# Patient Record
Sex: FEMALE | Race: WHITE | NOT HISPANIC OR LATINO | Employment: UNEMPLOYED | URBAN - METROPOLITAN AREA
[De-identification: names, ages, dates, MRNs, and addresses within clinical notes are randomized per-mention and may not be internally consistent; named-entity substitution may affect disease eponyms.]

---

## 2017-10-24 ENCOUNTER — HOSPITAL ENCOUNTER (OUTPATIENT)
Dept: PREADMISSION TESTING | Facility: HOSPITAL | Age: 22
Discharge: HOME | End: 2017-10-24
Attending: SURGERY
Payer: COMMERCIAL

## 2017-10-24 VITALS — HEIGHT: 61 IN | WEIGHT: 123 LBS | BODY MASS INDEX: 23.22 KG/M2

## 2017-10-24 DIAGNOSIS — K80.20 CALCULUS OF GALLBLADDER WITHOUT CHOLECYSTITIS WITHOUT OBSTRUCTION: Primary | ICD-10-CM

## 2017-10-24 LAB
BILIRUB UR QL STRIP: NEGATIVE
CLARITY UR: CLEAR
COLOR UR: YELLOW
GLUCOSE UR QL STRIP: NEGATIVE
HGB UR QL STRIP: NEGATIVE
KETONES UR QL STRIP: NEGATIVE
LEUKOCYTE ESTERASE UR QL STRIP: NEGATIVE
NITRITE UR QL STRIP: NEGATIVE
PH UR STRIP: 6 [PH] (ref 5–8)
PROT UR QL STRIP: NEGATIVE
SP GR UR STRIP: <=1.005 (ref 1–1.03)
URN SPEC COLLECT METH UR: ABNORMAL
UROBILINOGEN UR STRIP-ACNC: NEGATIVE EU/DL

## 2017-10-24 PROCEDURE — 81003 URINALYSIS AUTO W/O SCOPE: CPT

## 2017-10-24 PROCEDURE — 99900103 DSU ONLY-NO CHARGE-INITIAL HR (STAT)

## 2017-10-26 ENCOUNTER — ANESTHESIA EVENT (OUTPATIENT)
Dept: SURGERY | Facility: HOSPITAL | Age: 22
End: 2017-10-26
Payer: COMMERCIAL

## 2017-10-27 ENCOUNTER — HOSPITAL ENCOUNTER (OUTPATIENT)
Facility: HOSPITAL | Age: 22
Discharge: HOME | End: 2017-10-27
Attending: SURGERY | Admitting: SURGERY
Payer: COMMERCIAL

## 2017-10-27 ENCOUNTER — ANESTHESIA (OUTPATIENT)
Dept: SURGERY | Facility: HOSPITAL | Age: 22
End: 2017-10-27
Payer: COMMERCIAL

## 2017-10-27 VITALS
OXYGEN SATURATION: 98 % | TEMPERATURE: 98 F | DIASTOLIC BLOOD PRESSURE: 66 MMHG | WEIGHT: 123 LBS | BODY MASS INDEX: 23.22 KG/M2 | HEIGHT: 61 IN | RESPIRATION RATE: 16 BRPM | SYSTOLIC BLOOD PRESSURE: 118 MMHG | HEART RATE: 70 BPM

## 2017-10-27 DIAGNOSIS — K80.20 GALLSTONES: ICD-10-CM

## 2017-10-27 LAB
B-HCG UR QL: NEGATIVE
CTP QC/QA: YES

## 2017-10-27 PROCEDURE — 71000015 HC POSTOP RECOV 1ST HR: Performed by: SURGERY

## 2017-10-27 PROCEDURE — 81025 URINE PREGNANCY TEST: CPT | Performed by: SURGERY

## 2017-10-27 PROCEDURE — 63600175 PHARM REV CODE 636 W HCPCS: Performed by: ANESTHESIOLOGY

## 2017-10-27 PROCEDURE — 27201423 OPTIME MED/SURG SUP & DEVICES STERILE SUPPLY: Performed by: SURGERY

## 2017-10-27 PROCEDURE — 25000003 PHARM REV CODE 250: Performed by: ANESTHESIOLOGY

## 2017-10-27 PROCEDURE — 71000033 HC RECOVERY, INTIAL HOUR: Performed by: SURGERY

## 2017-10-27 PROCEDURE — 63600175 PHARM REV CODE 636 W HCPCS: Performed by: SURGERY

## 2017-10-27 PROCEDURE — 36000709 HC OR TIME LEV III EA ADD 15 MIN: Performed by: SURGERY

## 2017-10-27 PROCEDURE — D9220A PRA ANESTHESIA: Mod: CRNA,,, | Performed by: NURSE ANESTHETIST, CERTIFIED REGISTERED

## 2017-10-27 PROCEDURE — 63600175 PHARM REV CODE 636 W HCPCS: Performed by: NURSE ANESTHETIST, CERTIFIED REGISTERED

## 2017-10-27 PROCEDURE — 99900104 DSU ONLY-NO CHARGE-EA ADD'L HR (STAT): Performed by: SURGERY

## 2017-10-27 PROCEDURE — D9220A PRA ANESTHESIA: Mod: ANES,,, | Performed by: ANESTHESIOLOGY

## 2017-10-27 PROCEDURE — 37000009 HC ANESTHESIA EA ADD 15 MINS: Performed by: SURGERY

## 2017-10-27 PROCEDURE — 36000708 HC OR TIME LEV III 1ST 15 MIN: Performed by: SURGERY

## 2017-10-27 PROCEDURE — 71000016 HC POSTOP RECOV ADDL HR: Performed by: SURGERY

## 2017-10-27 PROCEDURE — 25000003 PHARM REV CODE 250: Performed by: NURSE ANESTHETIST, CERTIFIED REGISTERED

## 2017-10-27 PROCEDURE — 37000008 HC ANESTHESIA 1ST 15 MINUTES: Performed by: SURGERY

## 2017-10-27 PROCEDURE — 99900103 DSU ONLY-NO CHARGE-INITIAL HR (STAT): Performed by: SURGERY

## 2017-10-27 PROCEDURE — 71000039 HC RECOVERY, EACH ADD'L HOUR: Performed by: SURGERY

## 2017-10-27 PROCEDURE — 88304 TISSUE EXAM BY PATHOLOGIST: CPT | Performed by: PATHOLOGY

## 2017-10-27 RX ORDER — FENTANYL CITRATE 50 UG/ML
25 INJECTION, SOLUTION INTRAMUSCULAR; INTRAVENOUS EVERY 5 MIN PRN
Status: DISCONTINUED | OUTPATIENT
Start: 2017-10-27 | End: 2017-10-27 | Stop reason: HOSPADM

## 2017-10-27 RX ORDER — ONDANSETRON 2 MG/ML
INJECTION INTRAMUSCULAR; INTRAVENOUS
Status: DISCONTINUED | OUTPATIENT
Start: 2017-10-27 | End: 2017-10-27

## 2017-10-27 RX ORDER — SODIUM CHLORIDE, SODIUM LACTATE, POTASSIUM CHLORIDE, CALCIUM CHLORIDE 600; 310; 30; 20 MG/100ML; MG/100ML; MG/100ML; MG/100ML
INJECTION, SOLUTION INTRAVENOUS CONTINUOUS
Status: DISCONTINUED | OUTPATIENT
Start: 2017-10-27 | End: 2017-10-27 | Stop reason: HOSPADM

## 2017-10-27 RX ORDER — DEXAMETHASONE SODIUM PHOSPHATE 4 MG/ML
INJECTION, SOLUTION INTRA-ARTICULAR; INTRALESIONAL; INTRAMUSCULAR; INTRAVENOUS; SOFT TISSUE
Status: DISCONTINUED | OUTPATIENT
Start: 2017-10-27 | End: 2017-10-27

## 2017-10-27 RX ORDER — FENTANYL CITRATE 50 UG/ML
25 INJECTION, SOLUTION INTRAMUSCULAR; INTRAVENOUS EVERY 5 MIN PRN
Status: COMPLETED | OUTPATIENT
Start: 2017-10-27 | End: 2017-10-27

## 2017-10-27 RX ORDER — LIDOCAINE HCL/PF 100 MG/5ML
SYRINGE (ML) INTRAVENOUS
Status: DISCONTINUED | OUTPATIENT
Start: 2017-10-27 | End: 2017-10-27

## 2017-10-27 RX ORDER — FENTANYL CITRATE 50 UG/ML
INJECTION, SOLUTION INTRAMUSCULAR; INTRAVENOUS
Status: DISCONTINUED | OUTPATIENT
Start: 2017-10-27 | End: 2017-10-27

## 2017-10-27 RX ORDER — MIDAZOLAM HYDROCHLORIDE 1 MG/ML
INJECTION, SOLUTION INTRAMUSCULAR; INTRAVENOUS
Status: DISCONTINUED | OUTPATIENT
Start: 2017-10-27 | End: 2017-10-27

## 2017-10-27 RX ORDER — NEOSTIGMINE METHYLSULFATE 1 MG/ML
INJECTION, SOLUTION INTRAVENOUS
Status: DISCONTINUED | OUTPATIENT
Start: 2017-10-27 | End: 2017-10-27

## 2017-10-27 RX ORDER — ONDANSETRON 2 MG/ML
4 INJECTION INTRAMUSCULAR; INTRAVENOUS ONCE AS NEEDED
Status: DISCONTINUED | OUTPATIENT
Start: 2017-10-27 | End: 2017-10-27 | Stop reason: HOSPADM

## 2017-10-27 RX ORDER — PROPOFOL 10 MG/ML
VIAL (ML) INTRAVENOUS
Status: DISCONTINUED | OUTPATIENT
Start: 2017-10-27 | End: 2017-10-27

## 2017-10-27 RX ORDER — VECURONIUM BROMIDE FOR INJECTION 1 MG/ML
INJECTION, POWDER, LYOPHILIZED, FOR SOLUTION INTRAVENOUS
Status: DISCONTINUED | OUTPATIENT
Start: 2017-10-27 | End: 2017-10-27

## 2017-10-27 RX ORDER — KETOROLAC TROMETHAMINE 30 MG/ML
INJECTION, SOLUTION INTRAMUSCULAR; INTRAVENOUS
Status: DISCONTINUED | OUTPATIENT
Start: 2017-10-27 | End: 2017-10-27

## 2017-10-27 RX ORDER — LIDOCAINE HYDROCHLORIDE 10 MG/ML
1 INJECTION, SOLUTION EPIDURAL; INFILTRATION; INTRACAUDAL; PERINEURAL ONCE
Status: COMPLETED | OUTPATIENT
Start: 2017-10-27 | End: 2017-10-27

## 2017-10-27 RX ORDER — GLYCOPYRROLATE 0.2 MG/ML
INJECTION INTRAMUSCULAR; INTRAVENOUS
Status: DISCONTINUED | OUTPATIENT
Start: 2017-10-27 | End: 2017-10-27

## 2017-10-27 RX ORDER — OXYCODONE HYDROCHLORIDE 5 MG/1
5 TABLET ORAL ONCE
Status: COMPLETED | OUTPATIENT
Start: 2017-10-27 | End: 2017-10-27

## 2017-10-27 RX ORDER — PHENYLEPHRINE HYDROCHLORIDE 10 MG/ML
INJECTION INTRAVENOUS
Status: DISCONTINUED | OUTPATIENT
Start: 2017-10-27 | End: 2017-10-27

## 2017-10-27 RX ORDER — OXYCODONE HYDROCHLORIDE 5 MG/1
5 TABLET ORAL ONCE AS NEEDED
Status: COMPLETED | OUTPATIENT
Start: 2017-10-27 | End: 2017-10-27

## 2017-10-27 RX ADMIN — PIPERACILLIN SODIUM AND TAZOBACTAM SODIUM 3.38 G: 3; .375 INJECTION, POWDER, LYOPHILIZED, FOR SOLUTION INTRAVENOUS at 07:10

## 2017-10-27 RX ADMIN — GLYCOPYRROLATE 0.2 MG: 0.2 INJECTION, SOLUTION INTRAMUSCULAR; INTRAVENOUS at 07:10

## 2017-10-27 RX ADMIN — PHENYLEPHRINE HYDROCHLORIDE 100 MCG: 10 INJECTION INTRAVENOUS at 07:10

## 2017-10-27 RX ADMIN — NEOSTIGMINE METHYLSULFATE 5 MG: 1 INJECTION INTRAVENOUS at 08:10

## 2017-10-27 RX ADMIN — FENTANYL CITRATE 25 MCG: 50 INJECTION, SOLUTION INTRAMUSCULAR; INTRAVENOUS at 09:10

## 2017-10-27 RX ADMIN — FENTANYL CITRATE 50 MCG: 50 INJECTION INTRAMUSCULAR; INTRAVENOUS at 07:10

## 2017-10-27 RX ADMIN — MIDAZOLAM 2 MG: 1 INJECTION INTRAMUSCULAR; INTRAVENOUS at 07:10

## 2017-10-27 RX ADMIN — DEXAMETHASONE SODIUM PHOSPHATE 8 MG: 4 INJECTION, SOLUTION INTRAMUSCULAR; INTRAVENOUS at 07:10

## 2017-10-27 RX ADMIN — LIDOCAINE HYDROCHLORIDE 75 MG: 20 INJECTION, SOLUTION INTRAVENOUS at 07:10

## 2017-10-27 RX ADMIN — SODIUM CHLORIDE, SODIUM LACTATE, POTASSIUM CHLORIDE, AND CALCIUM CHLORIDE: .6; .31; .03; .02 INJECTION, SOLUTION INTRAVENOUS at 08:10

## 2017-10-27 RX ADMIN — VECURONIUM BROMIDE FOR INJECTION 5 MG: 1 INJECTION, POWDER, LYOPHILIZED, FOR SOLUTION INTRAVENOUS at 07:10

## 2017-10-27 RX ADMIN — PROPOFOL 160 MG: 10 INJECTION, EMULSION INTRAVENOUS at 07:10

## 2017-10-27 RX ADMIN — KETOROLAC TROMETHAMINE 30 MG: 30 INJECTION, SOLUTION INTRAMUSCULAR; INTRAVENOUS at 07:10

## 2017-10-27 RX ADMIN — OXYCODONE HYDROCHLORIDE 5 MG: 5 TABLET ORAL at 09:10

## 2017-10-27 RX ADMIN — LIDOCAINE HYDROCHLORIDE 10 MG: 10 INJECTION, SOLUTION EPIDURAL; INFILTRATION; INTRACAUDAL; PERINEURAL at 06:10

## 2017-10-27 RX ADMIN — FENTANYL CITRATE 25 MCG: 50 INJECTION INTRAMUSCULAR; INTRAVENOUS at 08:10

## 2017-10-27 RX ADMIN — ONDANSETRON 4 MG: 2 INJECTION, SOLUTION INTRAMUSCULAR; INTRAVENOUS at 07:10

## 2017-10-27 RX ADMIN — SODIUM CHLORIDE, SODIUM LACTATE, POTASSIUM CHLORIDE, AND CALCIUM CHLORIDE: .6; .31; .03; .02 INJECTION, SOLUTION INTRAVENOUS at 06:10

## 2017-10-27 RX ADMIN — GLYCOPYRROLATE 0.4 MG: 0.2 INJECTION, SOLUTION INTRAMUSCULAR; INTRAVENOUS at 08:10

## 2017-10-27 NOTE — ANESTHESIA PREPROCEDURE EVALUATION
10/27/2017  Irene Garyson is a 21 y.o., female.    Anesthesia Evaluation    I have reviewed the Patient Summary Reports.    I have reviewed the Nursing Notes.      Review of Systems  Anesthesia Hx:  No problems with previous Anesthesia        Physical Exam  General:  Well nourished                 Anesthesia Plan  Type of Anesthesia, risks & benefits discussed:  Anesthesia Type:  general  Patient's Preference:   Intra-op Monitoring Plan:   Intra-op Monitoring Plan Comments:   Post Op Pain Control Plan:   Post Op Pain Control Plan Comments:   Induction:   IV  Beta Blocker:  Patient is not currently on a Beta-Blocker (No further documentation required).       Informed Consent: Patient understands risks and agrees with Anesthesia plan.  Questions answered. Anesthesia consent signed with patient.  ASA Score: 1     Day of Surgery Review of History & Physical:    H&P update referred to the surgeon.         Ready For Surgery From Anesthesia Perspective.

## 2017-10-27 NOTE — TRANSFER OF CARE
"Anesthesia Transfer of Care Note    Patient: Irene Grayson    Procedure(s) Performed: Procedure(s) (LRB):  CHOLECYSTECTOMY-LAPAROSCOPIC (N/A)    Patient location: PACU    Anesthesia Type: general    Transport from OR: Transported from OR on room air with adequate spontaneous ventilation    Post pain: adequate analgesia    Post assessment: no apparent anesthetic complications and tolerated procedure well    Post vital signs: stable    Level of consciousness: sedated    Nausea/Vomiting: no nausea/vomiting    Complications: none    Transfer of care protocol was followed      Last vitals:   Visit Vitals  /64 (BP Location: Left arm, Patient Position: Lying)   Pulse 80   Temp 36.6 °C (97.9 °F) (Temporal)   Resp 18   Ht 5' 1" (1.549 m)   Wt 55.8 kg (123 lb)   LMP 10/14/2017 (Exact Date)   SpO2 99%   Breastfeeding? No   BMI 23.24 kg/m²     "

## 2017-10-27 NOTE — BRIEF OP NOTE
[facility]  Brief Operative Note     SUMMARY     Surgery Date: 10/27/2017     Surgeon(s) and Role:     * Mariela Martin MD - Primary    Assisting Surgeon: Dr Case Riojas    Pre-op Diagnosis:  Cholecystitis [K81.9]    Post-op Diagnosis:  Post-Op Diagnosis Codes:     * Biliary colic [K80.50]    Procedure(s) (LRB):  CHOLECYSTECTOMY-LAPAROSCOPIC (N/A)    Anesthesia: General    Description of the findings of the procedure: Small GB, but intrahepatic    Findings/Key Components: One mod. Size stone in GB--pt wanted a pic of stones    Estimated Blood Loss: 5 mL         Specimens:   Specimen (12h ago through future)    Start     Ordered    10/27/17 0755  Specimen to Pathology - Surgery  Once     Comments:  Pre-op Diagnosis: Cholecystitis [K81.9]Post-op Diagnosis: same Procedure(s):CHOLECYSTECTOMY-LAPAROSCOPIC Number of specimens: 1Name of specimens: gallbladder      10/27/17 0755          Discharge Note    SUMMARY     Admit Date: 10/27/2017    Discharge Date and Time:  10/27/2017 9:01 AM    Hospital Course (synopsis of major diagnoses, care, treatment, and services provided during the course of the hospital stay): **Uneventful*     Final Diagnosis: Post-Op Diagnosis Codes:     * Biliary colic [K80.50]    Disposition: Home or Self Care    Follow Up/Patient Instructions:     Medications:  Reconciled Home Medications:   Current Discharge Medication List      CONTINUE these medications which have NOT CHANGED    Details   ibuprofen (ADVIL,MOTRIN) 200 MG tablet Take 200 mg by mouth every 6 (six) hours as needed for Pain.      ondansetron (ZOFRAN-ODT) 4 MG TbDL Take 1 tablet (4 mg total) by mouth every 8 (eight) hours as needed (nausea).  Qty: 9 tablet, Refills: 0             Discharge Procedure Orders  Diet general     Activity as tolerated     Lifting restrictions   Scheduling Instructions: No heavy lifting X 3 weeks     Remove dressing in 48 hours   Scheduling Instructions: 1). Clean w peroxide after removing  dressings  2) Abd binder- for pt comfort  3). May shower once dressings removed       Follow-up Information     Mariela Martin MD On 11/6/2017.    Specialty:  General Surgery  Why:  For wound re-check at 2:30pm  Contact information:  [address]  [address]  [place] [address]  [phone]             Mariela Martin MD.    Specialty:  General Surgery  Why:  Pt already has an apt on Nov. 6th at 2:30pm  Contact information:  [address]  [address]  [facility] [address]  [phone]

## 2017-10-27 NOTE — OR NURSING
Pt. Is AAOx4, calm, cooperative.  Pt. Tolerates clear liquids without nausea or vomiting and maintains airway on room air.  Has abdominal discomfort/gas pain and abdominal discomfort from lap cali, but able to tolerate pain at this time.  Ambulated with 1 person assist to bathroom and able to void spontaneously. Pt. Hesitant to drink much at this time - RN instructed pt. And her mother on importance of  Adequate oral fluid intake, and to avoid spicy or fried food, advancing diet slowly, and they verbalized understanding.  RN reviewed plan of care for post op care, discharge instructions and medications with pt. And mother and they voiced understanding.  RN gave written instructions to mother.  Discharged by wheelchair to lobby and home with parents in private vehicle.

## 2017-10-27 NOTE — OR NURSING
Pt arrived to preop and placed in bed. SCDs and warm blankets provided. Periop process explained to both patient and family with verbalized understanding. Pts belongings tagged/bagged and kept in preop.

## 2017-10-27 NOTE — DISCHARGE SUMMARY
DATE OF ADMISSION:  10/27/2017.    DATE OF DISCHARGE:  10/27/2017.    DISCHARGE DIAGNOSES:  1.  Cholelithiasis.  2.  Biliary colic.  3.  Intrahepatic gallbladder.    PROCEDURE:  Laparoscopic cholecystectomy.    SURGEON:  Mariela Martin M.D.    FIRST ASSISTANT:  Dr. Rodriguez    SUMMARY:  The patient is a 21-year-old white female who was having biliary   colic, which was getting more frequent and she was found to have gallstones in   her gallbladder.  She was admitted for a laparoscopic cholecystectomy.  The   surgery went fine.  She was taken to Recovery Room and once she recovers will be   taken to Same Day Surgery and discharged home after she urinates and tolerate   liquids.  She has four Band-Aids which she was instructed to keep on for 48   hours and then removed.  She may shower once she takes the Band-Aid off.    ACTIVITY:  No heavy lifting x3 weeks.    DIET:  Regular.    INSTRUCTIONS:  Clean incisions 3 times a day with peroxide after removing the   dressings.    ACTIVITY:  No heavy lifting for 3 weeks.    CLINIC FOLLOWUP:  The patient is to come back and see me on 11/06/2017 at 2:30   in my office.    CONDITION UPON DISCHARGE:  Stable.      KATHRYN/IN  dd: 10/27/2017 09:11:36 (CDT)  td: 10/27/2017 10:40:35 (CDT)  Doc ID   #[id number]  Job ID #[id number]    CC:

## 2017-10-27 NOTE — ANESTHESIA POSTPROCEDURE EVALUATION
"Anesthesia Post Evaluation    Patient: Irene Grayson    Procedure(s) Performed: Procedure(s) (LRB):  CHOLECYSTECTOMY-LAPAROSCOPIC (N/A)    Final Anesthesia Type: general  Patient location during evaluation: PACU  Patient participation: Yes- Able to Participate  Level of consciousness: awake and alert  Post-procedure vital signs: reviewed and stable  Pain management: adequate  Airway patency: patent  PONV status at discharge: No PONV  Anesthetic complications: no      Cardiovascular status: blood pressure returned to baseline and hemodynamically stable  Respiratory status: unassisted  Hydration status: euvolemic  Follow-up not needed.        Visit Vitals  /64 (BP Location: Left arm, Patient Position: Lying)   Pulse 80   Temp 36.6 °C (97.9 °F) (Temporal)   Resp 18   Ht 5' 1" (1.549 m)   Wt 55.8 kg (123 lb)   LMP 10/14/2017 (Exact Date)   SpO2 99%   Breastfeeding? No   BMI 23.24 kg/m²       Pain/Isaac Score: Pain Assessment Performed: Yes (10/27/2017  8:55 AM)  Presence of Pain: non-verbal indicators absent (10/27/2017  8:55 AM)  Pain Rating Prior to Med Admin: 4 (10/27/2017  6:38 AM)  Isaac Score: 8 (10/27/2017  8:55 AM)      "

## 2017-10-27 NOTE — DISCHARGE INSTRUCTIONS
General Information:    1.  Do not drink alcoholic beverages including beer for 24 hours or as long as you are on pain medication..  2.  Do not drive a motor vehicle, operate machinery or power tools, or signs legal papers for 24 hours or as long as you are on pain medication.   3.  You may experience light-headedness, dizziness, and sleepiness following surgery. Please do not stay alone. A responsible adult should be with you for this 24 hour period.  4.  Go home and rest.    5. Progress slowly to a normal diet unless instructed.  Otherwise, begin with liquids such as soft drinks, then soup and crackers working up to solid foods. Drink plenty of nonalcoholic fluids.  6.  Certain anesthetics and pain medications produce nausea and vomiting in certain       individuals. If nausea becomes a problem at home, call you doctor.    7. A nurse will be calling you sometime after surgery. Do not be alarmed. This is our way of finding out how you are doing.    8. Several times every hour while you are awake, take 2-3 deep breaths and cough. If you had stomach surgery hold a pillow or rolled towel firmly against your stomach before you cough. This will help with any pain the cough might cause.  9. Several times every hour while you are awake, pump and flex your feet 5-6 times and do foot circles. This will help prevent blood clots.    10.Call your doctor for severe pain, bleeding, fever, or signs or symptoms of infection (pain, swelling, redness, foul odor, drainage).    Discharge Instructions: After Your Surgery/Procedure  Youve just had surgery. During surgery you were given medicine called anesthesia to keep you relaxed and free of pain. After surgery you may have some pain or nausea. This is common. Here are some tips for feeling better and getting well after surgery.     Stay on schedule with your medication.   Going home  Your doctor or nurse will show you how to take care of yourself when you go home. He or she will  "also answer your questions. Have an adult family member or friend drive you home.      For your safety we recommend these precaution for the first 24 hours after your procedure:  · Do not drive or use heavy equipment.  · Do not make important decisions or sign legal papers.  · Do not drink alcohol.  · Have someone stay with you, if needed. He or she can watch for problems and help keep you safe.  · Your concentration, balance, coordination, and judgement may be impaired for many hours after anesthesia.  Use caution when ambulating or standing up.     · You may feel weak and "washed out" after anesthesia and surgery.      Subtle residual effects of general anesthesia or sedation with regional / local anesthesia can last more than 24 hours.  Rest for the remainder of the day or longer if your Doctor/Surgeon has advised you to do so.  Although you may feel normal within the first 24 hours, your reflexes and mental ability may be impaired without you realizing it.  You may feel dizzy, lightheaded or sleepy for 24 hours or longer.      Be sure to go to all follow-up visits with your doctor. And rest after your surgery for as long as your doctor tells you to.  Coping with pain  If you have pain after surgery, pain medicine will help you feel better. Take it as told, before pain becomes severe. Also, ask your doctor or pharmacist about other ways to control pain. This might be with heat, ice, or relaxation. And follow any other instructions your surgeon or nurse gives you.  Tips for taking pain medicine  To get the best relief possible, remember these points:  · Pain medicines can upset your stomach. Taking them with a little food may help.  · Most pain relievers taken by mouth need at least 20 to 30 minutes to start to work.  · Taking medicine on a schedule can help you remember to take it. Try to time your medicine so that you can take it before starting an activity. This might be before you get dressed, go for a walk, " or sit down for dinner.  · Constipation is a common side effect of pain medicines. Call your doctor before taking any medicines such as laxatives or stool softeners to help ease constipation. Also ask if you should skip any foods. Drinking lots of fluids and eating foods such as fruits and vegetables that are high in fiber can also help. Remember, do not take laxatives unless your surgeon has prescribed them.  · Drinking alcohol and taking pain medicine can cause dizziness and slow your breathing. It can even be deadly. Do not drink alcohol while taking pain medicine.  · Pain medicine can make you react more slowly to things. Do not drive or run machinery while taking pain medicine.  Your health care provider may tell you to take acetaminophen to help ease your pain. Ask him or her how much you are supposed to take each day. Acetaminophen or other pain relievers may interact with your prescription medicines or other over-the-counter (OTC) drugs. Some prescription medicines have acetaminophen and other ingredients. Using both prescription and OTC acetaminophen for pain can cause you to overdose. Read the labels on your OTC medicines with care. This will help you to clearly know the list of ingredients, how much to take, and any warnings. It may also help you not take too much acetaminophen. If you have questions or do not understand the information, ask your pharmacist or health care provider to explain it to you before you take the OTC medicine.  Managing nausea  Some people have an upset stomach after surgery. This is often because of anesthesia, pain, or pain medicine, or the stress of surgery. These tips will help you handle nausea and eat healthy foods as you get better. If you were on a special food plan before surgery, ask your doctor if you should follow it while you get better. These tips may help:  · Do not push yourself to eat. Your body will tell you when to eat and how much.  · Start off with clear  liquids and soup. They are easier to digest.  · Next try semi-solid foods, such as mashed potatoes, applesauce, and gelatin, as you feel ready.  · Slowly move to solid foods. Dont eat fatty, rich, or spicy foods at first.  · Do not force yourself to have 3 large meals a day. Instead eat smaller amounts more often.  · Take pain medicines with a small amount of solid food, such as crackers or toast, to avoid nausea.     Call your surgeon if  · You still have pain an hour after taking medicine. The medicine may not be strong enough.  · You feel too sleepy, dizzy, or groggy. The medicine may be too strong.  · You have side effects like nausea, vomiting, or skin changes, such as rash, itching, or hives.       If you have obstructive sleep apnea  You were given anesthesia medicine during surgery to keep you comfortable and free of pain. After surgery, you may have more apnea spells because of this medicine and other medicines you were given. The spells may last longer than usual.   At home:  · Keep using the continuous positive airway pressure (CPAP) device when you sleep. Unless your health care provider tells you not to, use it when you sleep, day or night. CPAP is a common device used to treat obstructive sleep apnea.  · Talk with your provider before taking any pain medicine, muscle relaxants, or sedatives. Your provider will tell you about the possible dangers of taking these medicines.  © 5923-7660 [employer]. [address] [place] [address]. All rights reserved. This information is not intended as a substitute for professional medical care. Always follow your healthcare professional's instructions.  Post op instructions for prevention of DVT  What is deep vein thrombosis?  Deep vein thrombosis (DVT) is the medical term for blood clots in the deep veins of the leg.  These blood clots can be dangerous.  A DVT can block a blood vessel and keep blood from getting where it needs to go.   Another problem is that the clot can travel to other parts of the body such as the lungs.  A clot that travels to the lungs is called a pulmonary embolus (PE) and can cause serious problems with breathing which can lead to death.  Am I at risk for DVT/PE?  If you are not very active, you are at risk of DVT.  Anyone confined to bed, sitting for long periods of time, recovering from surgery, etc. increases the risk of DVT.  Other risk factors are cancer diagnosis, certain medications, estrogen replacement in any form,older age, obesity, pregnancy, smoking, history of clotting disorders, and dehydration.  How will I know if I have a DVT?   Swelling in the lower leg   Pain   Warmth, redness, hardness or bulging of the vein  If you have any of these symptoms, call your doctors office right away.  Some people will not have any symptoms until the clot moves to the lungs.  What are the symptoms of a PE?   Panting, shortness of breath, or trouble breathing   Sharp, knife-like chest pain when you breathe   Coughing or coughing up blood   Rapid heartbeat  If you have any of these symptoms or get worse quickly, call 911 for emergency treatment.  How can I prevent a DVT?   Avoid long periods of inactivity and dont cross your legs--get up and walk around every hour or so.   Stay active--walking after surgery is highly encouraged.  This means you should get out of the house and walk in the neighborhood.  Going up and down stairs will not impair healing (unless advised against such activity by your doctor).     Drink plenty of noncaffeinated, nonalcoholic fluids each day to prevent dehydration.   Wear special support stockings, if they have been advised by your doctor.   If you travel, stop at least once an hour and walk around.   Avoid smoking (assistance with stopping is available through your healthcare provider)  Always notify your doctor if you are not able to follow the post operative instructions that are  given to you at the time of discharge.  It may be necessary to prescribe one of the medications available to prevent DVT.Using Opioids for Pain Management     Your doctor has given instructions for you to take an opioid.  This is a drug for bad pain.  It helps control pain without causing bleeding and kidney problems.  Common opioid names are morphine, hydromorphone, oxycodone, and methadone. These drugs are called narcotics.    There are several safety concerns you need to know.     · It is against the law to give or sell this drug to another person.  You must keep this medicine safely locked.    · You may have side effects from taking this medication.  These include nausea, itching, sweating, sleepiness, a change in your ability to breathe, and depression.  · Do not take alcohol or sleeping pills opioids.    · Long-term opoid use may no longer giver you relief from pain.  It can cause you stomach pain, mental anxiety, and headaches.  Long-term opoid use can potentially lead to unlawful street drug abuse and reduce your ability to stay employed.    · Your body may become opioid tolerant if you need to take more to get relief.    · You must stop taking opioids if you begin having more pain as a result of the medicine.    · Opioid withdrawal occurs when you have to stop taking the drug.  It can cause you to have nausea, vomiting, diarrhea, stomach pain, anxiety, and dilated pupils in your eyes. This condition means you are opioid dependent.    · Addiction is a drug induced brain disease. It means there are changes in how your brain is working.  Children, teens, and young adults under 25 years old are more likely to get addicted to opioids.      · Addiction can happen with repeated opioid use.  It does not happen with short-term use of two weeks or less.       For more information, please speak with your doctor or pharmacist.        We hope your stay was comfortable as you heal now, mend and rest.    For we have  enjoyed taking care of you by giving your our best.    And as you get better, by regaining your health and strength;   We count it as a privilege to have served you and hope your time at [facility] was well spent.      Thank  You!!!  Discharge Instructions for Laparoscopic Cholecystectomy  You have had a procedure known as a laparoscopic cholecystectomy. A laparoscopic cholecystectomy is a procedure to remove your gallbladder. People who have this procedure usually recover more quickly and have less pain than with open gallbladder surgery (called open cholecystectomy). Many surgeons recommend a low-fat diet, avoiding fried food in particular, for the first month after surgery.   You can live a full and healthy life without your gallbladder. This includes eating the foods and doing the things you enjoyed before your gallbladder problems started.  Home care  Recommendations for home care include the following:   · Ask someone to drive you to your appointments for the next 3 days. Dont drive until you are no longer taking pain medicine and are able to step on the brake pedal without hesitation.   · Wash the skin around your incision daily with mild soap and water. It's OK to shower the day after your surgery.  · Eat your regular diet. It is wise to stay away from rich, greasy, or spicy food for a few days.  · Remember, it takes at least 1 week for you to get most of your strength and energy back.  · Make an office visit to talk to your healthcare provider if the following symptoms dont go away within a week after your surgery:  ¨ Fatigue  ¨ Pain around the incision  ¨ Diarrhea or constipation  ¨ Loss of appetite     When to call your healthcare provider  Call your healthcare provider immediately if you have any of the following:  · Yellowing of your eyes or skin (jaundice)  · Chills  · Fever of 100.4°F (38.0°C) or higher, or as directed by your healthcare provider   · Redness, swelling, increasing pain, pus, or a foul  smell at the incision site  · Dark or rust-colored urine  · Stool that is asael-colored or light in color instead of brown  · Increasing belly pain  · Rectal bleeding  · Leg swelling or shortness of breath   Date Last Reviewed: 7/1/2016  © 0553-6517 [employer]. [address] [place] [address]. All rights reserved. This information is not intended as a substitute for professional medical care. Always follow your healthcare professional's instructions.

## 2018-01-03 ENCOUNTER — OFFICE VISIT (OUTPATIENT)
Dept: FAMILY MEDICINE | Facility: CLINIC | Age: 23
End: 2018-01-03
Payer: COMMERCIAL

## 2018-01-03 VITALS
DIASTOLIC BLOOD PRESSURE: 80 MMHG | RESPIRATION RATE: 16 BRPM | BODY MASS INDEX: 22.28 KG/M2 | TEMPERATURE: 98 F | SYSTOLIC BLOOD PRESSURE: 118 MMHG | WEIGHT: 118 LBS | HEIGHT: 61 IN | HEART RATE: 88 BPM

## 2018-01-03 DIAGNOSIS — B00.1 FEVER BLISTER: ICD-10-CM

## 2018-01-03 DIAGNOSIS — N92.1 MENORRHAGIA WITH IRREGULAR CYCLE: Primary | ICD-10-CM

## 2018-01-03 DIAGNOSIS — D50.0 IRON DEFICIENCY ANEMIA DUE TO CHRONIC BLOOD LOSS: ICD-10-CM

## 2018-01-03 DIAGNOSIS — R51.9 CHRONIC DAILY HEADACHE: ICD-10-CM

## 2018-01-03 PROCEDURE — 99213 OFFICE O/P EST LOW 20 MIN: CPT | Mod: 25,,, | Performed by: FAMILY MEDICINE

## 2018-01-03 RX ORDER — NORTRIPTYLINE HYDROCHLORIDE 50 MG/1
50 CAPSULE ORAL NIGHTLY
Qty: 30 CAPSULE | Refills: 11 | Status: SHIPPED | OUTPATIENT
Start: 2018-01-03 | End: 2018-03-29

## 2018-01-03 RX ORDER — FAMCICLOVIR 500 MG/1
500 TABLET ORAL 3 TIMES DAILY
Qty: 21 TABLET | Refills: 6 | Status: SHIPPED | OUTPATIENT
Start: 2018-01-03 | End: 2018-01-10

## 2018-01-03 RX ORDER — BUTALBITAL, ACETAMINOPHEN AND CAFFEINE 50; 325; 40 MG/1; MG/1; MG/1
1 TABLET ORAL EVERY 4 HOURS PRN
Qty: 28 TABLET | Refills: 0 | Status: SHIPPED | OUTPATIENT
Start: 2018-01-03 | End: 2018-01-10

## 2018-01-03 NOTE — PROGRESS NOTES
Subjective:       Patient ID: Irene Grayson is a 22 y.o. female.    Chief Complaint: Headache and Jaw Pain (and pain in lymph nodes. )      Patient reports that she had a pain at the bridge of the mandible in the midline socially developed fever blister shows a pain in the right and anterior cervical nodes at the angle of the jaw and on the left parasternal area      Headache    This is a chronic problem. Episode onset: 9 months. The problem occurs intermittently (3/week). The problem has been gradually worsening. The pain is located in the bilateral, parietal and frontal region. The pain is at a severity of 10/10. The pain is severe. Associated symptoms include sinus pressure. Associated symptoms comments: No scotomata.. The symptoms are aggravated by bright light. She has tried darkened room for the symptoms. The treatment provided moderate relief. Her past medical history is significant for migraines in the family.       Allergies and Medications:   Review of patient's allergies indicates:  No Known Allergies  Current Outpatient Prescriptions   Medication Sig Dispense Refill    butalbital-acetaminophen-caffeine -40 mg (FIORICET, ESGIC) -40 mg per tablet Take 1 tablet by mouth every 4 (four) hours as needed for Pain. 28 tablet 0    famciclovir (FAMVIR) 500 MG tablet Take 1 tablet (500 mg total) by mouth 3 (three) times daily. 21 tablet 6    nortriptyline (PAMELOR) 50 MG capsule Take 1 capsule (50 mg total) by mouth every evening. 30 capsule 11     No current facility-administered medications for this visit.        Family History:   Family History   Problem Relation Age of Onset    Adopted: Yes    No Known Problems Mother     No Known Problems Father        Social History:   Social History     Social History    Marital status: Single     Spouse name: N/A    Number of children: N/A    Years of education: N/A     Occupational History    Not on file.     Social History Main Topics    Smoking  status: Current Every Day Smoker     Packs/day: 0.50     Years: 5.00     Types: Cigarettes    Smokeless tobacco: Never Used    Alcohol use No    Drug use: No    Sexual activity: Not on file     Other Topics Concern    Not on file     Social History Narrative    No narrative on file       Review of Systems   HENT: Positive for sinus pressure.    Neurological: Positive for headaches.       Objective:     Vitals:    01/03/18 1408   BP: 118/80   Pulse: 88   Resp: 16   Temp: 97.9 °F (36.6 °C)        Physical Exam    Assessment:       1. Menorrhagia with irregular cycle    2. Chronic daily headache    3. Fever blister    4. Iron deficiency anemia due to chronic blood loss        Plan:       Irene was seen today for headache and jaw pain.    Diagnoses and all orders for this visit:    Menorrhagia with irregular cycle  -     Ambulatory referral to Gynecology    Chronic daily headache  -     butalbital-acetaminophen-caffeine -40 mg (FIORICET, ESGIC) -40 mg per tablet; Take 1 tablet by mouth every 4 (four) hours as needed for Pain.  -     nortriptyline (PAMELOR) 50 MG capsule; Take 1 capsule (50 mg total) by mouth every evening.    Fever blister  -     famciclovir (FAMVIR) 500 MG tablet; Take 1 tablet (500 mg total) by mouth 3 (three) times daily.    Iron deficiency anemia due to chronic blood loss  -     Ambulatory referral to Gynecology  -     CBC auto differential; Future  -     CBC auto differential         Return in about 1 month (around 2/3/2018), or if symptoms worsen or fail to improve.

## 2018-03-19 ENCOUNTER — TELEPHONE (OUTPATIENT)
Dept: FAMILY MEDICINE | Facility: CLINIC | Age: 23
End: 2018-03-19

## 2018-03-29 ENCOUNTER — OFFICE VISIT (OUTPATIENT)
Dept: FAMILY MEDICINE | Facility: CLINIC | Age: 23
End: 2018-03-29
Payer: COMMERCIAL

## 2018-03-29 VITALS
BODY MASS INDEX: 21.9 KG/M2 | HEIGHT: 61 IN | HEART RATE: 108 BPM | SYSTOLIC BLOOD PRESSURE: 118 MMHG | RESPIRATION RATE: 20 BRPM | TEMPERATURE: 99 F | WEIGHT: 116 LBS | DIASTOLIC BLOOD PRESSURE: 68 MMHG

## 2018-03-29 DIAGNOSIS — R11.2 INTRACTABLE VOMITING WITH NAUSEA, UNSPECIFIED VOMITING TYPE: ICD-10-CM

## 2018-03-29 DIAGNOSIS — R10.84 GENERALIZED ABDOMINAL PAIN: Primary | ICD-10-CM

## 2018-03-29 DIAGNOSIS — K29.70 GASTRITIS, PRESENCE OF BLEEDING UNSPECIFIED, UNSPECIFIED CHRONICITY, UNSPECIFIED GASTRITIS TYPE: ICD-10-CM

## 2018-03-29 LAB
BILIRUB SERPL-MCNC: NORMAL MG/DL
BLOOD URINE, POC: NORMAL
COLOR, POC UA: NORMAL
GLUCOSE UR QL STRIP: NORMAL
KETONES UR QL STRIP: NORMAL
LEUKOCYTE ESTERASE URINE, POC: NORMAL
NITRITE, POC UA: NORMAL
PH, POC UA: 6
PROTEIN, POC: NORMAL
SPECIFIC GRAVITY, POC UA: 1.01
UROBILINOGEN, POC UA: 0.2

## 2018-03-29 PROCEDURE — 99213 OFFICE O/P EST LOW 20 MIN: CPT | Mod: 25,,, | Performed by: FAMILY MEDICINE

## 2018-03-29 PROCEDURE — 81002 URINALYSIS NONAUTO W/O SCOPE: CPT | Mod: ,,, | Performed by: FAMILY MEDICINE

## 2018-03-29 RX ORDER — ONDANSETRON 4 MG/1
4 TABLET, ORALLY DISINTEGRATING ORAL EVERY 6 HOURS PRN
Qty: 20 TABLET | Refills: 0 | Status: ON HOLD | OUTPATIENT
Start: 2018-03-29 | End: 2020-02-02

## 2018-03-29 RX ORDER — OMEPRAZOLE 40 MG/1
40 CAPSULE, DELAYED RELEASE ORAL DAILY
Qty: 90 CAPSULE | Refills: 3 | Status: SHIPPED | OUTPATIENT
Start: 2018-03-29 | End: 2019-07-30 | Stop reason: SDUPTHER

## 2018-03-29 NOTE — PROGRESS NOTES
Subjective:       Patient ID: Irene Grayson is a 22 y.o. female.    Chief Complaint: Emesis and Hospital Follow Up (er with pneumonia last week)      Patient was admitted to the hospital for 4 days because of combination of elevated blood pressure pneumonia unspecified type of bacterial infection hospital records not available at this time.D/c'd almost a week a go . devoloped vomiting last pm.  LMP yesterday.      Emesis    This is a new problem. The current episode started yesterday. The emesis has an appearance of bile. There has been no fever. Associated symptoms include diarrhea (chronic). The treatment provided no relief.       Allergies and Medications:   Review of patient's allergies indicates:  No Known Allergies  Current Outpatient Prescriptions   Medication Sig Dispense Refill    omeprazole (PRILOSEC) 40 MG capsule Take 1 capsule (40 mg total) by mouth once daily. 90 capsule 3    ondansetron (ZOFRAN-ODT) 4 MG TbDL Take 1 tablet (4 mg total) by mouth every 6 (six) hours as needed (nausea). 20 tablet 0     No current facility-administered medications for this visit.        Family History:   Family History   Problem Relation Age of Onset    Adopted: Yes    No Known Problems Mother     No Known Problems Father        Social History:   Social History     Social History    Marital status: Single     Spouse name: N/A    Number of children: N/A    Years of education: N/A     Occupational History    Not on file.     Social History Main Topics    Smoking status: Current Every Day Smoker     Packs/day: 0.50     Years: 5.00     Types: Cigarettes    Smokeless tobacco: Never Used    Alcohol use No    Drug use: No    Sexual activity: Not on file     Other Topics Concern    Not on file     Social History Narrative    No narrative on file       Review of Systems   Gastrointestinal: Positive for diarrhea (chronic) and vomiting.       Objective:     Vitals:    03/29/18 1341   BP: 118/68   Pulse: 108    Resp: 20   Temp: 98.8 °F (37.1 °C)        Physical Exam   Constitutional: She appears well-developed and well-nourished. No distress.   HENT:   Head: Normocephalic and atraumatic.   Right Ear: Hearing, tympanic membrane, external ear and ear canal normal. No drainage, swelling or tenderness. No foreign bodies. Tympanic membrane is not injected, not scarred, not perforated, not erythematous, not retracted and not bulging. Tympanic membrane mobility is normal. No middle ear effusion. No hemotympanum. No decreased hearing is noted.   Left Ear: Hearing, tympanic membrane, external ear and ear canal normal. No drainage, swelling or tenderness. No foreign bodies. Tympanic membrane is not injected, not scarred, not perforated, not erythematous, not retracted and not bulging. Tympanic membrane mobility is normal.  No middle ear effusion. No hemotympanum. No decreased hearing is noted.   Nose: Nose normal. No mucosal edema, rhinorrhea, nose lacerations, sinus tenderness, nasal deformity or septal deviation. Right sinus exhibits no maxillary sinus tenderness and no frontal sinus tenderness. Left sinus exhibits no maxillary sinus tenderness and no frontal sinus tenderness.   Mouth/Throat: Uvula is midline and oropharynx is clear and moist. Normal dentition. No oropharyngeal exudate, posterior oropharyngeal edema, posterior oropharyngeal erythema or tonsillar abscesses.   Eyes: Conjunctivae and EOM are normal. Pupils are equal, round, and reactive to light. Right eye exhibits no discharge. Left eye exhibits no discharge. No scleral icterus.   Neck: Normal range of motion. Neck supple. No JVD present. No tracheal deviation present. No thyromegaly present.   Cardiovascular: Normal rate, regular rhythm, normal heart sounds and intact distal pulses.  Exam reveals no gallop and no friction rub.    No murmur heard.  Pulmonary/Chest: Effort normal and breath sounds normal. No stridor. No respiratory distress. She has no wheezes. She  has no rales. She exhibits no tenderness.   Abdominal: Soft. Bowel sounds are normal. She exhibits no distension (mid to LUQ, no rebound) and no mass. There is tenderness. There is no rebound and no guarding. No hernia.   Lymphadenopathy:     She has no cervical adenopathy.   Skin: She is not diaphoretic.   Nursing note and vitals reviewed.    Urine dipstick shows positive for ketones.  Micro exam: not done. UA is negative for leukocytes and nitrites and blood.    Assessment:       1. Generalized abdominal pain    2. Gastritis, presence of bleeding unspecified, unspecified chronicity, unspecified gastritis type    3. Intractable vomiting with nausea, unspecified vomiting type        Plan:       Irene was seen today for emesis and hospital follow up.    Diagnoses and all orders for this visit:    Generalized abdominal pain  -     POCT URINE DIPSTICK WITHOUT MICROSCOPE    Gastritis, presence of bleeding unspecified, unspecified chronicity, unspecified gastritis type  -     omeprazole (PRILOSEC) 40 MG capsule; Take 1 capsule (40 mg total) by mouth once daily.  -     ondansetron (ZOFRAN-ODT) 4 MG TbDL; Take 1 tablet (4 mg total) by mouth every 6 (six) hours as needed (nausea).    Intractable vomiting with nausea, unspecified vomiting type  -     POCT URINE DIPSTICK WITHOUT MICROSCOPE         Follow-up in about 4 days (around 4/2/2018), or if symptoms worsen or fail to improve.

## 2018-06-06 ENCOUNTER — OFFICE VISIT (OUTPATIENT)
Dept: FAMILY MEDICINE | Facility: CLINIC | Age: 23
End: 2018-06-06
Payer: COMMERCIAL

## 2018-06-06 VITALS
WEIGHT: 120 LBS | BODY MASS INDEX: 22.66 KG/M2 | DIASTOLIC BLOOD PRESSURE: 64 MMHG | HEART RATE: 110 BPM | SYSTOLIC BLOOD PRESSURE: 110 MMHG | TEMPERATURE: 99 F | RESPIRATION RATE: 20 BRPM | HEIGHT: 61 IN | OXYGEN SATURATION: 100 %

## 2018-06-06 DIAGNOSIS — N91.4 SECONDARY OLIGOMENORRHEA: ICD-10-CM

## 2018-06-06 DIAGNOSIS — N60.12 FIBROCYSTIC DISEASE OF LEFT BREAST: ICD-10-CM

## 2018-06-06 DIAGNOSIS — R11.0 NAUSEA: ICD-10-CM

## 2018-06-06 LAB
B-HCG UR QL: NEGATIVE
BILIRUB SERPL-MCNC: NEGATIVE MG/DL
BLOOD, POC UA: NORMAL
CTP QC/QA: YES
GLUCOSE UR QL STRIP: NEGATIVE
KETONES UR QL STRIP: NEGATIVE
LEUKOCYTE ESTERASE URINE, POC: NEGATIVE
NITRITE, POC UA: NEGATIVE
PH, POC UA: 6
PROTEIN, POC: NEGATIVE
SPECIFIC GRAVITY, POC UA: 1
UROBILINOGEN, POC UA: 0.2

## 2018-06-06 PROCEDURE — 3008F BODY MASS INDEX DOCD: CPT | Mod: ,,, | Performed by: FAMILY MEDICINE

## 2018-06-06 PROCEDURE — 81025 URINE PREGNANCY TEST: CPT | Mod: ,,, | Performed by: FAMILY MEDICINE

## 2018-06-06 PROCEDURE — 81000 URINALYSIS NONAUTO W/SCOPE: CPT | Mod: ,,, | Performed by: FAMILY MEDICINE

## 2018-06-06 PROCEDURE — 99214 OFFICE O/P EST MOD 30 MIN: CPT | Mod: 25,,, | Performed by: FAMILY MEDICINE

## 2018-06-06 NOTE — PATIENT INSTRUCTIONS
Fibrocystic    What Are Benign Breast Conditions?  Most breast conditions are benign (noncancerous), causing no serious harm to you. But all women are at some risk for breast cancer. Your risk increases as you grow older. So if you notice any breast changes that arent normal for you, see your healthcare provider. This helps to make sure that you receive proper treatment if there is a problem.  Breast infection  Infections of the breast tissue can cause skin redness, warmth, and pain or tenderness. The most common infection is mastitis. This inflammation of the mammary glands can happen during breastfeeding. Mastitis and other breast infections are often treated with antibiotics.  Nipple discharge  Many women can squeeze a tiny amount of a clear or milky discharge from 1 or both nipples. This discharge may be normal. Other kinds of discharge may be symptoms of a breast condition. A dark discharge can be caused by an intraductal papilloma (a benign growth in a duct near the nipple). A nipple discharge that is dark or bloody, or that happens without squeezing your nipple, should be checked by your healthcare provider.    Fibrocystic changes  These benign changes may cause a thickening in the breasts. Breasts may be tender or painful. They may feel more dense in some areas than in others. Sometimes solid or fluid-filled lumps (cysts) may also form. Cysts may be smooth, soft or firm, and tender. They may become larger and more tender right before your period.    Benign breast lumps  Benign breast lumps come in all shapes, textures, and sizes. A fibroadenoma (a lump of fibrous tissue) may be smooth, firm, and rubbery. This type of lump is usually painless and movable. Have any lump checked by your healthcare provider.  Date Last Reviewed: 8/13/2015  © 1902-8433 [employer]. [address] [place] [address], [place] [address]. All rights reserved. This information is not intended as a substitute for professional  medical care. Always follow your healthcare professional's instructions.

## 2018-06-06 NOTE — PROGRESS NOTES
Subjective:       Patient ID: Irene Grayson is a 22 y.o. female.    Chief Complaint: Hot Flashes; Nausea; and Chest Pain (soreness on left side around breast )      Nausea , hot flashes 2-3 weeks ago. Breast tendernesss, left upper outer quadran , some on right. This started 2 days ago. LMP Sunday, lighter-only 2 to four days.   Positive sexually active.      Nausea   This is a new problem. The current episode started 1 to 4 weeks ago. Associated symptoms include chest pain ( breast tenderness.) and nausea. Nothing aggravates the symptoms. She has tried nothing for the symptoms. The treatment provided no relief.   Chest Pain    Associated symptoms include nausea.         Allergies and Medications:   Review of patient's allergies indicates:  No Known Allergies  Current Outpatient Prescriptions   Medication Sig Dispense Refill    omeprazole (PRILOSEC) 40 MG capsule Take 1 capsule (40 mg total) by mouth once daily. 90 capsule 3    ondansetron (ZOFRAN-ODT) 4 MG TbDL Take 1 tablet (4 mg total) by mouth every 6 (six) hours as needed (nausea). 20 tablet 0     No current facility-administered medications for this visit.        Family History:   Family History   Problem Relation Age of Onset    Adopted: Yes    No Known Problems Mother     No Known Problems Father        Social History:   Social History     Social History    Marital status: Single     Spouse name: N/A    Number of children: N/A    Years of education: N/A     Occupational History    Not on file.     Social History Main Topics    Smoking status: Current Every Day Smoker     Packs/day: 0.50     Years: 5.00     Types: Cigarettes    Smokeless tobacco: Never Used    Alcohol use Yes      Comment: occasionally     Drug use: No    Sexual activity: Not on file     Other Topics Concern    Not on file     Social History Narrative    No narrative on file       Review of Systems   Cardiovascular: Positive for chest pain ( breast tenderness.).    Gastrointestinal: Positive for nausea.       Objective:     Vitals:    06/06/18 1420   BP: 110/64   Pulse: 110   Resp: 20   Temp: 98.5 °F (36.9 °C)        Physical Exam   Constitutional: She appears well-developed and well-nourished.   Pulmonary/Chest:       Skin: Capillary refill takes less than 2 seconds.   Psychiatric: She has a normal mood and affect. Her behavior is normal. Judgment and thought content normal.   Nursing note and vitals reviewed.  Urine dipstick shows negative for all components.  Micro exam: not done.  UPT negative  Assessment:       1. Nausea -Related to hormone fluctuations also affecting her periods and breast tenderness    2. Secondary oligomenorrhea As above    3. Fibrocystic disease of left breast As above        Plan:       Irene was seen today for hot flashes, nausea and chest pain.    Diagnoses and all orders for this visit:    Nausea  -     POCT Urine Pregnancy  -     POCT Urinalysis    Secondary oligomenorrhea  -     POCT Urine Pregnancy    Fibrocystic disease of left breast         No Follow-up on file.

## 2019-04-04 ENCOUNTER — OFFICE VISIT (OUTPATIENT)
Dept: FAMILY MEDICINE | Facility: CLINIC | Age: 24
End: 2019-04-04
Payer: COMMERCIAL

## 2019-04-04 VITALS
DIASTOLIC BLOOD PRESSURE: 78 MMHG | WEIGHT: 114 LBS | BODY MASS INDEX: 21.52 KG/M2 | OXYGEN SATURATION: 98 % | HEIGHT: 61 IN | HEART RATE: 104 BPM | RESPIRATION RATE: 17 BRPM | SYSTOLIC BLOOD PRESSURE: 110 MMHG | TEMPERATURE: 98 F

## 2019-04-04 DIAGNOSIS — K62.89 PROCTITIS: ICD-10-CM

## 2019-04-04 DIAGNOSIS — K52.9 CHRONIC DIARRHEA: ICD-10-CM

## 2019-04-04 PROCEDURE — 99213 OFFICE O/P EST LOW 20 MIN: CPT | Mod: ,,, | Performed by: FAMILY MEDICINE

## 2019-04-04 PROCEDURE — 99213 PR OFFICE/OUTPT VISIT, EST, LEVL III, 20-29 MIN: ICD-10-PCS | Mod: ,,, | Performed by: FAMILY MEDICINE

## 2019-04-04 PROCEDURE — 3008F BODY MASS INDEX DOCD: CPT | Mod: ,,, | Performed by: FAMILY MEDICINE

## 2019-04-04 PROCEDURE — 3008F PR BODY MASS INDEX (BMI) DOCUMENTED: ICD-10-PCS | Mod: ,,, | Performed by: FAMILY MEDICINE

## 2019-04-04 RX ORDER — CHOLESTYRAMINE 4 G/9G
1 POWDER, FOR SUSPENSION ORAL 2 TIMES DAILY
Qty: 60 PACKET | Refills: 5 | Status: SHIPPED | OUTPATIENT
Start: 2019-04-04 | End: 2019-07-30 | Stop reason: SDUPTHER

## 2019-04-04 NOTE — PROGRESS NOTES
Subjective:       Patient ID: Irene Grayson is a 23 y.o. female.    Chief Complaint: Rectal Pain (since gallbladder surgery )      A Tania because of new onset rectal pain. This is started after gallbladder surgery urinary half ago get worse during the pregnancy and after the delivery has not gotten better. Was evaluated by her GYN who did not feel that it was hemorrhoid. She had extensive vaginal tears at requiring multiple lines of sutures. Occurs with all bowel movements for more soft no blood associated with with the pain or with bowel movements. Is not exacerbated by cough sneezing or Valsalva.      Allergies and Medications:   Review of patient's allergies indicates:  No Known Allergies  Current Outpatient Medications   Medication Sig Dispense Refill    ondansetron (ZOFRAN-ODT) 4 MG TbDL Take 1 tablet (4 mg total) by mouth every 6 (six) hours as needed (nausea). 20 tablet 0    cholestyramine, with sugar, 4 gram Powd Take 1 Package by mouth 2 (two) times daily. 60 packet 5    hydrocortisone-pramoxine (PROCTOFOAM-HS) rectal foam Place 1 applicator rectally 2 (two) times daily. 1 applicator 0    omeprazole (PRILOSEC) 40 MG capsule Take 1 capsule (40 mg total) by mouth once daily. 90 capsule 3     No current facility-administered medications for this visit.        Family History:   Family History   Adopted: Yes   Problem Relation Age of Onset    No Known Problems Mother     No Known Problems Father        Social History:   Social History     Socioeconomic History    Marital status: Single     Spouse name: Not on file    Number of children: Not on file    Years of education: Not on file    Highest education level: Not on file   Occupational History    Not on file   Social Needs    Financial resource strain: Not on file    Food insecurity:     Worry: Not on file     Inability: Not on file    Transportation needs:     Medical: Not on file     Non-medical: Not on file   Tobacco Use    Smoking  status: Current Every Day Smoker     Packs/day: 0.50     Years: 5.00     Pack years: 2.50     Types: Cigarettes    Smokeless tobacco: Never Used   Substance and Sexual Activity    Alcohol use: Yes     Comment: occasionally     Drug use: No    Sexual activity: Yes     Partners: Male   Lifestyle    Physical activity:     Days per week: Not on file     Minutes per session: Not on file    Stress: Not on file   Relationships    Social connections:     Talks on phone: Not on file     Gets together: Not on file     Attends Confucianism service: Not on file     Active member of club or organization: Not on file     Attends meetings of clubs or organizations: Not on file     Relationship status: Not on file   Other Topics Concern    Not on file   Social History Narrative    Not on file       Review of Systems    Objective:     Vitals:    04/04/19 0924   BP: 110/78   Pulse: 104   Resp: 17   Temp: 98 °F (36.7 °C)        Physical Exam   Constitutional: She appears well-developed and well-nourished.   HENT:   Head: Normocephalic and atraumatic.   Eyes: Pupils are equal, round, and reactive to light. Conjunctivae and EOM are normal.   Genitourinary: Vagina normal.         Nursing note and vitals reviewed.      Assessment:       1. Chronic diarrhea    2. Proctitis        Plan:       Irene was seen today for rectal pain.    Diagnoses and all orders for this visit:    Chronic diarrhea  -     cholestyramine, with sugar, 4 gram Powd; Take 1 Package by mouth 2 (two) times daily.    Proctitis  -     cholestyramine, with sugar, 4 gram Powd; Take 1 Package by mouth 2 (two) times daily.  -     hydrocortisone-pramoxine (PROCTOFOAM-HS) rectal foam; Place 1 applicator rectally 2 (two) times daily.         Follow up in about 6 months (around 10/4/2019), or if symptoms worsen or fail to improve, for annual, well visit..

## 2019-07-30 ENCOUNTER — OFFICE VISIT (OUTPATIENT)
Dept: FAMILY MEDICINE | Facility: CLINIC | Age: 24
End: 2019-07-30
Payer: COMMERCIAL

## 2019-07-30 VITALS
TEMPERATURE: 98 F | HEART RATE: 104 BPM | WEIGHT: 123.88 LBS | SYSTOLIC BLOOD PRESSURE: 120 MMHG | HEIGHT: 61 IN | BODY MASS INDEX: 23.39 KG/M2 | OXYGEN SATURATION: 99 % | DIASTOLIC BLOOD PRESSURE: 68 MMHG

## 2019-07-30 DIAGNOSIS — K52.9 CHRONIC DIARRHEA: ICD-10-CM

## 2019-07-30 DIAGNOSIS — K62.89 PROCTITIS: ICD-10-CM

## 2019-07-30 DIAGNOSIS — R07.2 PRECORDIAL PAIN: ICD-10-CM

## 2019-07-30 DIAGNOSIS — L02.92 FURUNCULOSIS: ICD-10-CM

## 2019-07-30 DIAGNOSIS — R00.2 PALPITATION: Primary | ICD-10-CM

## 2019-07-30 DIAGNOSIS — K29.70 GASTRITIS, PRESENCE OF BLEEDING UNSPECIFIED, UNSPECIFIED CHRONICITY, UNSPECIFIED GASTRITIS TYPE: ICD-10-CM

## 2019-07-30 LAB — EKG 12-LEAD: NORMAL

## 2019-07-30 PROCEDURE — 99214 PR OFFICE/OUTPT VISIT, EST, LEVL IV, 30-39 MIN: ICD-10-PCS | Mod: 25,S$GLB,, | Performed by: FAMILY MEDICINE

## 2019-07-30 PROCEDURE — 3008F BODY MASS INDEX DOCD: CPT | Mod: S$GLB,,, | Performed by: FAMILY MEDICINE

## 2019-07-30 PROCEDURE — 93000 PR ELECTROCARDIOGRAM, COMPLETE: ICD-10-PCS | Mod: S$GLB,,, | Performed by: FAMILY MEDICINE

## 2019-07-30 PROCEDURE — 99999 PR PBB SHADOW E&M-EST. PATIENT-LVL V: CPT | Mod: PBBFAC,,, | Performed by: FAMILY MEDICINE

## 2019-07-30 PROCEDURE — 99999 PR PBB SHADOW E&M-EST. PATIENT-LVL V: ICD-10-PCS | Mod: PBBFAC,,, | Performed by: FAMILY MEDICINE

## 2019-07-30 PROCEDURE — 99214 OFFICE O/P EST MOD 30 MIN: CPT | Mod: 25,S$GLB,, | Performed by: FAMILY MEDICINE

## 2019-07-30 PROCEDURE — 93000 ELECTROCARDIOGRAM COMPLETE: CPT | Mod: S$GLB,,, | Performed by: FAMILY MEDICINE

## 2019-07-30 PROCEDURE — 3008F PR BODY MASS INDEX (BMI) DOCUMENTED: ICD-10-PCS | Mod: S$GLB,,, | Performed by: FAMILY MEDICINE

## 2019-07-30 RX ORDER — CHOLESTYRAMINE 4 G/9G
1 POWDER, FOR SUSPENSION ORAL 2 TIMES DAILY
Qty: 60 PACKET | Refills: 5 | Status: ON HOLD | OUTPATIENT
Start: 2019-07-30 | End: 2020-02-02

## 2019-07-30 RX ORDER — OMEPRAZOLE 40 MG/1
40 CAPSULE, DELAYED RELEASE ORAL DAILY
Qty: 90 CAPSULE | Refills: 3 | Status: ON HOLD | OUTPATIENT
Start: 2019-07-30 | End: 2020-02-02

## 2019-07-30 RX ORDER — CLINDAMYCIN HYDROCHLORIDE 300 MG/1
300 CAPSULE ORAL EVERY 6 HOURS
Qty: 40 CAPSULE | Refills: 0 | Status: SHIPPED | OUTPATIENT
Start: 2019-07-30 | End: 2019-08-09

## 2019-07-30 RX ORDER — NORETHINDRONE ACETATE AND ETHINYL ESTRADIOL .02; 1 MG/1; MG/1
TABLET ORAL
Refills: 4 | Status: ON HOLD | COMMUNITY
Start: 2019-06-10 | End: 2020-02-02

## 2019-07-30 NOTE — PROGRESS NOTES
Subjective:       Patient ID: Irene Grayson is a 23 y.o. female.    Chief Complaint: Dizziness (palpitations worse after warm shower ) and Mass (under left arm )      Patient has visit today because of dizziness this is started during her pregnancy but has actually persisted and the baby is 5 months old. She relates it was worse directly after the delivery but has persisted and on a daily basis.      Dizziness:   Chronicity:  Chronic  Onset: greater than 5 months.  Progression since onset:  Waxing and waning and gradually improving  Frequency:  Every few hours  Pain Scale:  0/10  Severity:  Initially severe, but improved  Dizziness characteristics:  Spacial disorientation, off-balance and lightheaded/impending faint   Associated symptoms: nausea, light-headedness, palpitations ( Palpitations occur daily depending on activities, facilitated by physical exertion.) and panic.no fever, no headaches, no tinnitus, no vomiting and no slurred speech.  Aggravated by:  Nothing and causal events  Mass   This is a chronic problem. The current episode started 1 to 4 weeks ago (3 weeks). The problem occurs constantly. The problem has been gradually worsening. Associated symptoms include nausea. Pertinent negatives include no chills, fatigue, fever, headaches, rash or vomiting.       Allergies and Medications:   Review of patient's allergies indicates:  No Known Allergies  Current Outpatient Medications   Medication Sig Dispense Refill    norethindrone-ethinyl estradiol (MICROGESTIN 1/20) 1-20 mg-mcg per tablet TK 1 T PO QD  4    cholestyramine, with sugar, 4 gram Powd Take 1 Package by mouth 2 (two) times daily. 60 packet 5    clindamycin (CLEOCIN) 300 MG capsule Take 1 capsule (300 mg total) by mouth every 6 (six) hours. for 10 days 40 capsule 0    hydrocortisone-pramoxine (PROCTOFOAM-HS) rectal foam Place 1 applicator rectally 2 (two) times daily. 1 applicator 0    omeprazole (PRILOSEC) 40 MG capsule Take 1 capsule (40  mg total) by mouth once daily. 90 capsule 3    ondansetron (ZOFRAN-ODT) 4 MG TbDL Take 1 tablet (4 mg total) by mouth every 6 (six) hours as needed (nausea). 20 tablet 0     No current facility-administered medications for this visit.        Family History:   Family History   Adopted: Yes   Problem Relation Age of Onset    No Known Problems Mother     No Known Problems Father        Social History:   Social History     Socioeconomic History    Marital status: Single     Spouse name: Not on file    Number of children: Not on file    Years of education: Not on file    Highest education level: Not on file   Occupational History    Not on file   Social Needs    Financial resource strain: Not on file    Food insecurity:     Worry: Not on file     Inability: Not on file    Transportation needs:     Medical: Not on file     Non-medical: Not on file   Tobacco Use    Smoking status: Current Every Day Smoker     Packs/day: 0.50     Years: 5.00     Pack years: 2.50     Types: Cigarettes    Smokeless tobacco: Never Used   Substance and Sexual Activity    Alcohol use: Yes     Comment: occasionally     Drug use: No    Sexual activity: Yes     Partners: Male   Lifestyle    Physical activity:     Days per week: Not on file     Minutes per session: Not on file    Stress: Not at all   Relationships    Social connections:     Talks on phone: Not on file     Gets together: Not on file     Attends [employer] service: Not on file     Active member of club or organization: Not on file     Attends meetings of clubs or organizations: Not on file     Relationship status: Not on file   Other Topics Concern    Not on file   Social History Narrative    Not on file       Review of Systems   Constitutional: Negative for chills, fatigue and fever.   HENT: Negative for tinnitus.    Cardiovascular: Positive for palpitations ( Palpitations occur daily depending on activities, facilitated by physical exertion.).   Gastrointestinal:  Positive for nausea. Negative for vomiting.   Skin: Negative for rash.   Neurological: Positive for dizziness and light-headedness. Negative for headaches.       Objective:     Vitals:    07/30/19 0952   BP: 120/68   Pulse: 104   Temp:         Physical Exam   Constitutional: She appears well-developed and well-nourished. No distress.   Cardiovascular: Normal rate, regular rhythm, normal heart sounds and intact distal pulses. Exam reveals no gallop and no friction rub.   No murmur heard.  Pulmonary/Chest: Breath sounds normal. No stridor. No respiratory distress. She has no wheezes. She has no rales. She exhibits no tenderness.   Abdominal: Soft. Bowel sounds are normal.   Musculoskeletal:        Arms:  Skin: She is not diaphoretic.   Nursing note and vitals reviewed.    EKG shows a normal sinus rhythm without ectopy or ST changes.  Assessment:       1. Palpitation    2. Chronic diarrhea    3. Proctitis    4. Gastritis, presence of bleeding unspecified, unspecified chronicity, unspecified gastritis type    5. Precordial pain    6. Furunculosis        Plan:       Irene was seen today for dizziness and mass.    Diagnoses and all orders for this visit:    Palpitation  -     POCT EKG 12-LEAD (NOT FOR [facility] USE)  -     CBC auto differential; Future  -     TSH; Future  -     Comprehensive metabolic panel; Future  -     Cancel: Ambulatory consult to Cardiology  -     Ambulatory consult to Cardiology    Chronic diarrhea  -     cholestyramine, with sugar, 4 gram Powd; Take 1 Package by mouth 2 (two) times daily.    Proctitis  -     cholestyramine, with sugar, 4 gram Powd; Take 1 Package by mouth 2 (two) times daily.  -     hydrocortisone-pramoxine (PROCTOFOAM-HS) rectal foam; Place 1 applicator rectally 2 (two) times daily.    Gastritis, presence of bleeding unspecified, unspecified chronicity, unspecified gastritis type  -     omeprazole (PRILOSEC) 40 MG capsule; Take 1 capsule (40 mg total) by mouth once daily.  -      CBC auto differential; Future    Precordial pain  -     POCT EKG 12-LEAD (NOT FOR [facility] USE)  -     Cancel: Ambulatory consult to Cardiology  -     Ambulatory consult to Cardiology    Furunculosis  -     clindamycin (CLEOCIN) 300 MG capsule; Take 1 capsule (300 mg total) by mouth every 6 (six) hours. for 10 days  Warm compresses twice a day as tolerated. Patient was instructed if he does come to ahead to we will come in and drain it.       Follow up if symptoms worsen or fail to improve.

## 2019-09-12 LAB
HBV SURFACE AG SERPL QL IA: NEGATIVE
HCT VFR BLD AUTO: 38 % (ref 36–46)
HIV 1+2 AB+HIV1 P24 AG SERPL QL IA: NORMAL
INDIRECT COOMBS: NORMAL
RPR: NORMAL

## 2019-12-20 ENCOUNTER — HOSPITAL ENCOUNTER (EMERGENCY)
Facility: HOSPITAL | Age: 24
Discharge: HOME | End: 2019-12-21
Attending: EMERGENCY MEDICINE
Payer: COMMERCIAL

## 2019-12-20 DIAGNOSIS — N93.9 VAGINAL BLEEDING: ICD-10-CM

## 2019-12-20 DIAGNOSIS — O46.90 VAGINAL BLEEDING IN PREGNANCY: Primary | ICD-10-CM

## 2019-12-20 LAB
ABO + RH BLD: NORMAL
ALBUMIN SERPL BCP-MCNC: 3 G/DL (ref 3.5–5.2)
ALP SERPL-CCNC: 58 U/L (ref 55–135)
ALT SERPL W/O P-5'-P-CCNC: 10 U/L (ref 10–44)
ANION GAP SERPL CALC-SCNC: 10 MMOL/L (ref 8–16)
AST SERPL-CCNC: 14 U/L (ref 10–40)
BASOPHILS # BLD AUTO: 0.02 K/UL (ref 0–0.2)
BASOPHILS NFR BLD: 0.2 % (ref 0–1.9)
BILIRUB SERPL-MCNC: 0.3 MG/DL (ref 0.1–1)
BILIRUB UR QL STRIP: NEGATIVE
BLD GP AB SCN CELLS X3 SERPL QL: NORMAL
BUN SERPL-MCNC: 6 MG/DL (ref 6–20)
CALCIUM SERPL-MCNC: 8.7 MG/DL (ref 8.7–10.5)
CHLORIDE SERPL-SCNC: 106 MMOL/L (ref 95–110)
CLARITY UR: CLEAR
CO2 SERPL-SCNC: 22 MMOL/L (ref 23–29)
COLOR UR: YELLOW
CREAT SERPL-MCNC: 0.4 MG/DL (ref 0.5–1.4)
DIFFERENTIAL METHOD: ABNORMAL
EOSINOPHIL # BLD AUTO: 0.1 K/UL (ref 0–0.5)
EOSINOPHIL NFR BLD: 0.6 % (ref 0–8)
ERYTHROCYTE [DISTWIDTH] IN BLOOD BY AUTOMATED COUNT: 15.3 % (ref 11.5–14.5)
EST. GFR  (AFRICAN AMERICAN): >60 ML/MIN/1.73 M^2
EST. GFR  (NON AFRICAN AMERICAN): >60 ML/MIN/1.73 M^2
GLUCOSE SERPL-MCNC: 94 MG/DL (ref 70–110)
GLUCOSE UR QL STRIP: NEGATIVE
HCT VFR BLD AUTO: 28.4 % (ref 37–48.5)
HGB BLD-MCNC: 9.3 G/DL (ref 12–16)
HGB UR QL STRIP: NEGATIVE
IMM GRANULOCYTES # BLD AUTO: 0.1 K/UL (ref 0–0.04)
IMM GRANULOCYTES NFR BLD AUTO: 0.9 % (ref 0–0.5)
KETONES UR QL STRIP: NEGATIVE
LEUKOCYTE ESTERASE UR QL STRIP: NEGATIVE
LYMPHOCYTES # BLD AUTO: 1.8 K/UL (ref 1–4.8)
LYMPHOCYTES NFR BLD: 16.6 % (ref 18–48)
MCH RBC QN AUTO: 25.4 PG (ref 27–31)
MCHC RBC AUTO-ENTMCNC: 32.7 G/DL (ref 32–36)
MCV RBC AUTO: 78 FL (ref 82–98)
MONOCYTES # BLD AUTO: 0.7 K/UL (ref 0.3–1)
MONOCYTES NFR BLD: 6.8 % (ref 4–15)
NEUTROPHILS # BLD AUTO: 8.1 K/UL (ref 1.8–7.7)
NEUTROPHILS NFR BLD: 74.9 % (ref 38–73)
NITRITE UR QL STRIP: NEGATIVE
NRBC BLD-RTO: 0 /100 WBC
PH UR STRIP: 7 [PH] (ref 5–8)
PLATELET # BLD AUTO: 278 K/UL (ref 150–350)
PMV BLD AUTO: 10 FL (ref 9.2–12.9)
POTASSIUM SERPL-SCNC: 3.2 MMOL/L (ref 3.5–5.1)
PROT SERPL-MCNC: 6.7 G/DL (ref 6–8.4)
PROT UR QL STRIP: NEGATIVE
RBC # BLD AUTO: 3.66 M/UL (ref 4–5.4)
SODIUM SERPL-SCNC: 138 MMOL/L (ref 136–145)
SP GR UR STRIP: 1.02 (ref 1–1.03)
URN SPEC COLLECT METH UR: ABNORMAL
UROBILINOGEN UR STRIP-ACNC: ABNORMAL EU/DL
WBC # BLD AUTO: 10.74 K/UL (ref 3.9–12.7)

## 2019-12-20 PROCEDURE — 80053 COMPREHEN METABOLIC PANEL: CPT

## 2019-12-20 PROCEDURE — 87205 SMEAR GRAM STAIN: CPT

## 2019-12-20 PROCEDURE — 85025 COMPLETE CBC W/AUTO DIFF WBC: CPT

## 2019-12-20 PROCEDURE — 86850 RBC ANTIBODY SCREEN: CPT

## 2019-12-20 PROCEDURE — 85461 HEMOGLOBIN FETAL: CPT

## 2019-12-20 PROCEDURE — 87081 CULTURE SCREEN ONLY: CPT

## 2019-12-20 PROCEDURE — 86901 BLOOD TYPING SEROLOGIC RH(D): CPT

## 2019-12-20 PROCEDURE — 81003 URINALYSIS AUTO W/O SCOPE: CPT

## 2019-12-20 PROCEDURE — 96374 THER/PROPH/DIAG INJ IV PUSH: CPT

## 2019-12-20 PROCEDURE — 96372 THER/PROPH/DIAG INJ SC/IM: CPT | Mod: 59

## 2019-12-20 PROCEDURE — 87210 SMEAR WET MOUNT SALINE/INK: CPT

## 2019-12-20 PROCEDURE — 99284 EMERGENCY DEPT VISIT MOD MDM: CPT | Mod: 25

## 2019-12-20 PROCEDURE — 87491 CHLMYD TRACH DNA AMP PROBE: CPT

## 2019-12-21 VITALS
SYSTOLIC BLOOD PRESSURE: 111 MMHG | HEART RATE: 96 BPM | TEMPERATURE: 98 F | DIASTOLIC BLOOD PRESSURE: 56 MMHG | RESPIRATION RATE: 16 BRPM | OXYGEN SATURATION: 99 % | HEIGHT: 60 IN | BODY MASS INDEX: 23.75 KG/M2 | WEIGHT: 121 LBS

## 2019-12-21 LAB
CLUE CELLS VAG QL WET PREP: NORMAL
FETAL CELL SCN BLD QL ROSETTE: NORMAL
INJECT RH IG VOL PATIENT: NORMAL ML
SPECIMEN SOURCE: NORMAL
T VAGINALIS GENITAL QL WET PREP: NORMAL
YEAST GENITAL QL WET PREP: NORMAL

## 2019-12-21 PROCEDURE — 63600519 RHOGAM PHARM REV CODE 636 ALT 250 W HCPCS

## 2019-12-21 PROCEDURE — 25000003 PHARM REV CODE 250: Performed by: EMERGENCY MEDICINE

## 2019-12-21 PROCEDURE — 63600175 PHARM REV CODE 636 W HCPCS

## 2019-12-21 RX ORDER — ONDANSETRON 2 MG/ML
4 INJECTION INTRAMUSCULAR; INTRAVENOUS
Status: COMPLETED | OUTPATIENT
Start: 2019-12-21 | End: 2019-12-21

## 2019-12-21 RX ADMIN — POTASSIUM BICARBONATE 50 MEQ: 977.5 TABLET, EFFERVESCENT ORAL at 12:12

## 2019-12-21 RX ADMIN — HUMAN RHO(D) IMMUNE GLOBULIN 300 MCG: 1500 SOLUTION INTRAMUSCULAR; INTRAVENOUS at 12:12

## 2019-12-21 RX ADMIN — ONDANSETRON 4 MG: 2 INJECTION INTRAMUSCULAR; INTRAVENOUS at 01:12

## 2019-12-21 NOTE — ED NOTES
"VS stable.   Reports mild lower abd. Cramping for last couple of days, but this evening noticed small amt of blood when wiping after voiding.  Also noticed "yellow tissue....Looked like gum" tonight.    "

## 2019-12-21 NOTE — DISCHARGE INSTRUCTIONS
If you have increased bleeding, severe abdominal pain, or other concerning symptoms please return to the ER. It is important that you follow up with your obstetrician this week.

## 2019-12-21 NOTE — ED PROVIDER NOTES
Encounter Date: 2019     History     Chief Complaint   Patient presents with    Vaginal Bleeding     cramping   19 weeks pregnant     HPI   24-year-old female  at 19 weeks pregnant presents with vaginal bleeding and cramping lower abdominal pain. This started this morning with small blood-tinged discharge.  This afternoon she had spotting on toilet paper after urination.  She has also had moderate cramping lower abdominal pain for the past 5 days.  She suffers with nausea and vomiting throughout her pregnancy and take Zofran at home.  Denies any diarrhea, no fevers.  She has a history of HPV but no history of other STIs.  She underwent a ultrasound evaluation earlier this week which demonstrated oligohydramnios but no other anatomic abnormalities.    Review of patient's allergies indicates:  No Known Allergies  Past Medical History:   Diagnosis Date    Epilepsy     Gallstones     Seizure disorder, complex partial     last seizure ; no medication    Seizures     Swine flu      Past Surgical History:   Procedure Laterality Date    CHOLECYSTECTOMY      toe I&D      TONSILLECTOMY      UPPER GASTROINTESTINAL ENDOSCOPY      wisdom teelam       Family History   Adopted: Yes   Problem Relation Age of Onset    No Known Problems Mother     No Known Problems Father      Social History     Tobacco Use    Smoking status: Current Every Day Smoker     Packs/day: 0.50     Years: 5.00     Pack years: 2.50     Types: Cigarettes    Smokeless tobacco: Never Used   Substance Use Topics    Alcohol use: Yes     Comment: occasionally     Drug use: No     Review of Systems   Constitutional: Negative for fever.   HENT: Negative for sore throat.    Respiratory: Negative for shortness of breath.    Cardiovascular: Negative for chest pain.   Gastrointestinal: Negative for nausea.   Genitourinary: Positive for pelvic pain and vaginal bleeding. Negative for dysuria.   Musculoskeletal: Negative for back pain.    Skin: Negative for rash.   Neurological: Negative for weakness.   Hematological: Does not bruise/bleed easily.       Physical Exam     Initial Vitals [12/20/19 2117]   BP Pulse Resp Temp SpO2   130/69 104 16 98.4 °F (36.9 °C) 99 %      MAP       --         Physical Exam    Constitutional: She appears well-developed and well-nourished. She is not diaphoretic. No distress.   HENT:   Head: Normocephalic and atraumatic.   Eyes: EOM are normal. Pupils are equal, round, and reactive to light. Right eye exhibits no discharge. Left eye exhibits no discharge.   Neck: Normal range of motion. Neck supple.   Cardiovascular: Normal rate, regular rhythm and normal heart sounds. Exam reveals no gallop and no friction rub.    No murmur heard.  Pulmonary/Chest: Breath sounds normal. No respiratory distress. She has no wheezes. She has no rhonchi. She has no rales.   Abdominal: Soft. She exhibits no distension. There is no tenderness. There is no rebound and no guarding.   No abdominal tenderness to deep palpation   Genitourinary:   Genitourinary Comments: External vagina normal. On speculum exam the sterile there is no blood or discharge within the vaginal vault.  The cervix is closed.  On sterile gloved bimanual examination there is no CMT, cervix closed.   Musculoskeletal: She exhibits no edema or tenderness.   Neurological: She is alert and oriented to person, place, and time.   Skin: Skin is warm and dry.   Psychiatric: She has a normal mood and affect. Thought content normal.         ED Course   Procedures  Labs Reviewed   CBC W/ AUTO DIFFERENTIAL - Abnormal; Notable for the following components:       Result Value    RBC 3.66 (*)     Hemoglobin 9.3 (*)     Hematocrit 28.4 (*)     Mean Corpuscular Volume 78 (*)     Mean Corpuscular Hemoglobin 25.4 (*)     RDW 15.3 (*)     Immature Granulocytes 0.9 (*)     Gran # (ANC) 8.1 (*)     Immature Grans (Abs) 0.10 (*)     Gran% 74.9 (*)     Lymph% 16.6 (*)     All other components  within normal limits   COMPREHENSIVE METABOLIC PANEL - Abnormal; Notable for the following components:    Potassium 3.2 (*)     CO2 22 (*)     Creatinine 0.4 (*)     Albumin 3.0 (*)     All other components within normal limits   URINALYSIS, REFLEX TO URINE CULTURE - Abnormal; Notable for the following components:    Urobilinogen, UA 2.0-3.0 (*)     All other components within normal limits    Narrative:     Preferred Collection Type->Urine, Clean Catch  Specimen Source->Urine   CULTURE, GONOCOCCUS   C. TRACHOMATIS/N. GONORRHOEAE BY AMP DNA   VAGINAL SCREEN    Narrative:     Specimen Source->Vagina   GROUP & RH   JANICE - [facility] (FETAL BLEED SCREEN)   INDIRECT ANTIGLOBULIN TEST   PREPARE RH IMMUNE GLOBULIN          Imaging Results          US OB More Than 14 Wks First Gestation (Final result)  Result time 12/20/19 23:29:09    Final result by Mohini Machado MD (12/20/19 23:29:09)                 Narrative:    Greater than 14 week OB ultrasound    Clinical history is bleeding    There is a single viable intrauterine gestation in transverse  presentation with positive cardiac activity at 153 bpm the placenta is  posterior fundal in location without previa. There is a 1.7 cm  hypoechoic area adjacent to the edge of the placenta which may  represent a small retroplacental hemorrhage. The cervix measures 3.1  cm.    Measurements are as follows  BPD 4.3 cm 19 weeks 0 days  Head circumference 15.3 cm 18 weeks 2 days  Abdominal circumference 13.5 cm 19 weeks 0 days  Femur length 2.7 cm 18 weeks 1 day    This gives estimated gestational age of 18 weeks 4 days +/- 1 week 2  days with estimated date of delivery of 5/18/2020.    The ANALILIA is 10.52.    Dedicated fetal anatomic study was not performed.    IMPRESSION: Single viable intrauterine gestation in transverse  presentation at 18 weeks 4 days +/- 1 week 2 days    Posterior fundal placenta with a 1.7 cm hypoechoic area adjacent to  the edge of the placenta suggestive of a  small retroplacental  hemorrhage. There is no evidence of previa    Electronically Signed by Mohini Machado M.D. on 2019 11:49 PM                               Medical Decision Making:   Initial Assessment:   24-year-old female  presents with vaginal bleeding at 19 weeks pregnant  Vital Signs unremarkable. Labs remarkable for microcytic anemia to 9.3, CMP overall unremarkable. She is Rh negative and was given RhoGAM.  Her pelvic examination is overall reassuring given there is no discharge or bleeding present at this time and the cervix is closed.  Abdominal ultrasound demonstrates heart rate of 153 with good fetal movement.  There is a posterior placenta demonstrating a 1.7 cm retroplacental hemorrhage. I did perform a vaginal pH which was positive, but follow up testing revealed no evidence of amniotic fluid. Given these findings she was discharged and plans to f/u with her gynecologist Monday. Will return with severe abdominal pain, severe bleeding, or other concerning symptoms. Prior to leaving, she had some nausea that was typical of her nausea at home, and I offered her zofran (which she does take at home). She had some improvement and was discharged.    Lauro Paul MD  Resident, PGY-3  2019 2:50 AM                      ED Course as of Dec 21 0121   Sat Dec 21, 2019   0022 Patient with lower abdominal cramping and vaginal bleeding.  Abdominal cramping improved.  Rh negative so RhoGAM given.  Ob ultrasound reviewed and no acute abnormalities detected other than a subchorionic hemorrhage. Has patient stable discharged with instructions and follow up with OBGYN  Patient seen and evaluated and examined along with that resident house officer.  Agree with plan and management and evaluation        [UM]      ED Course User Index  [UM] Bong Zuniga MD                Clinical Impression:       ICD-10-CM ICD-9-CM   1. Vaginal bleeding in pregnancy O46.90 641.93   2. Vaginal bleeding N93.9 623.8                              Lauro Paul MD  Resident  12/21/19 0260

## 2019-12-21 NOTE — ED NOTES
No bleeding or  Tenderness on vaginal exam performed per Dr. Paul ER Resident.  No bleeding or discharge noted.    Faint color change with Nitrazine paper so  ROM test done to test for leakage of amniotic fluid.   Is  Processing at bedside and will be complete in 20 minutes

## 2019-12-24 LAB
BACTERIA GENITAL AEROBE CULT: NORMAL
CHLAMYDIA, AMPLIFIED DNA: NEGATIVE
GRAM STN SPEC: NORMAL
N GONORRHOEAE, AMPLIFIED DNA: NEGATIVE

## 2020-01-08 DIAGNOSIS — Z20.828 EXPOSURE TO THE FLU: Primary | ICD-10-CM

## 2020-01-08 RX ORDER — OSELTAMIVIR PHOSPHATE 75 MG/1
75 CAPSULE ORAL DAILY
Qty: 10 CAPSULE | Refills: 0 | Status: SHIPPED | OUTPATIENT
Start: 2020-01-08 | End: 2020-01-18

## 2020-01-29 ENCOUNTER — HOSPITAL ENCOUNTER (OUTPATIENT)
Facility: HOSPITAL | Age: 25
Discharge: HOME | End: 2020-01-30
Attending: SPECIALIST | Admitting: OBSTETRICS & GYNECOLOGY
Payer: COMMERCIAL

## 2020-01-29 DIAGNOSIS — N93.9 VAGINAL SPOTTING: ICD-10-CM

## 2020-01-29 PROCEDURE — 81003 URINALYSIS AUTO W/O SCOPE: CPT

## 2020-01-30 VITALS
HEART RATE: 94 BPM | TEMPERATURE: 98 F | SYSTOLIC BLOOD PRESSURE: 116 MMHG | BODY MASS INDEX: 23.41 KG/M2 | RESPIRATION RATE: 16 BRPM | HEIGHT: 61 IN | DIASTOLIC BLOOD PRESSURE: 57 MMHG | WEIGHT: 124 LBS

## 2020-01-30 LAB
BILIRUB UR QL STRIP: NEGATIVE
CLARITY UR: CLEAR
COLOR UR: YELLOW
GLUCOSE UR QL STRIP: NEGATIVE
HGB UR QL STRIP: NEGATIVE
KETONES UR QL STRIP: NEGATIVE
LEUKOCYTE ESTERASE UR QL STRIP: NEGATIVE
NITRITE UR QL STRIP: NEGATIVE
PH UR STRIP: 8 [PH] (ref 5–8)
PROT UR QL STRIP: NEGATIVE
SP GR UR STRIP: 1.01 (ref 1–1.03)
URN SPEC COLLECT METH UR: ABNORMAL
UROBILINOGEN UR STRIP-ACNC: ABNORMAL EU/DL

## 2020-01-30 PROCEDURE — 99211 OFF/OP EST MAY X REQ PHY/QHP: CPT | Mod: 25

## 2020-01-30 PROCEDURE — 25000003 PHARM REV CODE 250: Performed by: OBSTETRICS & GYNECOLOGY

## 2020-01-30 RX ORDER — ACETAMINOPHEN 325 MG/1
500 TABLET ORAL EVERY 6 HOURS PRN
Status: ON HOLD | COMMUNITY
End: 2020-03-11 | Stop reason: HOSPADM

## 2020-01-30 RX ORDER — ACETAMINOPHEN 500 MG
500 TABLET ORAL ONCE
Status: COMPLETED | OUTPATIENT
Start: 2020-01-30 | End: 2020-01-30

## 2020-01-30 RX ADMIN — ACETAMINOPHEN 500 MG: 500 TABLET, FILM COATED ORAL at 01:01

## 2020-01-30 NOTE — DISCHARGE INSTRUCTIONS
Please keep your next scheduled appointment with Dr. Quijano. Call your OB, if you have any questions related to care or interventions at home, if you begin to experience new signs and symptoms. Continue to take your scheduled medications (prenatal vitamins) as previously prescribed. Return to L&D for persistent signs of  labor

## 2020-01-30 NOTE — NURSING
25 yo pt presents with cc of vaginal spotting and cramping that has been occurring for the last week. Pt states she's been unable to come to L&D due to parental responsibilities and her partner's work schedule. Pt states that both cramping and spotting are irregular. Last time she had intercourse was last week, she does not believe spotting is related to intercourse as it didn't occur soon after activity. Pt states that yesterday she had no spotting, today it was seen 2x when she was wiping only. Pt denies that she has passed any clots, had brvb that resembles a cycle, had to wear a pad to collect blood. She reports that she has had bleeding previously this pregnancy, was seen in ER around 16 wks for some type of hemorrhage. Pt states that she wanted to come in to be sure everything was okay. She denies lof, ctxs. States that she is feeling fm. Monitors were placed on patient, VS assessed, pe assessment complete and found to be as charted. Urine sent to lab. Pt denies current needs.

## 2020-01-30 NOTE — NURSING
Dr. Hixson called and given report on patient. MD aware of patient's irritability, presenting cc, hx of bleeding,cyst, interventions that patients has taken at home (tylenol x1, warm bath --- no relief). Orders given for PO hydration, Tylenol 500mg PO and US for placenta placement.

## 2020-02-02 ENCOUNTER — HOSPITAL ENCOUNTER (OUTPATIENT)
Facility: HOSPITAL | Age: 25
Discharge: HOME | End: 2020-02-02
Attending: SPECIALIST | Admitting: SPECIALIST
Payer: COMMERCIAL

## 2020-02-02 VITALS
RESPIRATION RATE: 18 BRPM | WEIGHT: 124 LBS | SYSTOLIC BLOOD PRESSURE: 119 MMHG | TEMPERATURE: 97 F | DIASTOLIC BLOOD PRESSURE: 58 MMHG | HEART RATE: 96 BPM | HEIGHT: 61 IN | BODY MASS INDEX: 23.41 KG/M2

## 2020-02-02 DIAGNOSIS — O46.90 VAGINAL BLEEDING IN PREGNANCY: ICD-10-CM

## 2020-02-02 LAB
BILIRUB UR QL STRIP: NEGATIVE
CLARITY UR: CLEAR
COLOR UR: YELLOW
FIBRONECTIN FETAL SPEC QL: NEGATIVE
GLUCOSE UR QL STRIP: NEGATIVE
HGB UR QL STRIP: NEGATIVE
KETONES UR QL STRIP: NEGATIVE
LEUKOCYTE ESTERASE UR QL STRIP: NEGATIVE
NITRITE UR QL STRIP: NEGATIVE
PH UR STRIP: 7 [PH] (ref 5–8)
PROT UR QL STRIP: ABNORMAL
SP GR UR STRIP: 1.02 (ref 1–1.03)
URN SPEC COLLECT METH UR: ABNORMAL
UROBILINOGEN UR STRIP-ACNC: NEGATIVE EU/DL

## 2020-02-02 PROCEDURE — 99211 OFF/OP EST MAY X REQ PHY/QHP: CPT

## 2020-02-02 PROCEDURE — 81003 URINALYSIS AUTO W/O SCOPE: CPT

## 2020-02-02 PROCEDURE — 82731 ASSAY OF FETAL FIBRONECTIN: CPT

## 2020-02-02 NOTE — NURSING
1215- pt arrived to unit with c/o vaginal bleeding and cramping. States when she went to bathroom and wiped there was blood on toilet paper. No blood on underwear and patient not wearing pad at arrival. Attempted to give maxi pad to patient but pt denied need.  No other complaints at this time. Oriented to Rm 2301 and given call light to call when ready.     1245- pt states this is the 6th time she has had vaginal bleeding this pregnancy. States cramping feels like period cramps but in her back. Pt states she has not had sex in 2 weeks nor has she had anything in her vagina. States it began while she was changing her baby's diaper this morning. Pt also states she drinks minimal water throughout the day. Water provided and patient encouraged to PO hydrate.    1320- pt denies any new bleeding or cramping. Appears very comfortable in bed.     1321- update given to MD. Received order for SVE and FFN if no blood on swab.    1335- RN at bedside to explain process and POC with patient. PT VU.    1500- Discharge instructions reviewed with patient. Pt VU. All questions encouraged and answered appropriately.

## 2020-02-02 NOTE — DISCHARGE INSTRUCTIONS
Remain on modified bedrest and pelvic rest until you follow up with your primary doctor.  Keep your scheduled appointment with your provider.    Call your Doctor if you have any of the following:  Temperature above 100 degrees  Nausea, vomiting and/or diarrhea  Severe headache, dizziness, or blurred vision  Notable increase in swelling of hands or feet  Notable swelling of face and lips  Difficulty, pain or burning with urination  Foul smelling vaginal discharge  Decreased fetal movement    Come to the hospital if you have any of the following symptoms:  Your water breaks  More than 4-6 contractions in 1 hour for 2 or more hours  Vaginal bleeding like a period    After 28 weeks, you should feel 10 distinct fetal movements within a 2 hour period.    It is recommended that you drink 1/2 a gallon of water each day.  Tea, Soda and Juice are  in addition to this.

## 2020-03-05 ENCOUNTER — HOSPITAL ENCOUNTER (INPATIENT)
Facility: HOSPITAL | Age: 25
LOS: 6 days | Discharge: HOME | End: 2020-03-11
Attending: SPECIALIST | Admitting: SPECIALIST
Payer: COMMERCIAL

## 2020-03-05 DIAGNOSIS — R10.9 ABDOMINAL PAIN DURING PREGNANCY: ICD-10-CM

## 2020-03-05 DIAGNOSIS — O26.899 ABDOMINAL PAIN DURING PREGNANCY: ICD-10-CM

## 2020-03-05 DIAGNOSIS — O60.00 PRETERM LABOR: ICD-10-CM

## 2020-03-05 LAB
ABO + RH BLD: NORMAL
BILIRUB UR QL STRIP: NEGATIVE
BLD GP AB SCN CELLS X3 SERPL QL: NORMAL
BLOOD GROUP ANTIBODIES SERPL: NORMAL
CLARITY UR: CLEAR
COLOR UR: YELLOW
ERYTHROCYTE [DISTWIDTH] IN BLOOD BY AUTOMATED COUNT: 14.6 % (ref 11.5–14.5)
GLUCOSE UR QL STRIP: NEGATIVE
HCT VFR BLD AUTO: 28 % (ref 37–48.5)
HGB BLD-MCNC: 8.6 G/DL (ref 12–16)
HGB UR QL STRIP: NEGATIVE
KETONES UR QL STRIP: NEGATIVE
LEUKOCYTE ESTERASE UR QL STRIP: NEGATIVE
MAGNESIUM SERPL-MCNC: 4.9 MG/DL (ref 1.6–2.6)
MAGNESIUM SERPL-MCNC: 5.7 MG/DL (ref 1.6–2.6)
MAGNESIUM SERPL-MCNC: 6.1 MG/DL (ref 1.6–2.6)
MCH RBC QN AUTO: 22.9 PG (ref 27–31)
MCHC RBC AUTO-ENTMCNC: 30.7 G/DL (ref 32–36)
MCV RBC AUTO: 75 FL (ref 82–98)
NITRITE UR QL STRIP: NEGATIVE
PH UR STRIP: 7 [PH] (ref 5–8)
PLATELET # BLD AUTO: 309 K/UL (ref 150–350)
PMV BLD AUTO: 9.6 FL (ref 9.2–12.9)
POC PH, VAGINAL: 5.5 (ref 4.5–5.5)
PROT UR QL STRIP: NEGATIVE
RBC # BLD AUTO: 3.76 M/UL (ref 4–5.4)
RPR SER QL: NORMAL
SP GR UR STRIP: 1.01 (ref 1–1.03)
URN SPEC COLLECT METH UR: NORMAL
UROBILINOGEN UR STRIP-ACNC: NEGATIVE EU/DL
WBC # BLD AUTO: 20.56 K/UL (ref 3.9–12.7)

## 2020-03-05 PROCEDURE — 85027 COMPLETE CBC AUTOMATED: CPT

## 2020-03-05 PROCEDURE — 86850 RBC ANTIBODY SCREEN: CPT

## 2020-03-05 PROCEDURE — 12000002 HC ACUTE/MED SURGE SEMI-PRIVATE ROOM

## 2020-03-05 PROCEDURE — 86870 RBC ANTIBODY IDENTIFICATION: CPT

## 2020-03-05 PROCEDURE — 86592 SYPHILIS TEST NON-TREP QUAL: CPT

## 2020-03-05 PROCEDURE — 81003 URINALYSIS AUTO W/O SCOPE: CPT

## 2020-03-05 PROCEDURE — 36415 COLL VENOUS BLD VENIPUNCTURE: CPT

## 2020-03-05 PROCEDURE — 83735 ASSAY OF MAGNESIUM: CPT | Mod: 91

## 2020-03-05 PROCEDURE — 63600175 PHARM REV CODE 636 W HCPCS: Performed by: SPECIALIST

## 2020-03-05 PROCEDURE — 99211 OFF/OP EST MAY X REQ PHY/QHP: CPT | Mod: 25

## 2020-03-05 RX ORDER — SODIUM CHLORIDE, SODIUM LACTATE, POTASSIUM CHLORIDE, CALCIUM CHLORIDE 600; 310; 30; 20 MG/100ML; MG/100ML; MG/100ML; MG/100ML
INJECTION, SOLUTION INTRAVENOUS CONTINUOUS
Status: DISCONTINUED | OUTPATIENT
Start: 2020-03-05 | End: 2020-03-08

## 2020-03-05 RX ORDER — BETAMETHASONE SODIUM PHOSPHATE AND BETAMETHASONE ACETATE 3; 3 MG/ML; MG/ML
12 INJECTION, SUSPENSION INTRA-ARTICULAR; INTRALESIONAL; INTRAMUSCULAR; SOFT TISSUE
Status: COMPLETED | OUTPATIENT
Start: 2020-03-05 | End: 2020-03-06

## 2020-03-05 RX ORDER — MAGNESIUM SULFATE HEPTAHYDRATE 40 MG/ML
4 INJECTION, SOLUTION INTRAVENOUS ONCE
Status: COMPLETED | OUTPATIENT
Start: 2020-03-05 | End: 2020-03-05

## 2020-03-05 RX ORDER — MAGNESIUM SULFATE HEPTAHYDRATE 40 MG/ML
0.5 INJECTION, SOLUTION INTRAVENOUS CONTINUOUS
Status: DISCONTINUED | OUTPATIENT
Start: 2020-03-05 | End: 2020-03-08

## 2020-03-05 RX ORDER — DOCUSATE SODIUM 100 MG/1
100 CAPSULE, LIQUID FILLED ORAL 2 TIMES DAILY
Status: DISCONTINUED | OUTPATIENT
Start: 2020-03-05 | End: 2020-03-11

## 2020-03-05 RX ORDER — BETAMETHASONE SODIUM PHOSPHATE AND BETAMETHASONE ACETATE 3; 3 MG/ML; MG/ML
12 INJECTION, SUSPENSION INTRA-ARTICULAR; INTRALESIONAL; INTRAMUSCULAR; SOFT TISSUE
Status: DISCONTINUED | OUTPATIENT
Start: 2020-03-05 | End: 2020-03-05 | Stop reason: SDUPTHER

## 2020-03-05 RX ORDER — BUTORPHANOL TARTRATE 2 MG/ML
1 INJECTION INTRAMUSCULAR; INTRAVENOUS
Status: DISCONTINUED | OUTPATIENT
Start: 2020-03-05 | End: 2020-03-08

## 2020-03-05 RX ADMIN — AMPICILLIN SODIUM 2 G: 2 INJECTION, POWDER, FOR SOLUTION INTRAMUSCULAR; INTRAVENOUS at 02:03

## 2020-03-05 RX ADMIN — BUTORPHANOL TARTRATE 1 MG: 2 INJECTION, SOLUTION INTRAMUSCULAR; INTRAVENOUS at 07:03

## 2020-03-05 RX ADMIN — MAGNESIUM SULFATE HEPTAHYDRATE 4 G: 40 INJECTION, SOLUTION INTRAVENOUS at 07:03

## 2020-03-05 RX ADMIN — AMPICILLIN SODIUM 2 G: 2 INJECTION, POWDER, FOR SOLUTION INTRAMUSCULAR; INTRAVENOUS at 07:03

## 2020-03-05 RX ADMIN — SODIUM CHLORIDE, SODIUM LACTATE, POTASSIUM CHLORIDE, AND CALCIUM CHLORIDE: .6; .31; .03; .02 INJECTION, SOLUTION INTRAVENOUS at 07:03

## 2020-03-05 RX ADMIN — BETAMETHASONE ACETATE AND BETAMETHASONE SODIUM PHOSPHATE 12 MG: 3; 3 INJECTION, SUSPENSION INTRA-ARTICULAR; INTRALESIONAL; INTRAMUSCULAR; SOFT TISSUE at 07:03

## 2020-03-05 RX ADMIN — SODIUM CHLORIDE, SODIUM LACTATE, POTASSIUM CHLORIDE, AND CALCIUM CHLORIDE 75 ML/HR: .6; .31; .03; .02 INJECTION, SOLUTION INTRAVENOUS at 07:03

## 2020-03-05 RX ADMIN — PROMETHAZINE HYDROCHLORIDE 12.5 MG: 25 INJECTION INTRAMUSCULAR; INTRAVENOUS at 07:03

## 2020-03-05 NOTE — PROGRESS NOTES
[facility]  Obstetrics  Antepartum Progress Note    Patient Name: Irene Grayson  MRN: [id number]  Admission Date: 3/5/2020  Hospital Length of Stay: 0 days  Attending Physician: Brian Quijano MD  Primary Care Provider: Jean Waller MD    Subjective:     Principal Problem: labor    HPI:  24-year-old  2 para 0101 at 29 weeks and 2 days gestational age admitted in  labor    Hospital Course:  No notes on file    Obstetric HPI:  Patient feeling much better, feeling very infrequent contraction.  Active fetal movement.  At approximately 10:00 a.m. patient reported questionable leakage of fluid, nitrazine negative, ROM Plus equivocal, no evidence of amniotic fluid at perineum then or since.     Objective:     Vital Signs (Most Recent):  Temp: 97.8 °F (36.6 °C) (20 1350)  Pulse: 99 (20 1549)  Resp: 16 (20 1549)  BP: (!) 108/57 (20 1549)  SpO2: 96 % (20 1549) Vital Signs (24h Range):  Temp:  [97.6 °F (36.4 °C)-98.3 °F (36.8 °C)] 97.8 °F (36.6 °C)  Pulse:  [] 99  Resp:  [16-18] 16  SpO2:  [95 %-98 %] 96 %  BP: ()/(50-77) 108/57     Weight: 58.1 kg (128 lb)  Body mass index is 24.19 kg/m².    FHT:  Baseline 140s and reassuring  TOCO:  2-4 contractions per hour      Intake/Output Summary (Last 24 hours) at 3/5/2020 1729  Last data filed at 3/5/2020 1550  Gross per 24 hour   Intake 1567.08 ml   Output 1325 ml   Net 242.08 ml       Cervical Exam:  Deferred       Significant Labs:  Recent Lab Results       20  1526   20  1010   20  0935   20  0701   20  0625        POC pH, Vaginal   5.5           Antibody Identification       POS  Comment:  Rhogam Antibody       Appearance, UA         Clear     Bilirubin (UA)         Negative     Color, UA         Yellow     Glucose, UA         Negative     Group & Rh       A NEG       Hematocrit       28.0       Hemoglobin       8.6       INDIRECT LEE       POS       Ketones, UA          Negative     Leukocytes, UA         Negative     Magnesium 5.7  Comment:  Magnesium critical result(s) repeated. Called and verbal readback   obtained from [facility]  by CW1 2020 16:25     4.9  Comment:  Mg critical result(s) repeated. Called and verbal readback obtained   from Nurse SANIA Ballard by JH5 2020 10:24           MCH       22.9       MCHC       30.7       MCV       75       MPV       9.6       NITRITE UA         Negative     Occult Blood UA         Negative     pH, UA         7.0     Platelets       309       Protein, UA         Negative  Comment:  Recommend a 24 hour urine protein or a urine   protein/creatinine ratio if globulin induced proteinuria is  clinically suspected.       RBC       3.76       RDW       14.6       RPR       Non-reactive       Specific Gravity, UA         1.010     Specimen UA         Urine, Clean Catch     UROBILINOGEN UA         Negative     WBC       20.56                            Physical Exam     Abdomen-soft nontender  Uterus-soft nontender  Extremities-SCDs in place      Assessment/Plan:     24 y.o. female  at 29w2d for:    *  labor  IUP at 29 weeks and 2 days gestational age  Pre term labor with advanced cervical dilation-stable since admission, continue Mag sulfate, Mag levels within normal range.  Status post 1 of 2 doses of betamethasone, continue IV antibiotics, continue bedrest, continue Contreras  Rh negative  Skyler breech presentation  NNP-spoke with patient  Leukocytosis-unknown origin, patient afebrile,UA negative,, no URI symptoms, no evidence of chorioamnionitis.  Repeat CBC in josie Quijano MD  Obstetrics  [facility]

## 2020-03-05 NOTE — SUBJECTIVE & OBJECTIVE
Obstetric HPI:  Patient feeling much better, feeling very infrequent contraction.  Active fetal movement.  At approximately 10:00 a.m. patient reported questionable leakage of fluid, nitrazine negative, ROM Plus equivocal, no evidence of amniotic fluid at perineum then or since.     Objective:     Vital Signs (Most Recent):  Temp: 97.8 °F (36.6 °C) (03/05/20 1350)  Pulse: 99 (03/05/20 1549)  Resp: 16 (03/05/20 1549)  BP: (!) 108/57 (03/05/20 1549)  SpO2: 96 % (03/05/20 1549) Vital Signs (24h Range):  Temp:  [97.6 °F (36.4 °C)-98.3 °F (36.8 °C)] 97.8 °F (36.6 °C)  Pulse:  [] 99  Resp:  [16-18] 16  SpO2:  [95 %-98 %] 96 %  BP: ()/(50-77) 108/57     Weight: 58.1 kg (128 lb)  Body mass index is 24.19 kg/m².    FHT:  Baseline 140s and reassuring  TOCO:  2-4 contractions per hour      Intake/Output Summary (Last 24 hours) at 3/5/2020 1729  Last data filed at 3/5/2020 1550  Gross per 24 hour   Intake 1567.08 ml   Output 1325 ml   Net 242.08 ml       Cervical Exam:  Deferred       Significant Labs:  Recent Lab Results       03/05/20  1526   03/05/20  1010   03/05/20  0935   03/05/20  0701   03/05/20  0625        POC pH, Vaginal   5.5           Antibody Identification       POS  Comment:  Rhogam Antibody       Appearance, UA         Clear     Bilirubin (UA)         Negative     Color, UA         Yellow     Glucose, UA         Negative     Group & Rh       A NEG       Hematocrit       28.0       Hemoglobin       8.6       INDIRECT LEE       POS       Ketones, UA         Negative     Leukocytes, UA         Negative     Magnesium 5.7  Comment:  Magnesium critical result(s) repeated. Called and verbal readback   obtained from Padmini Rodriguez RN L&D.  by CW1 03/05/2020 16:25     4.9  Comment:  Mg critical result(s) repeated. Called and verbal readback obtained   from Nurse SANIA Ballard by [facility] 03/05/2020 10:24           MCH       22.9       MCHC       30.7       MCV       75       MPV       9.6       NITRITE UA          Negative     Occult Blood UA         Negative     pH, UA         7.0     Platelets       309       Protein, UA         Negative  Comment:  Recommend a 24 hour urine protein or a urine   protein/creatinine ratio if globulin induced proteinuria is  clinically suspected.       RBC       3.76       RDW       14.6       RPR       Non-reactive       Specific Gravity, UA         1.010     Specimen UA         Urine, Clean Catch     UROBILINOGEN UA         Negative     WBC       20.56                            Physical Exam     Abdomen-soft nontender  Uterus-soft nontender  Extremities-SCDs in place

## 2020-03-05 NOTE — NURSING
"0655- Report rec'd from Vicky Rodas RN. Dr Quijano has just given orders for admission for  labor, celestone, US, admit labs, Magnesium 4 gm bolus then 2 gm/hr, and ampicillin.   0657- Pt transferred via bed to R 3 for admission, mother present at pt's side.  0700- IV started with 18G to lt wrist per M. Olvin RN x1 attempt. Admit labs drawn and sent.  0703- Betamethasone explained and given to Rt ventrogluteal per MD orders.  0706- Magnesium explained and started per MD orders. Side effects and intended effects discussed with pt and her mother. Pt instructed to maintain complete bedrest, VU.  0711- Dr Quijano to BS to discuss POC again with pt and family and answer questions. PT and mother VU.  0737- Mag bolus complete, rate decreased to 2gm/hr per MD orders. Pt VU.  0740- Janette Ben NGUYENP to BS to discuss infant outcomes and NICU protocols with pt and family for possible  delivery. Questions answered by NNP. NNP encouraged pt to let her know any further questions/concerns- NNPs available in house  if needed. Pt VU.  0742- US tech to BS for OB US as ordered.  0755- Stadol and phenergan explained and given per MD orders. Pt rates pain with ctx 7/10 scale.   0815- After pain meds, pt reports pain is now 3/10 scale. Cruz explained and placed. Pt bernadine well.  0830- MSG left for Dr Quijano that US and lab results are now available. MD in delivery presently.   0849- Dr Quijano present on unit. Updated on US and lab results, ctx pattern, pt reports pain improved with pain meds given and is requesting PO intake. MD gave new order for ice chips only. Will update with acute changes.  0850- Pt resting quietly. Updated on new order from Dr Quijano for ice chips only, VU. Ice chips given.  0958- Pt resting quietly, reports feeling pressure from the cruz "like I want to push it out". Reports feeling "like I peed on myself" for last 10-15 minutes. No fluid noted on underpad. Will update MD.  1005- Dr " Samm updated on pt c/o peeing on herself for last 15 mins- no fluid noted on underpad. MD gave new order for nitrazine and ROM plus.  1008- Pt updated on new orders from Dr Quijano VU. No fluid noted on perineum or on towel under pt. Nitrazine negative. ROM plus explained and collected.  1039- Dr Quijano updated that ROM plus is faint positive, nitrazine neg. Still no fluid noted. D/T the fact that ANALILIA on US this AM was 12 cm and baby is kuldip breech and was not well engaged when MD checked this AM, MD states may be false positive on ROM plus and to update him if any leaking. MD also notified that mag level was 4.9. No new orders at this time.  1232- Dr Quijano called unit for update. MD notified that pt has not reported any additional fluid leaking. Still c/o pain with cruz. No acute changes at this time. No new orders at this time.  0080- Dr Quijano to BS to evaluate pt and discuss POC. MD updated on ctx pattern 2-5/hr for last few hours. MD gave new orders to repeat CBC in AM with scheduled mag level and allow pt to eat dinner and drink liquids until MN then NPO after that. Pt VU. Questions answered per MD. Pt and family VU.

## 2020-03-05 NOTE — ASSESSMENT & PLAN NOTE
IUP at 29 weeks and 2 days gestational age  Pre term labor with advanced cervical dilation-stable since admission, continue Mag sulfate, Mag levels within normal range.  Status post 1 of 2 doses of betamethasone, continue IV antibiotics, continue bedrest, continue Contreras  Rh negative  Skyler breech presentation  NNP-spoke with patient  Leukocytosis-unknown origin, patient afebrile,UA negative,, no URI symptoms, no evidence of chorioamnionitis.  Repeat CBC in a.m.

## 2020-03-05 NOTE — ASSESSMENT & PLAN NOTE
IUP at 29 weeks and 2 days gestational age  Pre term labor-will start Mag sulfate for tocolysis, antibiotics, betamethasone x2, admit labs, ultrasound for size and position, Contreras catheter and bedrest  Rh negative  Unknown fetal presentation  NNP-to speak with patient

## 2020-03-05 NOTE — H&P
[facility]  Obstetrics  History & Physical    Patient Name: Irene Grayson  MRN: [id number]  Admission Date: 3/5/2020  Primary Care Provider: Jean Waller MD    Subjective:     Principal Problem: labor    History of Present Illness:  24-year-old  2 para 0101 at 29 weeks and 2 days gestational age admitted in  labor    Obstetric HPI:  24-year-old  2 para 0101 at 29 weeks and 2 days gestational age presents complaining of regular painful contractions intensified over the last 12 hr.  Patient states mild contractions starting approximately 2 days ago, denies rupture membranes, states measured minimal vaginal spotting, states active fetal movement.      OB History    Para Term  AB Living   2 1 0 1 0 1   SAB TAB Ectopic Multiple Live Births   0 0 0 0 1      # Outcome Date GA Lbr Ceasar/2nd Weight Sex Delivery Anes PTL Lv   2 Current            1  19 36w3d  2.268 kg (5 lb) F  EPI N JASMYN      Complications: IUGR (intrauterine growth restriction) affecting care of mother      Name: Danielle     Past Medical History:   Diagnosis Date    Anemia     Epilepsy     Gallstones     HPV (human papilloma virus) infection     LGSIL on Pap smear of cervix     Seizure disorder, complex partial     last seizure ; no medication    Seizures     Swine flu      Past Surgical History:   Procedure Laterality Date    CHOLECYSTECTOMY      LAPAROSCOPIC CHOLECYSTECTOMY      toe I&D      TONSILLECTOMY      UPPER GASTROINTESTINAL ENDOSCOPY      wisdom teeeth         PTA Medications   Medication Sig    acetaminophen (TYLENOL) 325 MG tablet Take 500 mg by mouth every 6 (six) hours as needed for Pain.    PNV no.95/ferrous fum/folic ac (PRENATAL ORAL) Take 1 tablet by mouth once daily.       Review of patient's allergies indicates:  No Known Allergies     Family History     Problem Relation (Age of Onset)    No Known Problems Mother, Father        Tobacco Use     Smoking status: Former Smoker     Types: Cigarettes     Start date:      Last attempt to quit: 10/2019     Years since quittin.4    Smokeless tobacco: Never Used    Tobacco comment: quit about a month ago   Substance and Sexual Activity    Alcohol use: Not Currently    Drug use: No    Sexual activity: Yes     Partners: Male     Birth control/protection: None     Review of Systems   Objective:     Vital Signs (Most Recent):  Temp: 97.6 °F (36.4 °C) (20)  Pulse: (!) 117 (20)  Resp: 18 (20)  BP: 128/77 (20) Vital Signs (24h Range):  Temp:  [97.6 °F (36.4 °C)] 97.6 °F (36.4 °C)  Pulse:  [117] 117  Resp:  [18] 18  BP: (128)/(77) 128/77     Weight: 58.1 kg (128 lb)  Body mass index is 24.19 kg/m².    FHT:  Baseline 140s and reassuring   TOCO:  Q to 4-5 minutes    Physical Exam    General-patient appears uncomfortable with contractions  Abdomen-soft and nontender  Uterus-firm with contractions, tender with contractions  Extremities-no calf tenderness  Pelvic-taut amniotic sac intact    Cervix:  Dilation:  5  Effacement:  80%  Station: -3  Presentation: Unknown     Significant Labs:  Lab Results   Component Value Date    GROUPTRH A NEG 2019    HEPBSAG Negative 2019       I have personallly reviewed all pertinent lab results from the last 24 hours.         Assessment/Plan:     24 y.o. female  at 29w2d for:    *  labor  IUP at 29 weeks and 2 days gestational age  Pre term labor-will start Mag sulfate for tocolysis, antibiotics, betamethasone x2, admit labs, ultrasound for size and position, Contreras catheter and bedrest  Rh negative  Unknown fetal presentation  NNP-to speak with patient        Gómez Quijano MD  Obstetrics  [facility]

## 2020-03-06 LAB
BASOPHILS # BLD AUTO: 0.03 K/UL (ref 0–0.2)
BASOPHILS NFR BLD: 0.1 % (ref 0–1.9)
DIFFERENTIAL METHOD: ABNORMAL
EOSINOPHIL # BLD AUTO: 0 K/UL (ref 0–0.5)
EOSINOPHIL NFR BLD: 0 % (ref 0–8)
ERYTHROCYTE [DISTWIDTH] IN BLOOD BY AUTOMATED COUNT: 14.7 % (ref 11.5–14.5)
HCT VFR BLD AUTO: 25.6 % (ref 37–48.5)
HGB BLD-MCNC: 7.7 G/DL (ref 12–16)
IMM GRANULOCYTES # BLD AUTO: 0.37 K/UL (ref 0–0.04)
IMM GRANULOCYTES NFR BLD AUTO: 1.6 % (ref 0–0.5)
LYMPHOCYTES # BLD AUTO: 1.1 K/UL (ref 1–4.8)
LYMPHOCYTES NFR BLD: 4.6 % (ref 18–48)
MAGNESIUM SERPL-MCNC: 5.4 MG/DL (ref 1.6–2.6)
MAGNESIUM SERPL-MCNC: 5.4 MG/DL (ref 1.6–2.6)
MAGNESIUM SERPL-MCNC: 6.2 MG/DL (ref 1.6–2.6)
MAGNESIUM SERPL-MCNC: 6.2 MG/DL (ref 1.6–2.6)
MCH RBC QN AUTO: 22.8 PG (ref 27–31)
MCHC RBC AUTO-ENTMCNC: 30.1 G/DL (ref 32–36)
MCV RBC AUTO: 76 FL (ref 82–98)
MONOCYTES # BLD AUTO: 1.4 K/UL (ref 0.3–1)
MONOCYTES NFR BLD: 6.3 % (ref 4–15)
NEUTROPHILS # BLD AUTO: 19.8 K/UL (ref 1.8–7.7)
NEUTROPHILS NFR BLD: 87.4 % (ref 38–73)
NRBC BLD-RTO: 0 /100 WBC
PLATELET # BLD AUTO: 339 K/UL (ref 150–350)
PMV BLD AUTO: 9.7 FL (ref 9.2–12.9)
RBC # BLD AUTO: 3.37 M/UL (ref 4–5.4)
WBC # BLD AUTO: 22.72 K/UL (ref 3.9–12.7)

## 2020-03-06 PROCEDURE — 12000002 HC ACUTE/MED SURGE SEMI-PRIVATE ROOM

## 2020-03-06 PROCEDURE — 36415 COLL VENOUS BLD VENIPUNCTURE: CPT

## 2020-03-06 PROCEDURE — 85025 COMPLETE CBC W/AUTO DIFF WBC: CPT

## 2020-03-06 PROCEDURE — 63600175 PHARM REV CODE 636 W HCPCS: Performed by: SPECIALIST

## 2020-03-06 PROCEDURE — 25000003 PHARM REV CODE 250: Performed by: SPECIALIST

## 2020-03-06 PROCEDURE — 83735 ASSAY OF MAGNESIUM: CPT | Mod: 91

## 2020-03-06 RX ORDER — ACETAMINOPHEN 500 MG
1000 TABLET ORAL EVERY 6 HOURS PRN
Status: DISCONTINUED | OUTPATIENT
Start: 2020-03-06 | End: 2020-03-08

## 2020-03-06 RX ADMIN — SODIUM CHLORIDE, SODIUM LACTATE, POTASSIUM CHLORIDE, AND CALCIUM CHLORIDE: .6; .31; .03; .02 INJECTION, SOLUTION INTRAVENOUS at 09:03

## 2020-03-06 RX ADMIN — AMPICILLIN SODIUM 2 G: 2 INJECTION, POWDER, FOR SOLUTION INTRAMUSCULAR; INTRAVENOUS at 02:03

## 2020-03-06 RX ADMIN — BETAMETHASONE ACETATE AND BETAMETHASONE SODIUM PHOSPHATE 12 MG: 3; 3 INJECTION, SUSPENSION INTRA-ARTICULAR; INTRALESIONAL; INTRAMUSCULAR; SOFT TISSUE at 08:03

## 2020-03-06 RX ADMIN — MAGNESIUM SULFATE HEPTAHYDRATE 2 G/HR: 40 INJECTION, SOLUTION INTRAVENOUS at 01:03

## 2020-03-06 RX ADMIN — AMPICILLIN SODIUM 2 G: 2 INJECTION, POWDER, FOR SOLUTION INTRAMUSCULAR; INTRAVENOUS at 08:03

## 2020-03-06 RX ADMIN — AMPICILLIN SODIUM 2 G: 2 INJECTION, POWDER, FOR SOLUTION INTRAMUSCULAR; INTRAVENOUS at 01:03

## 2020-03-06 RX ADMIN — ACETAMINOPHEN 1000 MG: 500 TABLET, FILM COATED ORAL at 10:03

## 2020-03-06 NOTE — ASSESSMENT & PLAN NOTE
IUP at 29 weeks and 3 days gestational age  Pre term labor with  cervical dilation-stable since admission, continue Mag sulfate, Mag levels within normal range.  Mag sulfate decreased to 1.5 grams/hour today secondary to patient complaints.  Status post 2 of 2 doses of betamethasone, continue IV antibiotics, continue bedrest, Contreras catheter discontinued last night secondary to patient's complaints of extreme pain with Contreras in place  P ROM-confirmed last night  Rh negative  Skyler breech presentation  NNP-spoke with patient  Leukocytosis-unknown origin, patient afebrile,UA negative,, no URI symptoms, no evidence of chorioamnionitis.  Repeat CBC in a.m.

## 2020-03-06 NOTE — PROGRESS NOTES
[facility]  Obstetrics  Antepartum Progress Note    Patient Name: Irene Grayson  MRN: [id number]  Admission Date: 3/5/2020  Hospital Length of Stay: 1 days  Attending Physician: Brian Quijano MD  Primary Care Provider: Jean Waller MD    Subjective:     Principal Problem: labor    HPI:  24-year-old  2 para 0101 at 29 weeks and 2 days gestational age admitted in  labor    Hospital Course:  No notes on file    Obstetric HPI:  Hospital day 2.- pre term labor/P ROM  No complaints currently.  Active fetal movement, no regular contractions, no vaginal bleeding, minimal leakage of clear fluid     Objective:     Vital Signs (Most Recent):  Temp: 97.2 °F (36.2 °C) (20 1000)  Pulse: 96 (20 1110)  Resp: 18 (20 0809)  BP: (!) 118/58 (20 1109)  SpO2: 100 % (20 1110) Vital Signs (24h Range):  Temp:  [97 °F (36.1 °C)-98.4 °F (36.9 °C)] 97.2 °F (36.2 °C)  Pulse:  [] 96  Resp:  [16-20] 18  SpO2:  [90 %-100 %] 100 %  BP: ()/(50-76) 118/58     Weight: 58.1 kg (128 lb)  Body mass index is 24.19 kg/m².    FHT:  Baseline 130s and reassuring, infrequent mild variable  TOCO: Contractions-4 times per hour, patient not feeling      Intake/Output Summary (Last 24 hours) at 3/6/2020 1306  Last data filed at 3/6/2020 1200  Gross per 24 hour   Intake 3108.13 ml   Output 3975 ml   Net -866.87 ml       Cervical Exam:  Deferred, by RN exam last night 4 cm/50%/-3  Confirmed rupture of membranes last night     Significant Labs:  Recent Lab Results       20  0926   20  0327   20  2100   20  1526        Baso #   0.03         Basophil%   0.1         Differential Method   Automated         Eos #   0.0         Eosinophil%   0.0         Gran # (ANC)   19.8         Gran%   87.4         Hematocrit   25.6         Hemoglobin   7.7         Immature Grans (Abs)   0.37  Comment:  Mild elevation in immature granulocytes is non specific and   can be seen in a  variety of conditions including stress response,   acute inflammation, trauma and pregnancy. Correlation with other   laboratory and clinical findings is essential.           Immature Granulocytes   1.6         Lymph #   1.1         Lymph%   4.6         Magnesium 6.2  Comment:  Magnesium critical result(s) repeated. Called and verbal readback   obtained from Mica Dooley - RN L&D.  by [facility] 2020 10:20   6.2  Comment:  mg critical result(s) repeated. Called and verbal readback obtained   from darrin crowder rn lnd by \[facility]\"" 2020 04:44   6.1  Comment:  mg critical result(s) repeated. Called and verbal readback obtained   from princess terrell rn lnd by \[facility]\"" 2020 21:38   5.7  Comment:  Magnesium critical result(s) repeated. Called and verbal readback   obtained from Padmini Bah - SAMARA L&D.  by [facility] 2020 16:25       MCH   22.8         MCHC   30.1         MCV   76         Mono #   1.4         Mono%   6.3         MPV   9.7         nRBC   0         Platelets   339         RBC   3.37         RDW   14.7         WBC   22.72               Physical Exam     Pulmonary-clear bilaterally  Abdomen soft and nontender  Uterus-soft and nontender  Extremities-no calf tenderness, SCDs in place    Assessment/Plan:     24 y.o. female  at 29w3d for:    *  labor  IUP at 29 weeks and 3 days gestational age  Pre term labor with  cervical dilation-stable since admission, continue Mag sulfate, Mag levels within normal range.  Mag sulfate decreased to 1.5 grams/hour today secondary to patient complaints.  Status post 2 of 2 doses of betamethasone, continue IV antibiotics, continue bedrest, Contreras catheter discontinued last night secondary to patient's complaints of extreme pain with Contreras in place  P ROM-confirmed last night  Rh negative  Skyler breech presentation  NNP-spoke with patient  Leukocytosis-unknown origin, patient afebrile,UA negative,, no URI symptoms, no evidence of chorioamnionitis.  Repeat CBC in  josie Quijano MD  Obstetrics  [facility]

## 2020-03-06 NOTE — NURSING
0730- MD aware of ctx pattern. per Dr. Quijano, if patient is not feeling ctx, she can have breakfast, otherwise remain ice chips only.     0825- pt reports only feeling ctx as gas- not painful. MD updated and states can still have breakfast.     1030- Per MD, ok to reduce Mag to 1.5 g/hr and give 1 gm tylenol for headache    1200- Pt feeling much better after lowering Mag. MD updated. No new orders.     1330- pt educated on importance of only having visitors who are free of virus symptoms. Educated that all visitors should be over age 12 unless they are siblings and any sibling under age 12 needs to wear mask while visiting. Mask provided to patient for her daughter, pt VU    1700- discussed breastfeeding at length with patient. Pt states she is an over  of breast milk with last pregnancy and has been leaking colostrum for several weeks. Educated on importance of beginning pumping within 3 hours of birth. Pump and supplies at nurse's station. Pt VU     1800- pt resting

## 2020-03-06 NOTE — SUBJECTIVE & OBJECTIVE
Obstetric HPI:  Hospital day 2.- pre term labor/P ROM  No complaints currently.  Active fetal movement, no regular contractions, no vaginal bleeding, minimal leakage of clear fluid     Objective:     Vital Signs (Most Recent):  Temp: 97.2 °F (36.2 °C) (03/06/20 1000)  Pulse: 96 (03/06/20 1110)  Resp: 18 (03/06/20 0809)  BP: (!) 118/58 (03/06/20 1109)  SpO2: 100 % (03/06/20 1110) Vital Signs (24h Range):  Temp:  [97 °F (36.1 °C)-98.4 °F (36.9 °C)] 97.2 °F (36.2 °C)  Pulse:  [] 96  Resp:  [16-20] 18  SpO2:  [90 %-100 %] 100 %  BP: ()/(50-76) 118/58     Weight: 58.1 kg (128 lb)  Body mass index is 24.19 kg/m².    FHT:  Baseline 130s and reassuring, infrequent mild variable  TOCO: Contractions-4 times per hour, patient not feeling      Intake/Output Summary (Last 24 hours) at 3/6/2020 1306  Last data filed at 3/6/2020 1200  Gross per 24 hour   Intake 3108.13 ml   Output 3975 ml   Net -866.87 ml       Cervical Exam:  Deferred, by RN exam last night 4 cm/50%/-3  Confirmed rupture of membranes last night     Significant Labs:  Recent Lab Results       03/06/20  0926   03/06/20  0327   03/05/20  2100   03/05/20  1526        Baso #   0.03         Basophil%   0.1         Differential Method   Automated         Eos #   0.0         Eosinophil%   0.0         Gran # (ANC)   19.8         Gran%   87.4         Hematocrit   25.6         Hemoglobin   7.7         Immature Grans (Abs)   0.37  Comment:  Mild elevation in immature granulocytes is non specific and   can be seen in a variety of conditions including stress response,   acute inflammation, trauma and pregnancy. Correlation with other   laboratory and clinical findings is essential.           Immature Granulocytes   1.6         Lymph #   1.1         Lymph%   4.6         Magnesium 6.2  Comment:  Magnesium critical result(s) repeated. Called and verbal readback   obtained from Mica Dooley - SAMARA L&D.  by CW1 03/06/2020 10:20   6.2  Comment:  mg critical result(s)  repeated. Called and verbal readback obtained   from darrin crowder rn lnd by [facility] 03/06/2020 04:44   6.1  Comment:  mg critical result(s) repeated. Called and verbal readback obtained   from princess terrell rn lnd by [facility] 03/05/2020 21:38   5.7  Comment:  Magnesium critical result(s) repeated. Called and verbal readback   obtained from Padmini Bah - SAMARA L&D.  by [facility] 03/05/2020 16:25       MCH   22.8         MCHC   30.1         MCV   76         Mono #   1.4         Mono%   6.3         MPV   9.7         nRBC   0         Platelets   339         RBC   3.37         RDW   14.7         WBC   22.72               Physical Exam     Pulmonary-clear bilaterally  Abdomen soft and nontender  Uterus-soft and nontender  Extremities-no calf tenderness, SCDs in place

## 2020-03-07 LAB
BASOPHILS # BLD AUTO: 0.04 K/UL (ref 0–0.2)
BASOPHILS NFR BLD: 0.2 % (ref 0–1.9)
DIFFERENTIAL METHOD: ABNORMAL
EOSINOPHIL # BLD AUTO: 0 K/UL (ref 0–0.5)
EOSINOPHIL NFR BLD: 0 % (ref 0–8)
ERYTHROCYTE [DISTWIDTH] IN BLOOD BY AUTOMATED COUNT: 14.8 % (ref 11.5–14.5)
HCT VFR BLD AUTO: 23.9 % (ref 37–48.5)
HGB BLD-MCNC: 7.3 G/DL (ref 12–16)
IMM GRANULOCYTES # BLD AUTO: 0.5 K/UL (ref 0–0.04)
IMM GRANULOCYTES NFR BLD AUTO: 2.4 % (ref 0–0.5)
LYMPHOCYTES # BLD AUTO: 1.4 K/UL (ref 1–4.8)
LYMPHOCYTES NFR BLD: 6.6 % (ref 18–48)
MAGNESIUM SERPL-MCNC: 5 MG/DL (ref 1.6–2.6)
MAGNESIUM SERPL-MCNC: 5.3 MG/DL (ref 1.6–2.6)
MAGNESIUM SERPL-MCNC: 5.3 MG/DL (ref 1.6–2.6)
MAGNESIUM SERPL-MCNC: 5.5 MG/DL (ref 1.6–2.6)
MCH RBC QN AUTO: 23.5 PG (ref 27–31)
MCHC RBC AUTO-ENTMCNC: 30.5 G/DL (ref 32–36)
MCV RBC AUTO: 77 FL (ref 82–98)
MONOCYTES # BLD AUTO: 1.6 K/UL (ref 0.3–1)
MONOCYTES NFR BLD: 7.4 % (ref 4–15)
NEUTROPHILS # BLD AUTO: 17.7 K/UL (ref 1.8–7.7)
NEUTROPHILS NFR BLD: 83.4 % (ref 38–73)
NRBC BLD-RTO: 0 /100 WBC
PLATELET # BLD AUTO: 319 K/UL (ref 150–350)
PMV BLD AUTO: 10 FL (ref 9.2–12.9)
RBC # BLD AUTO: 3.11 M/UL (ref 4–5.4)
WBC # BLD AUTO: 21.22 K/UL (ref 3.9–12.7)

## 2020-03-07 PROCEDURE — 36415 COLL VENOUS BLD VENIPUNCTURE: CPT

## 2020-03-07 PROCEDURE — 63600175 PHARM REV CODE 636 W HCPCS: Performed by: SPECIALIST

## 2020-03-07 PROCEDURE — 12000002 HC ACUTE/MED SURGE SEMI-PRIVATE ROOM

## 2020-03-07 PROCEDURE — 83735 ASSAY OF MAGNESIUM: CPT | Mod: 91

## 2020-03-07 PROCEDURE — 85025 COMPLETE CBC W/AUTO DIFF WBC: CPT

## 2020-03-07 RX ORDER — MAGNESIUM SULFATE HEPTAHYDRATE 40 MG/ML
1 INJECTION, SOLUTION INTRAVENOUS CONTINUOUS
Status: DISCONTINUED | OUTPATIENT
Start: 2020-03-08 | End: 2020-03-07

## 2020-03-07 RX ORDER — MAGNESIUM SULFATE HEPTAHYDRATE 40 MG/ML
0.5 INJECTION, SOLUTION INTRAVENOUS CONTINUOUS
Status: DISCONTINUED | OUTPATIENT
Start: 2020-03-08 | End: 2020-03-07

## 2020-03-07 RX ADMIN — AMPICILLIN SODIUM 2 G: 2 INJECTION, POWDER, FOR SOLUTION INTRAMUSCULAR; INTRAVENOUS at 08:03

## 2020-03-07 RX ADMIN — SODIUM CHLORIDE, SODIUM LACTATE, POTASSIUM CHLORIDE, AND CALCIUM CHLORIDE: .6; .31; .03; .02 INJECTION, SOLUTION INTRAVENOUS at 04:03

## 2020-03-07 RX ADMIN — SODIUM CHLORIDE, SODIUM LACTATE, POTASSIUM CHLORIDE, AND CALCIUM CHLORIDE: .6; .31; .03; .02 INJECTION, SOLUTION INTRAVENOUS at 05:03

## 2020-03-07 RX ADMIN — AMPICILLIN SODIUM 2 G: 2 INJECTION, POWDER, FOR SOLUTION INTRAMUSCULAR; INTRAVENOUS at 02:03

## 2020-03-07 RX ADMIN — AMPICILLIN SODIUM 2 G: 2 INJECTION, POWDER, FOR SOLUTION INTRAMUSCULAR; INTRAVENOUS at 01:03

## 2020-03-07 NOTE — ASSESSMENT & PLAN NOTE
Continue mag and expectant management;  Will wean mag tonite as per Dr. Quijano.  Currently no s/s chorio, labor, etc.    Baby breech so abdominal delivery expected.

## 2020-03-07 NOTE — NURSING
"1000: Pt awake, mother at side. POC reviewed, pt agrees, verbalizes understanding. Denies large leak of fluid, reports small leakage, denies vaginal bleeding, denies pain, denies ctx,+fm. Reports spotting when wiping after using bedpan.   1254: Pt reports feeling urge to have bm, no bm noted x2 days. Assisted on bedside commode, instructed not to bear down, pt agrees.   1320:Pt assisted back into bed, EFM adjusted. States unable to have bm. Refuses colace, states "due to gallbladder surgery, stool softener gives her diarrhea."  1452: Pt assisted up onto bedside commode to attempt to have bm. Unable to have bm. Labor bed changed out for standard bed at this time.   1501:Pt assisted into bed. Mother and sister at bedside.   1645: Pt reports feeling a "gush" however towel dry and small amt of bloody pink discharge wiped by pt from perineum.  1840: Pt denies feeling pain from ctx. States she was asleep.     "

## 2020-03-07 NOTE — SUBJECTIVE & OBJECTIVE
Obstetric HPI:HD#3/29.3 weeks  Patient reports None contractions, active fetal movement(just less since mag), absent vaginal bleeding , present loss of fluid ;  Mag level     Objective:     Vital Signs (Most Recent):  Temp: 97.6 °F (36.4 °C) (03/07/20 0745)  Pulse: 89 (03/07/20 0951)  Resp: 16 (03/07/20 0947)  BP: (!) 96/52 (03/07/20 0900)  SpO2: 98 % (03/07/20 0950) Vital Signs (24h Range):  Temp:  [97 °F (36.1 °C)-98.3 °F (36.8 °C)] 97.6 °F (36.4 °C)  Pulse:  [] 89  Resp:  [16-20] 16  SpO2:  [93 %-100 %] 98 %  BP: ()/(50-69) 96/52     Weight: 58.1 kg (128 lb)  Body mass index is 24.19 kg/m².    FHT:  Reactive Cat 0 (reassuring)  TOCO:  Q rare minutes      Intake/Output Summary (Last 24 hours) at 3/7/2020 1100  Last data filed at 3/7/2020 1000  Gross per 24 hour   Intake 1058.13 ml   Output 4550 ml   Net -3491.87 ml       Cervical Exam:  Dilation:  4  Effacement:  50%  Station: -3  Presentation: Breech     Significant Labs:  Recent Lab Results       03/07/20  0310   03/06/20  2135   03/06/20  1550        Baso # 0.04         Basophil% 0.2         Differential Method Automated         Eos # 0.0         Eosinophil% 0.0         Gran # (ANC) 17.7         Gran% 83.4         Hematocrit 23.9         Hemoglobin 7.3         Immature Grans (Abs) 0.50  Comment:  Mild elevation in immature granulocytes is non specific and   can be seen in a variety of conditions including stress response,   acute inflammation, trauma and pregnancy. Correlation with other   laboratory and clinical findings is essential.           Immature Granulocytes 2.4         Lymph # 1.4         Lymph% 6.6         Magnesium 5.5  Comment:  mg critical result(s) repeated. Called and verbal readback obtained   from lupe kaminskid by [facility] 03/07/2020 03:59   5.4  Comment:  mg critical result(s) repeated. Called and verbal readback obtained   from lupe kaminskid by [facility] 03/06/2020 22:07   5.4  Comment:  Magnesium  critical result(s) repeated.  Called and verbal readback   obtained from Stephanie Rodriguez RN L&D.  by CW1 03/06/2020 16:26       MCH 23.5         MCHC 30.5         MCV 77         Mono # 1.6         Mono% 7.4         MPV 10.0         nRBC 0         Platelets 319         RBC 3.11         RDW 14.8         WBC 21.22               Physical Exam:                       Musculoskeletal: She exhibits no edema or tenderness.

## 2020-03-07 NOTE — PROGRESS NOTES
[facility]  Obstetrics  Antepartum Progress Note    Patient Name: Irene Grayson  MRN: [id number]  Admission Date: 3/5/2020  Hospital Length of Stay: 2 days  Attending Physician: Brian Quijano MD  Primary Care Provider: Jean Waller MD    Subjective:     Principal Problem: labor    HPI:  24-year-old  2 para 0101 at 29 weeks and 2 days gestational age admitted in  labor    Hospital Course:  No notes on file    Obstetric HPI:HD#3/29.3 weeks  Patient reports None contractions, active fetal movement(just less since mag), absent vaginal bleeding , present loss of fluid ;  Mag level     Objective:     Vital Signs (Most Recent):  Temp: 97.6 °F (36.4 °C) (20 0745)  Pulse: 89 (20 0951)  Resp: 16 (20 0947)  BP: (!) 96/52 (20 0900)  SpO2: 98 % (20 0950) Vital Signs (24h Range):  Temp:  [97 °F (36.1 °C)-98.3 °F (36.8 °C)] 97.6 °F (36.4 °C)  Pulse:  [] 89  Resp:  [16-20] 16  SpO2:  [93 %-100 %] 98 %  BP: ()/(50-69) 96/52     Weight: 58.1 kg (128 lb)  Body mass index is 24.19 kg/m².    FHT:  Reactive Cat 0 (reassuring)  TOCO:  Q rare minutes      Intake/Output Summary (Last 24 hours) at 3/7/2020 1100  Last data filed at 3/7/2020 1000  Gross per 24 hour   Intake 1058.13 ml   Output 4550 ml   Net -3491.87 ml       Cervical Exam:  Dilation:  4  Effacement:  50%  Station: -3  Presentation: Breech     Significant Labs:  Recent Lab Results       20  0310   20  2135   20  1550        Baso # 0.04         Basophil% 0.2         Differential Method Automated         Eos # 0.0         Eosinophil% 0.0         Gran # (ANC) 17.7         Gran% 83.4         Hematocrit 23.9         Hemoglobin 7.3         Immature Grans (Abs) 0.50  Comment:  Mild elevation in immature granulocytes is non specific and   can be seen in a variety of conditions including stress response,   acute inflammation, trauma and pregnancy. Correlation with other   laboratory and  clinical findings is essential.           Immature Granulocytes 2.4         Lymph # 1.4         Lymph% 6.6         Magnesium 5.5  Comment:  mg critical result(s) repeated. Called and verbal readback obtained   from lupe crowder rn lnd by [facility] 2020 03:59   5.4  Comment:  mg critical result(s) repeated. Called and verbal readback obtained   from lupe crowder rn lnd by [facility] 2020 22:07   5.4  Comment:  Magnesium  critical result(s) repeated. Called and verbal readback   obtained from Stephanie Rodriguez RN L&D.  by [facility] 2020 16:26       MCH 23.5         MCHC 30.5         MCV 77         Mono # 1.6         Mono% 7.4         MPV 10.0         nRBC 0         Platelets 319         RBC 3.11         RDW 14.8         WBC 21.22               Physical Exam:                       Musculoskeletal: She exhibits no edema or tenderness.             Assessment/Plan:     24 y.o. female  at 29w4d for:    *  labor  IUP at 29 weeks and 3 days gestational age  Pre term labor with  cervical dilation-stable since admission, continue Mag sulfate, Mag levels within normal range.  Mag sulfate decreased to 1.5 grams/hour today secondary to patient complaints.  Status post 2 of 2 doses of betamethasone, continue IV antibiotics, continue bedrest, Contreras catheter discontinued last night secondary to patient's complaints of extreme pain with Contreras in place  P ROM-confirmed last night  Rh negative  Skyler breech presentation  NNP-spoke with patient  Leukocytosis-unknown origin, patient afebrile,UA negative,, no URI symptoms, no evidence of chorioamnionitis.  Repeat CBC in a.m.    Abdominal pain during pregnancy  Continue mag and expectant management;  Will wean mag tonite as per Dr. Quijano.  Currently no s/s chorio, labor, etc.    Baby breech so abdominal delivery expected.          Karina Castillo MD  Obstetrics  [facility]

## 2020-03-08 ENCOUNTER — ANESTHESIA (OUTPATIENT)
Dept: OBSTETRICS AND GYNECOLOGY | Facility: HOSPITAL | Age: 25
End: 2020-03-08
Payer: COMMERCIAL

## 2020-03-08 ENCOUNTER — ANESTHESIA EVENT (OUTPATIENT)
Dept: OBSTETRICS AND GYNECOLOGY | Facility: HOSPITAL | Age: 25
End: 2020-03-08
Payer: COMMERCIAL

## 2020-03-08 LAB
ABO + RH BLD: NORMAL
BLD GP AB SCN CELLS X3 SERPL QL: NORMAL
BLOOD GROUP ANTIBODIES SERPL: NORMAL
ERYTHROCYTE [DISTWIDTH] IN BLOOD BY AUTOMATED COUNT: 14.8 % (ref 11.5–14.5)
HCT VFR BLD AUTO: 23.8 % (ref 37–48.5)
HGB BLD-MCNC: 7.1 G/DL (ref 12–16)
MAGNESIUM SERPL-MCNC: 4.5 MG/DL (ref 1.6–2.6)
MCH RBC QN AUTO: 23.1 PG (ref 27–31)
MCHC RBC AUTO-ENTMCNC: 29.8 G/DL (ref 32–36)
MCV RBC AUTO: 77 FL (ref 82–98)
PLATELET # BLD AUTO: 246 K/UL (ref 150–350)
PMV BLD AUTO: 9.7 FL (ref 9.2–12.9)
RBC # BLD AUTO: 3.08 M/UL (ref 4–5.4)
WBC # BLD AUTO: 15.82 K/UL (ref 3.9–12.7)

## 2020-03-08 PROCEDURE — 27000284 HC CANNULA NASAL: Performed by: NURSE ANESTHETIST, CERTIFIED REGISTERED

## 2020-03-08 PROCEDURE — 27202103: Performed by: NURSE ANESTHETIST, CERTIFIED REGISTERED

## 2020-03-08 PROCEDURE — S0020 INJECTION, BUPIVICAINE HYDRO: HCPCS | Performed by: SPECIALIST

## 2020-03-08 PROCEDURE — 83735 ASSAY OF MAGNESIUM: CPT

## 2020-03-08 PROCEDURE — 71000039 HC RECOVERY, EACH ADD'L HOUR: Performed by: SPECIALIST

## 2020-03-08 PROCEDURE — 27000670 HC SET COMBINED SPINAL AND EPIDURAL: Performed by: NURSE ANESTHETIST, CERTIFIED REGISTERED

## 2020-03-08 PROCEDURE — 63600175 PHARM REV CODE 636 W HCPCS: Performed by: OBSTETRICS & GYNECOLOGY

## 2020-03-08 PROCEDURE — 71000033 HC RECOVERY, INTIAL HOUR: Performed by: SPECIALIST

## 2020-03-08 PROCEDURE — 63600175 PHARM REV CODE 636 W HCPCS: Performed by: ANESTHESIOLOGY

## 2020-03-08 PROCEDURE — 85027 COMPLETE CBC AUTOMATED: CPT

## 2020-03-08 PROCEDURE — 12000002 HC ACUTE/MED SURGE SEMI-PRIVATE ROOM

## 2020-03-08 PROCEDURE — 25000003 PHARM REV CODE 250: Performed by: ANESTHESIOLOGY

## 2020-03-08 PROCEDURE — 86870 RBC ANTIBODY IDENTIFICATION: CPT

## 2020-03-08 PROCEDURE — 37000009 HC ANESTHESIA EA ADD 15 MINS: Performed by: SPECIALIST

## 2020-03-08 PROCEDURE — 25000003 PHARM REV CODE 250: Performed by: SPECIALIST

## 2020-03-08 PROCEDURE — 86900 BLOOD TYPING SEROLOGIC ABO: CPT

## 2020-03-08 PROCEDURE — 63600175 PHARM REV CODE 636 W HCPCS: Performed by: SPECIALIST

## 2020-03-08 PROCEDURE — 63600175 PHARM REV CODE 636 W HCPCS: Performed by: NURSE ANESTHETIST, CERTIFIED REGISTERED

## 2020-03-08 PROCEDURE — C9290 INJ, BUPIVACAINE LIPOSOME: HCPCS | Performed by: SPECIALIST

## 2020-03-08 PROCEDURE — 37000008 HC ANESTHESIA 1ST 15 MINUTES: Performed by: SPECIALIST

## 2020-03-08 PROCEDURE — C1751 CATH, INF, PER/CENT/MIDLINE: HCPCS | Performed by: NURSE ANESTHETIST, CERTIFIED REGISTERED

## 2020-03-08 PROCEDURE — 36415 COLL VENOUS BLD VENIPUNCTURE: CPT

## 2020-03-08 PROCEDURE — 36000685 HC CESAREAN SECTION LEVEL I

## 2020-03-08 PROCEDURE — 86922 COMPATIBILITY TEST ANTIGLOB: CPT

## 2020-03-08 RX ORDER — MUPIROCIN 20 MG/G
1 OINTMENT TOPICAL 2 TIMES DAILY
Status: CANCELLED | OUTPATIENT
Start: 2020-03-08 | End: 2020-03-13

## 2020-03-08 RX ORDER — ONDANSETRON 2 MG/ML
INJECTION INTRAMUSCULAR; INTRAVENOUS
Status: DISCONTINUED | OUTPATIENT
Start: 2020-03-08 | End: 2020-03-08

## 2020-03-08 RX ORDER — DIPHENHYDRAMINE HYDROCHLORIDE 50 MG/ML
INJECTION INTRAMUSCULAR; INTRAVENOUS
Status: DISCONTINUED | OUTPATIENT
Start: 2020-03-08 | End: 2020-03-08

## 2020-03-08 RX ORDER — FENTANYL CITRATE 50 UG/ML
INJECTION, SOLUTION INTRAMUSCULAR; INTRAVENOUS
Status: DISCONTINUED | OUTPATIENT
Start: 2020-03-08 | End: 2020-03-08

## 2020-03-08 RX ORDER — OXYCODONE AND ACETAMINOPHEN 10; 325 MG/1; MG/1
1 TABLET ORAL EVERY 4 HOURS PRN
Status: DISCONTINUED | OUTPATIENT
Start: 2020-03-08 | End: 2020-03-11 | Stop reason: HOSPADM

## 2020-03-08 RX ORDER — DIPHENHYDRAMINE HYDROCHLORIDE 50 MG/ML
12.5 INJECTION INTRAMUSCULAR; INTRAVENOUS
Status: DISCONTINUED | OUTPATIENT
Start: 2020-03-08 | End: 2020-03-08

## 2020-03-08 RX ORDER — PRENATAL WITH FERROUS FUM AND FOLIC ACID 3080; 920; 120; 400; 22; 1.84; 3; 20; 10; 1; 12; 200; 27; 25; 2 [IU]/1; [IU]/1; MG/1; [IU]/1; MG/1; MG/1; MG/1; MG/1; MG/1; MG/1; UG/1; MG/1; MG/1; MG/1; MG/1
1 TABLET ORAL DAILY
Status: DISCONTINUED | OUTPATIENT
Start: 2020-03-09 | End: 2020-03-11 | Stop reason: HOSPADM

## 2020-03-08 RX ORDER — OXYCODONE HYDROCHLORIDE 5 MG/1
5 TABLET ORAL ONCE
Status: DISCONTINUED | OUTPATIENT
Start: 2020-03-08 | End: 2020-03-08 | Stop reason: HOSPADM

## 2020-03-08 RX ORDER — ONDANSETRON 2 MG/ML
4 INJECTION INTRAMUSCULAR; INTRAVENOUS EVERY 6 HOURS PRN
Status: DISCONTINUED | OUTPATIENT
Start: 2020-03-08 | End: 2020-03-08

## 2020-03-08 RX ORDER — BUPIVACAINE HYDROCHLORIDE 5 MG/ML
24 INJECTION, SOLUTION EPIDURAL; INTRACAUDAL ONCE
Status: COMPLETED | OUTPATIENT
Start: 2020-03-08 | End: 2020-03-08

## 2020-03-08 RX ORDER — CEFAZOLIN SODIUM 1 G/50ML
SOLUTION INTRAVENOUS
Status: DISCONTINUED | OUTPATIENT
Start: 2020-03-08 | End: 2020-03-08

## 2020-03-08 RX ORDER — OXYCODONE AND ACETAMINOPHEN 5; 325 MG/1; MG/1
1 TABLET ORAL EVERY 4 HOURS PRN
Status: DISCONTINUED | OUTPATIENT
Start: 2020-03-08 | End: 2020-03-11 | Stop reason: HOSPADM

## 2020-03-08 RX ORDER — DOCUSATE SODIUM 100 MG/1
200 CAPSULE, LIQUID FILLED ORAL 2 TIMES DAILY
Status: DISCONTINUED | OUTPATIENT
Start: 2020-03-08 | End: 2020-03-11 | Stop reason: HOSPADM

## 2020-03-08 RX ORDER — OXYCODONE HYDROCHLORIDE 5 MG/1
10 TABLET ORAL
Status: DISCONTINUED | OUTPATIENT
Start: 2020-03-08 | End: 2020-03-08

## 2020-03-08 RX ORDER — OXYCODONE HYDROCHLORIDE 5 MG/1
10 TABLET ORAL EVERY 4 HOURS PRN
Status: DISCONTINUED | OUTPATIENT
Start: 2020-03-08 | End: 2020-03-11 | Stop reason: HOSPADM

## 2020-03-08 RX ORDER — SODIUM CHLORIDE 9 MG/ML
INJECTION, SOLUTION INTRAVENOUS CONTINUOUS
Status: DISCONTINUED | OUTPATIENT
Start: 2020-03-08 | End: 2020-03-08

## 2020-03-08 RX ORDER — HYDROCODONE BITARTRATE AND ACETAMINOPHEN 500; 5 MG/1; MG/1
TABLET ORAL
Status: DISCONTINUED | OUTPATIENT
Start: 2020-03-08 | End: 2020-03-08

## 2020-03-08 RX ORDER — HYDROMORPHONE HYDROCHLORIDE 1 MG/ML
0.2 INJECTION, SOLUTION INTRAMUSCULAR; INTRAVENOUS; SUBCUTANEOUS
Status: DISCONTINUED | OUTPATIENT
Start: 2020-03-08 | End: 2020-03-08 | Stop reason: HOSPADM

## 2020-03-08 RX ORDER — MORPHINE SULFATE 0.5 MG/ML
INJECTION, SOLUTION EPIDURAL; INTRATHECAL; INTRAVENOUS
Status: DISCONTINUED | OUTPATIENT
Start: 2020-03-08 | End: 2020-03-08

## 2020-03-08 RX ORDER — SODIUM CHLORIDE, SODIUM LACTATE, POTASSIUM CHLORIDE, CALCIUM CHLORIDE 600; 310; 30; 20 MG/100ML; MG/100ML; MG/100ML; MG/100ML
INJECTION, SOLUTION INTRAVENOUS CONTINUOUS PRN
Status: DISCONTINUED | OUTPATIENT
Start: 2020-03-08 | End: 2020-03-08

## 2020-03-08 RX ORDER — BISACODYL 10 MG
10 SUPPOSITORY, RECTAL RECTAL ONCE AS NEEDED
Status: DISCONTINUED | OUTPATIENT
Start: 2020-03-08 | End: 2020-03-11 | Stop reason: HOSPADM

## 2020-03-08 RX ORDER — ONDANSETRON 2 MG/ML
4 INJECTION INTRAMUSCULAR; INTRAVENOUS DAILY PRN
Status: DISCONTINUED | OUTPATIENT
Start: 2020-03-08 | End: 2020-03-08

## 2020-03-08 RX ORDER — SODIUM CHLORIDE 0.9 % (FLUSH) 0.9 %
10 SYRINGE (ML) INJECTION
Status: DISCONTINUED | OUTPATIENT
Start: 2020-03-08 | End: 2020-03-08

## 2020-03-08 RX ORDER — OXYTOCIN-SODIUM CHLORIDE 0.9% IV SOLN 30 UNIT/500ML 30-0.9/5 UT/ML-%
SOLUTION INTRAVENOUS CONTINUOUS PRN
Status: DISCONTINUED | OUTPATIENT
Start: 2020-03-08 | End: 2020-03-08

## 2020-03-08 RX ORDER — HYDROMORPHONE HYDROCHLORIDE 1 MG/ML
1 INJECTION, SOLUTION INTRAMUSCULAR; INTRAVENOUS; SUBCUTANEOUS
Status: ACTIVE | OUTPATIENT
Start: 2020-03-08 | End: 2020-03-10

## 2020-03-08 RX ORDER — ONDANSETRON 2 MG/ML
4 INJECTION INTRAMUSCULAR; INTRAVENOUS EVERY 4 HOURS PRN
Status: DISCONTINUED | OUTPATIENT
Start: 2020-03-08 | End: 2020-03-08

## 2020-03-08 RX ORDER — ONDANSETRON 4 MG/1
8 TABLET, ORALLY DISINTEGRATING ORAL EVERY 8 HOURS PRN
Status: DISCONTINUED | OUTPATIENT
Start: 2020-03-08 | End: 2020-03-11 | Stop reason: HOSPADM

## 2020-03-08 RX ORDER — HYDROMORPHONE HYDROCHLORIDE 1 MG/ML
0.5 INJECTION, SOLUTION INTRAMUSCULAR; INTRAVENOUS; SUBCUTANEOUS
Status: ACTIVE | OUTPATIENT
Start: 2020-03-08 | End: 2020-03-10

## 2020-03-08 RX ORDER — OXYTOCIN-SODIUM CHLORIDE 0.9% IV SOLN 30 UNIT/500ML 30-0.9/5 UT/ML-%
42 SOLUTION INTRAVENOUS CONTINUOUS
Status: ACTIVE | OUTPATIENT
Start: 2020-03-08 | End: 2020-03-09

## 2020-03-08 RX ORDER — DIPHENHYDRAMINE HYDROCHLORIDE 50 MG/ML
12.5 INJECTION INTRAMUSCULAR; INTRAVENOUS
Status: DISCONTINUED | OUTPATIENT
Start: 2020-03-08 | End: 2020-03-08 | Stop reason: HOSPADM

## 2020-03-08 RX ORDER — DIPHENHYDRAMINE HCL 25 MG
25 CAPSULE ORAL EVERY 4 HOURS PRN
Status: DISCONTINUED | OUTPATIENT
Start: 2020-03-08 | End: 2020-03-11 | Stop reason: HOSPADM

## 2020-03-08 RX ORDER — HYDROMORPHONE HYDROCHLORIDE 1 MG/ML
0.2 INJECTION, SOLUTION INTRAMUSCULAR; INTRAVENOUS; SUBCUTANEOUS
Status: DISCONTINUED | OUTPATIENT
Start: 2020-03-08 | End: 2020-03-08

## 2020-03-08 RX ORDER — ADHESIVE BANDAGE
30 BANDAGE TOPICAL 2 TIMES DAILY PRN
Status: DISCONTINUED | OUTPATIENT
Start: 2020-03-09 | End: 2020-03-11 | Stop reason: HOSPADM

## 2020-03-08 RX ORDER — DIPHENHYDRAMINE HYDROCHLORIDE 50 MG/ML
25 INJECTION INTRAMUSCULAR; INTRAVENOUS EVERY 6 HOURS
Status: DISCONTINUED | OUTPATIENT
Start: 2020-03-08 | End: 2020-03-08

## 2020-03-08 RX ORDER — SODIUM CHLORIDE, SODIUM LACTATE, POTASSIUM CHLORIDE, CALCIUM CHLORIDE 600; 310; 30; 20 MG/100ML; MG/100ML; MG/100ML; MG/100ML
INJECTION, SOLUTION INTRAVENOUS CONTINUOUS
Status: ACTIVE | OUTPATIENT
Start: 2020-03-08 | End: 2020-03-09

## 2020-03-08 RX ORDER — OXYCODONE HYDROCHLORIDE 5 MG/1
5 TABLET ORAL EVERY 4 HOURS PRN
Status: DISCONTINUED | OUTPATIENT
Start: 2020-03-08 | End: 2020-03-11 | Stop reason: HOSPADM

## 2020-03-08 RX ORDER — LIDOCAINE HYDROCHLORIDE 20 MG/ML
INJECTION, SOLUTION EPIDURAL; INFILTRATION; INTRACAUDAL; PERINEURAL
Status: COMPLETED | OUTPATIENT
Start: 2020-03-08 | End: 2020-03-08

## 2020-03-08 RX ORDER — CEFAZOLIN SODIUM 2 G/50ML
2 SOLUTION INTRAVENOUS ONCE AS NEEDED
Status: DISCONTINUED | OUTPATIENT
Start: 2020-03-08 | End: 2020-03-08

## 2020-03-08 RX ORDER — AMOXICILLIN 250 MG
1 CAPSULE ORAL 2 TIMES DAILY PRN
Status: DISCONTINUED | OUTPATIENT
Start: 2020-03-08 | End: 2020-03-11 | Stop reason: HOSPADM

## 2020-03-08 RX ORDER — ACETAMINOPHEN 10 MG/ML
INJECTION, SOLUTION INTRAVENOUS
Status: DISCONTINUED | OUTPATIENT
Start: 2020-03-08 | End: 2020-03-08

## 2020-03-08 RX ORDER — ONDANSETRON 2 MG/ML
4 INJECTION INTRAMUSCULAR; INTRAVENOUS DAILY PRN
Status: DISCONTINUED | OUTPATIENT
Start: 2020-03-08 | End: 2020-03-08 | Stop reason: HOSPADM

## 2020-03-08 RX ADMIN — ACETAMINOPHEN 650 MG: 10 INJECTION, SOLUTION INTRAVENOUS at 08:03

## 2020-03-08 RX ADMIN — DIPHENHYDRAMINE HYDROCHLORIDE 25 MG: 50 INJECTION, SOLUTION INTRAMUSCULAR; INTRAVENOUS at 08:03

## 2020-03-08 RX ADMIN — MORPHINE SULFATE 2 MG: 0.5 INJECTION, SOLUTION EPIDURAL; INTRATHECAL; INTRAVENOUS at 08:03

## 2020-03-08 RX ADMIN — SODIUM CHLORIDE, SODIUM LACTATE, POTASSIUM CHLORIDE, AND CALCIUM CHLORIDE: .6; .31; .03; .02 INJECTION, SOLUTION INTRAVENOUS at 01:03

## 2020-03-08 RX ADMIN — HYDROMORPHONE HYDROCHLORIDE 1 MG: 1 INJECTION, SOLUTION INTRAMUSCULAR; INTRAVENOUS; SUBCUTANEOUS at 10:03

## 2020-03-08 RX ADMIN — AMPICILLIN SODIUM 2 G: 2 INJECTION, POWDER, FOR SOLUTION INTRAMUSCULAR; INTRAVENOUS at 08:03

## 2020-03-08 RX ADMIN — FENTANYL CITRATE 45 MCG: 50 INJECTION INTRAMUSCULAR; INTRAVENOUS at 08:03

## 2020-03-08 RX ADMIN — Medication 1000 ML/HR: at 08:03

## 2020-03-08 RX ADMIN — FENTANYL CITRATE 50 MCG: 50 INJECTION INTRAMUSCULAR; INTRAVENOUS at 07:03

## 2020-03-08 RX ADMIN — LIDOCAINE HYDROCHLORIDE 3 ML: 20 INJECTION, SOLUTION EPIDURAL; INFILTRATION; INTRACAUDAL; PERINEURAL at 07:03

## 2020-03-08 RX ADMIN — SODIUM CHLORIDE, SODIUM LACTATE, POTASSIUM CHLORIDE, AND CALCIUM CHLORIDE: .6; .31; .03; .02 INJECTION, SOLUTION INTRAVENOUS at 07:03

## 2020-03-08 RX ADMIN — CEFAZOLIN SODIUM 2 G: 1 SOLUTION INTRAVENOUS at 07:03

## 2020-03-08 RX ADMIN — AMPICILLIN SODIUM 2 G: 2 INJECTION, POWDER, FOR SOLUTION INTRAMUSCULAR; INTRAVENOUS at 02:03

## 2020-03-08 RX ADMIN — ONDANSETRON 4 MG: 2 INJECTION INTRAMUSCULAR; INTRAVENOUS at 07:03

## 2020-03-08 RX ADMIN — MORPHINE SULFATE 3 MG: 0.5 INJECTION, SOLUTION EPIDURAL; INTRATHECAL; INTRAVENOUS at 08:03

## 2020-03-08 RX ADMIN — MAGNESIUM SULFATE HEPTAHYDRATE 1 G/HR: 40 INJECTION, SOLUTION INTRAVENOUS at 01:03

## 2020-03-08 RX ADMIN — FENTANYL CITRATE 5 MCG: 50 INJECTION INTRAMUSCULAR; INTRAVENOUS at 07:03

## 2020-03-08 RX ADMIN — AMPICILLIN SODIUM 2 G: 2 INJECTION, POWDER, FOR SOLUTION INTRAMUSCULAR; INTRAVENOUS at 01:03

## 2020-03-08 NOTE — SUBJECTIVE & OBJECTIVE
Obstetric HPI: HD#4 29.5 weeks  Patient reports irregular mild contractions, active fetal movement, very mild red streaks when wipes since last night  (vaginal bleeding ), present loss of fluid .  I&Ps issued     Objective:     Vital Signs (Most Recent):  Temp: 97.7 °F (36.5 °C) (03/08/20 0902)  Pulse: 88 (03/08/20 0802)  Resp: 17 (03/08/20 0902)  BP: (!) 110/55 (03/08/20 0802)  SpO2: 98 % (03/07/20 1450) Vital Signs (24h Range):  Temp:  [97.6 °F (36.4 °C)-98.5 °F (36.9 °C)] 97.7 °F (36.5 °C)  Pulse:  [] 88  Resp:  [16-18] 17  SpO2:  [96 %-99 %] 98 %  BP: (105-131)/(52-77) 110/55     Weight: 58.1 kg (128 lb)  Body mass index is 24.19 kg/m².    FHT: reactive  TOCO:  Q irregular minutes      Intake/Output Summary (Last 24 hours) at 3/8/2020 1038  Last data filed at 3/8/2020 0600  Gross per 24 hour   Intake 5049.88 ml   Output 2800 ml   Net 2249.88 ml       Cervical Exam:  Dilation:  4  Effacement:  50%  Station: -3  Presentation: Breech     Significant Labs:  Recent Lab Results       03/08/20  0405   03/07/20  2125   03/07/20  1542   03/07/20  0956        Magnesium 4.5 5.0  Comment:  mg critical result(s) repeated. Called and verbal readback obtained   from ty shah rn lnd by [facility] 03/07/2020 21:54   5.3  Comment:  Mg   critical result(s) called and verbal readback obtained from Colleen Cavazos RN/LnD by S 03/07/2020 16:31   5.3  Comment:  Mg   critical result(s) called and verbal readback obtained from Colleen Cavazos RN/LnD by [facility] 03/07/2020 11:02             Physical Exam:             Abdominal: Soft. Bowel sounds are normal.             Musculoskeletal: She exhibits no edema or tenderness.

## 2020-03-08 NOTE — PROGRESS NOTES
[facility]  Obstetrics  Antepartum Progress Note    Patient Name: Irene Grayson  MRN: [id number]  Admission Date: 3/5/2020  Hospital Length of Stay: 3 days  Attending Physician: Brian Quijano MD  Primary Care Provider: Jean Waller MD    Subjective:     Principal Problem: labor    HPI:  24-year-old  2 para 0101 at 29 weeks and 2 days gestational age admitted in  labor    Hospital Course:  No notes on file    Obstetric HPI: HD#4 29.5 weeks  Patient reports irregular mild contractions, active fetal movement, very mild red streaks when wipes since last night  (vaginal bleeding ), present loss of fluid .  I&Ps issued     Objective:     Vital Signs (Most Recent):  Temp: 97.7 °F (36.5 °C) (20 09)  Pulse: 88 (20 08)  Resp: 17 (20 09)  BP: (!) 110/55 (20 0802)  SpO2: 98 % (20 1450) Vital Signs (24h Range):  Temp:  [97.6 °F (36.4 °C)-98.5 °F (36.9 °C)] 97.7 °F (36.5 °C)  Pulse:  [] 88  Resp:  [16-18] 17  SpO2:  [96 %-99 %] 98 %  BP: (105-131)/(52-77) 110/55     Weight: 58.1 kg (128 lb)  Body mass index is 24.19 kg/m².    FHT: reactive  TOCO:  Q irregular minutes      Intake/Output Summary (Last 24 hours) at 3/8/2020 1038  Last data filed at 3/8/2020 0600  Gross per 24 hour   Intake 5049.88 ml   Output 2800 ml   Net 2249.88 ml       Cervical Exam:  Dilation:  4  Effacement:  50%  Station: -3  Presentation: Breech     Significant Labs:  Recent Lab Results       20  0405   20  2125   20  1542   20  0956        Magnesium 4.5 5.0  Comment:  mg critical result(s) repeated. Called and verbal readback obtained   from ty kaminskid by [facility] 2020 21:54   5.3  Comment:  Mg   critical result(s) called and verbal readback obtained from Colleen Cavazos RN/Erasmo by [facility] 2020 16:31   5.3  Comment:  Mg   critical result(s) called and verbal readback obtained from Colleen Cavazos RN/Erasmo by [facility] 2020 11:02              Physical Exam:             Abdominal: Soft. Bowel sounds are normal.             Musculoskeletal: She exhibits no edema or tenderness.             Assessment/Plan:     24 y.o. female  at 29w5d for:    *  labor  IUP at 29 weeks and 3 days gestational age  Pre term labor with  cervical dilation-stable since admission, continue Mag sulfate, Mag levels within normal range.  Mag sulfate decreased to 1.5 grams/hour today secondary to patient complaints.  Status post 2 of 2 doses of betamethasone, continue IV antibiotics, continue bedrest, Contreras catheter discontinued last night secondary to patient's complaints of extreme pain with Contreras in place  P ROM-confirmed last night  Rh negative  Skyler breech presentation  NNP-spoke with patient  Leukocytosis-unknown origin, patient afebrile,UA negative,, no URI symptoms, no evidence of chorioamnionitis.  Repeat CBC in a.m.    Abdominal pain during pregnancy  Stable PPROM 29.5 weeks, mag now off; cont expectant management            Karina Castillo MD  Obstetrics  [facility]

## 2020-03-08 NOTE — NURSING
0800: Pt asleep in bed, easily awakes. Brother at bedside. Magnesium infusion discontinued at this time per order.   0915: Pt awake, up to bedside commode to void. POC discussed. Pt verbalizes understanding. States that she has not felt any contractions this morning.  1025: Pt reports feeling slight cramping to lower abd. Denies pain. Light pink mucous discharge upon pt dabbing perineum after voiding.   1035: Dr. Castillo at bedside to discuss POC with pt. Notified by pt of minor cramping. Orders rec'd to check cervix if pt begins to feel pain or discomfort with ctx or cramping. Pt agrees. Ok to continue to get up out of bed to bedside commode per Dr. Castillo.  1200: Pt assisted to bedside commode.  1210: Pt reports having BM. Assisted back into bed. Lani care complete, under towel changed. Assisted with hand hygiene. Educated pt on importance of pumping within first 6 hours  1320: Pt reports feeling increased and more painful cramping. SVE unchanged at 4/50/-3. Dr. Castillo notified, no new orders.   1345: Orders rec'd from Dr. castillo to update Dr. Quijano on pt. Status.   1357: Dr. Quijano updated on pt status, Mag turned off at 0800, complained of more painful ctx, sve 4/50/-3, no bleeding on exam, pt has some light mucousy bloody discharge, denies need for pain medication, does have uterine irritability. Orders rec'd for CBC and type and screen, and ultrasound for size, position, and shanita.   1508: Per u/s [email or web address] shanita 11, breech presentation, est weigh 2#15oz. Dr. Quijano notified. No new orders.   1805: Pt reports pain scale 5/10 and progressively getting worse. Dr. Quijano notified pt rayshawn irregularly very 4 min x 1 hour and getting progressively more uncomfortable. Rates pain 5/10. Orders rec'd at this time to contact Neonatologist and Anesthesia to schedule c/s for tonight.   1815: Neonatologist available at 1930, anesthesia ready when needed, Dr. Quijano notified. Orders rec'd to roll back to OR for 1910  for start time of 1930.  1832: Abdomen cleansed with hibaclens, clipped per pt prior to admission.  1845: Dr. Vinson at bedside for anesthesia interview.

## 2020-03-08 NOTE — ANESTHESIA PREPROCEDURE EVALUATION
03/08/2020  Irene Grayson is a 24 y.o., female.    Anesthesia Evaluation    I have reviewed the Patient Summary Reports.    I have reviewed the Nursing Notes.   I have reviewed the Medications.     Review of Systems  Anesthesia Hx:  No problems with previous Anesthesia Denies Hx of Anesthetic complications  Denies Family Hx of Anesthesia complications.   Denies Personal Hx of Anesthesia complications.   Social:  Former Smoker, No Alcohol Use    Hematology/Oncology:     Oncology Normal    -- Anemia:   EENT/Dental:EENT/Dental Normal   Cardiovascular:  Cardiovascular Normal     Pulmonary:  Pulmonary Normal    Renal/:  Renal/ Normal     Hepatic/GI:  Hepatic/GI Normal    Musculoskeletal:  Musculoskeletal Normal    Neurological:   Headaches Seizures Patient's last seizure reported to be 2011  No neurologist follow-up  No anticonvulsant medications   Endocrine:  Endocrine Normal    Dermatological:  Skin Normal    Psych:  Psychiatric Normal           Physical Exam  General:  Well nourished    Airway/Jaw/Neck:  Airway Findings: Mouth Opening: Normal Tongue: Normal  General Airway Assessment: Adult  Mallampati: II  Improves to II with phonation.  TM Distance: < 4 cm  Jaw/Neck Findings:  Neck ROM: Normal ROM      Dental:  Dental Findings: In tact   Chest/Lungs:  Chest/Lungs Findings: Clear to auscultation, Normal Respiratory Rate     Heart/Vascular:  Heart Findings: Rate: Normal  Rhythm: Regular Rhythm  Sounds: Normal        Mental Status:  Mental Status Findings:  Cooperative, Alert and Oriented         Anesthesia Plan  Type of Anesthesia, risks & benefits discussed:  Anesthesia Type:  CSE  Patient's Preference: CSE  Intra-op Monitoring Plan: standard ASA monitors  Intra-op Monitoring Plan Comments:   Post Op Pain Control Plan: multimodal analgesia  Post Op Pain Control Plan Comments:   Induction:    Beta  Blocker:  Patient is not currently on a Beta-Blocker (No further documentation required).       Informed Consent: Patient understands risks and agrees with Anesthesia plan.  Questions answered. Anesthesia consent signed with patient.  ASA Score: 2     Day of Surgery Review of History & Physical:        Anesthesia Plan Notes: CSE  Duramorph 3 mg epidural injection   Benadryl 25 mg iv  Decadron 8 mg iv   Zofran 4 mg iv  Ofirmev 650 mg iv         Ready For Surgery From Anesthesia Perspective.

## 2020-03-09 LAB
BASOPHILS # BLD AUTO: 0.02 K/UL (ref 0–0.2)
BASOPHILS NFR BLD: 0.1 % (ref 0–1.9)
DIFFERENTIAL METHOD: ABNORMAL
EOSINOPHIL # BLD AUTO: 0 K/UL (ref 0–0.5)
EOSINOPHIL NFR BLD: 0.1 % (ref 0–8)
ERYTHROCYTE [DISTWIDTH] IN BLOOD BY AUTOMATED COUNT: 14.6 % (ref 11.5–14.5)
HCT VFR BLD AUTO: 20.6 % (ref 37–48.5)
HGB BLD-MCNC: 6 G/DL (ref 12–16)
IMM GRANULOCYTES # BLD AUTO: 0.25 K/UL (ref 0–0.04)
IMM GRANULOCYTES NFR BLD AUTO: 1.4 % (ref 0–0.5)
LYMPHOCYTES # BLD AUTO: 1.6 K/UL (ref 1–4.8)
LYMPHOCYTES NFR BLD: 9.4 % (ref 18–48)
MCH RBC QN AUTO: 23.1 PG (ref 27–31)
MCHC RBC AUTO-ENTMCNC: 29.1 G/DL (ref 32–36)
MCV RBC AUTO: 79 FL (ref 82–98)
MONOCYTES # BLD AUTO: 1 K/UL (ref 0.3–1)
MONOCYTES NFR BLD: 6 % (ref 4–15)
NEUTROPHILS # BLD AUTO: 14.3 K/UL (ref 1.8–7.7)
NEUTROPHILS NFR BLD: 83 % (ref 38–73)
NRBC BLD-RTO: 0 /100 WBC
PLATELET # BLD AUTO: 212 K/UL (ref 150–350)
PMV BLD AUTO: 9.7 FL (ref 9.2–12.9)
RBC # BLD AUTO: 2.6 M/UL (ref 4–5.4)
WBC # BLD AUTO: 17.28 K/UL (ref 3.9–12.7)

## 2020-03-09 PROCEDURE — 85025 COMPLETE CBC W/AUTO DIFF WBC: CPT

## 2020-03-09 PROCEDURE — 12000002 HC ACUTE/MED SURGE SEMI-PRIVATE ROOM

## 2020-03-09 PROCEDURE — 25000003 PHARM REV CODE 250: Performed by: SPECIALIST

## 2020-03-09 PROCEDURE — 36415 COLL VENOUS BLD VENIPUNCTURE: CPT

## 2020-03-09 RX ADMIN — IBUPROFEN 600 MG: 400 TABLET ORAL at 01:03

## 2020-03-09 RX ADMIN — OXYCODONE HYDROCHLORIDE 10 MG: 5 TABLET ORAL at 06:03

## 2020-03-09 RX ADMIN — OXYCODONE HYDROCHLORIDE 5 MG: 5 TABLET ORAL at 04:03

## 2020-03-09 RX ADMIN — PRENATAL VIT W/ FE FUMARATE-FA TAB 27-0.8 MG 1 TABLET: 27-0.8 TAB at 09:03

## 2020-03-09 RX ADMIN — Medication: at 06:03

## 2020-03-09 RX ADMIN — IBUPROFEN 600 MG: 400 TABLET ORAL at 06:03

## 2020-03-09 RX ADMIN — IBUPROFEN 600 MG: 400 TABLET ORAL at 08:03

## 2020-03-09 NOTE — ANESTHESIA POSTPROCEDURE EVALUATION
Anesthesia Post Evaluation    Patient: Irene Grayson    Procedure(s) Performed: Procedure(s) (LRB):   SECTION (N/A)    Final Anesthesia Type: CSE    Patient location during evaluation: labor & delivery  Patient participation: Yes- Able to Participate  Level of consciousness: awake and alert, oriented and awake  Post-procedure vital signs: reviewed and stable  Pain management: adequate  Airway patency: patent    PONV status at discharge: No PONV  Anesthetic complications: no      Cardiovascular status: blood pressure returned to baseline, hemodynamically stable and stable  Respiratory status: unassisted, spontaneous ventilation and room air  Hydration status: euvolemic  Follow-up needed (Pain management and to assess full return of motor and sensory functions to the patient's lower extremities)           Vitals Value Taken Time   /88 3/8/2020  9:42 PM   Temp 36.9 °C (98.5 °F) 3/8/2020  5:48 PM   Pulse 79 3/8/2020  9:42 PM   Resp 16 3/8/2020  6:02 PM   SpO2 100 % 3/8/2020  9:41 PM   Vitals shown include unvalidated device data.      No case tracking events are documented in the log.      Pain/Isaac Score: No data recorded

## 2020-03-09 NOTE — PLAN OF CARE
Pt stable since tx to unit. VSS, pain well controlled, cruz catheter in place with good urine output, dressing to incision C/D/I, fundus firm, and lochia light. Will cont to monitor.         Sharon Gambino RN  03/09/2020  3:57 AM

## 2020-03-09 NOTE — PROGRESS NOTES
[facility]  Obstetrics  Antepartum Progress Note    Patient Name: Irene Grayson  MRN: [id number]  Admission Date: 3/5/2020  Hospital Length of Stay: 3 days  Attending Physician: Brian Quijano MD  Primary Care Provider: Jean Waller MD    Subjective:     Principal Problem: labor    HPI:  24-year-old  2 para 0101 at 29 weeks and 2 days gestational age admitted in  labor    Hospital Course:  No notes on file    Obstetric HPI:  Addendum-patient now feeling regular painful contractions, status post betamethasone x2 last dose greater than 36 hr ago.  Will proceed with primary  section at this time secondary to labor, also to allow neonatology to be present     Objective:     Vital Signs (Most Recent):  Temp: 98.5 °F (36.9 °C) (20 1748)  Pulse: 96 (20 1802)  Resp: 16 (20 1802)  BP: 119/61 (20 1802)  SpO2: 98 % (20 1450) Vital Signs (24h Range):  Temp:  [97.6 °F (36.4 °C)-98.5 °F (36.9 °C)] 98.5 °F (36.9 °C)  Pulse:  [] 96  Resp:  [16-18] 16  BP: (108-134)/(55-81) 119/61     Weight: 58.1 kg (128 lb)  Body mass index is 24.19 kg/m².    FHT:  Baseline 140s reactive  TOCO:  Q 3-5 minutes      Intake/Output Summary (Last 24 hours) at 3/8/2020 1901  Last data filed at 3/8/2020 1600  Gross per 24 hour   Intake 5277.17 ml   Output 3700 ml   Net 1577.17 ml       Cervical Exam:  4 cm/50%/-3 by RN exam this morning     Significant Labs:  Recent Lab Results       20  1443   20  0405   20  2125        Antibody Identification POS  Comment:  Rhogam Antibody  @20 16:25 by SLT:  RHIG RECEIVED 19 @ [facility] ED. PREVIOUSLY IDENTIFIED PASSIVE D ON 3/5/20           Group & Rh A NEG         Hematocrit 23.8         Hemoglobin 7.1         INDIRECT LEE POS         Magnesium   4.5 5.0  Comment:  mg critical result(s) repeated. Called and verbal readback obtained   from ty shah rn lnd by [facility] 2020 21:54       [facility] 23.1          MCHC 29.8         MCV 77         MPV 9.7         Platelets 246         RBC 3.08         RDW 14.8         WBC 15.82               Physical Exam     General-noticeably uncomfortable contractions  Extremities-no calf tenderness    Assessment/Plan:     24 y.o. female  at 29w5d for:    *  labor  IUP at 29 weeks and 5 days gestational age  Pre term labor with  cervical dilation-now in early active labor.  Status post betamethasone x2 last dose approximately 36 hr ago.  P ROM-confirmed last night  Rh negative  Skyler breech presentation  Will proceed with primary  section so that neonatology can be present during delivery    Abdominal pain during pregnancy  Stable PPROM 29.5 weeks, mag now off; cont expectant management            Gómez Quijano MD  Obstetrics  [facility]

## 2020-03-09 NOTE — ASSESSMENT & PLAN NOTE
IUP at 29 weeks and 5 days gestational age  Pre term labor with  cervical dilation-now in early active labor.  Status post betamethasone x2 last dose approximately 36 hr ago.  P ROM-confirmed last night  Rh negative  Skyler breech presentation  Will proceed with primary  section so that neonatology can be present during delivery

## 2020-03-09 NOTE — PLAN OF CARE
Met with patient to complete OB initial discharge planning assessment. Patient lives with her mother, 1 child and now baby. Patient has access to a car seat and plans to breast  feed. Patient has access to diapers and understands process of how to apply for WIC. Patient stated they have no needs from /case management staff at this time.       03/09/20 1402   Discharge Assessment   Assessment Type Discharge Planning Assessment   Confirmed/corrected address and phone number on facesheet? Yes   Assessment information obtained from? Patient   Current cognitive status: Alert/Oriented   Current Functional Status: Independent   Lives With parent(s);child(ruben), dependent   Able to Return to Prior Arrangements yes   Is patient able to care for self after discharge? Yes   Patient currently being followed by outpatient case management? No   Patient currently receives any other outside agency services? No   Do you have any problems affording any of your prescribed medications? No   Is the patient taking medications as prescribed? yes   Does the patient have transportation home? Yes   Transportation Anticipated family or friend will provide   Does the patient receive services at the [facility]mad[facility] Clinic? No   Discharge Plan A Home   Discharge Plan B Home   Patient/Family in Agreement with Plan yes

## 2020-03-09 NOTE — SUBJECTIVE & OBJECTIVE
Hospital course: No notes on file    Interval History:  Postop day 1.-status post classical     She is doing well this morning. She is tolerating a regular diet without nausea or vomiting. She is not voiding spontaneously. She is not ambulating. She has not passed flatus, and has not a BM. Vaginal bleeding is mild. She denies fever or chills. Abdominal pain is mild and controlled with oral medications. She is not breastfeeding. She desires circumcision for her male baby: not applicable.    Objective:     Vital Signs (Most Recent):  Temp: 98.8 °F (37.1 °C) (20)  Pulse: 93 (20)  Resp: 16 (20)  BP: 116/75 (20)  SpO2: 95 % (20) Vital Signs (24h Range):  Temp:  [97.6 °F (36.4 °C)-99.2 °F (37.3 °C)] 98.8 °F (37.1 °C)  Pulse:  [] 93  Resp:  [16-20] 16  SpO2:  [95 %-100 %] 95 %  BP: (114-147)/() 116/75     Weight: 58.1 kg (128 lb)  Body mass index is 24.19 kg/m².      Intake/Output Summary (Last 24 hours) at 3/9/2020 0915  Last data filed at 3/9/2020 0120  Gross per 24 hour   Intake 1150 ml   Output 5050 ml   Net -3900 ml       Significant Labs:  Lab Results   Component Value Date    GROUPTRH A NEG 2020    HEPBSAG Negative 2019     Recent Labs   Lab 20  0331   HGB 6.0*   HCT 20.6*       CBC:   Recent Labs   Lab 20  0331   WBC 17.28*   RBC 2.60*   HGB 6.0*   HCT 20.6*      MCV 79*   MCH 23.1*   MCHC 29.1*     I have personallly reviewed all pertinent lab results from the last 24 hours.    Physical Exam:       Cardiovascular: Normal rate.          Abdominal: Soft. Bowel sounds are normal. She exhibits abdominal incision (clean, dry, intact). There is no tenderness.     Genitourinary: Uterus is enlarged (firm and below umbilicus). There is bleeding (minimal) in the vagina.           Musculoskeletal: She exhibits no tenderness (no calf tenderness).

## 2020-03-09 NOTE — L&D DELIVERY NOTE
[facility]   Section   Operative Note    SUMMARY     Date of Procedure: 3/8/2020     24-year-old  2 para 0101 at 29 weeks and 5 days gestational age with  rupture of membranes and  labor, now status post betamethasone x2 and currently in early active labor.  Will proceed with primary  section    Procedure: Procedure(s) (LRB):   SECTION (N/A)    Surgeon(s) and Role:     * Brian Quijano MD - Primary    Assisting Surgeon: None    Pre-Operative Diagnosis:  labor [O60.00] ,  rupture membranes, breech presentation     Post-Operative Diagnosis: Post-Op Diagnosis Codes:     *  labor [O60.00], same    Anesthesia: Spinal/Epidural    Technical Procedures Used:  Classical  section           Description of the Findings of the Procedure:  Normal-appearing female anatomy, kuldip breech presentation    Significant Surgical Tasks Conducted by the Assistant(s), if Applicable:  Typical    Complications: No    Blood Loss: 500 mL     With patient in supine position, the legs are  and Contreras Catheter placed and positioning to supine done.   Abdomen prepped with Chloroprep and 3 minute drying time allowed prior to draping of the abdomen.   Time out taken with OR team members.  Pfannenstiel Incision made through the skin, transverse fascial incision developed, rectus muscles  in the midline and the peritoneum entered.   no adhesions noted.  Germán wound retractor placed  The lower uterine segment and position of the fetus identified.   Bladder flap taken down through transverse peritoneal incision.    Vertical incision made on uterus with scalpel secondary to underdeveloped lower uterine segment and extended with bandage scissors .  Clear fluid noted.  Infant delivered from frankbreech presentation.  Cord clamped after one minute and  handed to attending nurse.  Cord blood taken, placenta delivered.  The uterus wasnot  exteriorized.  The edges of the uterine incision are grasped with [place] clamps at the angles and the inferior and superior midline edges of the incision.    Closure of the uterine incision in a double layer with 0 Monocryl x2, starting at the top portion of the uterus run to the bottom portion of the incision.   Observation for bleeding with suture of any bleeding along the hysterotomy line.   With good hemostasis noted, the anterior pelvis is rinsed with sterile saline.   Right and left adnexa with normal anatomy.  The Germán wound retractor was removed     Closure of the abdomen with 2 0 Vicryl running of the peritoneum, fascial closure with 0 Maxon starting at the distal angle and tying the knot at the proximal angle.  Skin closure with 4 0 Vicryl subcuticular.  Wound dressed with Mepilex.          Specimens:   Specimen (12h ago, onward)    None          Condition: Good    Disposition: PACU - hemodynamically stable.    Attestation: Good     Viable female infant delivered in the kuldip breech position with Apgars of 9/9 and weight of 3 lb 7 oz    EBL-500 cc    Delivery Information for David Grayson    Birth information:  YOB: 2019   Time of birth: 7:54 PM   Sex: female   Head Delivery Date/Time:     Delivery type:    Gestational Age: 29w5d    Delivery Providers    Delivering clinician:             Measurements    Weight:    Length:           Apgars    Living status:    Apgars:   1 min.:   5 min.:   10 min.:   15 min.:   20 min.:     Skin color:          Heart rate:          Reflex irritability:          Muscle tone:          Respiratory effort:          Total:                                 Interventions/Resuscitation         Cord    No data filed        Placenta    Placenta delivery date/time:    Placenta removal:             Labor Events:       labor: Yes     Labor Onset Date/Time:         Dilation Complete Date/Time:         Start Pushing Date/Time:         Start Pushing  Date/Time:       Rupture Date/Time: 20         Rupture type:  premature rupture of membranes         Fluid Amount: Moderate      Fluid Color: Clear      Fluid Odor:        Membrane Status: PROM (Premature Rupture)               steroids: Full Course     Antibiotics given for GBS:       Induction:       Indications for induction:  Premature ROM     Augmentation:       Indications for augmentation:       Labor complications:       Additional complications:          Cervical ripening:                     Delivery:      Episiotomy:       Indication for Episiotomy:       Perineal Lacerations:   Repaired:      Periurethral Laceration:   Repaired:     Labial Laceration:   Repaired:     Sulcus Laceration:   Repaired:     Vaginal Laceration:   Repaired:     Cervical Laceration:   Repaired:     Repair suture:       Repair # of packets:       Last Value - EBL - Nursing (mL):       Sum - EBL - Nursing (mL): 500     Last Value - EBL - Anesthesia (mL): 500      Calculated QBL (mL):        Vaginal Sweep Performed:       S[facility]icount Correct:         Other providers:            Details (if applicable):  Trial of Labor      Categorization:      Priority:     Indications for :     Incision Type:       Additional  information:  Forceps:    Vacuum:    Breech:    Observed anomalies    Other (Comments):

## 2020-03-09 NOTE — ANESTHESIA POST-OP PAIN MANAGEMENT
Acute Pain Service Progress Note    Irene Grayson is a 24 y.o., female, [id number].    Surgery:   section    Post Op Day #: 1  Problem List:    Active Hospital Problems    Diagnosis  POA    * labor [O60.00]  Yes     premature rupture of membranes (PPROM) with onset of labor after 24 hours of rupture in third trimester, antepartum [O42.113]  Yes    Abdominal pain during pregnancy [O26.899, R10.9]  Yes      Resolved Hospital Problems   No resolved problems to display.      Vitals   Vitals:    20 0300   BP: 116/75   Pulse: 93   Resp: 16   Temp: 37.1 °C (98.8 °F)        Labs    Admission on 2020   Component Date Value Ref Range Status    Specimen UA 2020 Urine, Clean Catch   Final    Color, UA 2020 Yellow  Yellow, Straw, Mary Final    Appearance, UA 2020 Clear  Clear Final    pH, UA 2020 7.0  5.0 - 8.0 Final    Specific Gravity, UA 2020 1.010  1.005 - 1.030 Final    Protein, UA 2020 Negative  Negative Final    Glucose, UA 2020 Negative  Negative Final    Ketones, UA 2020 Negative  Negative Final    Bilirubin (UA) 2020 Negative  Negative Final    Occult Blood UA 2020 Negative  Negative Final    Nitrite, UA 2020 Negative  Negative Final    Urobilinogen, UA 2020 Negative  Negative EU/dL Final    Leukocytes, UA 2020 Negative  Negative Final    WBC 2020 20.56* 3.90 - 12.70 K/uL Final    RBC 2020 3.76* 4.00 - 5.40 M/uL Final    Hemoglobin 2020 8.6* 12.0 - 16.0 g/dL Final    Hematocrit 2020 28.0* 37.0 - 48.5 % Final    Mean Corpuscular Volume 2020 75* 82 - 98 fL Final    Mean Corpuscular Hemoglobin 2020 22.9* 27.0 - 31.0 pg Final    Mean Corpuscular Hemoglobin Conc 2020 30.7* 32.0 - 36.0 g/dL Final    RDW 2020 14.6* 11.5 - 14.5 % Final    Platelets 2020 309  150 - 350 K/uL Final    MPV 2020 9.6  9.2 - 12.9 fL Final    Group & Rh  03/05/2020 A NEG   Final    Indirect Alla 03/05/2020 POS   Final    RPR 03/05/2020 Non-reactive  Non-reactive Final    Magnesium 03/05/2020 4.9* 1.6 - 2.6 mg/dL Final    Antibody Identification 03/05/2020 POS   Final    Magnesium 03/05/2020 5.7* 1.6 - 2.6 mg/dL Final    POC pH, Vaginal 03/05/2020 5.5* 4.5 - 5.5 Final    Magnesium 03/05/2020 6.1* 1.6 - 2.6 mg/dL Final    WBC 03/06/2020 22.72* 3.90 - 12.70 K/uL Final    RBC 03/06/2020 3.37* 4.00 - 5.40 M/uL Final    Hemoglobin 03/06/2020 7.7* 12.0 - 16.0 g/dL Final    Hematocrit 03/06/2020 25.6* 37.0 - 48.5 % Final    Mean Corpuscular Volume 03/06/2020 76* 82 - 98 fL Final    Mean Corpuscular Hemoglobin 03/06/2020 22.8* 27.0 - 31.0 pg Final    Mean Corpuscular Hemoglobin Conc 03/06/2020 30.1* 32.0 - 36.0 g/dL Final    RDW 03/06/2020 14.7* 11.5 - 14.5 % Final    Platelets 03/06/2020 339  150 - 350 K/uL Final    MPV 03/06/2020 9.7  9.2 - 12.9 fL Final    Immature Granulocytes 03/06/2020 1.6* 0.0 - 0.5 % Final    Gran # (ANC) 03/06/2020 19.8* 1.8 - 7.7 K/uL Final    Immature Grans (Abs) 03/06/2020 0.37* 0.00 - 0.04 K/uL Final    Lymph # 03/06/2020 1.1  1.0 - 4.8 K/uL Final    Mono # 03/06/2020 1.4* 0.3 - 1.0 K/uL Final    Eos # 03/06/2020 0.0  0.0 - 0.5 K/uL Final    Baso # 03/06/2020 0.03  0.00 - 0.20 K/uL Final    nRBC 03/06/2020 0  0 /100 WBC Final    Gran% 03/06/2020 87.4* 38.0 - 73.0 % Final    Lymph% 03/06/2020 4.6* 18.0 - 48.0 % Final    Mono% 03/06/2020 6.3  4.0 - 15.0 % Final    Eosinophil% 03/06/2020 0.0  0.0 - 8.0 % Final    Basophil% 03/06/2020 0.1  0.0 - 1.9 % Final    Differential Method 03/06/2020 Automated   Final    Magnesium 03/06/2020 6.2* 1.6 - 2.6 mg/dL Final    Magnesium 03/06/2020 6.2* 1.6 - 2.6 mg/dL Final    Magnesium 03/06/2020 5.4* 1.6 - 2.6 mg/dL Final    Magnesium 03/06/2020 5.4* 1.6 - 2.6 mg/dL Final    Magnesium 03/07/2020 5.5* 1.6 - 2.6 mg/dL Final    WBC 03/07/2020 21.22* 3.90 - 12.70 K/uL Final     RBC 03/07/2020 3.11* 4.00 - 5.40 M/uL Final    Hemoglobin 03/07/2020 7.3* 12.0 - 16.0 g/dL Final    Hematocrit 03/07/2020 23.9* 37.0 - 48.5 % Final    Mean Corpuscular Volume 03/07/2020 77* 82 - 98 fL Final    Mean Corpuscular Hemoglobin 03/07/2020 23.5* 27.0 - 31.0 pg Final    Mean Corpuscular Hemoglobin Conc 03/07/2020 30.5* 32.0 - 36.0 g/dL Final    RDW 03/07/2020 14.8* 11.5 - 14.5 % Final    Platelets 03/07/2020 319  150 - 350 K/uL Final    MPV 03/07/2020 10.0  9.2 - 12.9 fL Final    Immature Granulocytes 03/07/2020 2.4* 0.0 - 0.5 % Final    Gran # (ANC) 03/07/2020 17.7* 1.8 - 7.7 K/uL Final    Immature Grans (Abs) 03/07/2020 0.50* 0.00 - 0.04 K/uL Final    Lymph # 03/07/2020 1.4  1.0 - 4.8 K/uL Final    Mono # 03/07/2020 1.6* 0.3 - 1.0 K/uL Final    Eos # 03/07/2020 0.0  0.0 - 0.5 K/uL Final    Baso # 03/07/2020 0.04  0.00 - 0.20 K/uL Final    nRBC 03/07/2020 0  0 /100 WBC Final    Gran% 03/07/2020 83.4* 38.0 - 73.0 % Final    Lymph% 03/07/2020 6.6* 18.0 - 48.0 % Final    Mono% 03/07/2020 7.4  4.0 - 15.0 % Final    Eosinophil% 03/07/2020 0.0  0.0 - 8.0 % Final    Basophil% 03/07/2020 0.2  0.0 - 1.9 % Final    Differential Method 03/07/2020 Automated   Final    Magnesium 03/07/2020 5.3* 1.6 - 2.6 mg/dL Final    Magnesium 03/07/2020 5.3* 1.6 - 2.6 mg/dL Final    Magnesium 03/07/2020 5.0* 1.6 - 2.6 mg/dL Final    Magnesium 03/08/2020 4.5* 1.6 - 2.6 mg/dL Final    WBC 03/08/2020 15.82* 3.90 - 12.70 K/uL Final    RBC 03/08/2020 3.08* 4.00 - 5.40 M/uL Final    Hemoglobin 03/08/2020 7.1* 12.0 - 16.0 g/dL Final    Hematocrit 03/08/2020 23.8* 37.0 - 48.5 % Final    Mean Corpuscular Volume 03/08/2020 77* 82 - 98 fL Final    Mean Corpuscular Hemoglobin 03/08/2020 23.1* 27.0 - 31.0 pg Final    Mean Corpuscular Hemoglobin Conc 03/08/2020 29.8* 32.0 - 36.0 g/dL Final    RDW 03/08/2020 14.8* 11.5 - 14.5 % Final    Platelets 03/08/2020 246  150 - 350 K/uL Final    MPV 03/08/2020 9.7  9.2 -  12.9 fL Final    Group & Rh 03/08/2020 A NEG   Final    Indirect Alla 03/08/2020 POS   Final    Antibody Identification 03/08/2020 POS   Final    UNIT NUMBER 03/08/2020 [id number]   Preliminary    Product Code 03/08/2020 O3080O33   Preliminary    DISPENSE STATUS 03/08/2020 CROSSMATCHED   Preliminary    CODING SYSTEM 03/08/2020 [id number]   Preliminary    Unit Blood Type Code 03/08/2020 0600   Preliminary    Unit Blood Type 03/08/2020 A NEG   Preliminary    Unit Expiration 03/08/2020 [id number]   Preliminary    UNIT NUMBER 03/08/2020 [id number]   Preliminary    Product Code 03/08/2020 T7034G35   Preliminary    DISPENSE STATUS 03/08/2020 CROSSMATCHED   Preliminary    CODING SYSTEM 03/08/2020 [id number]   Preliminary    Unit Blood Type Code 03/08/2020 0600   Preliminary    Unit Blood Type 03/08/2020 A NEG   Preliminary    Unit Expiration 03/08/2020 [id number]   Preliminary    WBC 03/09/2020 17.28* 3.90 - 12.70 K/uL Final    RBC 03/09/2020 2.60* 4.00 - 5.40 M/uL Final    Hemoglobin 03/09/2020 6.0* 12.0 - 16.0 g/dL Final    Hematocrit 03/09/2020 20.6* 37.0 - 48.5 % Final    Mean Corpuscular Volume 03/09/2020 79* 82 - 98 fL Final    Mean Corpuscular Hemoglobin 03/09/2020 23.1* 27.0 - 31.0 pg Final    Mean Corpuscular Hemoglobin Conc 03/09/2020 29.1* 32.0 - 36.0 g/dL Final    RDW 03/09/2020 14.6* 11.5 - 14.5 % Final    Platelets 03/09/2020 212  150 - 350 K/uL Final    MPV 03/09/2020 9.7  9.2 - 12.9 fL Final    Immature Granulocytes 03/09/2020 1.4* 0.0 - 0.5 % Final    Gran # (ANC) 03/09/2020 14.3* 1.8 - 7.7 K/uL Final    Immature Grans (Abs) 03/09/2020 0.25* 0.00 - 0.04 K/uL Final    Lymph # 03/09/2020 1.6  1.0 - 4.8 K/uL Final    Mono # 03/09/2020 1.0  0.3 - 1.0 K/uL Final    Eos # 03/09/2020 0.0  0.0 - 0.5 K/uL Final    Baso # 03/09/2020 0.02  0.00 - 0.20 K/uL Final    nRBC 03/09/2020 0  0 /100 WBC Final    Gran% 03/09/2020 83.0* 38.0 - 73.0 % Final    Lymph% 03/09/2020 9.4*  18.0 - 48.0 % Final    Mono% 03/09/2020 6.0  4.0 - 15.0 % Final    Eosinophil% 03/09/2020 0.1  0.0 - 8.0 % Final    Basophil% 03/09/2020 0.1  0.0 - 1.9 % Final    Differential Method 03/09/2020 Automated   Final        Meds   Current Facility-Administered Medications   Medication Dose Route Frequency Provider Last Rate Last Dose    bisacodyL suppository 10 mg  10 mg Rectal Once PRN Brian Quijano MD        diphenhydrAMINE capsule 25 mg  25 mg Oral Q4H PRN Brian Quijano MD        docusate sodium capsule 100 mg  100 mg Oral BID Brian Quijano MD        docusate sodium capsule 200 mg  200 mg Oral BID Brian Quijano MD        HYDROmorphone injection 0.5 mg  0.5 mg Intravenous Q2H PRN Brian Quijano MD        HYDROmorphone injection 1 mg  1 mg Intravenous Q2H PRN Brian Quijano MD   1 mg at 03/08/20 2258    ibuprofen tablet 600 mg  600 mg Oral Q6H Brian Quijano MD        lactated ringers infusion   Intravenous Continuous Brian Quijano MD        lanolin cream   Topical (Top) Continuous PRN Brian Quijano MD        magnesium hydroxide 400 mg/5 ml suspension 2,400 mg  30 mL Oral BID PRN Brian Quijano MD        ondansetron disintegrating tablet 8 mg  8 mg Oral Q8H PRN Brian Quijano MD        oxyCODONE immediate release tablet 10 mg  10 mg Oral Q4H PRN Brian Quijano MD        oxyCODONE immediate release tablet 5 mg  5 mg Oral Q4H PRN Brian Quijano MD        oxyCODONE-acetaminophen  mg per tablet 1 tablet  1 tablet Oral Q4H PRN Brian Quijano MD        oxyCODONE-acetaminophen 5-325 mg per tablet 1 tablet  1 tablet Oral Q4H PRN Brian Quijano MD        oxytocin 30 units in 500 mL normal saline infusion  42 dalia-units/min Intravenous Continuous Brian Quijano MD   Stopped at 03/08/20 2136    prenatal vitamin oral tablet  1 tablet Oral Daily Brian Quijano MD        promethazine (PHENERGAN) 12.5 mg in dextrose 5 % 50 mL IVPB  12.5 mg Intravenous Q6H PRJOSE GUADALUPE Torres  MD Samm        promethazine (PHENERGAN) 6.25 mg in dextrose 5 % 50 mL IVPB  6.25 mg Intravenous Q6H PRN Brian Quijano MD        rho(d) immune globulin injection 300 mcg  300 mcg Intramuscular vaccine x 1 dose Brian Quijano MD        senna-docusate 8.6-50 mg per tablet 1 tablet  1 tablet Oral BID PRN Brian Quijano MD         The patient was found to be awake alert and oriented x4 without evidence or sedation, confusion or respiratory depression at this time.  The patient states that her legs feel normal at this time and she was able to demonstrate motor and sensory to her lower extremities at this time.  The patient states that she is without headache or back ache at this time.  She denies nausea or vomiting but does experience pruritus.  The patient was encouraged to continue to request the medicines for itching that have been made available to her.  She states that they pruritus is manageable at this time but is to notify the Department of Anesthesiology if needed.  The patient states that her pain control is adequate at this time.  The epidural site was examined and found to be clean and dry without evidence of bleeding, infection or hematoma formation at this time.  The patient was instructed to notify the Department of Anesthesiology with any questions, problems or concerns.  The patient demonstrates no anesthesia complications at this time.     Assessment:     Pain control adequate    Plan:     Patient doing well, continue present treatment.    Evaluator Ramon Vinson

## 2020-03-09 NOTE — NURSING
Pt received from L&D via bed with nurse and spouse. Pt alert and oriented, VSS, fundus firm, and bleeding light at this time. Pt and spouse oriented to unit/room/call light. Reviewed POC and  Medications. Breast pump in room, offered to instruct pt on use-pt declines at this time, states she would like to rest before she starts pumping. Instructed to call nurse when ready to start pumping and call for any assistance, pain, questions or concerns. Will cont to monitor.    Sharon Gambino RN

## 2020-03-09 NOTE — TRANSFER OF CARE
"Anesthesia Transfer of Care Note    Patient: Irene Grayson    Procedure(s) Performed: Procedure(s) (LRB):   SECTION (N/A)    Patient location: PACU    Anesthesia Type: general    Transport from OR: Transported from OR on room air with adequate spontaneous ventilation    Post pain: adequate analgesia    Post assessment: no apparent anesthetic complications    Post vital signs: stable    Level of consciousness: awake and alert    Nausea/Vomiting: no nausea/vomiting    Complications: none    Transfer of care protocol was followed      Last vitals:   Visit Vitals  /61   Pulse 96   Temp 36.9 °C (98.5 °F) (Oral)   Resp 16   Ht 5' 1" (1.549 m)   Wt 58.1 kg (128 lb)   LMP  (LMP Unknown)   SpO2 98%   Breastfeeding? No   BMI 24.19 kg/m²     "

## 2020-03-09 NOTE — PROGRESS NOTES
"[facility]  Adult Nutrition   Progress Note (Initial Assessment)     SUMMARY     Recommendations  Recommendation/Intervention:  1. Continue regular diet and encourage adequate intake for lactation and healing s/p .   Goals: 1. Patient to meet at least 75% of estimated energy and protein needs via PO intake of meals.   Nutrition Goal Status: new  Communication of RD Recs: reviewed with RN     Dietitian Rounds Brief  Patient assessed 2' LOS. Pt s/p  POD 1. Patient sleeping at time of rounds per RN. Patient is eating well per RN. Patient is pumping breast milk, baby in NICU per RN. No needs at this time.     Reason for Assessment  Reason For Assessment: length of stay  Diagnosis: pregnancy complications  Relevant Medical History: Abdominal pain during pregnancy;  labor;  premature rupture of membranes (PPROM) with onset of labor after 24 hours of rupture in third trimester, antepartum  Nutrition Discharge Planning: Regular Diet     Nutrition Risk Screen  Nutrition Risk Screen: no indicators present     MST Score: 0  Have you recently lost weight without trying?: No  Weight loss score: 0  Have you been eating poorly because of a decreased appetite?: No  Appetite score: 0       Nutrition/Diet History  Spiritual, Cultural Beliefs, [employer] Practices, Values that Affect Care: no  Food Allergies: NKFA  Factors Affecting Nutritional Intake: None identified at this time    Anthropometrics  Temp: 98.4 °F (36.9 °C)  Height Method: Measured  Height: 5' 1" (154.9 cm)  Height (inches): 61 in  Weight Method: Standard Scale  Weight: 58.1 kg (128 lb)  Weight (lb): 128 lb  Ideal Body Weight (IBW), Female: 105 lb  % Ideal Body Weight, Female (lb): 121.9 %  BMI (Calculated): 24.2  BMI Grade: 18.5-24.9 - normal       Weight History:  Wt Readings from Last 10 Encounters:   20 58.1 kg (128 lb)   20 56.2 kg (124 lb)   20 56.2 kg (124 lb)   19 54.9 kg (121 lb)   19 " 56.2 kg (123 lb 14.4 oz)   04/04/19 51.7 kg (114 lb)   06/06/18 54.4 kg (120 lb)   03/29/18 52.6 kg (116 lb)   01/03/18 53.5 kg (118 lb)   10/27/17 55.8 kg (123 lb)       Lab/Procedures/Meds: Pertinent Labs Reviewed  Clinical Chemistry:  Recent Labs   Lab 03/08/20  0405   MG 4.5*     CBC:   Recent Labs   Lab 03/09/20  0331   WBC 17.28*   RBC 2.60*   HGB 6.0*   HCT 20.6*      MCV 79*   MCH 23.1*   MCHC 29.1*     Medications: Pertinent Medications reviewed  Scheduled Meds:   docusate sodium  100 mg Oral BID    docusate sodium  200 mg Oral BID    ibuprofen  600 mg Oral Q6H    prenatal vitamin  1 tablet Oral Daily     Continuous Infusions:   lanolin       PRN Meds:.bisacodyL, diphenhydrAMINE, HYDROmorphone, HYDROmorphone, lanolin, magnesium hydroxide 400 mg/5 ml, ondansetron, oxyCODONE, oxyCODONE, oxyCODONE-acetaminophen, oxyCODONE-acetaminophen, promethazine (PHENERGAN) IVPB, promethazine (PHENERGAN) IVPB, rho(D) immune globulin, senna-docusate 8.6-50 mg    Estimated/Assessed Needs  Weight Used For Calorie Calculations: 58.1 kg (128 lb 1.4 oz)  Energy Calorie Requirements (kcal): 1783 - 2073 (25 - 30 kcal/kg + 330 kcal/day lactation)   Energy Need Method: Kcal/kg  Protein Requirements: 64 - 87 (1.1 - 1.5 g/kg)   Weight Used For Protein Calculations: 58.1 kg (128 lb 1.4 oz)     Estimated Fluid Requirement Method: RDA Method  RDA Method (mL): 1783       Nutrition Prescription Ordered  Current Diet Order: Regular     Evaluation of Received Nutrient/Fluid Intake  Energy Calories Required: meeting needs  Protein Required: meeting needs  Fluid Required: meeting needs  Tolerance: tolerating     Intake/Output Summary (Last 24 hours) at 3/9/2020 1548  Last data filed at 3/9/2020 1230  Gross per 24 hour   Intake 1050 ml   Output 5150 ml   Net -4100 ml      % Intake of Estimated Energy Needs: 75 - 100 %  % Meal Intake: 75 - 100 %    Nutrition Risk  Level of Risk/Frequency of Follow-up: moderate     Monitor and  Evaluation  Food and Nutrient Intake: energy intake, food and beverage intake  Food and Nutrient Adminstration: diet order  Physical Activity and Function: nutrition-related ADLs and IADLs, factors affecting access to physical activity, breastfeeding  Anthropometric Measurements: weight change, weight, body mass index  Biochemical Data, Medical Tests and Procedures: electrolyte and renal panel, glucose/endocrine profile, lipid profile, gastrointestinal profile, inflammatory profile  Nutrition-Focused Physical Findings: overall appearance     Nutrition Follow-Up  RD Follow-up?: Yes     Eugenie Evans RD 03/09/2020 3:48 PM

## 2020-03-09 NOTE — PROGRESS NOTES
[facility]  Obstetrics  Postpartum Progress Note    Patient Name: Irene Grayson  MRN: [id number]  Admission Date: 3/5/2020  Hospital Length of Stay: 4 days  Attending Physician: Brian Quijano MD  Primary Care Provider: Jean Waller MD    Subjective:     Principal Problem: labor    Hospital course: No notes on file    Interval History:  Postop day 1.-status post classical     She is doing well this morning. She is tolerating a regular diet without nausea or vomiting. She is not voiding spontaneously. She is not ambulating. She has not passed flatus, and has not a BM. Vaginal bleeding is mild. She denies fever or chills. Abdominal pain is mild and controlled with oral medications. She is not breastfeeding. She desires circumcision for her male baby: not applicable.    Objective:     Vital Signs (Most Recent):  Temp: 98.8 °F (37.1 °C) (20 0300)  Pulse: 93 (20)  Resp: 16 (20)  BP: 116/75 (20 030)  SpO2: 95 % (20 0300) Vital Signs (24h Range):  Temp:  [97.6 °F (36.4 °C)-99.2 °F (37.3 °C)] 98.8 °F (37.1 °C)  Pulse:  [] 93  Resp:  [16-20] 16  SpO2:  [95 %-100 %] 95 %  BP: (114-147)/() 116/75     Weight: 58.1 kg (128 lb)  Body mass index is 24.19 kg/m².      Intake/Output Summary (Last 24 hours) at 3/9/2020 0915  Last data filed at 3/9/2020 0120  Gross per 24 hour   Intake 1150 ml   Output 5050 ml   Net -3900 ml       Significant Labs:  Lab Results   Component Value Date    GROUPTRH A NEG 2020    HEPBSAG Negative 2019     Recent Labs   Lab 20  033   HGB 6.0*   HCT 20.6*       CBC:   Recent Labs   Lab 20  033   WBC 17.28*   RBC 2.60*   HGB 6.0*   HCT 20.6*      MCV 79*   MCH 23.1*   MCHC 29.1*     I have personallly reviewed all pertinent lab results from the last 24 hours.    Physical Exam:       Cardiovascular: Normal rate.          Abdominal: Soft. Bowel sounds are normal. She exhibits abdominal  incision (clean, dry, intact). There is no tenderness.     Genitourinary: Uterus is enlarged (firm and below umbilicus). There is bleeding (minimal) in the vagina.           Musculoskeletal: She exhibits no tenderness (no calf tenderness).             Assessment/Plan:     24 y.o. female  for:    *  labor  Pod 1.-status post classical W-epusnzk-pzwui well  Pre and postoperative anemia-discussion with patient about blood transfusion if symptomatic, patient states understanding  Routine advances orders    Abdominal pain during pregnancy  Stable PPROM 29.5 weeks, mag now off; cont expectant management          Disposition: As patient meets milestones, will plan to discharge 2-3 days.    Gómez Quijano MD  Obstetrics  [facility]

## 2020-03-09 NOTE — PLAN OF CARE
VSS.  PT AMBULATING IN RM & TO TOILET WITHOUT DIFFICULTY.  VISITS INFANT IN NICU.  PUMPING BREASTS.  DSG CDI WITH BINDER ON.  LOCHIA SMALL, VOIDING WELL.

## 2020-03-09 NOTE — NURSING TRANSFER
Nursing Transfer Note      3/8/2020     Transfer To:Room 2106 m/b    Transfer via bed    Transfer with personal belongings. Fob accompanied.    Transported by SAMARA Valdez    Medicines sent: None    Chart send with patient: Yes    Notified: spouse, mother    Patient reassessed at: 2110 on 03/08/2020      Upon arrival to floor: patient oriented to room, call bell in reach and bed in lowest position

## 2020-03-09 NOTE — SUBJECTIVE & OBJECTIVE
Obstetric HPI:  Addendum-patient now feeling regular painful contractions, status post betamethasone x2 last dose greater than 36 hr ago.  Will proceed with primary  section at this time secondary to labor, also to allow neonatology to be present     Objective:     Vital Signs (Most Recent):  Temp: 98.5 °F (36.9 °C) (20 1748)  Pulse: 96 (20 1802)  Resp: 16 (20 1802)  BP: 119/61 (20 1802)  SpO2: 98 % (20 1450) Vital Signs (24h Range):  Temp:  [97.6 °F (36.4 °C)-98.5 °F (36.9 °C)] 98.5 °F (36.9 °C)  Pulse:  [] 96  Resp:  [16-18] 16  BP: (108-134)/(55-81) 119/61     Weight: 58.1 kg (128 lb)  Body mass index is 24.19 kg/m².    FHT:  Baseline 140s reactive  TOCO:  Q 3-5 minutes      Intake/Output Summary (Last 24 hours) at 3/8/2020 1901  Last data filed at 3/8/2020 1600  Gross per 24 hour   Intake 5277.17 ml   Output 3700 ml   Net 1577.17 ml       Cervical Exam:  4 cm/50%/-3 by RN exam this morning     Significant Labs:  Recent Lab Results       20  1443   20  0405   20  2125        Antibody Identification POS  Comment:  Rhogam Antibody  @20 16:25 by SLT:  RHIG RECEIVED 19 @ [facility] ED. PREVIOUSLY IDENTIFIED PASSIVE D ON 3/5/20           Group & Rh A NEG         Hematocrit 23.8         Hemoglobin 7.1         INDIRECT LEE POS         Magnesium   4.5 5.0  Comment:  mg critical result(s) repeated. Called and verbal readback obtained   from ty shha rn lnd by HBS 2020 21:54       MCH 23.1         MCHC 29.8         MCV 77         MPV 9.7         Platelets 246         RBC 3.08         RDW 14.8         WBC 15.82               Physical Exam     General-noticeably uncomfortable contractions  Extremities-no calf tenderness

## 2020-03-09 NOTE — ANESTHESIA PROCEDURE NOTES
"CSE    Patient location during procedure: OR  Start time: 3/8/2020 7:28 PM  Timeout: 3/8/2020 7:16 PM  End time: 3/8/2020 7:32 PM  Reason for block: at surgeon's request and post-op pain management    Staffing  Authorizing Provider: Ramon Vinson MD  Performing Provider: Ramon Vinson MD    Preanesthetic Checklist  Completed: patient identified, site marked, surgical consent, pre-op evaluation, timeout performed, IV checked, risks and benefits discussed and monitors and equipment checked  CSE  Patient position: sitting  Prep: Betadine  Patient monitoring: heart rate, continuous pulse ox and frequent blood pressure checks  Approach: midline  Spinal Needle  Needle type: pencil-tip   Needle gauge: 25 G  Needle length: 5 in  Epidural Needle  Injection technique: EMRE air  Needle type: Tuohy   Needle gauge: 17 G  Needle length: 3.5 in  Needle insertion depth: 1.5 cm  Location: L3-4  Needle localization: anatomical landmarks  Catheter  Catheter type: springwSmalldeals  Catheter size: 19 G  Catheter at skin depth: 10 cm  Test dose: lidocaine 2% with Epi 1-to-200,000 and negative  Additional Documentation: incremental injection, negative aspiration for heme, no paresthesia on injection, negative test dose and negative aspiration for CSF  Assessment  Sensory level: T4   Dermatomal levels determined by alcohol swab  Intrathecal Medications:  administered: primary anesthetic mcg of    Additional Notes  The patient was ID and examined with chart and labs reviewed. The risk benefits alternatives to the epidural were discussed with the patient with all questions answered and the patient asked if she desired the CSI and she responded "yes". The consents were signed and a timeout performed.              "

## 2020-03-09 NOTE — ASSESSMENT & PLAN NOTE
Pod 1.-status post classical Z-mcgyxav-kguki well  Pre and postoperative anemia-discussion with patient about blood transfusion if symptomatic, patient states understanding  Routine advances orders

## 2020-03-10 PROCEDURE — 12000002 HC ACUTE/MED SURGE SEMI-PRIVATE ROOM

## 2020-03-10 PROCEDURE — 25000003 PHARM REV CODE 250: Performed by: SPECIALIST

## 2020-03-10 RX ORDER — SIMETHICONE 80 MG
1 TABLET,CHEWABLE ORAL 3 TIMES DAILY PRN
Status: DISCONTINUED | OUTPATIENT
Start: 2020-03-10 | End: 2020-03-11 | Stop reason: HOSPADM

## 2020-03-10 RX ADMIN — IBUPROFEN 600 MG: 400 TABLET ORAL at 09:03

## 2020-03-10 RX ADMIN — OXYCODONE HYDROCHLORIDE 5 MG: 5 TABLET ORAL at 09:03

## 2020-03-10 RX ADMIN — DOCUSATE SODIUM 200 MG: 100 CAPSULE, LIQUID FILLED ORAL at 09:03

## 2020-03-10 RX ADMIN — IBUPROFEN 600 MG: 400 TABLET ORAL at 02:03

## 2020-03-10 RX ADMIN — IBUPROFEN 600 MG: 400 TABLET ORAL at 03:03

## 2020-03-10 RX ADMIN — PRENATAL VIT W/ FE FUMARATE-FA TAB 27-0.8 MG 1 TABLET: 27-0.8 TAB at 09:03

## 2020-03-10 RX ADMIN — SIMETHICONE CHEW TAB 80 MG 80 MG: 80 TABLET ORAL at 11:03

## 2020-03-10 RX ADMIN — OXYCODONE HYDROCHLORIDE 5 MG: 5 TABLET ORAL at 02:03

## 2020-03-10 RX ADMIN — OXYCODONE HYDROCHLORIDE 5 MG: 5 TABLET ORAL at 03:03

## 2020-03-10 NOTE — ASSESSMENT & PLAN NOTE
Pod2.-status post classical Z-vmixzrp-ttcfr well  Pre and postoperative anemia-patient asymptomatic, discussed and declined transfusion yesterday  Routine advances orders

## 2020-03-10 NOTE — PROGRESS NOTES
[facility]  Obstetrics  Postpartum Progress Note    Patient Name: Irene Grayson  MRN: [id number]  Admission Date: 3/5/2020  Hospital Length of Stay: 5 days  Attending Physician: Brian Quijano MD  Primary Care Provider: Jean Waller MD    Subjective:     Principal Problem: labor    Hospital course: No notes on file    Interval History:  Postop day 2.-status post classical X-xyspvgm-yi complaints, ambulating without difficulty    She is doing well this morning. She is tolerating a regular diet without nausea or vomiting. She is voiding spontaneously. She is ambulating. She has passed flatus, and has not a BM. Vaginal bleeding is mild. She denies fever or chills. Abdominal pain is mild and controlled with oral medications. She is not breastfeeding. She desires circumcision for her male baby: not applicable.    Objective:     Vital Signs (Most Recent):  Temp: 99.1 °F (37.3 °C) (03/10/20 0227)  Pulse: 101 (03/10/20 0227)  Resp: 16 (03/10/20 0227)  BP: 114/73 (03/10/20 0227)  SpO2: 96 % (03/10/20 0227) Vital Signs (24h Range):  Temp:  [97.9 °F (36.6 °C)-99.1 °F (37.3 °C)] 99.1 °F (37.3 °C)  Pulse:  [] 101  Resp:  [16-18] 16  SpO2:  [96 %-98 %] 96 %  BP: (101-125)/(62-82) 114/73     Weight: 58.1 kg (128 lb)  Body mass index is 24.19 kg/m².      Intake/Output Summary (Last 24 hours) at 3/10/2020 0651  Last data filed at 3/10/2020 0203  Gross per 24 hour   Intake 2500 ml   Output 2350 ml   Net 150 ml       Significant Labs:  Lab Results   Component Value Date    GROUPTRH A NEG 2020    HEPBSAG Negative 2019     Recent Labs   Lab 20  033   HGB 6.0*   HCT 20.6*       CBC:   Recent Labs   Lab 20  033   WBC 17.28*   RBC 2.60*   HGB 6.0*   HCT 20.6*      MCV 79*   MCH 23.1*   MCHC 29.1*     I have personallly reviewed all pertinent lab results from the last 24 hours.    Physical Exam:       Cardiovascular: Normal rate.          Abdominal: Soft. Bowel sounds are  normal. She exhibits abdominal incision (clean, dry, intact). There is no tenderness.     Genitourinary: Uterus is enlarged (firm and below umbilicus). There is bleeding (minimal) in the vagina.           Musculoskeletal: She exhibits no tenderness (no calf tenderness).             Assessment/Plan:     24 y.o. female  for:    *  labor  Pod2.-status post classical T-ovrpknt-bgbwt well  Pre and postoperative anemia-patient asymptomatic, discussed and declined transfusion yesterday  Routine advances orders    Abdominal pain during pregnancy  Stable PPROM 29.5 weeks, mag now off; cont expectant management          Disposition: As patient meets milestones, will plan to discharge tomorrow.    Gómez Quijano MD  Obstetrics  [facility]

## 2020-03-10 NOTE — PLAN OF CARE
Pt with adequate pain control with po pain medication. Lochia minimal. Voiding without difficulty. VSS. Pt did first shower before bedtime  with her mother's help . Pt taking motrin po as scheduled  q 6 hrs.  Pt took oxy 5 mg po x 1  during the night  along with the scheduled motrin  as pt asked.  Baby is in the NICU . Mom went to visit x 1 before she went to bed  . Pt has pumped x 1 this shift  and sent the EBM to the NICU . When the pt was asked  about pumping at 2 am , she refused stating the  cramping is too painful at this time , so oxy 5 mg was included in her meds  as she expressed . Will cont to monitor.

## 2020-03-11 VITALS
DIASTOLIC BLOOD PRESSURE: 60 MMHG | SYSTOLIC BLOOD PRESSURE: 104 MMHG | TEMPERATURE: 98 F | HEIGHT: 61 IN | HEART RATE: 87 BPM | WEIGHT: 128 LBS | OXYGEN SATURATION: 98 % | BODY MASS INDEX: 24.17 KG/M2 | RESPIRATION RATE: 16 BRPM

## 2020-03-11 PROCEDURE — 25000003 PHARM REV CODE 250: Performed by: SPECIALIST

## 2020-03-11 RX ORDER — IBUPROFEN 600 MG/1
600 TABLET ORAL EVERY 6 HOURS
Qty: 30 TABLET | Refills: 0
Start: 2020-03-11 | End: 2020-12-23

## 2020-03-11 RX ORDER — DOCUSATE SODIUM 100 MG/1
200 CAPSULE, LIQUID FILLED ORAL 2 TIMES DAILY
Qty: 60 CAPSULE | Refills: 0
Start: 2020-03-11 | End: 2020-12-23

## 2020-03-11 RX ORDER — OXYCODONE AND ACETAMINOPHEN 5; 325 MG/1; MG/1
1 TABLET ORAL EVERY 4 HOURS PRN
Qty: 20 TABLET | Refills: 0 | Status: SHIPPED | OUTPATIENT
Start: 2020-03-11 | End: 2020-12-23

## 2020-03-11 RX ADMIN — IBUPROFEN 600 MG: 400 TABLET ORAL at 02:03

## 2020-03-11 RX ADMIN — PRENATAL VIT W/ FE FUMARATE-FA TAB 27-0.8 MG 1 TABLET: 27-0.8 TAB at 08:03

## 2020-03-11 RX ADMIN — DOCUSATE SODIUM 200 MG: 100 CAPSULE, LIQUID FILLED ORAL at 08:03

## 2020-03-11 RX ADMIN — IBUPROFEN 600 MG: 400 TABLET ORAL at 08:03

## 2020-03-11 RX ADMIN — OXYCODONE HYDROCHLORIDE 5 MG: 5 TABLET ORAL at 02:03

## 2020-03-11 NOTE — DISCHARGE SUMMARY
[facility]  Obstetrics  Discharge Summary      Patient Name: Irene Grayson  MRN: [id number]  Admission Date: 3/5/2020  Hospital Length of Stay: 6 days  Discharge Date and Time:  2020 7:49 AM  Attending Physician: Brian Quijano MD   Discharging Provider: Gómez Quijano MD   Primary Care Provider: Jean Waller MD    HPI: 24-year-old  2 para 0101 at 29 weeks and 2 days gestational age admitted in  labor        Procedure(s) (LRB):   SECTION (N/A)     Hospital Course:   No notes on file  Patient admitted in  labor with  rupture membranes after betamethasone x2 patient proceeded to a return in active labor once Mag sulfate was discontinued and secondary to breech presentation underwent a primary classical  section, please see operative note for details.  Postoperatively patient did well by postop day 3.  Patient is tolerating p.o., passing flatus, ambulating urinating without difficulty, bleeding is minimal and pain is well controlled.  Patient did have pre and postop anemia, was offered blood transfusion twice and refused both times.  Physical exam on discharge is significant for an abdomen which is soft and nontender, uterus firm and below the umbilicus, incision clean dry and intact, lochia minimal, extremities without calf tenderness.  Consults (From admission, onward)        Status Ordering Provider     Inpatient consult to Lactation  Once     Provider:  (Not yet assigned)    Acknowledged BRIAN QUIJANO          Final Active Diagnoses:    Diagnosis Date Noted POA    PRINCIPAL PROBLEM:   labor [O60.00] 2020 Yes     premature rupture of membranes (PPROM) with onset of labor after 24 hours of rupture in third trimester, antepartum [O42.113] 2020 Yes    Abdominal pain during pregnancy [O26.899, R10.9] 2020 Yes      Problems Resolved During this Admission:        Labs: CBC No results for input(s): WBC, HGB, HCT,  PLT in the last 48 hours.    Feeding Method: both breast and bottle    Immunizations     Date Immunization Status Dose Route/Site Given by    20 6471 Rho (D) Immune Globulin - IM Incomplete 300 mcg Intramuscular/           Delivery:    Episiotomy: None   Lacerations: None   Repair suture: None   Repair # of packets:     Blood loss (ml): 500     Birth information:  YOB: 2019   Time of birth: 7:54 PM   Sex: female   Delivery type: , Low Transverse   Gestational Age: 29w5d    Delivery Clinician:      Other providers:       Additional  information:  Forceps:    Vacuum:    Breech:    Observed anomalies      Living?:           APGARS  One minute Five minutes Ten minutes   Skin color:         Heart rate:         Grimace:         Muscle tone:         Breathing:         Totals: 9  9        Placenta: Delivered:       appearance    Pending Diagnostic Studies:     None          Discharged Condition: good    Disposition: Home or Self Care    Follow Up:  Follow-up Information     Gómez Quijano MD In 2 weeks.    Specialty:  Obstetrics and Gynecology  Contact information:  [address] [place] [facility] [address]  [phone]                 Patient Instructions:      Diet Adult Regular     Other restrictions (specify):   Order Comments: Pelvic rest x6 weeks     Notify your health care provider if you experience any of the following:  temperature >100.4     Notify your health care provider if you experience any of the following:  persistent nausea and vomiting or diarrhea     Notify your health care provider if you experience any of the following:  severe uncontrolled pain     Notify your health care provider if you experience any of the following:  redness, tenderness, or signs of infection (pain, swelling, redness, odor or green/yellow discharge around incision site)     Notify your health care provider if you experience any of the following:  difficulty breathing  or increased cough     Notify your health care provider if you experience any of the following:  severe persistent headache     Notify your health care provider if you experience any of the following:  worsening rash     Pelvic Rest     Activity as tolerated     Medications:  Current Discharge Medication List      START taking these medications    Details   docusate sodium (COLACE) 100 MG capsule Take 2 capsules (200 mg total) by mouth 2 (two) times daily.  Refills: 0      ibuprofen (ADVIL,MOTRIN) 600 MG tablet Take 1 tablet (600 mg total) by mouth every 6 (six) hours.  Refills: 0      lanolin Crea cream Apply topically continuous prn for Dry Skin (Apply to nipples for soreness).  Refills: 0      oxyCODONE-acetaminophen (PERCOCET) 5-325 mg per tablet Take 1 tablet by mouth every 4 (four) hours as needed.  Qty: 20 tablet, Refills: 0    Comments: Quantity prescribed more than 7 day supply? No         STOP taking these medications       acetaminophen (TYLENOL) 325 MG tablet Comments:   Reason for Stopping:         PNV no.95/ferrous fum/folic ac (PRENATAL ORAL) Comments:   Reason for Stopping:               Gómez Quijano MD  Obstetrics  [facility]

## 2020-03-11 NOTE — LACTATION NOTE
"Set up with double electric Symphony pump. Instructed on use of pump and hygeine with she and her SO. Both v/u. Discussed importance of colostrum for her premature infant. Discussed donor milk as an option if supply is a concern. V/U. States, "I just want her to get breastmilk one way or the other b/c I know it's what is best". Denies any pain with pumping. Colostrum noted in flanges. Will f/u prn.   "
"States she is still pumping and getting colostrum each pump session. "They usually just give it as soon as I take it to the NICU". Denies any pain/discomfort/questions/concerns with pumping and storage at this time. Offered assist as needed/desired for any questions/concerns. V/u. Will f/u prn.   "
10cc colostrum collected in syringes, labeled and transported to [facility] and placed in separate bin in breast milk fridge. Pump parts cleaned. Mother instructed to pump every 2-3 hours. V/u.   
Awake and on bedside commode at this time. Will come back when back to bed to set up pump.   
Reinforced pumping frequency, cleaning & sanitizing pumping parts. Instructed pt to contact insurance company regarding a personal breast pump. Pt reports that she has a pump @ home but is missing pump parts to it. Showed pt on how to use a manual pump. Reinforced breast milk storage guidelines. Assistance offered prn. Pt verbalized understanding  
Sleeping. RN states she will let me know when she is awake.  
Statement Selected

## 2020-03-11 NOTE — NURSING
12mn... Ambulated halls to l/d to look for a friend of hers that had delivered.  She was asymptomatic  At that time . Ambulated on  2100 halls , asymptomatic .  At 0030 she  Ambulated to NICU to see her ,  . There the nicu nurse called me to come pick her up from there because she told them she was having a hot flash , and weak. PP rn arrived , pt got into the wc without difficulty, gave her a cold washcloth to put on her face, forehead for the ride.  She was fined  For the ride to her room and stated her walking has helped relieve some of the  Pain , gas she was having in her adb. . Pt refused the need for a pain pill at this time.   Pulse 120, /75,  T. 98 8 , 98 %.  Will cont . To monitor.

## 2020-03-11 NOTE — PLAN OF CARE
Pt has no complaints of  hot flashes or dizzy since once in the nicu earlier. She was medicated for pain earlier and has slept. She was encouraged  to call nurse  if  she has any issues or concerns. Will continue to monitor.

## 2020-03-11 NOTE — DISCHARGE INSTRUCTIONS
FOLLOW UP WITH YOUR DOCTOR IN 1 WEEK OR SOONER FOR ANY PROBLEMS.    Pelvic rest for 6 weeks (no sex, tampons, douching, nothing in the vagina)   You can experience vaginal bleeding on and off for up to 6 weeks, it will gradually get lighter and the color will change from bright red to a brownish discharge towards the end.     Activity:   NO strenuous activities or exercising for 6 weeks. Do not /lift anything over 15 pounds and no heavy housework or cleaning for 6 weeks. Limit stair climbing to twice a day during the first 2 weeks.   NO driving for 4 weeks. You may take short car trips but do not drive.   You may shower ONLY for the first 2 weeks, after 2 weeks you can soak in a bathtub. Use a mild soap, no heavy perfumes or fragrances to avoid irritation.   Walking frequently following a  delivery promotes healing and decreases pain associated with gas.   If constipation develops: You may take Colace (stool softener), Milk of Magnesia, Dulcolax or Miralax. All of these medications are sold over the counter.   Incision Care:   Clean your incision with mild soap & warm water only- do not scrub- let warm water run over it, then pat dry.     Pain Relief:   You may take Motrin for mild pain & uterine cramping.     Emotional Changes:   You may experience baby blues after delivery. You may feel let down, anxious and cry easily. This is normal. These feelings can begin 2-3 days after delivery and usually disappear in about a week or two. Prolonged sadness may indicate postpartum depression.     SEEK IMMEDIATE HELP FROM THE EMERGENCY ROOM IF YOU HAVE ANY CHEST PAIN OR DIFFICULTY BREATHING.    Call your doctor for any of the following:   Problems with any of your medications, inability to eat.   Foul smelling vaginal discharge.   If you notice pus-like drainage from your incision, if your incision or the area around it becomes hot or swollen, or if you notice a foul smelling odor.   Temperature above 100.4.    Heavy vaginal bleeding. All women bleed different after delivery and each delivery is different. Heavy bleeding consists of saturating a aisha pad in a 1 hour time period. Passing clots are normal, if you pass a blood clot larger than the size of a golf ball call your doctor's office.   If you experience pain in your legs/calves, if one leg increases in size and becomes swollen or becomes hot to touch or discolored.   Crying or periods of sadness beyond 2 weeks.     If you are breast feeding:   Wash your breasts with mild soap and warm water.   You should wear a supportive bra.   You should continue to take a prenatal vitamin for 6 weeks or until breastfeeding is discontinued.   If nipples are sore, apply a few drops of breast milk after nursing and let air dry or you can use Lanolin cream.   If breasts are engorged, apply warmth and express milk.   [facility] lactation consultant is available at [phone] for your breastfeeding needs.    If you are not breastfeeding:   Wear a tight bra and do not stimulate your breasts. Avoid handling your breasts and do not express milk. You may apply ice packs or cabbage leaves to relieve discomfort from engorgement. If your breasts become warm to touch, reddened or lumps develop call your doctor.Preparation and Hygiene:  1. Shower daily.  2. Wear a clean bra each day and wash daily in warm soapy water.  3. Change wet or moist breast pads frequently.  Moist pads can promote growth of germs.  Actively wash your hands, paying close attention to the area around and under your fingernails, thoroughly with soap and water for 15 seconds before pumping or handling your milk.  Re-wash your hands if you touch anything (scratching your nose, answering the phone, etc) while pumping or handling your milk.   4. Before pumping your breasts, assemble the pump collection kit and have ready the sterile container and labels.  Place these items on a clean surface next to the breast pump.  5. Each time  after you have finished pumping, take apart all of the parts of the breast pump collection kit and place them in a separate cleaning container (do not place them in the sink).  Be sure to remove the yellow valve from the breast shield and separate the white membrane from the yellow valve.  Rinse all of these parts with cool water.  Then use a new sponge and/or bottle brush and dishwashing detergent to clean the parts.  Rinse off the soapy water with cool water and air dry on a clean towel covered with a clean cloth.  All parts may also be washed after each use in the top rack of a .  6. Once each day, sanitize all of the parts of the breast pump collection kit.  This can be done by boiling the kit parts for 10 minutes or by using a Quick Clean Micro-Steam Bag made by [employer]  7. If condensation appears in the tubing, continue to run the pump with the tubing attached for 1-2 minutes or until the tubing is dry.   8. Notify your babys nurse or doctor if you become ill or need to take any medication, even over-the-counter medicines.  Collection and Storage of Expressed Breast milk:         1. Pump your breasts at least 8 or more times every 24 hours.  Double pump (both breasts at the same time) for at least 15-20 minutes using the most suction that is comfortable.    2. Write the date and time of pumping and the name of any medications you are taking on the babys pre-printed hospital identification label.   3.    Do not touch the inside of the storage containers or lids.  4.        Tightly screw the lid onto the container and place immediately into the refrigerator for daily use.  5.    Expressed breast milk should be refrigerated or frozen within 4 hours of pumping.  6.        Do not store expressed breast milk on the door of your refrigerator or freezer             where the temperature is warmer.   7.        Refrigerated milk may be stored for up to 7 days.  At this point it can be moved to the  freezer for 6 -12 months.  8.        Thaw frozen breast milk in overnight in the refrigerator.  Once milk is thawed it must be used within 24 hours.  9.        Refrigerated breast milk needs to be warmed to room temperature.  Warm by leaving unrefrigerated until it reached room temperature, or place sealed bottle into a cup of warm (not boiling) water or use a bottle warmer.               Never warm breast milk in a microwave or boiling water.    For any questions or concerns call the Lactation Department at [phone]

## 2020-03-12 LAB
BLD PROD TYP BPU: NORMAL
BLD PROD TYP BPU: NORMAL
BLOOD UNIT EXPIRATION DATE: NORMAL
BLOOD UNIT EXPIRATION DATE: NORMAL
BLOOD UNIT TYPE CODE: 600
BLOOD UNIT TYPE CODE: 600
BLOOD UNIT TYPE: NORMAL
BLOOD UNIT TYPE: NORMAL
CODING SYSTEM: NORMAL
CODING SYSTEM: NORMAL
DISPENSE STATUS: NORMAL
DISPENSE STATUS: NORMAL
NUM UNITS TRANS PACKED RBC: NORMAL
NUM UNITS TRANS PACKED RBC: NORMAL

## 2020-03-13 ENCOUNTER — HOSPITAL ENCOUNTER (OUTPATIENT)
Facility: HOSPITAL | Age: 25
Discharge: HOME | End: 2020-03-13
Attending: EMERGENCY MEDICINE | Admitting: STUDENT IN AN ORGANIZED HEALTH CARE EDUCATION/TRAINING PROGRAM
Payer: COMMERCIAL

## 2020-03-13 VITALS
DIASTOLIC BLOOD PRESSURE: 78 MMHG | RESPIRATION RATE: 18 BRPM | SYSTOLIC BLOOD PRESSURE: 123 MMHG | OXYGEN SATURATION: 99 % | WEIGHT: 120 LBS | TEMPERATURE: 100 F | BODY MASS INDEX: 22.66 KG/M2 | HEIGHT: 61 IN | HEART RATE: 93 BPM

## 2020-03-13 DIAGNOSIS — D64.9 SYMPTOMATIC ANEMIA: ICD-10-CM

## 2020-03-13 LAB
ABO + RH BLD: NORMAL
ALBUMIN SERPL BCP-MCNC: 2.8 G/DL (ref 3.5–5.2)
ALP SERPL-CCNC: 154 U/L (ref 55–135)
ALT SERPL W/O P-5'-P-CCNC: 27 U/L (ref 10–44)
ANION GAP SERPL CALC-SCNC: 12 MMOL/L (ref 8–16)
ANISOCYTOSIS BLD QL SMEAR: SLIGHT
APTT PPP: 29.1 SEC (ref 23.6–33.3)
AST SERPL-CCNC: 28 U/L (ref 10–40)
BASOPHILS NFR BLD: 0 % (ref 0–1.9)
BILIRUB SERPL-MCNC: 0.5 MG/DL (ref 0.1–1)
BLD GP AB SCN CELLS X3 SERPL QL: NORMAL
BLD PROD TYP BPU: NORMAL
BLD PROD TYP BPU: NORMAL
BLOOD UNIT EXPIRATION DATE: NORMAL
BLOOD UNIT EXPIRATION DATE: NORMAL
BLOOD UNIT TYPE CODE: 600
BLOOD UNIT TYPE CODE: 600
BLOOD UNIT TYPE: NORMAL
BLOOD UNIT TYPE: NORMAL
BUN SERPL-MCNC: 8 MG/DL (ref 6–20)
CALCIUM SERPL-MCNC: 8.9 MG/DL (ref 8.7–10.5)
CHLORIDE SERPL-SCNC: 104 MMOL/L (ref 95–110)
CO2 SERPL-SCNC: 21 MMOL/L (ref 23–29)
CODING SYSTEM: NORMAL
CODING SYSTEM: NORMAL
CREAT SERPL-MCNC: 0.5 MG/DL (ref 0.5–1.4)
DIFFERENTIAL METHOD: ABNORMAL
DISPENSE STATUS: NORMAL
DISPENSE STATUS: NORMAL
EOSINOPHIL NFR BLD: 0 % (ref 0–8)
ERYTHROCYTE [DISTWIDTH] IN BLOOD BY AUTOMATED COUNT: 15.8 % (ref 11.5–14.5)
EST. GFR  (AFRICAN AMERICAN): >60 ML/MIN/1.73 M^2
EST. GFR  (NON AFRICAN AMERICAN): >60 ML/MIN/1.73 M^2
GLUCOSE SERPL-MCNC: 94 MG/DL (ref 70–110)
HCG INTACT+B SERPL-ACNC: 2274 MIU/ML
HCT VFR BLD AUTO: 21.4 % (ref 37–48.5)
HGB BLD-MCNC: 6.4 G/DL (ref 12–16)
IMM GRANULOCYTES # BLD AUTO: ABNORMAL K/UL (ref 0–0.04)
IMM GRANULOCYTES NFR BLD AUTO: ABNORMAL % (ref 0–0.5)
INR PPP: 1
LYMPHOCYTES NFR BLD: 11 % (ref 18–48)
MCH RBC QN AUTO: 23 PG (ref 27–31)
MCHC RBC AUTO-ENTMCNC: 29.9 G/DL (ref 32–36)
MCV RBC AUTO: 77 FL (ref 82–98)
MONOCYTES NFR BLD: 5 % (ref 4–15)
NEUTROPHILS NFR BLD: 72 % (ref 38–73)
NEUTS BAND NFR BLD MANUAL: 12 %
NRBC BLD-RTO: 1 /100 WBC
NUM UNITS TRANS PACKED RBC: NORMAL
NUM UNITS TRANS PACKED RBC: NORMAL
PLATELET # BLD AUTO: 341 K/UL (ref 150–350)
PLATELET BLD QL SMEAR: ABNORMAL
PMV BLD AUTO: 9.4 FL (ref 9.2–12.9)
POTASSIUM SERPL-SCNC: 3.6 MMOL/L (ref 3.5–5.1)
PROT SERPL-MCNC: 7.4 G/DL (ref 6–8.4)
PROTHROMBIN TIME: 12.7 SEC (ref 10.6–14.8)
RBC # BLD AUTO: 2.78 M/UL (ref 4–5.4)
SODIUM SERPL-SCNC: 137 MMOL/L (ref 136–145)
WBC # BLD AUTO: 19.99 K/UL (ref 3.9–12.7)

## 2020-03-13 PROCEDURE — G0378 HOSPITAL OBSERVATION PER HR: HCPCS

## 2020-03-13 PROCEDURE — 85007 BL SMEAR W/DIFF WBC COUNT: CPT | Mod: 91

## 2020-03-13 PROCEDURE — 85027 COMPLETE CBC AUTOMATED: CPT

## 2020-03-13 PROCEDURE — P9016 RBC LEUKOCYTES REDUCED: HCPCS

## 2020-03-13 PROCEDURE — 85730 THROMBOPLASTIN TIME PARTIAL: CPT

## 2020-03-13 PROCEDURE — 84702 CHORIONIC GONADOTROPIN TEST: CPT

## 2020-03-13 PROCEDURE — 25000003 PHARM REV CODE 250: Performed by: SPECIALIST

## 2020-03-13 PROCEDURE — 99285 EMERGENCY DEPT VISIT HI MDM: CPT | Mod: 25

## 2020-03-13 PROCEDURE — 96372 THER/PROPH/DIAG INJ SC/IM: CPT | Mod: 59

## 2020-03-13 PROCEDURE — 85610 PROTHROMBIN TIME: CPT

## 2020-03-13 PROCEDURE — 63600175 PHARM REV CODE 636 W HCPCS: Performed by: EMERGENCY MEDICINE

## 2020-03-13 PROCEDURE — 36415 COLL VENOUS BLD VENIPUNCTURE: CPT

## 2020-03-13 PROCEDURE — 86920 COMPATIBILITY TEST SPIN: CPT

## 2020-03-13 PROCEDURE — 36430 TRANSFUSION BLD/BLD COMPNT: CPT

## 2020-03-13 PROCEDURE — 86850 RBC ANTIBODY SCREEN: CPT

## 2020-03-13 PROCEDURE — 80053 COMPREHEN METABOLIC PANEL: CPT

## 2020-03-13 PROCEDURE — 25000003 PHARM REV CODE 250: Performed by: EMERGENCY MEDICINE

## 2020-03-13 RX ORDER — SODIUM CHLORIDE 0.9 % (FLUSH) 0.9 %
10 SYRINGE (ML) INJECTION
Status: DISCONTINUED | OUTPATIENT
Start: 2020-03-13 | End: 2020-03-14 | Stop reason: HOSPADM

## 2020-03-13 RX ORDER — HYDROCODONE BITARTRATE AND ACETAMINOPHEN 500; 5 MG/1; MG/1
TABLET ORAL
Status: DISCONTINUED | OUTPATIENT
Start: 2020-03-13 | End: 2020-03-14 | Stop reason: HOSPADM

## 2020-03-13 RX ORDER — METHYLERGONOVINE MALEATE 0.2 MG/1
0.2 TABLET ORAL EVERY 6 HOURS
Qty: 4 TABLET | Refills: 0 | Status: SHIPPED | OUTPATIENT
Start: 2020-03-13 | End: 2020-12-23

## 2020-03-13 RX ORDER — METHYLERGONOVINE MALEATE 0.2 MG/ML
200 INJECTION INTRAVENOUS ONCE
Status: COMPLETED | OUTPATIENT
Start: 2020-03-13 | End: 2020-03-13

## 2020-03-13 RX ORDER — ACETAMINOPHEN 500 MG
1000 TABLET ORAL
Status: COMPLETED | OUTPATIENT
Start: 2020-03-13 | End: 2020-03-13

## 2020-03-13 RX ORDER — METHYLERGONOVINE MALEATE 0.2 MG/1
0.2 TABLET ORAL EVERY 6 HOURS
Status: DISCONTINUED | OUTPATIENT
Start: 2020-03-13 | End: 2020-03-14 | Stop reason: HOSPADM

## 2020-03-13 RX ADMIN — METHYLERGONOVINE MALEATE 0.2 MG: 0.2 TABLET ORAL at 05:03

## 2020-03-13 RX ADMIN — METHYLERGONOVINE MALEATE 200 MCG: 0.2 INJECTION, SOLUTION INTRAMUSCULAR; INTRAVENOUS at 10:03

## 2020-03-13 RX ADMIN — ACETAMINOPHEN 1000 MG: 500 TABLET, FILM COATED ORAL at 05:03

## 2020-03-13 NOTE — ASSESSMENT & PLAN NOTE
Postpartum hemorrhage-now resolved, patient brought tissue/clot passed at home will send to pathology, by ultrasound appears normal for postpartum  Pre and post delivery anemia-same H&H on admission as discharge, currently status post 1 of 2 units of packed red blood cells  Discharge today after 2nd unit of blood

## 2020-03-13 NOTE — SUBJECTIVE & OBJECTIVE
29-year-old  2 para 0202 presents the emergency room early this morning after passing large blood clot/tissue.  Patient status post  section on 2020 at 29 weeks.  Currently bleeding scant, patient without pain, no fever chills, no nausea vomiting    OB History    Para Term  AB Living   2 2 0 2 0 2   SAB TAB Ectopic Multiple Live Births   0 0 0 0 2      # Outcome Date GA Lbr Ceasar/2nd Weight Sex Delivery Anes PTL Lv   2  20 29w5d  1.56 kg (3 lb 7 oz) F CS-LTranv Spinal, EPI Y JASMYN      Name: NABEEL,GIRL DESTINY      Apgar1: 9  Apgar5: 9   1  19 36w3d  2.268 kg (5 lb) F  EPI N JASMYN      Complications: IUGR (intrauterine growth restriction) affecting care of mother      Name: Danielle     Past Medical History:   Diagnosis Date    Anemia     no current treatment    Epilepsy     Gallstones     HPV (human papilloma virus) infection     LGSIL on Pap smear of cervix     Seizure disorder, complex partial     last seizure ; no medication    Seizures     Swine flu      Past Surgical History:   Procedure Laterality Date     SECTION N/A 3/8/2020    Procedure:  SECTION;  Surgeon: Brian Quijano MD;  Location: [facility] L&D;  Service: OB/GYN;  Laterality: N/A;    CHOLECYSTECTOMY      LAPAROSCOPIC CHOLECYSTECTOMY      toe I&D      TONSILLECTOMY      UPPER GASTROINTESTINAL ENDOSCOPY      Showed H. Pylori    yifan dugan         PTA Medications   Medication Sig    ibuprofen (ADVIL,MOTRIN) 600 MG tablet Take 1 tablet (600 mg total) by mouth every 6 (six) hours.    docusate sodium (COLACE) 100 MG capsule Take 2 capsules (200 mg total) by mouth 2 (two) times daily.    lanolin Crea cream Apply topically continuous prn for Dry Skin (Apply to nipples for soreness).    oxyCODONE-acetaminophen (PERCOCET) 5-325 mg per tablet Take 1 tablet by mouth every 4 (four) hours as needed.       Review of patient's allergies indicates:  No  Known Allergies     Family History     Problem Relation (Age of Onset)    No Known Problems Mother, Father        Tobacco Use    Smoking status: Former Smoker     Packs/day: 0.25     Years: 4.00     Pack years: 1.00     Types: Cigarettes     Start date:      Last attempt to quit: 10/2019     Years since quittin.4    Smokeless tobacco: Never Used    Tobacco comment: quit with pregnancy (2019)   Substance and Sexual Activity    Alcohol use: Not Currently    Drug use: No    Sexual activity: Yes     Partners: Male     Birth control/protection: None     Review of Systems   Objective:     Vital Signs (Most Recent):  Temp: 98 °F (36.7 °C) (20 1115)  Pulse: 79 (20 1115)  Resp: 18 (20 1115)  BP: 119/68 (20 1115)  SpO2: 100 % (20 1115) Vital Signs (24h Range):  Temp:  [97.8 °F (36.6 °C)-98.3 °F (36.8 °C)] 98 °F (36.7 °C)  Pulse:  [] 79  Resp:  [14-20] 18  SpO2:  [99 %-100 %] 100 %  BP: (109-167)/(53-75) 119/68     Weight: 54.4 kg (120 lb)  Body mass index is 22.67 kg/m².    No LMP recorded (lmp unknown).    Physical Exam  Abdomen-soft nontender  Incision clean dry and intact  Uterus-firm and below the umbilicus  Lochia-scant/mild  Extremities-nontender  Laboratory:  CBC:   Recent Labs   Lab 20  0514   WBC 19.99*   RBC 2.78*   HGB 6.4*   HCT 21.4*      MCV 77*   MCH 23.0*   MCHC 29.9*       Diagnostic Results:  US: Reviewed  Normal postpartum ultrasound

## 2020-03-13 NOTE — H&P
[facility]  Obstetrics & Gynecology  History & Physical    Patient Name: Destiny Grayson  MRN: [id number]  Admission Date: 3/13/2020  Primary Care Provider: Jean Waller MD    Subjective:     Chief Complaint/Reason for Admission:  Heavy Bleeding, passing clots/tissue    History of Present Illness:  No notes on file    29-year-old  2 para 0202 presents the emergency room early this morning after passing large blood clot/tissue.  Patient status post  section on 2020 at 29 weeks.  Currently bleeding scant, patient without pain, no fever chills, no nausea vomiting    OB History    Para Term  AB Living   2 2 0 2 0 2   SAB TAB Ectopic Multiple Live Births   0 0 0 0 2      # Outcome Date GA Lbr Ceasar/2nd Weight Sex Delivery Anes PTL Lv   2  20 29w5d  1.56 kg (3 lb 7 oz) F CS-LTranv Spinal, EPI Y JASMYN      Name: NABEEL,GIRL DESTINY      Apgar1: 9  Apgar5: 9   1  19 36w3d  2.268 kg (5 lb) F  EPI N JASMYN      Complications: IUGR (intrauterine growth restriction) affecting care of mother      Name: Danielle     Past Medical History:   Diagnosis Date    Anemia     no current treatment    Epilepsy     Gallstones     HPV (human papilloma virus) infection     LGSIL on Pap smear of cervix     Seizure disorder, complex partial     last seizure ; no medication    Seizures     Swine flu      Past Surgical History:   Procedure Laterality Date     SECTION N/A 3/8/2020    Procedure:  SECTION;  Surgeon: Brian Quijano MD;  Location: [facility] L&D;  Service: OB/GYN;  Laterality: N/A;    CHOLECYSTECTOMY  2017    LAPAROSCOPIC CHOLECYSTECTOMY      toe I&D      TONSILLECTOMY      UPPER GASTROINTESTINAL ENDOSCOPY      Showed H. Pylori    yifan dugan  2010       PTA Medications   Medication Sig    ibuprofen (ADVIL,MOTRIN) 600 MG tablet Take 1 tablet (600 mg total) by mouth every 6 (six) hours.    docusate sodium (COLACE) 100 MG  capsule Take 2 capsules (200 mg total) by mouth 2 (two) times daily.    lanolin Crea cream Apply topically continuous prn for Dry Skin (Apply to nipples for soreness).    oxyCODONE-acetaminophen (PERCOCET) 5-325 mg per tablet Take 1 tablet by mouth every 4 (four) hours as needed.       Review of patient's allergies indicates:  No Known Allergies     Family History     Problem Relation (Age of Onset)    No Known Problems Mother, Father        Tobacco Use    Smoking status: Former Smoker     Packs/day: 0.25     Years: 4.00     Pack years: 1.00     Types: Cigarettes     Start date:      Last attempt to quit: 10/2019     Years since quittin.4    Smokeless tobacco: Never Used    Tobacco comment: quit with pregnancy ()   Substance and Sexual Activity    Alcohol use: Not Currently    Drug use: No    Sexual activity: Yes     Partners: Male     Birth control/protection: None     Review of Systems   Objective:     Vital Signs (Most Recent):  Temp: 98 °F (36.7 °C) (20 1115)  Pulse: 79 (20 1115)  Resp: 18 (20 1115)  BP: 119/68 (20 1115)  SpO2: 100 % (20 1115) Vital Signs (24h Range):  Temp:  [97.8 °F (36.6 °C)-98.3 °F (36.8 °C)] 98 °F (36.7 °C)  Pulse:  [] 79  Resp:  [14-20] 18  SpO2:  [99 %-100 %] 100 %  BP: (109-167)/(53-75) 119/68     Weight: 54.4 kg (120 lb)  Body mass index is 22.67 kg/m².    No LMP recorded (lmp unknown).    Physical Exam  Abdomen-soft nontender  Incision clean dry and intact  Uterus-firm and below the umbilicus  Lochia-scant/mild  Extremities-nontender  Laboratory:  CBC:   Recent Labs   Lab 20  0514   WBC 19.99*   RBC 2.78*   HGB 6.4*   HCT 21.4*      MCV 77*   MCH 23.0*   MCHC 29.9*       Diagnostic Results:  US: Reviewed  Normal postpartum ultrasound    Assessment/Plan:     * Postpartum hemorrhage  Postpartum hemorrhage-now resolved, patient brought tissue/clot passed at home will send to pathology, by ultrasound appears normal for  postpartum  Pre and post delivery anemia-same H&H on admission as discharge, currently status post 1 of 2 units of packed red blood cells  Discharge today after 2nd unit of blood        Gómez Quijano MD  Obstetrics & Gynecology  [facility]

## 2020-03-13 NOTE — ED PROVIDER NOTES
"Encounter Date: 3/13/2020       History     Chief Complaint   Patient presents with    Vaginal Bleeding     "delivered six days ago / " "vaginal bleeding now"      24-year-old  2 para 0202 who is s/p cesarian section on 3/8/20 at 29 weeks and 5 days gestational age secondary to pre term labor and premature rupture of membranes presents emergency department vaginal bleeding.  Patient states that initially her bleeding was light but it has intensified.  She states that over the past several days she has been passing large clots.  Tonight, she passed a softball size blood clot that appear to have tissue.  She states that she is having intense pelvic cramping and has had moderate to severe vaginal bleeding since passing the large clot.  She does feel weak.  She states that she has a history of anemia.  She declined blood transfusion prior to discharge.  No fevers or chills.  She is otherwise eating and drinking well.        Review of patient's allergies indicates:  No Known Allergies  Past Medical History:   Diagnosis Date    Anemia     no current treatment    Epilepsy     Gallstones     HPV (human papilloma virus) infection     LGSIL on Pap smear of cervix     Seizure disorder, complex partial     last seizure ; no medication    Seizures     Swine flu      Past Surgical History:   Procedure Laterality Date     SECTION N/A 3/8/2020    Procedure:  SECTION;  Surgeon: Brian Quijano MD;  [facility]: [facility];  Service: OB/GYN;  Laterality: N/A;    CHOLECYSTECTOMY      LAPAROSCOPIC CHOLECYSTECTOMY      toe I&D      TONSILLECTOMY      UPPER GASTROINTESTINAL ENDOSCOPY  2017    Showed H. Pylori    yifan dugan  2010     Family History   Adopted: Yes   Problem Relation Age of Onset    No Known Problems Mother     No Known Problems Father      Social History     Tobacco Use    Smoking status: Former Smoker     Packs/day: 0.25     Years: 4.00     Pack years: 1.00 "     Types: Cigarettes     Start date:      Last attempt to quit: 10/2019     Years since quittin.4    Smokeless tobacco: Never Used    Tobacco comment: quit with pregnancy (2019)   Substance Use Topics    Alcohol use: Not Currently    Drug use: No     Review of Systems   Constitutional: Negative for chills, diaphoresis, fatigue and fever.   HENT: Negative for congestion.    Eyes: Negative for visual disturbance.   Respiratory: Negative for cough, shortness of breath, wheezing and stridor.    Cardiovascular: Negative for chest pain and palpitations.   Gastrointestinal: Negative for abdominal distention, diarrhea, nausea and vomiting.   Genitourinary: Positive for pelvic pain and vaginal bleeding. Negative for dysuria, frequency, hematuria and urgency.   Musculoskeletal: Negative for back pain.   Skin: Negative for pallor.   Neurological: Positive for weakness. Negative for light-headedness, numbness and headaches.   Psychiatric/Behavioral: Negative for confusion.   All other systems reviewed and are negative.      Physical Exam     Initial Vitals [20 0448]   BP Pulse Resp Temp SpO2   (!) 167/70 110 14 98.3 °F (36.8 °C) 99 %      MAP       --         Physical Exam    Nursing note and vitals reviewed.  Constitutional: She appears well-developed and well-nourished. She appears distressed.   HENT:   Head: Normocephalic and atraumatic.   Eyes: EOM are normal.   Neck: Normal range of motion. Neck supple.   Cardiovascular: Normal rate, regular rhythm, normal heart sounds and intact distal pulses.   Pulmonary/Chest: Breath sounds normal. She has no wheezes. She has no rhonchi. She has no rales.   Abdominal: Soft. Bowel sounds are normal. She exhibits no distension. There is no tenderness. There is no rebound.   Genitourinary:   Genitourinary Comments: Small amount of vaginal bleeding   Musculoskeletal: Normal range of motion.   Neurological: She is alert and oriented to person, place, and time. She has  normal strength. No sensory deficit.   Skin: Skin is warm.   Psychiatric: Thought content normal.         ED Course   Procedures  Labs Reviewed   CBC W/ AUTO DIFFERENTIAL   COMPREHENSIVE METABOLIC PANEL   APTT   PROTIME-INR   HCG, QUANTITATIVE, PREGNANCY   TYPE & SCREEN          Imaging Results    None          Medical Decision Making:   ED Management:  24-year-old female presents with postpartum hemorrhage.  She does have small amount of persistent bleeding.  Her hemoglobin is 6.4 with white count of 20.  No infectious symptoms.  Given the patient's active bleeding and symptoms she will be transfused 2 units.  She will also be given Methergine per Dr. Rush's recommendations.  The patient has been here for 2 hr and saturated a pad and a half.  She is hemodynamically stable.  Labs otherwise grossly unremarkable.  The ultrasound with thickened endometrial stripe but no obvious retained products.  Dr. Rush has evaluated the patient emergency department and recommends admission to Dr. Quijano's service.  The patient has been consented for blood transfusion.    Liliya Azar MD  Emergency Medicine  03/13/2020 7:27 AM                                   Clinical Impression:       ICD-10-CM ICD-9-CM   1. Postpartum hemorrhage O72.1 666.10                                Liliya Azar MD  03/13/20 0727

## 2020-03-14 LAB
ANISOCYTOSIS BLD QL SMEAR: SLIGHT
BASOPHILS NFR BLD: 0 % (ref 0–1.9)
DIFFERENTIAL METHOD: ABNORMAL
EOSINOPHIL NFR BLD: 0 % (ref 0–8)
ERYTHROCYTE [DISTWIDTH] IN BLOOD BY AUTOMATED COUNT: 16.1 % (ref 11.5–14.5)
HCT VFR BLD AUTO: 31.4 % (ref 37–48.5)
HGB BLD-MCNC: 9.5 G/DL (ref 12–16)
IMM GRANULOCYTES # BLD AUTO: ABNORMAL K/UL (ref 0–0.04)
IMM GRANULOCYTES NFR BLD AUTO: ABNORMAL % (ref 0–0.5)
LYMPHOCYTES NFR BLD: 9 % (ref 18–48)
MCH RBC QN AUTO: 25 PG (ref 27–31)
MCHC RBC AUTO-ENTMCNC: 30.3 G/DL (ref 32–36)
MCV RBC AUTO: 83 FL (ref 82–98)
MONOCYTES NFR BLD: 7 % (ref 4–15)
NEUTROPHILS NFR BLD: 82 % (ref 38–73)
NEUTS BAND NFR BLD MANUAL: 2 %
NRBC BLD-RTO: 1 /100 WBC
PLATELET # BLD AUTO: 273 K/UL (ref 150–350)
PLATELET BLD QL SMEAR: ABNORMAL
PMV BLD AUTO: 9.5 FL (ref 9.2–12.9)
POLYCHROMASIA BLD QL SMEAR: ABNORMAL
RBC # BLD AUTO: 3.8 M/UL (ref 4–5.4)
WBC # BLD AUTO: 19.3 K/UL (ref 3.9–12.7)

## 2020-03-14 NOTE — NURSING
1917 Notified Dr Quijano of patients post blood H/H.  Dr Quijano States OK to D/C    1924 Dr Quijano notified of patients temp 100.2 , MD ok this D/C tonight     2033: Patient d/c with discharge instructions and medications, instructed to take next dose of methergine at midnight. Patient verbalized understanding of education. Discharged per wheelchair escorted by CNA with all belongings

## 2020-03-14 NOTE — DISCHARGE INSTRUCTIONS
Activity as tolerated    No driving until:    Notify your health care provider if you experience any of the following:  difficulty breathing or increased cough    Notify your health care provider if you experience any of the following:  persistent nausea and vomiting or diarrhea    Notify your health care provider if you experience any of the following:  redness, tenderness, or signs of infection (pain, swelling, redness, odor or green/yellow discharge around incision site)    Notify your health care provider if you experience any of the following:  severe persistent headache    Notify your health care provider if you experience any of the following:  severe uncontrolled pain    Notify your health care provider if you experience any of the following:  temperature >100.4    Notify your health care provider if you experience any of the following:  worsening rash    Other restrictions (specify):    Pelvic rest x6 weeks
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
## 2020-10-26 ENCOUNTER — OFFICE VISIT (OUTPATIENT)
Dept: PSYCHIATRY | Facility: CLINIC | Age: 25
End: 2020-10-26
Payer: COMMERCIAL

## 2020-10-26 DIAGNOSIS — F32.1 CURRENT MODERATE EPISODE OF MAJOR DEPRESSIVE DISORDER WITHOUT PRIOR EPISODE: ICD-10-CM

## 2020-10-26 DIAGNOSIS — F41.1 GAD (GENERALIZED ANXIETY DISORDER): Primary | ICD-10-CM

## 2020-10-26 PROCEDURE — 90791 PSYCH DIAGNOSTIC EVALUATION: CPT | Mod: 95,,, | Performed by: SOCIAL WORKER

## 2020-10-26 PROCEDURE — 90791 PR PSYCHIATRIC DIAGNOSTIC EVALUATION: ICD-10-PCS | Mod: 95,,, | Performed by: SOCIAL WORKER

## 2020-11-09 NOTE — ADDENDUM NOTE
Addendum  created 03/09/20 0448 by Ramon Vinson MD    Intraprocedure Event edited, Sign clinical note      
[facility]

## 2020-11-16 ENCOUNTER — PATIENT MESSAGE (OUTPATIENT)
Dept: PSYCHIATRY | Facility: CLINIC | Age: 25
End: 2020-11-16

## 2020-11-18 ENCOUNTER — PATIENT MESSAGE (OUTPATIENT)
Dept: PSYCHIATRY | Facility: CLINIC | Age: 25
End: 2020-11-18

## 2020-11-19 ENCOUNTER — PATIENT MESSAGE (OUTPATIENT)
Dept: PSYCHIATRY | Facility: CLINIC | Age: 25
End: 2020-11-19

## 2021-04-09 ENCOUNTER — PATIENT MESSAGE (OUTPATIENT)
Dept: PSYCHIATRY | Facility: CLINIC | Age: 26
End: 2021-04-09

## 2021-04-13 ENCOUNTER — TELEPHONE (OUTPATIENT)
Dept: FAMILY MEDICINE | Facility: CLINIC | Age: 26
End: 2021-04-13

## 2021-04-13 ENCOUNTER — HOSPITAL ENCOUNTER (EMERGENCY)
Facility: HOSPITAL | Age: 26
Discharge: HOME | End: 2021-04-13
Attending: EMERGENCY MEDICINE
Payer: COMMERCIAL

## 2021-04-13 VITALS
BODY MASS INDEX: 22.66 KG/M2 | RESPIRATION RATE: 18 BRPM | OXYGEN SATURATION: 98 % | TEMPERATURE: 99 F | WEIGHT: 120 LBS | DIASTOLIC BLOOD PRESSURE: 75 MMHG | HEART RATE: 94 BPM | SYSTOLIC BLOOD PRESSURE: 133 MMHG | HEIGHT: 61 IN

## 2021-04-13 DIAGNOSIS — K52.9 GASTROENTERITIS: Primary | ICD-10-CM

## 2021-04-13 LAB
ALBUMIN SERPL BCP-MCNC: 4 G/DL (ref 3.5–5.2)
ALP SERPL-CCNC: 78 U/L (ref 55–135)
ALT SERPL W/O P-5'-P-CCNC: 15 U/L (ref 10–44)
ANION GAP SERPL CALC-SCNC: 6 MMOL/L (ref 8–16)
AST SERPL-CCNC: 17 U/L (ref 10–40)
B-HCG UR QL: NEGATIVE
BASOPHILS # BLD AUTO: 0.04 K/UL (ref 0–0.2)
BASOPHILS NFR BLD: 0.4 % (ref 0–1.9)
BILIRUB SERPL-MCNC: 1 MG/DL (ref 0.1–1)
BILIRUB UR QL STRIP: NEGATIVE
BUN SERPL-MCNC: 9 MG/DL (ref 6–20)
CALCIUM SERPL-MCNC: 8.9 MG/DL (ref 8.7–10.5)
CHLORIDE SERPL-SCNC: 104 MMOL/L (ref 95–110)
CLARITY UR: CLEAR
CO2 SERPL-SCNC: 24 MMOL/L (ref 23–29)
COLOR UR: COLORLESS
CREAT SERPL-MCNC: 0.6 MG/DL (ref 0.5–1.4)
CTP QC/QA: YES
DIFFERENTIAL METHOD: ABNORMAL
EOSINOPHIL # BLD AUTO: 0 K/UL (ref 0–0.5)
EOSINOPHIL NFR BLD: 0.4 % (ref 0–8)
ERYTHROCYTE [DISTWIDTH] IN BLOOD BY AUTOMATED COUNT: 14.8 % (ref 11.5–14.5)
EST. GFR  (AFRICAN AMERICAN): >60 ML/MIN/1.73 M^2
EST. GFR  (NON AFRICAN AMERICAN): >60 ML/MIN/1.73 M^2
GLUCOSE SERPL-MCNC: 100 MG/DL (ref 70–110)
GLUCOSE UR QL STRIP: NEGATIVE
HCT VFR BLD AUTO: 38 % (ref 37–48.5)
HGB BLD-MCNC: 12 G/DL (ref 12–16)
HGB UR QL STRIP: NEGATIVE
IMM GRANULOCYTES # BLD AUTO: 0.03 K/UL (ref 0–0.04)
IMM GRANULOCYTES NFR BLD AUTO: 0.3 % (ref 0–0.5)
KETONES UR QL STRIP: NEGATIVE
LEUKOCYTE ESTERASE UR QL STRIP: NEGATIVE
LYMPHOCYTES # BLD AUTO: 1.5 K/UL (ref 1–4.8)
LYMPHOCYTES NFR BLD: 16.1 % (ref 18–48)
MCH RBC QN AUTO: 24.5 PG (ref 27–31)
MCHC RBC AUTO-ENTMCNC: 31.6 G/DL (ref 32–36)
MCV RBC AUTO: 78 FL (ref 82–98)
MONOCYTES # BLD AUTO: 0.7 K/UL (ref 0.3–1)
MONOCYTES NFR BLD: 7.3 % (ref 4–15)
NEUTROPHILS # BLD AUTO: 7.1 K/UL (ref 1.8–7.7)
NEUTROPHILS NFR BLD: 75.5 % (ref 38–73)
NITRITE UR QL STRIP: NEGATIVE
NRBC BLD-RTO: 0 /100 WBC
PH UR STRIP: 7 [PH] (ref 5–8)
PLATELET # BLD AUTO: 358 K/UL (ref 150–450)
PMV BLD AUTO: 9.9 FL (ref 9.2–12.9)
POTASSIUM SERPL-SCNC: 3.7 MMOL/L (ref 3.5–5.1)
PROT SERPL-MCNC: 7.4 G/DL (ref 6–8.4)
PROT UR QL STRIP: NEGATIVE
RBC # BLD AUTO: 4.9 M/UL (ref 4–5.4)
SODIUM SERPL-SCNC: 134 MMOL/L (ref 136–145)
SP GR UR STRIP: 1 (ref 1–1.03)
URN SPEC COLLECT METH UR: ABNORMAL
UROBILINOGEN UR STRIP-ACNC: NEGATIVE EU/DL
WBC # BLD AUTO: 9.44 K/UL (ref 3.9–12.7)

## 2021-04-13 PROCEDURE — 85025 COMPLETE CBC W/AUTO DIFF WBC: CPT | Performed by: EMERGENCY MEDICINE

## 2021-04-13 PROCEDURE — 80053 COMPREHEN METABOLIC PANEL: CPT | Performed by: EMERGENCY MEDICINE

## 2021-04-13 PROCEDURE — 63600175 PHARM REV CODE 636 W HCPCS: Performed by: EMERGENCY MEDICINE

## 2021-04-13 PROCEDURE — 81003 URINALYSIS AUTO W/O SCOPE: CPT | Performed by: EMERGENCY MEDICINE

## 2021-04-13 PROCEDURE — 25000003 PHARM REV CODE 250: Performed by: EMERGENCY MEDICINE

## 2021-04-13 PROCEDURE — 81025 URINE PREGNANCY TEST: CPT | Performed by: EMERGENCY MEDICINE

## 2021-04-13 PROCEDURE — 96361 HYDRATE IV INFUSION ADD-ON: CPT

## 2021-04-13 PROCEDURE — 99284 EMERGENCY DEPT VISIT MOD MDM: CPT | Mod: 25

## 2021-04-13 PROCEDURE — 96374 THER/PROPH/DIAG INJ IV PUSH: CPT

## 2021-04-13 PROCEDURE — 36415 COLL VENOUS BLD VENIPUNCTURE: CPT | Performed by: EMERGENCY MEDICINE

## 2021-04-13 RX ORDER — HYOSCYAMINE SULFATE 0.125 MG
125 TABLET ORAL EVERY 6 HOURS PRN
Qty: 15 TABLET | Refills: 0 | Status: SHIPPED | OUTPATIENT
Start: 2021-04-13 | End: 2021-05-13

## 2021-04-13 RX ORDER — ACETAMINOPHEN AND CODEINE PHOSPHATE 300; 30 MG/1; MG/1
1 TABLET ORAL EVERY 6 HOURS PRN
COMMUNITY
Start: 2021-01-21 | End: 2021-08-05

## 2021-04-13 RX ORDER — HYOSCYAMINE SULFATE 0.5 MG/ML
0.25 INJECTION, SOLUTION SUBCUTANEOUS
Status: COMPLETED | OUTPATIENT
Start: 2021-04-13 | End: 2021-04-13

## 2021-04-13 RX ORDER — ONDANSETRON 4 MG/1
4 TABLET, ORALLY DISINTEGRATING ORAL EVERY 8 HOURS PRN
Qty: 10 TABLET | Refills: 0 | Status: SHIPPED | OUTPATIENT
Start: 2021-04-13

## 2021-04-13 RX ORDER — IBUPROFEN 800 MG/1
800 TABLET ORAL EVERY 6 HOURS PRN
COMMUNITY
Start: 2021-01-21 | End: 2021-08-05

## 2021-04-13 RX ORDER — CYCLOBENZAPRINE HCL 10 MG
TABLET ORAL
COMMUNITY
Start: 2021-01-21 | End: 2021-08-05

## 2021-04-13 RX ORDER — SODIUM CHLORIDE 9 MG/ML
INJECTION, SOLUTION INTRAVENOUS
Status: COMPLETED | OUTPATIENT
Start: 2021-04-13 | End: 2021-04-13

## 2021-04-13 RX ADMIN — HYOSCYAMINE SULFATE 0.25 MG: 0.5 INJECTION, SOLUTION SUBCUTANEOUS at 07:04

## 2021-04-13 RX ADMIN — SODIUM CHLORIDE: 0.9 INJECTION, SOLUTION INTRAVENOUS at 07:04

## 2021-04-15 ENCOUNTER — OFFICE VISIT (OUTPATIENT)
Dept: PSYCHIATRY | Facility: CLINIC | Age: 26
End: 2021-04-15
Payer: COMMERCIAL

## 2021-04-15 DIAGNOSIS — F32.1 CURRENT MODERATE EPISODE OF MAJOR DEPRESSIVE DISORDER WITHOUT PRIOR EPISODE: ICD-10-CM

## 2021-04-15 DIAGNOSIS — F41.1 GAD (GENERALIZED ANXIETY DISORDER): ICD-10-CM

## 2021-04-15 PROCEDURE — 90837 PSYTX W PT 60 MINUTES: CPT | Mod: 95,,, | Performed by: SOCIAL WORKER

## 2021-04-15 PROCEDURE — 90837 PR PSYCHOTHERAPY W/PATIENT, 60 MIN: ICD-10-PCS | Mod: 95,,, | Performed by: SOCIAL WORKER

## 2021-04-20 ENCOUNTER — TELEPHONE (OUTPATIENT)
Dept: PSYCHIATRY | Facility: CLINIC | Age: 26
End: 2021-04-20

## 2021-04-22 ENCOUNTER — TELEPHONE (OUTPATIENT)
Dept: PSYCHIATRY | Facility: CLINIC | Age: 26
End: 2021-04-22

## 2021-05-03 ENCOUNTER — TELEPHONE (OUTPATIENT)
Dept: PSYCHIATRY | Facility: CLINIC | Age: 26
End: 2021-05-03

## 2021-05-20 LAB
ABO + RH BLD: NORMAL
HBV SURFACE AG SERPL QL IA: NEGATIVE
HIV 1+2 AB+HIV1 P24 AG SERPL QL IA: NEGATIVE
INDIRECT COOMBS: NEGATIVE
RPR: NON REACTIVE
RUBELLA IMMUNE STATUS: NORMAL

## 2021-08-05 ENCOUNTER — OFFICE VISIT (OUTPATIENT)
Dept: FAMILY MEDICINE | Facility: CLINIC | Age: 26
End: 2021-08-05
Payer: MEDICAID

## 2021-08-05 VITALS
HEIGHT: 61 IN | HEART RATE: 116 BPM | WEIGHT: 124.5 LBS | TEMPERATURE: 98 F | OXYGEN SATURATION: 98 % | SYSTOLIC BLOOD PRESSURE: 116 MMHG | BODY MASS INDEX: 23.5 KG/M2 | RESPIRATION RATE: 17 BRPM | DIASTOLIC BLOOD PRESSURE: 72 MMHG

## 2021-08-05 DIAGNOSIS — R05.9 COUGH: ICD-10-CM

## 2021-08-05 DIAGNOSIS — J06.9 UPPER RESPIRATORY TRACT INFECTION, UNSPECIFIED TYPE: ICD-10-CM

## 2021-08-05 DIAGNOSIS — J02.9 PHARYNGITIS, UNSPECIFIED ETIOLOGY: Primary | ICD-10-CM

## 2021-08-05 LAB
CTP QC/QA: YES
S PYO RRNA THROAT QL PROBE: NEGATIVE

## 2021-08-05 PROCEDURE — 3074F PR MOST RECENT SYSTOLIC BLOOD PRESSURE < 130 MM HG: ICD-10-PCS | Mod: CPTII,,, | Performed by: FAMILY MEDICINE

## 2021-08-05 PROCEDURE — 3008F PR BODY MASS INDEX (BMI) DOCUMENTED: ICD-10-PCS | Mod: CPTII,,, | Performed by: FAMILY MEDICINE

## 2021-08-05 PROCEDURE — 99214 PR OFFICE/OUTPT VISIT, EST, LEVL IV, 30-39 MIN: ICD-10-PCS | Mod: 25,S$PBB,, | Performed by: FAMILY MEDICINE

## 2021-08-05 PROCEDURE — U0005 INFEC AGEN DETEC AMPLI PROBE: HCPCS | Performed by: FAMILY MEDICINE

## 2021-08-05 PROCEDURE — U0003 INFECTIOUS AGENT DETECTION BY NUCLEIC ACID (DNA OR RNA); SEVERE ACUTE RESPIRATORY SYNDROME CORONAVIRUS 2 (SARS-COV-2) (CORONAVIRUS DISEASE [COVID-19]), AMPLIFIED PROBE TECHNIQUE, MAKING USE OF HIGH THROUGHPUT TECHNOLOGIES AS DESCRIBED BY CMS-2020-01-R: HCPCS | Performed by: FAMILY MEDICINE

## 2021-08-05 PROCEDURE — 1125F AMNT PAIN NOTED PAIN PRSNT: CPT | Mod: CPTII,,, | Performed by: FAMILY MEDICINE

## 2021-08-05 PROCEDURE — 3008F BODY MASS INDEX DOCD: CPT | Mod: CPTII,,, | Performed by: FAMILY MEDICINE

## 2021-08-05 PROCEDURE — 99214 OFFICE O/P EST MOD 30 MIN: CPT | Mod: 25,S$PBB,, | Performed by: FAMILY MEDICINE

## 2021-08-05 PROCEDURE — 1125F PR PAIN SEVERITY QUANTIFIED, PAIN PRESENT: ICD-10-PCS | Mod: CPTII,,, | Performed by: FAMILY MEDICINE

## 2021-08-05 PROCEDURE — 3078F PR MOST RECENT DIASTOLIC BLOOD PRESSURE < 80 MM HG: ICD-10-PCS | Mod: CPTII,,, | Performed by: FAMILY MEDICINE

## 2021-08-05 PROCEDURE — 87880 STREP A ASSAY W/OPTIC: CPT | Mod: PBBFAC | Performed by: FAMILY MEDICINE

## 2021-08-05 PROCEDURE — 3074F SYST BP LT 130 MM HG: CPT | Mod: CPTII,,, | Performed by: FAMILY MEDICINE

## 2021-08-05 PROCEDURE — 3078F DIAST BP <80 MM HG: CPT | Mod: CPTII,,, | Performed by: FAMILY MEDICINE

## 2021-08-07 LAB
SARS-COV-2 RNA RESP QL NAA+PROBE: NOT DETECTED
SARS-COV-2- CYCLE NUMBER: -1

## 2021-08-14 ENCOUNTER — HOSPITAL ENCOUNTER (EMERGENCY)
Facility: HOSPITAL | Age: 26
Discharge: HOME | End: 2021-08-14
Attending: EMERGENCY MEDICINE
Payer: COMMERCIAL

## 2021-08-14 VITALS
SYSTOLIC BLOOD PRESSURE: 100 MMHG | BODY MASS INDEX: 23.41 KG/M2 | RESPIRATION RATE: 16 BRPM | DIASTOLIC BLOOD PRESSURE: 60 MMHG | OXYGEN SATURATION: 99 % | TEMPERATURE: 98 F | HEART RATE: 87 BPM | WEIGHT: 124 LBS | HEIGHT: 61 IN

## 2021-08-14 DIAGNOSIS — O99.012 ANEMIA IN PREGNANCY, SECOND TRIMESTER: ICD-10-CM

## 2021-08-14 DIAGNOSIS — R10.9 ABDOMINAL PAIN: ICD-10-CM

## 2021-08-14 DIAGNOSIS — O26.892 ABDOMINAL PAIN IN PREGNANCY, SECOND TRIMESTER: Primary | ICD-10-CM

## 2021-08-14 DIAGNOSIS — R10.9 ABDOMINAL PAIN IN PREGNANCY, SECOND TRIMESTER: Primary | ICD-10-CM

## 2021-08-14 DIAGNOSIS — O20.0 THREATENED MISCARRIAGE: ICD-10-CM

## 2021-08-14 LAB
ABO + RH BLD: NORMAL
ALBUMIN SERPL BCP-MCNC: 3.2 G/DL (ref 3.5–5.2)
ALP SERPL-CCNC: 58 U/L (ref 55–135)
ALT SERPL W/O P-5'-P-CCNC: 14 U/L (ref 10–44)
ANION GAP SERPL CALC-SCNC: 11 MMOL/L (ref 8–16)
AST SERPL-CCNC: 15 U/L (ref 10–40)
BASOPHILS # BLD AUTO: 0.03 K/UL (ref 0–0.2)
BASOPHILS NFR BLD: 0.2 % (ref 0–1.9)
BILIRUB SERPL-MCNC: 0.5 MG/DL (ref 0.1–1)
BILIRUB UR QL STRIP: NEGATIVE
BUN SERPL-MCNC: <5 MG/DL (ref 6–20)
CALCIUM SERPL-MCNC: 8.7 MG/DL (ref 8.7–10.5)
CHLORIDE SERPL-SCNC: 107 MMOL/L (ref 95–110)
CLARITY UR: CLEAR
CO2 SERPL-SCNC: 19 MMOL/L (ref 23–29)
COLOR UR: YELLOW
CREAT SERPL-MCNC: 0.5 MG/DL (ref 0.5–1.4)
DIFFERENTIAL METHOD: ABNORMAL
EOSINOPHIL # BLD AUTO: 0.1 K/UL (ref 0–0.5)
EOSINOPHIL NFR BLD: 0.6 % (ref 0–8)
ERYTHROCYTE [DISTWIDTH] IN BLOOD BY AUTOMATED COUNT: 15.3 % (ref 11.5–14.5)
EST. GFR  (AFRICAN AMERICAN): >60 ML/MIN/1.73 M^2
EST. GFR  (NON AFRICAN AMERICAN): >60 ML/MIN/1.73 M^2
GLUCOSE SERPL-MCNC: 85 MG/DL (ref 70–110)
GLUCOSE UR QL STRIP: NEGATIVE
HCT VFR BLD AUTO: 29.3 % (ref 37–48.5)
HGB BLD-MCNC: 9.7 G/DL (ref 12–16)
HGB UR QL STRIP: NEGATIVE
IMM GRANULOCYTES # BLD AUTO: 0.09 K/UL (ref 0–0.04)
IMM GRANULOCYTES NFR BLD AUTO: 0.7 % (ref 0–0.5)
KETONES UR QL STRIP: NEGATIVE
LEUKOCYTE ESTERASE UR QL STRIP: NEGATIVE
LIPASE SERPL-CCNC: 23 U/L (ref 4–60)
LYMPHOCYTES # BLD AUTO: 1.9 K/UL (ref 1–4.8)
LYMPHOCYTES NFR BLD: 15.5 % (ref 18–48)
MAGNESIUM SERPL-MCNC: 1.8 MG/DL (ref 1.6–2.6)
MCH RBC QN AUTO: 27.6 PG (ref 27–31)
MCHC RBC AUTO-ENTMCNC: 33.1 G/DL (ref 32–36)
MCV RBC AUTO: 84 FL (ref 82–98)
MONOCYTES # BLD AUTO: 0.7 K/UL (ref 0.3–1)
MONOCYTES NFR BLD: 6 % (ref 4–15)
NEUTROPHILS # BLD AUTO: 9.3 K/UL (ref 1.8–7.7)
NEUTROPHILS NFR BLD: 77 % (ref 38–73)
NITRITE UR QL STRIP: NEGATIVE
NRBC BLD-RTO: 0 /100 WBC
PH UR STRIP: 6 [PH] (ref 5–8)
PLATELET # BLD AUTO: 285 K/UL (ref 150–450)
PMV BLD AUTO: 10.3 FL (ref 9.2–12.9)
POTASSIUM SERPL-SCNC: 3.6 MMOL/L (ref 3.5–5.1)
PROT SERPL-MCNC: 6.8 G/DL (ref 6–8.4)
PROT UR QL STRIP: NEGATIVE
RBC # BLD AUTO: 3.51 M/UL (ref 4–5.4)
SODIUM SERPL-SCNC: 137 MMOL/L (ref 136–145)
SP GR UR STRIP: 1.01 (ref 1–1.03)
URN SPEC COLLECT METH UR: NORMAL
UROBILINOGEN UR STRIP-ACNC: NEGATIVE EU/DL
WBC # BLD AUTO: 12.03 K/UL (ref 3.9–12.7)

## 2021-08-14 PROCEDURE — 85025 COMPLETE CBC W/AUTO DIFF WBC: CPT | Performed by: EMERGENCY MEDICINE

## 2021-08-14 PROCEDURE — 83735 ASSAY OF MAGNESIUM: CPT | Performed by: EMERGENCY MEDICINE

## 2021-08-14 PROCEDURE — 86900 BLOOD TYPING SEROLOGIC ABO: CPT | Performed by: EMERGENCY MEDICINE

## 2021-08-14 PROCEDURE — 81003 URINALYSIS AUTO W/O SCOPE: CPT | Performed by: EMERGENCY MEDICINE

## 2021-08-14 PROCEDURE — 80053 COMPREHEN METABOLIC PANEL: CPT | Performed by: EMERGENCY MEDICINE

## 2021-08-14 PROCEDURE — 83690 ASSAY OF LIPASE: CPT | Performed by: EMERGENCY MEDICINE

## 2021-08-14 PROCEDURE — 99284 EMERGENCY DEPT VISIT MOD MDM: CPT | Mod: 25

## 2021-08-14 RX ORDER — FERROUS SULFATE 325(65) MG
325 TABLET ORAL
Qty: 30 TABLET | Refills: 3 | Status: SHIPPED | OUTPATIENT
Start: 2021-08-14 | End: 2021-09-13

## 2021-09-21 PROBLEM — Z98.891 HISTORY OF CLASSICAL CESAREAN SECTION: Status: ACTIVE | Noted: 2021-09-21

## 2021-09-21 PROBLEM — O09.892 HISTORY OF PRETERM DELIVERY, CURRENTLY PREGNANT IN SECOND TRIMESTER: Status: ACTIVE | Noted: 2021-09-21

## 2021-09-21 PROBLEM — Z87.59 HISTORY OF PRIOR PREGNANCY WITH IUGR NEWBORN: Status: ACTIVE | Noted: 2021-09-21

## 2021-09-21 PROBLEM — G40.909 EPILEPSY: Status: ACTIVE | Noted: 2021-09-21

## 2021-09-28 DIAGNOSIS — N64.4 MASTODYNIA: Primary | ICD-10-CM

## 2021-10-13 ENCOUNTER — HOSPITAL ENCOUNTER (OUTPATIENT)
Facility: HOSPITAL | Age: 26
Discharge: HOME | End: 2021-10-13
Attending: STUDENT IN AN ORGANIZED HEALTH CARE EDUCATION/TRAINING PROGRAM | Admitting: STUDENT IN AN ORGANIZED HEALTH CARE EDUCATION/TRAINING PROGRAM
Payer: MEDICAID

## 2021-10-13 VITALS
RESPIRATION RATE: 16 BRPM | HEART RATE: 92 BPM | DIASTOLIC BLOOD PRESSURE: 59 MMHG | WEIGHT: 130 LBS | HEIGHT: 71 IN | SYSTOLIC BLOOD PRESSURE: 106 MMHG | TEMPERATURE: 98 F | OXYGEN SATURATION: 100 % | BODY MASS INDEX: 18.2 KG/M2

## 2021-10-13 DIAGNOSIS — O47.00 THREATENED PRETERM LABOR: ICD-10-CM

## 2021-10-13 LAB
BILIRUB UR QL STRIP: NEGATIVE
CLARITY UR: CLEAR
COLOR UR: YELLOW
GLUCOSE UR QL STRIP: NEGATIVE
HGB UR QL STRIP: NEGATIVE
KETONES UR QL STRIP: NEGATIVE
LEUKOCYTE ESTERASE UR QL STRIP: NEGATIVE
NITRITE UR QL STRIP: NEGATIVE
PH UR STRIP: 7 [PH] (ref 5–8)
PROT UR QL STRIP: NEGATIVE
SP GR UR STRIP: 1.01 (ref 1–1.03)
URN SPEC COLLECT METH UR: NORMAL
UROBILINOGEN UR STRIP-ACNC: NEGATIVE EU/DL

## 2021-10-13 PROCEDURE — 99211 OFF/OP EST MAY X REQ PHY/QHP: CPT

## 2021-10-13 PROCEDURE — 81003 URINALYSIS AUTO W/O SCOPE: CPT | Performed by: STUDENT IN AN ORGANIZED HEALTH CARE EDUCATION/TRAINING PROGRAM

## 2021-10-19 ENCOUNTER — HOSPITAL ENCOUNTER (INPATIENT)
Facility: HOSPITAL | Age: 26
LOS: 11 days | Discharge: HOME | End: 2021-10-30
Attending: STUDENT IN AN ORGANIZED HEALTH CARE EDUCATION/TRAINING PROGRAM | Admitting: STUDENT IN AN ORGANIZED HEALTH CARE EDUCATION/TRAINING PROGRAM
Payer: MEDICAID

## 2021-10-19 DIAGNOSIS — R00.0 TACHYCARDIA: ICD-10-CM

## 2021-10-19 DIAGNOSIS — O42.919 PRETERM PREMATURE RUPTURE OF MEMBRANES (PPROM) WITH UNKNOWN ONSET OF LABOR: ICD-10-CM

## 2021-10-19 DIAGNOSIS — R00.2 HEART PALPITATIONS: ICD-10-CM

## 2021-10-19 LAB
ABO + RH BLD: NORMAL
AMPHET+METHAMPHET UR QL: NEGATIVE
BACTERIA #/AREA URNS HPF: NEGATIVE /HPF
BARBITURATES UR QL SCN>200 NG/ML: NEGATIVE
BASOPHILS # BLD AUTO: 0.06 K/UL (ref 0–0.2)
BASOPHILS NFR BLD: 0.4 % (ref 0–1.9)
BENZODIAZ UR QL SCN>200 NG/ML: NEGATIVE
BILIRUB UR QL STRIP: NEGATIVE
BLD GP AB SCN CELLS X3 SERPL QL: NORMAL
BUPRENORPHINE UR QL: NEGATIVE
BZE UR QL SCN: NEGATIVE
CANNABINOIDS UR QL SCN: NEGATIVE
CLARITY UR: CLEAR
COLOR UR: YELLOW
CREAT UR-MCNC: 86 MG/DL (ref 15–325)
CTP QC/QA: YES
DIFFERENTIAL METHOD: ABNORMAL
EOSINOPHIL # BLD AUTO: 0 K/UL (ref 0–0.5)
EOSINOPHIL NFR BLD: 0.2 % (ref 0–8)
ERYTHROCYTE [DISTWIDTH] IN BLOOD BY AUTOMATED COUNT: 13.6 % (ref 11.5–14.5)
FIBRONECTIN FETAL SPEC QL: POSITIVE
GLUCOSE UR QL STRIP: ABNORMAL
HCT VFR BLD AUTO: 29.1 % (ref 37–48.5)
HGB BLD-MCNC: 9.2 G/DL (ref 12–16)
HGB UR QL STRIP: NEGATIVE
HYALINE CASTS #/AREA URNS LPF: 8 /LPF
IMM GRANULOCYTES # BLD AUTO: 0.25 K/UL (ref 0–0.04)
IMM GRANULOCYTES NFR BLD AUTO: 1.6 % (ref 0–0.5)
INJECT RH IG VOL PATIENT: NORMAL ML
KETONES UR QL STRIP: ABNORMAL
LEUKOCYTE ESTERASE UR QL STRIP: ABNORMAL
LYMPHOCYTES # BLD AUTO: 1.5 K/UL (ref 1–4.8)
LYMPHOCYTES NFR BLD: 9.4 % (ref 18–48)
MCH RBC QN AUTO: 27.1 PG (ref 27–31)
MCHC RBC AUTO-ENTMCNC: 31.6 G/DL (ref 32–36)
MCV RBC AUTO: 86 FL (ref 82–98)
MICROSCOPIC COMMENT: ABNORMAL
MONOCYTES # BLD AUTO: 0.9 K/UL (ref 0.3–1)
MONOCYTES NFR BLD: 5.5 % (ref 4–15)
NEUTROPHILS # BLD AUTO: 13.3 K/UL (ref 1.8–7.7)
NEUTROPHILS NFR BLD: 82.9 % (ref 38–73)
NITRITE UR QL STRIP: NEGATIVE
NRBC BLD-RTO: 0 /100 WBC
OPIATES UR QL SCN: NEGATIVE
PCP UR QL SCN>25 NG/ML: NEGATIVE
PH UR STRIP: 8 [PH] (ref 5–8)
PLATELET # BLD AUTO: 286 K/UL (ref 150–450)
PMV BLD AUTO: 9.6 FL (ref 9.2–12.9)
PROT UR QL STRIP: ABNORMAL
RBC # BLD AUTO: 3.4 M/UL (ref 4–5.4)
RBC #/AREA URNS HPF: 3 /HPF (ref 0–4)
RUPTURE OF MEMBRANE: POSITIVE
SARS-COV-2 RDRP RESP QL NAA+PROBE: NEGATIVE
SP GR UR STRIP: 1.02 (ref 1–1.03)
SQUAMOUS #/AREA URNS HPF: 4 /HPF
TOXICOLOGY INFORMATION: NORMAL
URN SPEC COLLECT METH UR: ABNORMAL
UROBILINOGEN UR STRIP-ACNC: ABNORMAL EU/DL
WBC # BLD AUTO: 16 K/UL (ref 3.9–12.7)
WBC #/AREA URNS HPF: 34 /HPF (ref 0–5)
YEAST URNS QL MICRO: ABNORMAL

## 2021-10-19 PROCEDURE — 87081 CULTURE SCREEN ONLY: CPT | Performed by: STUDENT IN AN ORGANIZED HEALTH CARE EDUCATION/TRAINING PROGRAM

## 2021-10-19 PROCEDURE — 81001 URINALYSIS AUTO W/SCOPE: CPT | Performed by: STUDENT IN AN ORGANIZED HEALTH CARE EDUCATION/TRAINING PROGRAM

## 2021-10-19 PROCEDURE — 63600175 PHARM REV CODE 636 W HCPCS: Performed by: STUDENT IN AN ORGANIZED HEALTH CARE EDUCATION/TRAINING PROGRAM

## 2021-10-19 PROCEDURE — 87086 URINE CULTURE/COLONY COUNT: CPT | Performed by: STUDENT IN AN ORGANIZED HEALTH CARE EDUCATION/TRAINING PROGRAM

## 2021-10-19 PROCEDURE — 85025 COMPLETE CBC W/AUTO DIFF WBC: CPT | Performed by: STUDENT IN AN ORGANIZED HEALTH CARE EDUCATION/TRAINING PROGRAM

## 2021-10-19 PROCEDURE — 82731 ASSAY OF FETAL FIBRONECTIN: CPT | Performed by: STUDENT IN AN ORGANIZED HEALTH CARE EDUCATION/TRAINING PROGRAM

## 2021-10-19 PROCEDURE — 87591 N.GONORRHOEAE DNA AMP PROB: CPT | Performed by: STUDENT IN AN ORGANIZED HEALTH CARE EDUCATION/TRAINING PROGRAM

## 2021-10-19 PROCEDURE — 63600519 RHOGAM PHARM REV CODE 636 ALT 250 W HCPCS: Performed by: STUDENT IN AN ORGANIZED HEALTH CARE EDUCATION/TRAINING PROGRAM

## 2021-10-19 PROCEDURE — 87491 CHLMYD TRACH DNA AMP PROBE: CPT | Performed by: STUDENT IN AN ORGANIZED HEALTH CARE EDUCATION/TRAINING PROGRAM

## 2021-10-19 PROCEDURE — 86900 BLOOD TYPING SEROLOGIC ABO: CPT | Performed by: STUDENT IN AN ORGANIZED HEALTH CARE EDUCATION/TRAINING PROGRAM

## 2021-10-19 PROCEDURE — 80307 DRUG TEST PRSMV CHEM ANLYZR: CPT | Performed by: STUDENT IN AN ORGANIZED HEALTH CARE EDUCATION/TRAINING PROGRAM

## 2021-10-19 PROCEDURE — 25000003 PHARM REV CODE 250: Performed by: STUDENT IN AN ORGANIZED HEALTH CARE EDUCATION/TRAINING PROGRAM

## 2021-10-19 PROCEDURE — 12000002 HC ACUTE/MED SURGE SEMI-PRIVATE ROOM

## 2021-10-19 PROCEDURE — U0002 COVID-19 LAB TEST NON-CDC: HCPCS | Performed by: STUDENT IN AN ORGANIZED HEALTH CARE EDUCATION/TRAINING PROGRAM

## 2021-10-19 RX ORDER — AZITHROMYCIN 250 MG/1
500 TABLET, FILM COATED ORAL DAILY
Status: DISCONTINUED | OUTPATIENT
Start: 2021-10-21 | End: 2021-10-20

## 2021-10-19 RX ORDER — CALCIUM CARBONATE 200(500)MG
1000 TABLET,CHEWABLE ORAL 3 TIMES DAILY PRN
Status: DISCONTINUED | OUTPATIENT
Start: 2021-10-19 | End: 2021-10-30 | Stop reason: HOSPADM

## 2021-10-19 RX ORDER — SODIUM CHLORIDE, SODIUM LACTATE, POTASSIUM CHLORIDE, CALCIUM CHLORIDE 600; 310; 30; 20 MG/100ML; MG/100ML; MG/100ML; MG/100ML
INJECTION, SOLUTION INTRAVENOUS CONTINUOUS
Status: DISCONTINUED | OUTPATIENT
Start: 2021-10-19 | End: 2021-10-30 | Stop reason: HOSPADM

## 2021-10-19 RX ORDER — BETAMETHASONE SODIUM PHOSPHATE AND BETAMETHASONE ACETATE 3; 3 MG/ML; MG/ML
12 INJECTION, SUSPENSION INTRA-ARTICULAR; INTRALESIONAL; INTRAMUSCULAR; SOFT TISSUE ONCE
Status: COMPLETED | OUTPATIENT
Start: 2021-10-19 | End: 2021-10-19

## 2021-10-19 RX ORDER — MAGNESIUM SULFATE HEPTAHYDRATE 40 MG/ML
4 INJECTION, SOLUTION INTRAVENOUS ONCE
Status: COMPLETED | OUTPATIENT
Start: 2021-10-19 | End: 2021-10-19

## 2021-10-19 RX ORDER — AZITHROMYCIN 250 MG/1
250 TABLET, FILM COATED ORAL DAILY
Status: DISCONTINUED | OUTPATIENT
Start: 2021-10-22 | End: 2021-10-20

## 2021-10-19 RX ORDER — MAGNESIUM SULFATE HEPTAHYDRATE 40 MG/ML
2 INJECTION, SOLUTION INTRAVENOUS CONTINUOUS
Status: DISCONTINUED | OUTPATIENT
Start: 2021-10-19 | End: 2021-10-20

## 2021-10-19 RX ORDER — ERYTHROMYCIN ETHYLSUCCINATE 200 MG/5ML
333 SUSPENSION ORAL EVERY 8 HOURS
Status: DISCONTINUED | OUTPATIENT
Start: 2021-10-21 | End: 2021-10-19

## 2021-10-19 RX ORDER — BETAMETHASONE SODIUM PHOSPHATE AND BETAMETHASONE ACETATE 3; 3 MG/ML; MG/ML
12 INJECTION, SUSPENSION INTRA-ARTICULAR; INTRALESIONAL; INTRAMUSCULAR; SOFT TISSUE ONCE
Status: COMPLETED | OUTPATIENT
Start: 2021-10-20 | End: 2021-10-20

## 2021-10-19 RX ORDER — SODIUM CHLORIDE, SODIUM LACTATE, POTASSIUM CHLORIDE, CALCIUM CHLORIDE 600; 310; 30; 20 MG/100ML; MG/100ML; MG/100ML; MG/100ML
INJECTION, SOLUTION INTRAVENOUS CONTINUOUS
Status: DISCONTINUED | OUTPATIENT
Start: 2021-10-19 | End: 2021-10-19

## 2021-10-19 RX ORDER — AMOXICILLIN 250 MG/1
250 CAPSULE ORAL EVERY 8 HOURS
Status: DISPENSED | OUTPATIENT
Start: 2021-10-21 | End: 2021-10-28

## 2021-10-19 RX ADMIN — ERYTHROMYCIN LACTOBIONATE 250 MG: 500 INJECTION, POWDER, LYOPHILIZED, FOR SOLUTION INTRAVENOUS at 11:10

## 2021-10-19 RX ADMIN — AMPICILLIN SODIUM 2 G: 2 INJECTION, POWDER, FOR SOLUTION INTRAMUSCULAR; INTRAVENOUS at 10:10

## 2021-10-19 RX ADMIN — SODIUM CHLORIDE, SODIUM LACTATE, POTASSIUM CHLORIDE, AND CALCIUM CHLORIDE: .6; .31; .03; .02 INJECTION, SOLUTION INTRAVENOUS at 02:10

## 2021-10-19 RX ADMIN — BETAMETHASONE ACETATE AND BETAMETHASONE SODIUM PHOSPHATE 12 MG: 3; 3 INJECTION, SUSPENSION INTRA-ARTICULAR; INTRALESIONAL; INTRAMUSCULAR; SOFT TISSUE at 02:10

## 2021-10-19 RX ADMIN — MAGNESIUM SULFATE HEPTAHYDRATE 4 G: 40 INJECTION, SOLUTION INTRAVENOUS at 07:10

## 2021-10-19 RX ADMIN — AMPICILLIN SODIUM 2 G: 2 INJECTION, POWDER, FOR SOLUTION INTRAMUSCULAR; INTRAVENOUS at 04:10

## 2021-10-19 RX ADMIN — CALCIUM CARBONATE (ANTACID) CHEW TAB 500 MG 1000 MG: 500 CHEW TAB at 05:10

## 2021-10-19 RX ADMIN — ERYTHROMYCIN LACTOBIONATE 250 MG: 500 INJECTION, POWDER, LYOPHILIZED, FOR SOLUTION INTRAVENOUS at 05:10

## 2021-10-19 RX ADMIN — HUMAN RHO(D) IMMUNE GLOBULIN 300 MCG: 1500 SOLUTION INTRAMUSCULAR; INTRAVENOUS at 10:10

## 2021-10-20 LAB
BASOPHILS # BLD AUTO: 0.02 K/UL (ref 0–0.2)
BASOPHILS NFR BLD: 0.1 % (ref 0–1.9)
DIFFERENTIAL METHOD: ABNORMAL
EOSINOPHIL # BLD AUTO: 0 K/UL (ref 0–0.5)
EOSINOPHIL NFR BLD: 0 % (ref 0–8)
ERYTHROCYTE [DISTWIDTH] IN BLOOD BY AUTOMATED COUNT: 13.8 % (ref 11.5–14.5)
HCT VFR BLD AUTO: 26.2 % (ref 37–48.5)
HGB BLD-MCNC: 8.3 G/DL (ref 12–16)
IMM GRANULOCYTES # BLD AUTO: 0.29 K/UL (ref 0–0.04)
IMM GRANULOCYTES NFR BLD AUTO: 1.9 % (ref 0–0.5)
LYMPHOCYTES # BLD AUTO: 0.9 K/UL (ref 1–4.8)
LYMPHOCYTES NFR BLD: 5.7 % (ref 18–48)
MAGNESIUM SERPL-MCNC: 5.7 MG/DL (ref 1.6–2.6)
MCH RBC QN AUTO: 27.1 PG (ref 27–31)
MCHC RBC AUTO-ENTMCNC: 31.7 G/DL (ref 32–36)
MCV RBC AUTO: 86 FL (ref 82–98)
MONOCYTES # BLD AUTO: 0.7 K/UL (ref 0.3–1)
MONOCYTES NFR BLD: 4.7 % (ref 4–15)
NEUTROPHILS # BLD AUTO: 13.3 K/UL (ref 1.8–7.7)
NEUTROPHILS NFR BLD: 87.6 % (ref 38–73)
NRBC BLD-RTO: 0 /100 WBC
PLATELET # BLD AUTO: 264 K/UL (ref 150–450)
PMV BLD AUTO: 9.5 FL (ref 9.2–12.9)
RBC # BLD AUTO: 3.06 M/UL (ref 4–5.4)
WBC # BLD AUTO: 15.17 K/UL (ref 3.9–12.7)

## 2021-10-20 PROCEDURE — 85025 COMPLETE CBC W/AUTO DIFF WBC: CPT | Performed by: STUDENT IN AN ORGANIZED HEALTH CARE EDUCATION/TRAINING PROGRAM

## 2021-10-20 PROCEDURE — 36415 COLL VENOUS BLD VENIPUNCTURE: CPT | Performed by: STUDENT IN AN ORGANIZED HEALTH CARE EDUCATION/TRAINING PROGRAM

## 2021-10-20 PROCEDURE — 83735 ASSAY OF MAGNESIUM: CPT | Performed by: STUDENT IN AN ORGANIZED HEALTH CARE EDUCATION/TRAINING PROGRAM

## 2021-10-20 PROCEDURE — 63600175 PHARM REV CODE 636 W HCPCS: Performed by: STUDENT IN AN ORGANIZED HEALTH CARE EDUCATION/TRAINING PROGRAM

## 2021-10-20 PROCEDURE — 25000003 PHARM REV CODE 250: Performed by: STUDENT IN AN ORGANIZED HEALTH CARE EDUCATION/TRAINING PROGRAM

## 2021-10-20 PROCEDURE — 12000002 HC ACUTE/MED SURGE SEMI-PRIVATE ROOM

## 2021-10-20 RX ORDER — PROMETHAZINE HYDROCHLORIDE 25 MG/1
25 TABLET ORAL EVERY 6 HOURS PRN
Status: DISCONTINUED | OUTPATIENT
Start: 2021-10-20 | End: 2021-10-30 | Stop reason: HOSPADM

## 2021-10-20 RX ORDER — ACETAMINOPHEN 500 MG
1000 TABLET ORAL EVERY 8 HOURS PRN
Status: DISCONTINUED | OUTPATIENT
Start: 2021-10-20 | End: 2021-10-28

## 2021-10-20 RX ORDER — FOLIC ACID 1 MG/1
4 TABLET ORAL DAILY
Status: DISCONTINUED | OUTPATIENT
Start: 2021-10-20 | End: 2021-10-30 | Stop reason: HOSPADM

## 2021-10-20 RX ORDER — ONDANSETRON 4 MG/1
8 TABLET, ORALLY DISINTEGRATING ORAL EVERY 6 HOURS PRN
Status: DISCONTINUED | OUTPATIENT
Start: 2021-10-20 | End: 2021-10-30 | Stop reason: HOSPADM

## 2021-10-20 RX ORDER — AZITHROMYCIN 250 MG/1
1000 TABLET, FILM COATED ORAL DAILY
Status: COMPLETED | OUTPATIENT
Start: 2021-10-21 | End: 2021-10-21

## 2021-10-20 RX ADMIN — ERYTHROMYCIN LACTOBIONATE 250 MG: 500 INJECTION, POWDER, LYOPHILIZED, FOR SOLUTION INTRAVENOUS at 11:10

## 2021-10-20 RX ADMIN — AMPICILLIN SODIUM 2 G: 2 INJECTION, POWDER, FOR SOLUTION INTRAMUSCULAR; INTRAVENOUS at 04:10

## 2021-10-20 RX ADMIN — AMPICILLIN SODIUM 2 G: 2 INJECTION, POWDER, FOR SOLUTION INTRAMUSCULAR; INTRAVENOUS at 10:10

## 2021-10-20 RX ADMIN — MAGNESIUM SULFATE IN WATER 2 G/HR: 40 INJECTION, SOLUTION INTRAVENOUS at 12:10

## 2021-10-20 RX ADMIN — SODIUM CHLORIDE, SODIUM LACTATE, POTASSIUM CHLORIDE, AND CALCIUM CHLORIDE: .6; .31; .03; .02 INJECTION, SOLUTION INTRAVENOUS at 04:10

## 2021-10-20 RX ADMIN — AMPICILLIN SODIUM 2 G: 2 INJECTION, POWDER, FOR SOLUTION INTRAMUSCULAR; INTRAVENOUS at 09:10

## 2021-10-20 RX ADMIN — ERYTHROMYCIN LACTOBIONATE 250 MG: 500 INJECTION, POWDER, LYOPHILIZED, FOR SOLUTION INTRAVENOUS at 05:10

## 2021-10-20 RX ADMIN — SODIUM CHLORIDE 125 MG: 9 INJECTION, SOLUTION INTRAVENOUS at 11:10

## 2021-10-20 RX ADMIN — FOLIC ACID 4 MG: 1 TABLET ORAL at 09:10

## 2021-10-20 RX ADMIN — BETAMETHASONE ACETATE AND BETAMETHASONE SODIUM PHOSPHATE 12 MG: 3; 3 INJECTION, SUSPENSION INTRA-ARTICULAR; INTRALESIONAL; INTRAMUSCULAR; SOFT TISSUE at 02:10

## 2021-10-21 LAB
BACTERIA SPEC AEROBE CULT: NORMAL
BACTERIA UR CULT: NO GROWTH

## 2021-10-21 PROCEDURE — 25000003 PHARM REV CODE 250: Performed by: STUDENT IN AN ORGANIZED HEALTH CARE EDUCATION/TRAINING PROGRAM

## 2021-10-21 PROCEDURE — 25000003 PHARM REV CODE 250: Performed by: SPECIALIST

## 2021-10-21 PROCEDURE — 63700000 PHARM REV CODE 250 ALT 637 W/O HCPCS: Performed by: STUDENT IN AN ORGANIZED HEALTH CARE EDUCATION/TRAINING PROGRAM

## 2021-10-21 PROCEDURE — 63600175 PHARM REV CODE 636 W HCPCS: Mod: JG | Performed by: STUDENT IN AN ORGANIZED HEALTH CARE EDUCATION/TRAINING PROGRAM

## 2021-10-21 PROCEDURE — 12000002 HC ACUTE/MED SURGE SEMI-PRIVATE ROOM

## 2021-10-21 RX ORDER — DOCUSATE SODIUM 100 MG/1
100 CAPSULE, LIQUID FILLED ORAL DAILY PRN
Status: DISCONTINUED | OUTPATIENT
Start: 2021-10-21 | End: 2021-10-30 | Stop reason: HOSPADM

## 2021-10-21 RX ORDER — POLYETHYLENE GLYCOL 3350 17 G/17G
17 POWDER, FOR SOLUTION ORAL DAILY PRN
Status: DISCONTINUED | OUTPATIENT
Start: 2021-10-21 | End: 2021-10-30 | Stop reason: HOSPADM

## 2021-10-21 RX ADMIN — ERYTHROMYCIN LACTOBIONATE 250 MG: 500 INJECTION, POWDER, LYOPHILIZED, FOR SOLUTION INTRAVENOUS at 11:10

## 2021-10-21 RX ADMIN — FOLIC ACID 4 MG: 1 TABLET ORAL at 09:10

## 2021-10-21 RX ADMIN — POLYETHYLENE GLYCOL 3350 17 G: 17 POWDER, FOR SOLUTION ORAL at 12:10

## 2021-10-21 RX ADMIN — CALCIUM CARBONATE (ANTACID) CHEW TAB 500 MG 500 MG: 500 CHEW TAB at 05:10

## 2021-10-21 RX ADMIN — AZITHROMYCIN MONOHYDRATE 1000 MG: 250 TABLET ORAL at 03:10

## 2021-10-21 RX ADMIN — AMPICILLIN SODIUM 2 G: 2 INJECTION, POWDER, FOR SOLUTION INTRAMUSCULAR; INTRAVENOUS at 04:10

## 2021-10-21 RX ADMIN — ONDANSETRON 8 MG: 4 TABLET, ORALLY DISINTEGRATING ORAL at 04:10

## 2021-10-21 RX ADMIN — AMOXICILLIN 250 MG: 250 CAPSULE ORAL at 02:10

## 2021-10-21 RX ADMIN — AMOXICILLIN 250 MG: 250 CAPSULE ORAL at 09:10

## 2021-10-21 RX ADMIN — AMPICILLIN SODIUM 2 G: 2 INJECTION, POWDER, FOR SOLUTION INTRAMUSCULAR; INTRAVENOUS at 10:10

## 2021-10-21 RX ADMIN — ERYTHROMYCIN LACTOBIONATE 250 MG: 500 INJECTION, POWDER, LYOPHILIZED, FOR SOLUTION INTRAVENOUS at 05:10

## 2021-10-22 LAB
ABO + RH BLD: NORMAL
BASOPHILS # BLD AUTO: 0.03 K/UL (ref 0–0.2)
BASOPHILS NFR BLD: 0.3 % (ref 0–1.9)
BLD GP AB SCN CELLS X3 SERPL QL: NORMAL
BLOOD GROUP ANTIBODIES SERPL: NORMAL
CHLAMYDIA, AMPLIFIED DNA: NEGATIVE
DIFFERENTIAL METHOD: ABNORMAL
EOSINOPHIL # BLD AUTO: 0 K/UL (ref 0–0.5)
EOSINOPHIL NFR BLD: 0.1 % (ref 0–8)
ERYTHROCYTE [DISTWIDTH] IN BLOOD BY AUTOMATED COUNT: 13.9 % (ref 11.5–14.5)
HCT VFR BLD AUTO: 25.9 % (ref 37–48.5)
HGB BLD-MCNC: 8.1 G/DL (ref 12–16)
IMM GRANULOCYTES # BLD AUTO: 0.55 K/UL (ref 0–0.04)
IMM GRANULOCYTES NFR BLD AUTO: 4.7 % (ref 0–0.5)
LYMPHOCYTES # BLD AUTO: 1.8 K/UL (ref 1–4.8)
LYMPHOCYTES NFR BLD: 15.5 % (ref 18–48)
MCH RBC QN AUTO: 27 PG (ref 27–31)
MCHC RBC AUTO-ENTMCNC: 31.3 G/DL (ref 32–36)
MCV RBC AUTO: 86 FL (ref 82–98)
MONOCYTES # BLD AUTO: 1.1 K/UL (ref 0.3–1)
MONOCYTES NFR BLD: 9.1 % (ref 4–15)
N GONORRHOEAE, AMPLIFIED DNA: NEGATIVE
NEUTROPHILS # BLD AUTO: 8.2 K/UL (ref 1.8–7.7)
NEUTROPHILS NFR BLD: 70.3 % (ref 38–73)
NRBC BLD-RTO: 1 /100 WBC
PLATELET # BLD AUTO: 240 K/UL (ref 150–450)
PMV BLD AUTO: 9.9 FL (ref 9.2–12.9)
RBC # BLD AUTO: 3 M/UL (ref 4–5.4)
WBC # BLD AUTO: 11.65 K/UL (ref 3.9–12.7)

## 2021-10-22 PROCEDURE — 12000002 HC ACUTE/MED SURGE SEMI-PRIVATE ROOM

## 2021-10-22 PROCEDURE — 85025 COMPLETE CBC W/AUTO DIFF WBC: CPT | Performed by: STUDENT IN AN ORGANIZED HEALTH CARE EDUCATION/TRAINING PROGRAM

## 2021-10-22 PROCEDURE — 36415 COLL VENOUS BLD VENIPUNCTURE: CPT | Performed by: STUDENT IN AN ORGANIZED HEALTH CARE EDUCATION/TRAINING PROGRAM

## 2021-10-22 PROCEDURE — 25000003 PHARM REV CODE 250: Performed by: SPECIALIST

## 2021-10-22 PROCEDURE — 86900 BLOOD TYPING SEROLOGIC ABO: CPT | Performed by: STUDENT IN AN ORGANIZED HEALTH CARE EDUCATION/TRAINING PROGRAM

## 2021-10-22 PROCEDURE — 25000003 PHARM REV CODE 250: Performed by: OBSTETRICS & GYNECOLOGY

## 2021-10-22 PROCEDURE — 86870 RBC ANTIBODY IDENTIFICATION: CPT | Performed by: STUDENT IN AN ORGANIZED HEALTH CARE EDUCATION/TRAINING PROGRAM

## 2021-10-22 PROCEDURE — 25000003 PHARM REV CODE 250: Performed by: STUDENT IN AN ORGANIZED HEALTH CARE EDUCATION/TRAINING PROGRAM

## 2021-10-22 RX ORDER — ADHESIVE BANDAGE
30 BANDAGE TOPICAL DAILY PRN
Status: DISCONTINUED | OUTPATIENT
Start: 2021-10-22 | End: 2021-10-30 | Stop reason: HOSPADM

## 2021-10-22 RX ADMIN — AMOXICILLIN 250 MG: 250 CAPSULE ORAL at 02:10

## 2021-10-22 RX ADMIN — AMOXICILLIN 250 MG: 250 CAPSULE ORAL at 10:10

## 2021-10-22 RX ADMIN — AMOXICILLIN 250 MG: 250 CAPSULE ORAL at 06:10

## 2021-10-22 RX ADMIN — MAGNESIUM HYDROXIDE 2400 MG: 400 SUSPENSION ORAL at 07:10

## 2021-10-22 RX ADMIN — FOLIC ACID 4 MG: 1 TABLET ORAL at 08:10

## 2021-10-22 RX ADMIN — DOCUSATE SODIUM 100 MG: 100 CAPSULE, LIQUID FILLED ORAL at 07:10

## 2021-10-23 PROCEDURE — 25000003 PHARM REV CODE 250: Performed by: STUDENT IN AN ORGANIZED HEALTH CARE EDUCATION/TRAINING PROGRAM

## 2021-10-23 PROCEDURE — 12000002 HC ACUTE/MED SURGE SEMI-PRIVATE ROOM

## 2021-10-23 PROCEDURE — 25000003 PHARM REV CODE 250: Performed by: OBSTETRICS & GYNECOLOGY

## 2021-10-23 RX ORDER — DIPHENHYDRAMINE HCL 25 MG
25 CAPSULE ORAL NIGHTLY PRN
Status: DISCONTINUED | OUTPATIENT
Start: 2021-10-23 | End: 2021-10-30 | Stop reason: HOSPADM

## 2021-10-23 RX ADMIN — DIPHENHYDRAMINE HYDROCHLORIDE 25 MG: 25 CAPSULE ORAL at 10:10

## 2021-10-23 RX ADMIN — AMOXICILLIN 250 MG: 250 CAPSULE ORAL at 02:10

## 2021-10-23 RX ADMIN — AMOXICILLIN 250 MG: 250 CAPSULE ORAL at 10:10

## 2021-10-23 RX ADMIN — FOLIC ACID 4 MG: 1 TABLET ORAL at 09:10

## 2021-10-23 RX ADMIN — AMOXICILLIN 250 MG: 250 CAPSULE ORAL at 06:10

## 2021-10-24 PROBLEM — R00.2 PALPITATIONS: Status: ACTIVE | Noted: 2021-10-24

## 2021-10-24 PROBLEM — R42 DIZZINESS: Status: ACTIVE | Noted: 2021-10-24

## 2021-10-24 LAB
ALBUMIN SERPL BCP-MCNC: 2.8 G/DL (ref 3.5–5.2)
ALP SERPL-CCNC: 85 U/L (ref 55–135)
ALT SERPL W/O P-5'-P-CCNC: 22 U/L (ref 10–44)
ANION GAP SERPL CALC-SCNC: 10 MMOL/L (ref 8–16)
ANISOCYTOSIS BLD QL SMEAR: SLIGHT
AST SERPL-CCNC: 24 U/L (ref 10–40)
BASOPHILS NFR BLD: 0 % (ref 0–1.9)
BILIRUB SERPL-MCNC: 0.3 MG/DL (ref 0.1–1)
BUN SERPL-MCNC: 7 MG/DL (ref 6–20)
CALCIUM SERPL-MCNC: 9.6 MG/DL (ref 8.7–10.5)
CHLORIDE SERPL-SCNC: 105 MMOL/L (ref 95–110)
CO2 SERPL-SCNC: 25 MMOL/L (ref 23–29)
CREAT SERPL-MCNC: 0.3 MG/DL (ref 0.5–1.4)
DIFFERENTIAL METHOD: ABNORMAL
EOSINOPHIL NFR BLD: 0 % (ref 0–8)
ERYTHROCYTE [DISTWIDTH] IN BLOOD BY AUTOMATED COUNT: 13.7 % (ref 11.5–14.5)
EST. GFR  (AFRICAN AMERICAN): >60 ML/MIN/1.73 M^2
EST. GFR  (NON AFRICAN AMERICAN): >60 ML/MIN/1.73 M^2
GLUCOSE SERPL-MCNC: 103 MG/DL (ref 70–110)
HCT VFR BLD AUTO: 27.6 % (ref 37–48.5)
HGB BLD-MCNC: 8.8 G/DL (ref 12–16)
HYPOCHROMIA BLD QL SMEAR: ABNORMAL
IMM GRANULOCYTES # BLD AUTO: ABNORMAL K/UL (ref 0–0.04)
IMM GRANULOCYTES NFR BLD AUTO: ABNORMAL % (ref 0–0.5)
LYMPHOCYTES NFR BLD: 13 % (ref 18–48)
MCH RBC QN AUTO: 27.3 PG (ref 27–31)
MCHC RBC AUTO-ENTMCNC: 31.9 G/DL (ref 32–36)
MCV RBC AUTO: 86 FL (ref 82–98)
MONOCYTES NFR BLD: 5 % (ref 4–15)
MYELOCYTES NFR BLD MANUAL: 1 %
NEUTROPHILS NFR BLD: 80 % (ref 38–73)
NEUTS BAND NFR BLD MANUAL: 1 %
NRBC BLD-RTO: 0 /100 WBC
PLATELET # BLD AUTO: 245 K/UL (ref 150–450)
PLATELET BLD QL SMEAR: ABNORMAL
PMV BLD AUTO: 9.9 FL (ref 9.2–12.9)
POLYCHROMASIA BLD QL SMEAR: ABNORMAL
POTASSIUM SERPL-SCNC: 3.6 MMOL/L (ref 3.5–5.1)
PROT SERPL-MCNC: 6.6 G/DL (ref 6–8.4)
RBC # BLD AUTO: 3.22 M/UL (ref 4–5.4)
SODIUM SERPL-SCNC: 140 MMOL/L (ref 136–145)
TROPONIN I SERPL DL<=0.01 NG/ML-MCNC: <0.03 NG/ML
TROPONIN I SERPL DL<=0.01 NG/ML-MCNC: <0.03 NG/ML
WBC # BLD AUTO: 13.43 K/UL (ref 3.9–12.7)

## 2021-10-24 PROCEDURE — 36415 COLL VENOUS BLD VENIPUNCTURE: CPT | Performed by: HOSPITALIST

## 2021-10-24 PROCEDURE — 85007 BL SMEAR W/DIFF WBC COUNT: CPT | Performed by: STUDENT IN AN ORGANIZED HEALTH CARE EDUCATION/TRAINING PROGRAM

## 2021-10-24 PROCEDURE — 25000003 PHARM REV CODE 250: Performed by: STUDENT IN AN ORGANIZED HEALTH CARE EDUCATION/TRAINING PROGRAM

## 2021-10-24 PROCEDURE — 84484 ASSAY OF TROPONIN QUANT: CPT | Performed by: STUDENT IN AN ORGANIZED HEALTH CARE EDUCATION/TRAINING PROGRAM

## 2021-10-24 PROCEDURE — 93005 ELECTROCARDIOGRAM TRACING: CPT | Performed by: SPECIALIST

## 2021-10-24 PROCEDURE — 93010 ELECTROCARDIOGRAM REPORT: CPT | Mod: ,,, | Performed by: SPECIALIST

## 2021-10-24 PROCEDURE — 80053 COMPREHEN METABOLIC PANEL: CPT | Performed by: STUDENT IN AN ORGANIZED HEALTH CARE EDUCATION/TRAINING PROGRAM

## 2021-10-24 PROCEDURE — 93010 EKG 12-LEAD: ICD-10-PCS | Mod: ,,, | Performed by: SPECIALIST

## 2021-10-24 PROCEDURE — 12000002 HC ACUTE/MED SURGE SEMI-PRIVATE ROOM

## 2021-10-24 PROCEDURE — 85027 COMPLETE CBC AUTOMATED: CPT | Performed by: STUDENT IN AN ORGANIZED HEALTH CARE EDUCATION/TRAINING PROGRAM

## 2021-10-24 PROCEDURE — 36415 COLL VENOUS BLD VENIPUNCTURE: CPT | Performed by: STUDENT IN AN ORGANIZED HEALTH CARE EDUCATION/TRAINING PROGRAM

## 2021-10-24 PROCEDURE — 84484 ASSAY OF TROPONIN QUANT: CPT | Mod: 91 | Performed by: HOSPITALIST

## 2021-10-24 RX ADMIN — AMOXICILLIN 250 MG: 250 CAPSULE ORAL at 10:10

## 2021-10-24 RX ADMIN — AMOXICILLIN 250 MG: 250 CAPSULE ORAL at 02:10

## 2021-10-24 RX ADMIN — AMOXICILLIN 250 MG: 250 CAPSULE ORAL at 06:10

## 2021-10-24 RX ADMIN — FOLIC ACID 4 MG: 1 TABLET ORAL at 08:10

## 2021-10-25 ENCOUNTER — CLINICAL SUPPORT (OUTPATIENT)
Dept: CARDIOLOGY | Facility: HOSPITAL | Age: 26
End: 2021-10-25
Attending: HOSPITALIST
Payer: MEDICAID

## 2021-10-25 VITALS — HEIGHT: 61 IN | WEIGHT: 125 LBS | BODY MASS INDEX: 23.6 KG/M2

## 2021-10-25 LAB
ABO + RH BLD: NORMAL
ANISOCYTOSIS BLD QL SMEAR: SLIGHT
AORTIC ROOT ANNULUS: 2.64 CM
AORTIC VALVE CUSP SEPERATION: 1.68 CM
AV INDEX (PROSTH): 0.88
AV MEAN GRADIENT: 4 MMHG
AV PEAK GRADIENT: 7 MMHG
AV VALVE AREA: 1.48 CM2
AV VELOCITY RATIO: 84.99
BASOPHILS NFR BLD: 0 % (ref 0–1.9)
BLD GP AB SCN CELLS X3 SERPL QL: NORMAL
BLOOD GROUP ANTIBODIES SERPL: NORMAL
BSA FOR ECHO PROCEDURE: 1.56 M2
CV ECHO LV RWT: 0.38 CM
DIFFERENTIAL METHOD: ABNORMAL
DOP CALC AO PEAK VEL: 1.3 M/S
DOP CALC AO VTI: 24.22 CM
DOP CALC LVOT AREA: 1.7 CM2
DOP CALC LVOT DIAMETER: 1.46 CM
DOP CALC LVOT PEAK VEL: 110.49 M/S
DOP CALC LVOT STROKE VOLUME: 35.84 CM3
DOP CALCLVOT PEAK VEL VTI: 21.42 CM
E WAVE DECELERATION TIME: 186.47 MSEC
E/A RATIO: 1.46
E/E' RATIO: 7.77 M/S
ECHO LV POSTERIOR WALL: 0.82 CM (ref 0.6–1.1)
EJECTION FRACTION: 65 %
EOSINOPHIL NFR BLD: 0 % (ref 0–8)
ERYTHROCYTE [DISTWIDTH] IN BLOOD BY AUTOMATED COUNT: 14.1 % (ref 11.5–14.5)
FRACTIONAL SHORTENING: 49 % (ref 28–44)
HCT VFR BLD AUTO: 27.2 % (ref 37–48.5)
HGB BLD-MCNC: 8.7 G/DL (ref 12–16)
IMM GRANULOCYTES # BLD AUTO: ABNORMAL K/UL (ref 0–0.04)
IMM GRANULOCYTES NFR BLD AUTO: ABNORMAL % (ref 0–0.5)
INTERVENTRICULAR SEPTUM: 0.76 CM (ref 0.6–1.1)
LEFT ATRIUM SIZE: 3.44 CM
LEFT INTERNAL DIMENSION IN SYSTOLE: 2.16 CM (ref 2.1–4)
LEFT VENTRICLE MASS INDEX: 66 G/M2
LEFT VENTRICULAR INTERNAL DIMENSION IN DIASTOLE: 4.26 CM (ref 3.5–6)
LEFT VENTRICULAR MASS: 101.99 G
LV LATERAL E/E' RATIO: 6.31 M/S
LV SEPTAL E/E' RATIO: 10.1 M/S
LYMPHOCYTES NFR BLD: 11 % (ref 18–48)
MCH RBC QN AUTO: 27.4 PG (ref 27–31)
MCHC RBC AUTO-ENTMCNC: 32 G/DL (ref 32–36)
MCV RBC AUTO: 86 FL (ref 82–98)
METAMYELOCYTES NFR BLD MANUAL: 1 %
MONOCYTES NFR BLD: 3 % (ref 4–15)
MV PEAK A VEL: 0.69 M/S
MV PEAK E VEL: 1.01 M/S
MYELOCYTES NFR BLD MANUAL: 1 %
NEUTROPHILS NFR BLD: 78 % (ref 38–73)
NEUTS BAND NFR BLD MANUAL: 6 %
NRBC BLD-RTO: 0 /100 WBC
PLATELET # BLD AUTO: 255 K/UL (ref 150–450)
PLATELET BLD QL SMEAR: ABNORMAL
PMV BLD AUTO: 10 FL (ref 9.2–12.9)
POLYCHROMASIA BLD QL SMEAR: ABNORMAL
PV PEAK VELOCITY: 110 CM/S
RA PRESSURE: 3 MMHG
RBC # BLD AUTO: 3.17 M/UL (ref 4–5.4)
RIGHT VENTRICULAR END-DIASTOLIC DIMENSION: 320 CM
TDI LATERAL: 0.16 M/S
TDI SEPTAL: 0.1 M/S
TDI: 0.13 M/S
WBC # BLD AUTO: 15.07 K/UL (ref 3.9–12.7)

## 2021-10-25 PROCEDURE — 85027 COMPLETE CBC AUTOMATED: CPT | Performed by: STUDENT IN AN ORGANIZED HEALTH CARE EDUCATION/TRAINING PROGRAM

## 2021-10-25 PROCEDURE — 86870 RBC ANTIBODY IDENTIFICATION: CPT | Performed by: STUDENT IN AN ORGANIZED HEALTH CARE EDUCATION/TRAINING PROGRAM

## 2021-10-25 PROCEDURE — 93306 TTE W/DOPPLER COMPLETE: CPT | Mod: 26,,, | Performed by: SPECIALIST

## 2021-10-25 PROCEDURE — 93010 EKG 12-LEAD: ICD-10-PCS | Mod: ,,, | Performed by: INTERNAL MEDICINE

## 2021-10-25 PROCEDURE — 93010 ELECTROCARDIOGRAM REPORT: CPT | Mod: ,,, | Performed by: INTERNAL MEDICINE

## 2021-10-25 PROCEDURE — 63600175 PHARM REV CODE 636 W HCPCS: Performed by: STUDENT IN AN ORGANIZED HEALTH CARE EDUCATION/TRAINING PROGRAM

## 2021-10-25 PROCEDURE — 93306 TTE W/DOPPLER COMPLETE: CPT

## 2021-10-25 PROCEDURE — 25000003 PHARM REV CODE 250: Performed by: STUDENT IN AN ORGANIZED HEALTH CARE EDUCATION/TRAINING PROGRAM

## 2021-10-25 PROCEDURE — 86900 BLOOD TYPING SEROLOGIC ABO: CPT | Performed by: STUDENT IN AN ORGANIZED HEALTH CARE EDUCATION/TRAINING PROGRAM

## 2021-10-25 PROCEDURE — 93005 ELECTROCARDIOGRAM TRACING: CPT | Performed by: INTERNAL MEDICINE

## 2021-10-25 PROCEDURE — 85007 BL SMEAR W/DIFF WBC COUNT: CPT | Performed by: STUDENT IN AN ORGANIZED HEALTH CARE EDUCATION/TRAINING PROGRAM

## 2021-10-25 PROCEDURE — 12000002 HC ACUTE/MED SURGE SEMI-PRIVATE ROOM

## 2021-10-25 PROCEDURE — 93306 ECHO (CUPID ONLY): ICD-10-PCS | Mod: 26,,, | Performed by: SPECIALIST

## 2021-10-25 PROCEDURE — 36415 COLL VENOUS BLD VENIPUNCTURE: CPT | Performed by: STUDENT IN AN ORGANIZED HEALTH CARE EDUCATION/TRAINING PROGRAM

## 2021-10-25 RX ADMIN — AMOXICILLIN 250 MG: 250 CAPSULE ORAL at 06:10

## 2021-10-25 RX ADMIN — SODIUM CHLORIDE, SODIUM LACTATE, POTASSIUM CHLORIDE, AND CALCIUM CHLORIDE: .6; .31; .03; .02 INJECTION, SOLUTION INTRAVENOUS at 06:10

## 2021-10-25 RX ADMIN — CALCIUM CARBONATE (ANTACID) CHEW TAB 500 MG 1000 MG: 500 CHEW TAB at 12:10

## 2021-10-25 RX ADMIN — FOLIC ACID 4 MG: 1 TABLET ORAL at 09:10

## 2021-10-25 RX ADMIN — SODIUM CHLORIDE, SODIUM LACTATE, POTASSIUM CHLORIDE, AND CALCIUM CHLORIDE: .6; .31; .03; .02 INJECTION, SOLUTION INTRAVENOUS at 08:10

## 2021-10-25 RX ADMIN — AMOXICILLIN 250 MG: 250 CAPSULE ORAL at 10:10

## 2021-10-26 PROCEDURE — 25000003 PHARM REV CODE 250: Performed by: STUDENT IN AN ORGANIZED HEALTH CARE EDUCATION/TRAINING PROGRAM

## 2021-10-26 PROCEDURE — 12000002 HC ACUTE/MED SURGE SEMI-PRIVATE ROOM

## 2021-10-26 RX ADMIN — FOLIC ACID 4 MG: 1 TABLET ORAL at 09:10

## 2021-10-26 RX ADMIN — AMOXICILLIN 250 MG: 250 CAPSULE ORAL at 02:10

## 2021-10-26 RX ADMIN — AMOXICILLIN 250 MG: 250 CAPSULE ORAL at 06:10

## 2021-10-27 PROCEDURE — 25000003 PHARM REV CODE 250: Performed by: STUDENT IN AN ORGANIZED HEALTH CARE EDUCATION/TRAINING PROGRAM

## 2021-10-27 PROCEDURE — 63600175 PHARM REV CODE 636 W HCPCS: Performed by: STUDENT IN AN ORGANIZED HEALTH CARE EDUCATION/TRAINING PROGRAM

## 2021-10-27 PROCEDURE — 12000002 HC ACUTE/MED SURGE SEMI-PRIVATE ROOM

## 2021-10-27 RX ORDER — METOPROLOL TARTRATE 25 MG/1
12.5 TABLET ORAL 2 TIMES DAILY
Status: DISCONTINUED | OUTPATIENT
Start: 2021-10-27 | End: 2021-10-30 | Stop reason: HOSPADM

## 2021-10-27 RX ADMIN — METOPROLOL TARTRATE 12.5 MG: 25 TABLET, FILM COATED ORAL at 11:10

## 2021-10-27 RX ADMIN — AMOXICILLIN 250 MG: 250 CAPSULE ORAL at 02:10

## 2021-10-27 RX ADMIN — FOLIC ACID 4 MG: 1 TABLET ORAL at 09:10

## 2021-10-27 RX ADMIN — SODIUM CHLORIDE 125 MG: 9 INJECTION, SOLUTION INTRAVENOUS at 11:10

## 2021-10-28 ENCOUNTER — ANESTHESIA (OUTPATIENT)
Dept: OBSTETRICS AND GYNECOLOGY | Facility: HOSPITAL | Age: 26
End: 2021-10-28
Payer: MEDICAID

## 2021-10-28 ENCOUNTER — ANESTHESIA EVENT (OUTPATIENT)
Dept: OBSTETRICS AND GYNECOLOGY | Facility: HOSPITAL | Age: 26
End: 2021-10-28
Payer: MEDICAID

## 2021-10-28 LAB
ANION GAP SERPL CALC-SCNC: 14 MMOL/L (ref 8–16)
BUN SERPL-MCNC: 7 MG/DL (ref 6–20)
CALCIUM SERPL-MCNC: 9.4 MG/DL (ref 8.7–10.5)
CHLORIDE SERPL-SCNC: 106 MMOL/L (ref 95–110)
CO2 SERPL-SCNC: 20 MMOL/L (ref 23–29)
CREAT SERPL-MCNC: 0.5 MG/DL (ref 0.5–1.4)
ERYTHROCYTE [DISTWIDTH] IN BLOOD BY AUTOMATED COUNT: 14.1 % (ref 11.5–14.5)
ERYTHROCYTE [DISTWIDTH] IN BLOOD BY AUTOMATED COUNT: 14.4 % (ref 11.5–14.5)
EST. GFR  (AFRICAN AMERICAN): >60 ML/MIN/1.73 M^2
EST. GFR  (NON AFRICAN AMERICAN): >60 ML/MIN/1.73 M^2
GLUCOSE SERPL-MCNC: 89 MG/DL (ref 70–110)
HCT VFR BLD AUTO: 26.8 % (ref 37–48.5)
HCT VFR BLD AUTO: 28.7 % (ref 37–48.5)
HGB BLD-MCNC: 8.6 G/DL (ref 12–16)
HGB BLD-MCNC: 9 G/DL (ref 12–16)
MAGNESIUM SERPL-MCNC: 1.8 MG/DL (ref 1.6–2.6)
MCH RBC QN AUTO: 26.9 PG (ref 27–31)
MCH RBC QN AUTO: 27.7 PG (ref 27–31)
MCHC RBC AUTO-ENTMCNC: 31.4 G/DL (ref 32–36)
MCHC RBC AUTO-ENTMCNC: 32.1 G/DL (ref 32–36)
MCV RBC AUTO: 86 FL (ref 82–98)
MCV RBC AUTO: 86 FL (ref 82–98)
PLATELET # BLD AUTO: 235 K/UL (ref 150–450)
PLATELET # BLD AUTO: 240 K/UL (ref 150–450)
PMV BLD AUTO: 10 FL (ref 9.2–12.9)
PMV BLD AUTO: 9.9 FL (ref 9.2–12.9)
POTASSIUM SERPL-SCNC: 4 MMOL/L (ref 3.5–5.1)
RBC # BLD AUTO: 3.11 M/UL (ref 4–5.4)
RBC # BLD AUTO: 3.35 M/UL (ref 4–5.4)
SODIUM SERPL-SCNC: 140 MMOL/L (ref 136–145)
WBC # BLD AUTO: 19.17 K/UL (ref 3.9–12.7)
WBC # BLD AUTO: 22.26 K/UL (ref 3.9–12.7)

## 2021-10-28 PROCEDURE — 63600175 PHARM REV CODE 636 W HCPCS: Performed by: STUDENT IN AN ORGANIZED HEALTH CARE EDUCATION/TRAINING PROGRAM

## 2021-10-28 PROCEDURE — 85027 COMPLETE CBC AUTOMATED: CPT | Mod: 91 | Performed by: STUDENT IN AN ORGANIZED HEALTH CARE EDUCATION/TRAINING PROGRAM

## 2021-10-28 PROCEDURE — 80048 BASIC METABOLIC PNL TOTAL CA: CPT | Performed by: STUDENT IN AN ORGANIZED HEALTH CARE EDUCATION/TRAINING PROGRAM

## 2021-10-28 PROCEDURE — 37000009 HC ANESTHESIA EA ADD 15 MINS: Performed by: STUDENT IN AN ORGANIZED HEALTH CARE EDUCATION/TRAINING PROGRAM

## 2021-10-28 PROCEDURE — 25000003 PHARM REV CODE 250: Performed by: STUDENT IN AN ORGANIZED HEALTH CARE EDUCATION/TRAINING PROGRAM

## 2021-10-28 PROCEDURE — 36415 COLL VENOUS BLD VENIPUNCTURE: CPT | Performed by: STUDENT IN AN ORGANIZED HEALTH CARE EDUCATION/TRAINING PROGRAM

## 2021-10-28 PROCEDURE — 71000033 HC RECOVERY, INTIAL HOUR: Performed by: STUDENT IN AN ORGANIZED HEALTH CARE EDUCATION/TRAINING PROGRAM

## 2021-10-28 PROCEDURE — 85027 COMPLETE CBC AUTOMATED: CPT | Performed by: STUDENT IN AN ORGANIZED HEALTH CARE EDUCATION/TRAINING PROGRAM

## 2021-10-28 PROCEDURE — C1751 CATH, INF, PER/CENT/MIDLINE: HCPCS | Performed by: ANESTHESIOLOGY

## 2021-10-28 PROCEDURE — 63600175 PHARM REV CODE 636 W HCPCS: Performed by: NURSE ANESTHETIST, CERTIFIED REGISTERED

## 2021-10-28 PROCEDURE — 83735 ASSAY OF MAGNESIUM: CPT | Performed by: STUDENT IN AN ORGANIZED HEALTH CARE EDUCATION/TRAINING PROGRAM

## 2021-10-28 PROCEDURE — 37000008 HC ANESTHESIA 1ST 15 MINUTES: Performed by: STUDENT IN AN ORGANIZED HEALTH CARE EDUCATION/TRAINING PROGRAM

## 2021-10-28 PROCEDURE — 27000670 HC SET COMBINED SPINAL AND EPIDURAL: Performed by: ANESTHESIOLOGY

## 2021-10-28 PROCEDURE — 71000039 HC RECOVERY, EACH ADD'L HOUR: Performed by: STUDENT IN AN ORGANIZED HEALTH CARE EDUCATION/TRAINING PROGRAM

## 2021-10-28 PROCEDURE — 12000002 HC ACUTE/MED SURGE SEMI-PRIVATE ROOM

## 2021-10-28 PROCEDURE — 62326 NJX INTERLAMINAR LMBR/SAC: CPT | Performed by: ANESTHESIOLOGY

## 2021-10-28 PROCEDURE — 36000685 HC CESAREAN SECTION LEVEL I

## 2021-10-28 RX ORDER — MISOPROSTOL 200 UG/1
800 TABLET ORAL
Status: DISCONTINUED | OUTPATIENT
Start: 2021-10-28 | End: 2021-10-30 | Stop reason: HOSPADM

## 2021-10-28 RX ORDER — KETOROLAC TROMETHAMINE 30 MG/ML
30 INJECTION, SOLUTION INTRAMUSCULAR; INTRAVENOUS EVERY 8 HOURS
Status: COMPLETED | OUTPATIENT
Start: 2021-10-28 | End: 2021-10-29

## 2021-10-28 RX ORDER — MORPHINE SULFATE 0.5 MG/ML
INJECTION, SOLUTION EPIDURAL; INTRATHECAL; INTRAVENOUS
Status: DISCONTINUED | OUTPATIENT
Start: 2021-10-28 | End: 2021-10-28

## 2021-10-28 RX ORDER — SIMETHICONE 80 MG
1 TABLET,CHEWABLE ORAL EVERY 6 HOURS PRN
Status: DISCONTINUED | OUTPATIENT
Start: 2021-10-28 | End: 2021-10-30 | Stop reason: HOSPADM

## 2021-10-28 RX ORDER — OXYCODONE HYDROCHLORIDE 5 MG/1
5 TABLET ORAL EVERY 4 HOURS PRN
Status: DISCONTINUED | OUTPATIENT
Start: 2021-10-28 | End: 2021-10-30 | Stop reason: HOSPADM

## 2021-10-28 RX ORDER — CARBOPROST TROMETHAMINE 250 UG/ML
250 INJECTION, SOLUTION INTRAMUSCULAR
Status: DISCONTINUED | OUTPATIENT
Start: 2021-10-28 | End: 2021-10-30 | Stop reason: HOSPADM

## 2021-10-28 RX ORDER — DIPHENHYDRAMINE HYDROCHLORIDE 50 MG/ML
25 INJECTION INTRAMUSCULAR; INTRAVENOUS EVERY 4 HOURS PRN
Status: DISCONTINUED | OUTPATIENT
Start: 2021-10-28 | End: 2021-10-30 | Stop reason: HOSPADM

## 2021-10-28 RX ORDER — HYDROMORPHONE HYDROCHLORIDE 1 MG/ML
1.5 INJECTION, SOLUTION INTRAMUSCULAR; INTRAVENOUS; SUBCUTANEOUS EVERY 4 HOURS PRN
Status: DISCONTINUED | OUTPATIENT
Start: 2021-10-28 | End: 2021-10-30 | Stop reason: HOSPADM

## 2021-10-28 RX ORDER — FAMOTIDINE 10 MG/ML
20 INJECTION INTRAVENOUS
Status: DISCONTINUED | OUTPATIENT
Start: 2021-10-28 | End: 2021-10-30 | Stop reason: HOSPADM

## 2021-10-28 RX ORDER — PHENYLEPHRINE HYDROCHLORIDE 10 MG/ML
INJECTION INTRAVENOUS
Status: DISCONTINUED | OUTPATIENT
Start: 2021-10-28 | End: 2021-10-28

## 2021-10-28 RX ORDER — OXYCODONE HYDROCHLORIDE 5 MG/1
10 TABLET ORAL EVERY 4 HOURS PRN
Status: DISCONTINUED | OUTPATIENT
Start: 2021-10-28 | End: 2021-10-30 | Stop reason: HOSPADM

## 2021-10-28 RX ORDER — BETAMETHASONE SODIUM PHOSPHATE AND BETAMETHASONE ACETATE 3; 3 MG/ML; MG/ML
12 INJECTION, SUSPENSION INTRA-ARTICULAR; INTRALESIONAL; INTRAMUSCULAR; SOFT TISSUE ONCE
Status: COMPLETED | OUTPATIENT
Start: 2021-10-28 | End: 2021-10-28

## 2021-10-28 RX ORDER — NALBUPHINE HYDROCHLORIDE 10 MG/ML
5 INJECTION, SOLUTION INTRAMUSCULAR; INTRAVENOUS; SUBCUTANEOUS EVERY 4 HOURS PRN
Status: DISCONTINUED | OUTPATIENT
Start: 2021-10-28 | End: 2021-10-30 | Stop reason: HOSPADM

## 2021-10-28 RX ORDER — ACETAMINOPHEN 500 MG
1000 TABLET ORAL EVERY 8 HOURS
Status: DISCONTINUED | OUTPATIENT
Start: 2021-10-28 | End: 2021-10-30 | Stop reason: HOSPADM

## 2021-10-28 RX ORDER — SODIUM CITRATE AND CITRIC ACID MONOHYDRATE 334; 500 MG/5ML; MG/5ML
30 SOLUTION ORAL
Status: DISCONTINUED | OUTPATIENT
Start: 2021-10-28 | End: 2021-10-30 | Stop reason: HOSPADM

## 2021-10-28 RX ORDER — PROCHLORPERAZINE EDISYLATE 5 MG/ML
5 INJECTION INTRAMUSCULAR; INTRAVENOUS EVERY 6 HOURS PRN
Status: DISCONTINUED | OUTPATIENT
Start: 2021-10-28 | End: 2021-10-30 | Stop reason: HOSPADM

## 2021-10-28 RX ORDER — OXYTOCIN-SODIUM CHLORIDE 0.9% IV SOLN 30 UNIT/500ML 30-0.9/5 UT/ML-%
SOLUTION INTRAVENOUS CONTINUOUS PRN
Status: DISCONTINUED | OUTPATIENT
Start: 2021-10-28 | End: 2021-10-28

## 2021-10-28 RX ORDER — IBUPROFEN 400 MG/1
800 TABLET ORAL EVERY 8 HOURS
Status: DISCONTINUED | OUTPATIENT
Start: 2021-10-29 | End: 2021-10-30 | Stop reason: HOSPADM

## 2021-10-28 RX ORDER — FENTANYL CITRATE 50 UG/ML
INJECTION, SOLUTION INTRAMUSCULAR; INTRAVENOUS
Status: DISCONTINUED | OUTPATIENT
Start: 2021-10-28 | End: 2021-10-28

## 2021-10-28 RX ORDER — OXYTOCIN-SODIUM CHLORIDE 0.9% IV SOLN 30 UNIT/500ML 30-0.9/5 UT/ML-%
30 SOLUTION INTRAVENOUS ONCE
Status: DISCONTINUED | OUTPATIENT
Start: 2021-10-28 | End: 2021-10-30 | Stop reason: HOSPADM

## 2021-10-28 RX ORDER — METHYLERGONOVINE MALEATE 0.2 MG/ML
200 INJECTION INTRAVENOUS
Status: DISCONTINUED | OUTPATIENT
Start: 2021-10-28 | End: 2021-10-30 | Stop reason: HOSPADM

## 2021-10-28 RX ORDER — ONDANSETRON 2 MG/ML
INJECTION INTRAMUSCULAR; INTRAVENOUS
Status: DISCONTINUED | OUTPATIENT
Start: 2021-10-28 | End: 2021-10-28

## 2021-10-28 RX ORDER — CEFAZOLIN SODIUM 2 G/50ML
2 SOLUTION INTRAVENOUS ONCE
Status: COMPLETED | OUTPATIENT
Start: 2021-10-28 | End: 2021-10-28

## 2021-10-28 RX ORDER — ONDANSETRON 2 MG/ML
4 INJECTION INTRAMUSCULAR; INTRAVENOUS EVERY 6 HOURS PRN
Status: ACTIVE | OUTPATIENT
Start: 2021-10-28 | End: 2021-10-30

## 2021-10-28 RX ORDER — AMOXICILLIN 250 MG
1 CAPSULE ORAL NIGHTLY PRN
Status: DISCONTINUED | OUTPATIENT
Start: 2021-10-28 | End: 2021-10-30 | Stop reason: HOSPADM

## 2021-10-28 RX ADMIN — ONDANSETRON 8 MG: 4 TABLET, ORALLY DISINTEGRATING ORAL at 04:10

## 2021-10-28 RX ADMIN — KETOROLAC TROMETHAMINE 30 MG: 30 INJECTION, SOLUTION INTRAMUSCULAR; INTRAVENOUS at 02:10

## 2021-10-28 RX ADMIN — MORPHINE SULFATE 2 MG: 0.5 INJECTION, SOLUTION EPIDURAL; INTRATHECAL; INTRAVENOUS at 06:10

## 2021-10-28 RX ADMIN — ACETAMINOPHEN 1000 MG: 500 TABLET, FILM COATED ORAL at 04:10

## 2021-10-28 RX ADMIN — Medication 1000 ML/HR: at 07:10

## 2021-10-28 RX ADMIN — PHENYLEPHRINE HYDROCHLORIDE 100 MCG: 10 INJECTION INTRAVENOUS at 06:10

## 2021-10-28 RX ADMIN — BETAMETHASONE ACETATE AND BETAMETHASONE SODIUM PHOSPHATE 12 MG: 3; 3 INJECTION, SUSPENSION INTRA-ARTICULAR; INTRALESIONAL; INTRAMUSCULAR; SOFT TISSUE at 04:10

## 2021-10-28 RX ADMIN — KETOROLAC TROMETHAMINE 30 MG: 30 INJECTION, SOLUTION INTRAMUSCULAR; INTRAVENOUS at 10:10

## 2021-10-28 RX ADMIN — ACETAMINOPHEN 1000 MG: 500 TABLET, FILM COATED ORAL at 12:10

## 2021-10-28 RX ADMIN — METOPROLOL TARTRATE 12.5 MG: 25 TABLET, FILM COATED ORAL at 09:10

## 2021-10-28 RX ADMIN — CEFAZOLIN SODIUM 2 G: 2 SOLUTION INTRAVENOUS at 06:10

## 2021-10-28 RX ADMIN — ACETAMINOPHEN 1000 MG: 500 TABLET, FILM COATED ORAL at 10:10

## 2021-10-28 RX ADMIN — AZITHROMYCIN MONOHYDRATE 500 MG: 500 INJECTION, POWDER, LYOPHILIZED, FOR SOLUTION INTRAVENOUS at 06:10

## 2021-10-28 RX ADMIN — MORPHINE SULFATE 3 MG: 0.5 INJECTION, SOLUTION EPIDURAL; INTRATHECAL; INTRAVENOUS at 06:10

## 2021-10-28 RX ADMIN — SODIUM CHLORIDE, SODIUM LACTATE, POTASSIUM CHLORIDE, AND CALCIUM CHLORIDE 1000 ML: .6; .31; .03; .02 INJECTION, SOLUTION INTRAVENOUS at 05:10

## 2021-10-28 RX ADMIN — SODIUM CHLORIDE, SODIUM LACTATE, POTASSIUM CHLORIDE, AND CALCIUM CHLORIDE 500 ML: .6; .31; .03; .02 INJECTION, SOLUTION INTRAVENOUS at 06:10

## 2021-10-28 RX ADMIN — SODIUM CHLORIDE, SODIUM LACTATE, POTASSIUM CHLORIDE, AND CALCIUM CHLORIDE 1000 ML: .6; .31; .03; .02 INJECTION, SOLUTION INTRAVENOUS at 06:10

## 2021-10-28 RX ADMIN — ONDANSETRON 4 MG: 2 INJECTION INTRAMUSCULAR; INTRAVENOUS at 06:10

## 2021-10-28 RX ADMIN — FLUCONAZOLE 150 MG: 50 TABLET ORAL at 12:10

## 2021-10-28 RX ADMIN — FENTANYL CITRATE 100 MCG: 50 INJECTION INTRAMUSCULAR; INTRAVENOUS at 06:10

## 2021-10-28 RX ADMIN — FOLIC ACID 4 MG: 1 TABLET ORAL at 09:10

## 2021-10-28 RX ADMIN — Medication 1000 ML/HR: at 06:10

## 2021-10-29 PROCEDURE — 25000003 PHARM REV CODE 250: Performed by: STUDENT IN AN ORGANIZED HEALTH CARE EDUCATION/TRAINING PROGRAM

## 2021-10-29 PROCEDURE — 63600175 PHARM REV CODE 636 W HCPCS: Performed by: STUDENT IN AN ORGANIZED HEALTH CARE EDUCATION/TRAINING PROGRAM

## 2021-10-29 PROCEDURE — 12000002 HC ACUTE/MED SURGE SEMI-PRIVATE ROOM

## 2021-10-29 RX ADMIN — ACETAMINOPHEN 1000 MG: 500 TABLET, FILM COATED ORAL at 02:10

## 2021-10-29 RX ADMIN — IBUPROFEN 800 MG: 400 TABLET, FILM COATED ORAL at 02:10

## 2021-10-29 RX ADMIN — OXYCODONE HYDROCHLORIDE 5 MG: 5 TABLET ORAL at 10:10

## 2021-10-29 RX ADMIN — METOPROLOL TARTRATE 12.5 MG: 25 TABLET, FILM COATED ORAL at 09:10

## 2021-10-29 RX ADMIN — KETOROLAC TROMETHAMINE 30 MG: 30 INJECTION, SOLUTION INTRAMUSCULAR; INTRAVENOUS at 05:10

## 2021-10-29 RX ADMIN — ACETAMINOPHEN 1000 MG: 500 TABLET, FILM COATED ORAL at 05:10

## 2021-10-29 RX ADMIN — ACETAMINOPHEN 1000 MG: 500 TABLET, FILM COATED ORAL at 10:10

## 2021-10-29 RX ADMIN — METOPROLOL TARTRATE 12.5 MG: 25 TABLET, FILM COATED ORAL at 11:10

## 2021-10-29 RX ADMIN — IBUPROFEN 800 MG: 400 TABLET, FILM COATED ORAL at 09:10

## 2021-10-29 RX ADMIN — FOLIC ACID 4 MG: 1 TABLET ORAL at 11:10

## 2021-10-30 VITALS
HEIGHT: 61 IN | TEMPERATURE: 97 F | RESPIRATION RATE: 18 BRPM | SYSTOLIC BLOOD PRESSURE: 107 MMHG | DIASTOLIC BLOOD PRESSURE: 64 MMHG | OXYGEN SATURATION: 99 % | BODY MASS INDEX: 23.6 KG/M2 | HEART RATE: 74 BPM | WEIGHT: 125 LBS

## 2021-10-30 PROCEDURE — 25000003 PHARM REV CODE 250: Performed by: STUDENT IN AN ORGANIZED HEALTH CARE EDUCATION/TRAINING PROGRAM

## 2021-10-30 RX ORDER — OXYCODONE HYDROCHLORIDE 5 MG/1
5 TABLET ORAL EVERY 4 HOURS PRN
Qty: 28 TABLET | Refills: 0 | Status: SHIPPED | OUTPATIENT
Start: 2021-10-30

## 2021-10-30 RX ADMIN — OXYCODONE HYDROCHLORIDE 5 MG: 5 TABLET ORAL at 06:10

## 2021-10-30 RX ADMIN — ACETAMINOPHEN 1000 MG: 500 TABLET, FILM COATED ORAL at 06:10

## 2021-10-30 RX ADMIN — IBUPROFEN 800 MG: 400 TABLET, FILM COATED ORAL at 06:10

## 2021-10-30 RX ADMIN — FOLIC ACID 4 MG: 1 TABLET ORAL at 09:10

## 2021-10-30 RX ADMIN — DOCUSATE SODIUM 100 MG: 100 CAPSULE, LIQUID FILLED ORAL at 09:10

## 2021-10-30 RX ADMIN — METOPROLOL TARTRATE 12.5 MG: 25 TABLET, FILM COATED ORAL at 09:10

## 2021-10-30 RX ADMIN — OXYCODONE HYDROCHLORIDE 10 MG: 5 TABLET ORAL at 09:10

## 2021-10-30 RX ADMIN — ACETAMINOPHEN 1000 MG: 500 TABLET, FILM COATED ORAL at 02:10

## 2021-10-30 RX ADMIN — IBUPROFEN 800 MG: 400 TABLET, FILM COATED ORAL at 02:10

## 2021-11-11 ENCOUNTER — OFFICE VISIT (OUTPATIENT)
Dept: FAMILY MEDICINE | Facility: CLINIC | Age: 26
End: 2021-11-11
Payer: MEDICAID

## 2021-11-11 ENCOUNTER — PATIENT MESSAGE (OUTPATIENT)
Dept: PSYCHIATRY | Facility: CLINIC | Age: 26
End: 2021-11-11
Payer: MEDICAID

## 2021-11-11 VITALS
DIASTOLIC BLOOD PRESSURE: 79 MMHG | HEART RATE: 101 BPM | WEIGHT: 120.81 LBS | RESPIRATION RATE: 16 BRPM | SYSTOLIC BLOOD PRESSURE: 124 MMHG | BODY MASS INDEX: 22.82 KG/M2 | TEMPERATURE: 98 F | OXYGEN SATURATION: 98 %

## 2021-11-11 DIAGNOSIS — R05.9 COUGH: ICD-10-CM

## 2021-11-11 DIAGNOSIS — K52.9 GASTROENTERITIS: Primary | ICD-10-CM

## 2021-11-11 LAB
CTP QC/QA: YES
SARS-COV-2 RDRP RESP QL NAA+PROBE: NEGATIVE

## 2021-11-11 PROCEDURE — 99213 OFFICE O/P EST LOW 20 MIN: CPT | Performed by: FAMILY MEDICINE

## 2021-11-11 PROCEDURE — 99214 PR OFFICE/OUTPT VISIT, EST, LEVL IV, 30-39 MIN: ICD-10-PCS | Mod: 25,S$PBB,, | Performed by: FAMILY MEDICINE

## 2021-11-11 PROCEDURE — 99214 OFFICE O/P EST MOD 30 MIN: CPT | Mod: 25,S$PBB,, | Performed by: FAMILY MEDICINE

## 2021-11-11 PROCEDURE — U0002 COVID-19 LAB TEST NON-CDC: HCPCS | Mod: PBBFAC | Performed by: FAMILY MEDICINE

## 2021-11-11 RX ORDER — METOPROLOL TARTRATE 25 MG/1
12.5 TABLET, FILM COATED ORAL 2 TIMES DAILY
COMMUNITY
Start: 2021-11-02

## 2022-01-01 NOTE — OP NOTE
DATE OF PROCEDURE:  10/27/2017    PREOPERATIVE DIAGNOSES:  1.  Cholelithiasis.  2.  Biliary colic.    POSTOPERATIVE DIAGNOSES:  1.  Cholelithiasis.  2.  Biliary colic.  3.  Intrahepatic gallbladder.    PROCEDURE:  Laparoscopic cholecystectomy.    SURGEON:  Dr. Martin.    FIRST ASSISTANT:  Jennifer.    ESTIMATED BLOOD LOSS:  Approximately 5 to 10 mL    DRAINS:  None.    COMPLICATIONS:  None.    PROCEDURE IN DETAIL:  The patient was brought to the Operating Room where she   was placed on the operating room table in the supine position.  Following   general endotracheal anesthesia, the patient's abdomen was prepped and draped in   sterile fashion using chlorhexidine prep.  A supraumbilical transverse incision   was made and carried down to the subcutaneous tissue.  The S retractors were   used to separate the subcutaneous tissue down to the fascia and the fascia was   incised using the electrocautery.  Stay sutures using 0 Vicryl were placed on   the superior and inferior fascial edges.  The peritoneum was then grasped and   entered and the Anne trocar was placed.  The abdomen was then insufflated to 4   liters.  The camera was advanced and the tip of the gallbladder could be   identified.  The 2 lateral 5 mm trocars were then placed under direct   visualization with no bleeding from any of the trocar sites.  The gallbladder   was grasped and flipped up over the liver.  This was a anatomy book picture of the   gallbladder, the cystic duct, and you could see the common duct fabulously.  I   did take a picture of this.  The upper 10 mm trocar site was then placed after   positioning with the spinal needle.  Using the dolphin nose dissector and the   electrocautery, the cystic duct was identified, cleared and clipped twice   distally, once proximally and transected.  The cystic artery was identified,   cleared, clipped twice distally, once proximally and transected.  Then, using   the spatula and the electrocautery,  the gallbladder was removed from the liver   since it was an intrahepatic gallbladder.  There was some oozing due to it being   intrahepatic.  Once the gallbladder was removed, it was placed in an EndoCatch   bag and brought out through the upper 10 mm trocar site.  The liver was held up   using the Endo-Kittner and the cautery was then placed on 75 and the oozing   areas were cauterized.  Following this, the pressure was dropped to 7 and there   was no bile or bleeding present.  The pressure was then increased and 2 pieces   of Surgicel were placed in the liver bed.  The omentum fell up into the area and   the trocars were then removed under direct visualization with no bleeding from   any of the trocar sites.  The two 10 mm trocar sites, the fascia was closed with   interrupted 0 Vicryl sutures.  The skin of all 4 incisions was closed with   running 4-0 Monocryl subcuticular suture.  Benzoin and Steri-Strips were placed   across the incision and they were covered with Band-Aids.  The patient was then   awakened from anesthesia and taken to the Recovery Room in stable condition.    All counts were reported as correct x2 prior to closing the incisions.      YEN  dd: 10/27/2017 09:07:43 (CDT)  td: 10/27/2017 10:48:19 (CDMICHAELA)  Doc ID   #[id number]  Job ID #[id number]    CC:      9

## 2022-02-01 ENCOUNTER — TELEPHONE (OUTPATIENT)
Dept: PSYCHIATRY | Facility: CLINIC | Age: 27
End: 2022-02-01
Payer: MEDICAID

## 2022-03-14 NOTE — SUBJECTIVE & OBJECTIVE
Called patient's father back. No answer left a voicemail with a call back number to schedule an appointment with provider.    Obstetric HPI:  24-year-old  2 para 0101 at 29 weeks and 2 days gestational age presents complaining of regular painful contractions intensified over the last 12 hr.  Patient states mild contractions starting approximately 2 days ago, denies rupture membranes, states measured minimal vaginal spotting, states active fetal movement.      OB History    Para Term  AB Living   2 1 0 1 0 1   SAB TAB Ectopic Multiple Live Births   0 0 0 0 1      # Outcome Date GA Lbr Ceasar/2nd Weight Sex Delivery Anes PTL Lv   2 Current            1  19 36w3d  2.268 kg (5 lb) F  EPI N JASMYN      Complications: IUGR (intrauterine growth restriction) affecting care of mother      Name: Danielle     Past Medical History:   Diagnosis Date    Anemia     Epilepsy     Gallstones     HPV (human papilloma virus) infection     LGSIL on Pap smear of cervix     Seizure disorder, complex partial     last seizure ; no medication    Seizures     Swine flu      Past Surgical History:   Procedure Laterality Date    CHOLECYSTECTOMY      LAPAROSCOPIC CHOLECYSTECTOMY      toe I&D      TONSILLECTOMY      UPPER GASTROINTESTINAL ENDOSCOPY      wisdom teeeth         PTA Medications   Medication Sig    acetaminophen (TYLENOL) 325 MG tablet Take 500 mg by mouth every 6 (six) hours as needed for Pain.    PNV no.95/ferrous fum/folic ac (PRENATAL ORAL) Take 1 tablet by mouth once daily.       Review of patient's allergies indicates:  No Known Allergies     Family History     Problem Relation (Age of Onset)    No Known Problems Mother, Father        Tobacco Use    Smoking status: Former Smoker     Types: Cigarettes     Start date:      Last attempt to quit: 10/2019     Years since quittin.4    Smokeless tobacco: Never Used    Tobacco comment: quit about a month ago   Substance and Sexual Activity    Alcohol use: Not Currently    Drug use: No    Sexual activity: Yes     Partners: Male     Birth  control/protection: None     Review of Systems   Objective:     Vital Signs (Most Recent):  Temp: 97.6 °F (36.4 °C) (03/05/20 0630)  Pulse: (!) 117 (03/05/20 0630)  Resp: 18 (03/05/20 0630)  BP: 128/77 (03/05/20 0630) Vital Signs (24h Range):  Temp:  [97.6 °F (36.4 °C)] 97.6 °F (36.4 °C)  Pulse:  [117] 117  Resp:  [18] 18  BP: (128)/(77) 128/77     Weight: 58.1 kg (128 lb)  Body mass index is 24.19 kg/m².    FHT:  Baseline 140s and reassuring   TOCO:  Q to 4-5 minutes    Physical Exam    General-patient appears uncomfortable with contractions  Abdomen-soft and nontender  Uterus-firm with contractions, tender with contractions  Extremities-no calf tenderness  Pelvic-taut amniotic sac intact    Cervix:  Dilation:  5  Effacement:  80%  Station: -3  Presentation: Unknown     Significant Labs:  Lab Results   Component Value Date    GROUPTRH A NEG 12/20/2019    HEPBSAG Negative 09/12/2019       I have personallly reviewed all pertinent lab results from the last 24 hours.

## 2022-03-23 ENCOUNTER — OFFICE VISIT (OUTPATIENT)
Dept: FAMILY MEDICINE | Facility: CLINIC | Age: 27
End: 2022-03-23
Payer: MEDICAID

## 2022-03-23 DIAGNOSIS — Z00.00 PREVENTATIVE HEALTH CARE: ICD-10-CM

## 2022-03-23 DIAGNOSIS — Z12.11 COLON CANCER SCREENING: ICD-10-CM

## 2022-03-23 DIAGNOSIS — Z86.19 H/O COLD SORES: Primary | ICD-10-CM

## 2022-03-23 DIAGNOSIS — B00.1 COLD SORE: ICD-10-CM

## 2022-03-23 PROCEDURE — 99213 OFFICE O/P EST LOW 20 MIN: CPT | Mod: 95,,, | Performed by: FAMILY MEDICINE

## 2022-03-23 PROCEDURE — 99213 PR OFFICE/OUTPT VISIT, EST, LEVL III, 20-29 MIN: ICD-10-PCS | Mod: 95,,, | Performed by: FAMILY MEDICINE

## 2022-03-23 RX ORDER — FAMCICLOVIR 125 MG/1
250 TABLET ORAL EVERY 12 HOURS
Qty: 28 TABLET | Refills: 5 | Status: SHIPPED | OUTPATIENT
Start: 2022-03-23 | End: 2022-03-30

## 2022-03-23 NOTE — PROGRESS NOTES
Subjective:        The chief complaint leading to consultation is:  Cold sore swollen glands  The patient location is:  Home  Visit type: Virtual visit with synchronous audio/video or audio only  This was a video visit in lieu of in-person visit due to the coronavirus emergency. Patient acknowledged and consented to the video visit encounter.     Patient presents with a history of 1 day of a severe cold sore or fever blister on the right lower lip she has had these previously but not as bad as it is now she reports some swollen glands on the right side as well    Mouth Lesions   The current episode started today. The onset was sudden. The problem occurs rarely. The problem has been gradually worsening. The problem is mild. Associated symptoms include mouth sores.       Past Surgical History:   Procedure Laterality Date     SECTION N/A 3/8/2020    Procedure:  SECTION;  Surgeon: Brian Quijano MD;  Location: [facility] L&D;  Service: OB/GYN;  Laterality: N/A;     SECTION N/A 10/28/2021    Procedure:  SECTION;  Surgeon: Lucinda Rush MD;  Location: [facility] L&D;  Service: OB/GYN;  Laterality: N/A;    CHOLECYSTECTOMY  2017    LAPAROSCOPIC CHOLECYSTECTOMY      toe I&D      TONSILLECTOMY      UPPER GASTROINTESTINAL ENDOSCOPY      Showed H. Pylori    yifan dugan       Past Medical History:   Diagnosis Date    Anemia     no current treatment    Epilepsy     Gallstones     HPV (human papilloma virus) infection     LGSIL on Pap smear of cervix     MTHFR (methylene THF reductase) deficiency and homocystinuria     Seizure disorder, complex partial     last seizure ; no medication    Seizures     Swine flu      Family History   Adopted: Yes   Problem Relation Age of Onset    No Known Problems Mother     No Known Problems Father         Social History:   Marital Status: Single  Alcohol History:  reports previous alcohol use.  Tobacco History:  reports  that she quit smoking about 2 years ago. Her smoking use included cigarettes. She started smoking about 6 years ago. She has a 1.00 pack-year smoking history. She has never used smokeless tobacco.  Drug History:  reports no history of drug use.    Review of patient's allergies indicates:   Allergen Reactions    Oranges [orange]      ulcers       Current Outpatient Medications   Medication Sig Dispense Refill    famciclovir (FAMVIR) 125 mg Tab Take 2 tablets (250 mg total) by mouth every 12 (twelve) hours. for 7 days 28 tablet 5    folic acid (FOLVITE) 1 MG tablet Take 1 mg by mouth once daily.      hyoscyamine (ANASPAZ,LEVSIN) 0.125 mg Tab Take 1 tablet (125 mcg total) by mouth every 6 (six) hours as needed (abdominal pain). 15 tablet 0    metoprolol tartrate (LOPRESSOR) 25 MG tablet Take 12.5 mg by mouth 2 (two) times daily.      ondansetron (ZOFRAN-ODT) 4 MG TbDL Take 1 tablet (4 mg total) by mouth every 8 (eight) hours as needed (nausea). 10 tablet 0    oxyCODONE (ROXICODONE) 5 MG immediate release tablet Take 1 tablet (5 mg total) by mouth every 4 (four) hours as needed. 28 tablet 0    promethazine (PHENERGAN) 25 MG tablet Take 25 mg by mouth every 6 (six) hours as needed for Nausea.       No current facility-administered medications for this visit.       Review of Systems   HENT: Positive for mouth sores.          Objective:        Physical Exam:   Physical Exam  HENT:      Mouth/Throat:                Assessment:       1. H/O cold sores    2. Cold sore    3. Preventative health care    4. Colon cancer screening      Plan:   H/O cold sores  -     famciclovir (FAMVIR) 125 mg Tab; Take 2 tablets (250 mg total) by mouth every 12 (twelve) hours. for 7 days  Dispense: 28 tablet; Refill: 5    Cold sore  -     famciclovir (FAMVIR) 125 mg Tab; Take 2 tablets (250 mg total) by mouth every 12 (twelve) hours. for 7 days  Dispense: 28 tablet; Refill: 5    Preventative health care  -     Lipid Panel; Future;  Expected date: 03/23/2022  -     Comprehensive Metabolic Panel; Future; Expected date: 03/23/2022  -     HIV 1/2 Ag/Ab (4th Gen); Future; Expected date: 03/23/2022  -     Hepatitis C Antibody; Future; Expected date: 03/23/2022    Colon cancer screening  -     Ambulatory referral/consult to Gastroenterology; Future; Expected date: 03/30/2022      Follow up in about 3 months (around 6/23/2022) for annual.    Total time spent with patient: 15'    Each patient to whom he or she provides medical services by telemedicine is:  (1) informed of the relationship between the physician and patient and the respective role of any other health care provider with respect to management of the patient; and (2) notified that he or she may decline to receive medical services by telemedicine and may withdraw from such care at any time.    This note was created using [employer] voice recognition software that occasionally misinterprets phrases or words.

## 2022-05-20 ENCOUNTER — HOSPITAL ENCOUNTER (EMERGENCY)
Facility: HOSPITAL | Age: 27
Discharge: HOME | End: 2022-05-20
Attending: EMERGENCY MEDICINE
Payer: COMMERCIAL

## 2022-05-20 VITALS
BODY MASS INDEX: 24.56 KG/M2 | SYSTOLIC BLOOD PRESSURE: 115 MMHG | HEART RATE: 97 BPM | TEMPERATURE: 98 F | WEIGHT: 130 LBS | OXYGEN SATURATION: 98 % | DIASTOLIC BLOOD PRESSURE: 62 MMHG | RESPIRATION RATE: 16 BRPM

## 2022-05-20 DIAGNOSIS — R10.9 ABDOMINAL PAIN: ICD-10-CM

## 2022-05-20 DIAGNOSIS — S29.019A THORACIC MYOFASCIAL STRAIN, INITIAL ENCOUNTER: Primary | ICD-10-CM

## 2022-05-20 LAB
ALBUMIN SERPL BCP-MCNC: 4.3 G/DL (ref 3.5–5.2)
ALP SERPL-CCNC: 71 U/L (ref 55–135)
ALT SERPL W/O P-5'-P-CCNC: 15 U/L (ref 10–44)
ANION GAP SERPL CALC-SCNC: 8 MMOL/L (ref 8–16)
AST SERPL-CCNC: 17 U/L (ref 10–40)
B-HCG UR QL: NEGATIVE
BASOPHILS # BLD AUTO: 0.04 K/UL (ref 0–0.2)
BASOPHILS NFR BLD: 0.5 % (ref 0–1.9)
BILIRUB SERPL-MCNC: 0.6 MG/DL (ref 0.1–1)
BILIRUB UR QL STRIP: NEGATIVE
BUN SERPL-MCNC: 6 MG/DL (ref 6–20)
CALCIUM SERPL-MCNC: 9.2 MG/DL (ref 8.7–10.5)
CHLORIDE SERPL-SCNC: 105 MMOL/L (ref 95–110)
CLARITY UR: CLEAR
CO2 SERPL-SCNC: 23 MMOL/L (ref 23–29)
COLOR UR: YELLOW
CREAT SERPL-MCNC: 0.7 MG/DL (ref 0.5–1.4)
CTP QC/QA: YES
DIFFERENTIAL METHOD: ABNORMAL
EOSINOPHIL # BLD AUTO: 0 K/UL (ref 0–0.5)
EOSINOPHIL NFR BLD: 0.4 % (ref 0–8)
ERYTHROCYTE [DISTWIDTH] IN BLOOD BY AUTOMATED COUNT: 18.3 % (ref 11.5–14.5)
EST. GFR  (AFRICAN AMERICAN): >60 ML/MIN/1.73 M^2
EST. GFR  (NON AFRICAN AMERICAN): >60 ML/MIN/1.73 M^2
GLUCOSE SERPL-MCNC: 104 MG/DL (ref 70–110)
GLUCOSE UR QL STRIP: NEGATIVE
HCT VFR BLD AUTO: 39.6 % (ref 37–48.5)
HGB BLD-MCNC: 12.1 G/DL (ref 12–16)
HGB UR QL STRIP: NEGATIVE
IMM GRANULOCYTES # BLD AUTO: 0.03 K/UL (ref 0–0.04)
IMM GRANULOCYTES NFR BLD AUTO: 0.4 % (ref 0–0.5)
INFLUENZA A, MOLECULAR: NEGATIVE
INFLUENZA B, MOLECULAR: NEGATIVE
KETONES UR QL STRIP: NEGATIVE
LEUKOCYTE ESTERASE UR QL STRIP: NEGATIVE
LIPASE SERPL-CCNC: 31 U/L (ref 4–60)
LYMPHOCYTES # BLD AUTO: 1.5 K/UL (ref 1–4.8)
LYMPHOCYTES NFR BLD: 19.4 % (ref 18–48)
MCH RBC QN AUTO: 22.7 PG (ref 27–31)
MCHC RBC AUTO-ENTMCNC: 30.6 G/DL (ref 32–36)
MCV RBC AUTO: 74 FL (ref 82–98)
MONOCYTES # BLD AUTO: 0.7 K/UL (ref 0.3–1)
MONOCYTES NFR BLD: 8.7 % (ref 4–15)
NEUTROPHILS # BLD AUTO: 5.4 K/UL (ref 1.8–7.7)
NEUTROPHILS NFR BLD: 70.6 % (ref 38–73)
NITRITE UR QL STRIP: NEGATIVE
NRBC BLD-RTO: 0 /100 WBC
PH UR STRIP: 6 [PH] (ref 5–8)
PLATELET # BLD AUTO: 331 K/UL (ref 150–450)
PMV BLD AUTO: 9.9 FL (ref 9.2–12.9)
POTASSIUM SERPL-SCNC: 4.1 MMOL/L (ref 3.5–5.1)
PROT SERPL-MCNC: 7.8 G/DL (ref 6–8.4)
PROT UR QL STRIP: NEGATIVE
RBC # BLD AUTO: 5.34 M/UL (ref 4–5.4)
SARS-COV-2 RDRP RESP QL NAA+PROBE: NEGATIVE
SODIUM SERPL-SCNC: 136 MMOL/L (ref 136–145)
SP GR UR STRIP: 1.01 (ref 1–1.03)
SPECIMEN SOURCE: NORMAL
URN SPEC COLLECT METH UR: NORMAL
UROBILINOGEN UR STRIP-ACNC: NEGATIVE EU/DL
WBC # BLD AUTO: 7.68 K/UL (ref 3.9–12.7)

## 2022-05-20 PROCEDURE — 80053 COMPREHEN METABOLIC PANEL: CPT | Performed by: NURSE PRACTITIONER

## 2022-05-20 PROCEDURE — 85025 COMPLETE CBC W/AUTO DIFF WBC: CPT | Performed by: NURSE PRACTITIONER

## 2022-05-20 PROCEDURE — U0002 COVID-19 LAB TEST NON-CDC: HCPCS | Performed by: NURSE PRACTITIONER

## 2022-05-20 PROCEDURE — 81003 URINALYSIS AUTO W/O SCOPE: CPT | Performed by: NURSE PRACTITIONER

## 2022-05-20 PROCEDURE — 25000003 PHARM REV CODE 250: Performed by: NURSE PRACTITIONER

## 2022-05-20 PROCEDURE — 99285 EMERGENCY DEPT VISIT HI MDM: CPT | Mod: 25

## 2022-05-20 PROCEDURE — 81025 URINE PREGNANCY TEST: CPT | Performed by: NURSE PRACTITIONER

## 2022-05-20 PROCEDURE — 87502 INFLUENZA DNA AMP PROBE: CPT | Performed by: NURSE PRACTITIONER

## 2022-05-20 PROCEDURE — 83690 ASSAY OF LIPASE: CPT | Performed by: NURSE PRACTITIONER

## 2022-05-20 RX ORDER — IBUPROFEN 600 MG/1
600 TABLET ORAL EVERY 8 HOURS PRN
Qty: 20 TABLET | Refills: 0 | Status: SHIPPED | OUTPATIENT
Start: 2022-05-20

## 2022-05-20 RX ORDER — METHOCARBAMOL 500 MG/1
1000 TABLET, FILM COATED ORAL EVERY 8 HOURS PRN
Qty: 30 TABLET | Refills: 0 | Status: SHIPPED | OUTPATIENT
Start: 2022-05-20 | End: 2022-05-25

## 2022-05-20 RX ORDER — KETOROLAC TROMETHAMINE 30 MG/ML
30 INJECTION, SOLUTION INTRAMUSCULAR; INTRAVENOUS
Status: DISCONTINUED | OUTPATIENT
Start: 2022-05-20 | End: 2022-05-20 | Stop reason: HOSPADM

## 2022-05-20 RX ADMIN — IBUPROFEN 600 MG: 200 TABLET, FILM COATED ORAL at 05:05

## 2022-05-21 NOTE — ED PROVIDER NOTES
Encounter Date: 2022       History     Chief Complaint   Patient presents with    Flank Pain     Right sided flank/abd pain x4 days; hurts worse when urinating     Irene Grayson is a 26 year old female with pmh anemia, gallstones, seizures, HPV presenting to the ED with c/o right flank pain. She states pain began four days ago in the right lower back. She began having right flank pain radiating to the RLQ yesterday with worsening pain with movement and urinating. She has had no vomiting, diarrhea, vaginal discharge, frequency, urgency, dysuria. She states the pain also increases with bending and picking her infant son up. She has no prior history of kidney stones and denies injury/trauma. She reports fever 2 days ago of 101 with none since.         Review of patient's allergies indicates:   Allergen Reactions    Oranges [orange]      ulcers     Past Medical History:   Diagnosis Date    Anemia     no current treatment    Epilepsy     Gallstones     HPV (human papilloma virus) infection     LGSIL on Pap smear of cervix     MTHFR (methylene THF reductase) deficiency and homocystinuria     Seizure disorder, complex partial     last seizure ; no medication    Seizures     Swine flu      Past Surgical History:   Procedure Laterality Date     SECTION N/A 3/8/2020    Procedure:  SECTION;  Surgeon: Brian Quijano MD;  Location: [facility] L&D;  Service: OB/GYN;  Laterality: N/A;     SECTION N/A 10/28/2021    Procedure:  SECTION;  Surgeon: Lucinda Rush MD;  Location: [facility] L&D;  Service: OB/GYN;  Laterality: N/A;    CHOLECYSTECTOMY  2017    LAPAROSCOPIC CHOLECYSTECTOMY  2017    toe I&D      TONSILLECTOMY      UPPER GASTROINTESTINAL ENDOSCOPY  2017    Showed H. Pylori    yifan dugan  2010     Family History   Adopted: Yes   Problem Relation Age of Onset    No Known Problems Mother     No Known Problems Father      Social History     Tobacco Use     Smoking status: Former Smoker     Packs/day: 0.25     Years: 4.00     Pack years: 1.00     Types: Cigarettes     Start date:      Quit date: 10/2019     Years since quittin.6    Smokeless tobacco: Never Used    Tobacco comment: quit with pregnancy ()   Substance Use Topics    Alcohol use: Not Currently    Drug use: No     Review of Systems   Constitutional: Positive for fever.   HENT: Negative for sore throat.    Respiratory: Negative for shortness of breath.    Cardiovascular: Negative for chest pain.   Gastrointestinal: Positive for abdominal pain. Negative for diarrhea, nausea and vomiting.   Genitourinary: Positive for flank pain. Negative for dysuria, frequency, urgency and vaginal discharge.   Musculoskeletal: Negative for back pain.   Skin: Negative for rash.   Neurological: Negative for weakness.   Hematological: Does not bruise/bleed easily.       Physical Exam     Initial Vitals [22 1429]   BP Pulse Resp Temp SpO2   138/87 94 16 98.7 °F (37.1 °C) 100 %      MAP       --         Physical Exam    Nursing note and vitals reviewed.  Constitutional: She appears well-developed and well-nourished. She is not diaphoretic. No distress.   HENT:   Head: Normocephalic and atraumatic.   Mouth/Throat: Oropharynx is clear and moist.   Eyes: Conjunctivae are normal.   Neck: Neck supple.   Cardiovascular: Normal rate, regular rhythm, normal heart sounds and intact distal pulses. Exam reveals no gallop and no friction rub.    No murmur heard.  Pulmonary/Chest: Breath sounds normal. No respiratory distress. She has no wheezes. She has no rhonchi. She has no rales.   Abdominal: Abdomen is soft. She exhibits no distension. There is abdominal tenderness (RLQ).   Musculoskeletal:         General: Normal range of motion.      Cervical back: Neck supple.      Thoracic back: Tenderness present. No bony tenderness.      Lumbar back: Normal.        Back:       Comments: No CVA tenderness to percussion      Neurological: She is alert and oriented to person, place, and time.   Skin: No rash noted. No erythema.   Psychiatric: Her speech is normal.         ED Course   Procedures  Labs Reviewed   CBC W/ AUTO DIFFERENTIAL - Abnormal; Notable for the following components:       Result Value    MCV 74 (*)     MCH 22.7 (*)     MCHC 30.6 (*)     RDW 18.3 (*)     All other components within normal limits   URINALYSIS, REFLEX TO URINE CULTURE    Narrative:     Specimen Source->Urine   COMPREHENSIVE METABOLIC PANEL   LIPASE   SARS-COV-2 RNA AMPLIFICATION, QUAL   INFLUENZA A AND B ANTIGEN    Narrative:     Specimen Source->Nasopharyngeal Swab   POCT URINE PREGNANCY          Imaging Results          US Pelvis Comp with Transvag NON-OB (xpd) (Final result)  Result time 05/20/22 18:49:04   Procedure changed from US Transvaginal Non OB     Final result by Melchor Cota MD (05/20/22 18:49:04)                 Narrative:    CLINICAL HISTORY:  26 years (1995) Female abd pain  Reported first day of last menstrual period: 5/10/2022    TECHNIQUE:  US PELVIS TRANSABDOMINAL AND TRANSVAGINAL. 40 images obtained. Ultrasound examination of the pelvis was performed transabdominally and transvaginally . Images were obtained using grayscale, color flow and doppler where appropriate.    COMPARISON:  None available.    FINDINGS:  Uterus: Normal size.  Homogeneous in echotexture.No mass.  Position: Anteverted  Uterine size: 7.7 x 5.7 x 4.1 cm (93 mL)    Endometrium: There is a 14 x 8 x 4 mm simple appearing fluid collection within the endometrial canal at the fundus. No embryo/yolk sac or fetal heart tones are identified. No focal abnormal color flow is seen.    RIGHT ovary/adnexa: The right ovary is unremarkable. There is normal color flow in the ovary.  Ovarian size: 3.2 x 2.7 x 1.9 cm    LEFT ovary/adnexa: The left ovary is unremarkable. There is normal color flow in the ovary.  Ovarian size: 2.6 x 1.9 x 1.9 cm    Fluid: There is no  abnormal free fluid in the cul-de-sac.    IMPRESSION:  1. Small amount of nonspecific simple appearing fluid in the endometrial canal  2. Physiologic appearing ovaries.        Electronically signed by:  Melchor Cota MD  5/20/2022 6:49 PM CDT Workstation: [id number]                             CT Renal Stone Study ABD Pelvis WO (Final result)  Result time 05/20/22 15:56:50    Final result by Griffin Nevarez MD (05/20/22 15:56:50)                 Narrative:    CMS MANDATED QUALITY DATA - CT RADIATION  436    All CT scans at this facility utilize dose modulation, iterative reconstruction, and/or weight based dosing when appropriate to reduce radiation dose to as low as reasonably achievable.    CT ABDOMEN PELVIS WITHOUT IV CONTRAST    CLINICAL HISTORY:  26 years Female Flank pain, kidney stone suspected    COMPARISON: April 13, 2021 CT abdomen and pelvis    FINDINGS: Lung bases are clear. Bone window images demonstrate no acute or aggressive osseous abnormality.    No focal hepatic lesion. Gallbladder is surgically absent. No bile duct dilation. Spleen is unremarkable. Pancreas is within normal limits. No adrenal lesion. 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 No renal calculi or hydronephrosis. Ureters are normal in caliber. Urinary bladder is partially collapsed.    Stomach is unremarkable. No evidence of small bowel obstruction. No evidence of appendicitis or colitis.    No free fluid or free air within the abdomen or pelvis. Uterus and adnexal regions are grossly available.    IMPRESSION:    No CT evidence of acute pathology involving the abdomen or pelvis.    Specifically, negative for urinary tract calculi or obstructive uropathy.    Electronically signed by:  Griffin Nevarez MD  5/20/2022 3:56 PM CDT Workstation: [id number]                               Medications   ibuprofen tablet 600 mg (600 mg Oral Given 5/20/22 1857)           APC / Resident Notes:   The patient's pain is reproducible to palpation of the  right flank area as well as lifting her infant son. She has no evidence of UTI on urinalysis and also has no leukocytosis, PHIL, or fever in the ED. She did have mild RLQ tenderness with Small amount of nonspecific simple appearing fluid in the endometrial canal. No ovarian torsion on ultrasound but the patient states the pain has been intermittent. She as advised to return to the ED if pain returns or worsens to repeat imaging. No nephrolithiasis or evidence of renal pathology. She also has an appointment with her GYN next week. Pelvic exam and STI testing offered but patient refused and stated that she would prefer her own GYN to perform this exam/testing since she is comfortable with her and has no concerns for STI. Do not suspect appendicitis. Flank pain is likely musculoskeletal in nature and patient instructed to take NSAIDs and muscle relaxant as needed. Discussed possibility of ovarian torsion and need for repeat imaging for worsening pain and she verbalized understanding. Based on my clinical evaluation, I do not appreciate any immediate, emergent, or life threatening condition or etiology that warrants additional workup today and feel that the patient can be discharged with close follow up care.                    Clinical Impression:   Final diagnoses:  [R10.9] Abdominal pain  [S29.019A] Thoracic myofascial strain, initial encounter (Primary)          ED Disposition Condition    Discharge Stable        ED Prescriptions     Medication Sig Dispense Start Date End Date Auth. Provider    ibuprofen (ADVIL,MOTRIN) 600 MG tablet Take 1 tablet (600 mg total) by mouth every 8 (eight) hours as needed for Pain. 20 tablet 5/20/2022  Shoshana Rios NP    methocarbamoL (ROBAXIN) 500 MG Tab Take 2 tablets (1,000 mg total) by mouth every 8 (eight) hours as needed (muscle pain). 30 tablet 5/20/2022 5/25/2022 Shoshana Rios NP        Follow-up Information     Follow up With Specialties Details Why Contact Info  Additional Information    [facility] - Emergency Dept Emergency Medicine   [address] [address]  [phone] 1st floor    Jean Waller MD Family Medicine Schedule an appointment as soon as possible for a visit  As needed [address]  [address]  [place] [address]  [phone]              Shoshana Rios NP  05/21/22 0111       Shoshana Rios NP  05/21/22 0112

## 2022-07-14 ENCOUNTER — OFFICE VISIT (OUTPATIENT)
Dept: FAMILY MEDICINE | Facility: CLINIC | Age: 27
End: 2022-07-14
Payer: MEDICAID

## 2022-07-14 DIAGNOSIS — B02.9 HERPES ZOSTER WITHOUT COMPLICATION: Primary | ICD-10-CM

## 2022-07-14 PROCEDURE — 1160F RVW MEDS BY RX/DR IN RCRD: CPT | Mod: CPTII,95,, | Performed by: NURSE PRACTITIONER

## 2022-07-14 PROCEDURE — 1159F PR MEDICATION LIST DOCUMENTED IN MEDICAL RECORD: ICD-10-PCS | Mod: CPTII,95,, | Performed by: NURSE PRACTITIONER

## 2022-07-14 PROCEDURE — 1159F MED LIST DOCD IN RCRD: CPT | Mod: CPTII,95,, | Performed by: NURSE PRACTITIONER

## 2022-07-14 PROCEDURE — 1160F PR REVIEW ALL MEDS BY PRESCRIBER/CLIN PHARMACIST DOCUMENTED: ICD-10-PCS | Mod: CPTII,95,, | Performed by: NURSE PRACTITIONER

## 2022-07-14 PROCEDURE — 99213 OFFICE O/P EST LOW 20 MIN: CPT | Mod: 95,,, | Performed by: NURSE PRACTITIONER

## 2022-07-14 PROCEDURE — 99213 PR OFFICE/OUTPT VISIT, EST, LEVL III, 20-29 MIN: ICD-10-PCS | Mod: 95,,, | Performed by: NURSE PRACTITIONER

## 2022-07-14 RX ORDER — VALACYCLOVIR HYDROCHLORIDE 1 G/1
1000 TABLET, FILM COATED ORAL 3 TIMES DAILY
Qty: 21 TABLET | Refills: 0 | Status: SHIPPED | OUTPATIENT
Start: 2022-07-14 | End: 2022-07-21

## 2022-07-14 RX ORDER — GABAPENTIN 300 MG/1
300 CAPSULE ORAL DAILY
Qty: 28 CAPSULE | Refills: 0 | Status: SHIPPED | OUTPATIENT
Start: 2022-07-14

## 2022-07-14 NOTE — PROGRESS NOTES
Subjective:        The chief complaint leading to consultation is: shingles   The patient location is:  Home  Visit type: Virtual visit with synchronous audio/video or audio only  This was a video visit in lieu of in-person visit due to the coronavirus emergency. Patient acknowledged and consented to the video visit encounter.     26-year-old female presents via virtual visit with complaints of shingles.  Patient has a history shingles, varicella, and HSV-1.  Last episode of shingles was  . Rash with blisters stared yesterday to her right clavicle region.  The area is painful, describes as if someone was rubbing a cactus on her shoulder. She has tried multiple otc creams to try and relieve the pain, rates pain 7/10.      Rash  This is a recurrent problem. The current episode started yesterday. The problem has been gradually worsening since onset. The affected locations include the chest. The rash is characterized by blistering. She was exposed to nothing. Associated symptoms include fatigue. Pertinent negatives include no anorexia, congestion, cough, diarrhea, eye pain, facial edema, fever, joint pain, nail changes, rhinorrhea, shortness of breath, sore throat or vomiting. Past treatments include analgesics, anti-itch cream and cold compress. The treatment provided no relief. Her past medical history is significant for eczema and varicella.       Past Surgical History:   Procedure Laterality Date     SECTION N/A 3/8/2020    Procedure:  SECTION;  Surgeon: Brian Quijano MD;  Location: [facility] L&D;  Service: OB/GYN;  Laterality: N/A;     SECTION N/A 10/28/2021    Procedure:  SECTION;  Surgeon: Lucinda Rush MD;  Location: [facility] L&D;  Service: OB/GYN;  Laterality: N/A;    CHOLECYSTECTOMY  2017    LAPAROSCOPIC CHOLECYSTECTOMY  2017    toe I&D      TONSILLECTOMY      UPPER GASTROINTESTINAL ENDOSCOPY  2017    Showed H. Pylori    yifan Loza     Past  Medical History:   Diagnosis Date    Anemia     no current treatment    Epilepsy     Gallstones     HPV (human papilloma virus) infection     LGSIL on Pap smear of cervix     MTHFR (methylene THF reductase) deficiency and homocystinuria     Seizure disorder, complex partial     last seizure 2011; no medication    Seizures     Swine flu      Family History   Adopted: Yes   Problem Relation Age of Onset    No Known Problems Mother     No Known Problems Father         Social History:   Marital Status: Single  Alcohol History:  reports previous alcohol use.  Tobacco History:  reports that she quit smoking about 2 years ago. Her smoking use included cigarettes. She started smoking about 6 years ago. She has a 1.00 pack-year smoking history. She has never used smokeless tobacco.  Drug History:  reports no history of drug use.    Review of patient's allergies indicates:   Allergen Reactions    Oranges [orange]      ulcers       Current Outpatient Medications   Medication Sig Dispense Refill    folic acid (FOLVITE) 1 MG tablet Take 1 mg by mouth once daily.      gabapentin (NEURONTIN) 300 MG capsule Take 1 capsule (300 mg total) by mouth Daily. May increase to twice daily if needed. 28 capsule 0    hyoscyamine (ANASPAZ,LEVSIN) 0.125 mg Tab Take 1 tablet (125 mcg total) by mouth every 6 (six) hours as needed (abdominal pain). 15 tablet 0    ibuprofen (ADVIL,MOTRIN) 600 MG tablet Take 1 tablet (600 mg total) by mouth every 8 (eight) hours as needed for Pain. 20 tablet 0    metoprolol tartrate (LOPRESSOR) 25 MG tablet Take 12.5 mg by mouth 2 (two) times daily.      ondansetron (ZOFRAN-ODT) 4 MG TbDL Take 1 tablet (4 mg total) by mouth every 8 (eight) hours as needed (nausea). 10 tablet 0    oxyCODONE (ROXICODONE) 5 MG immediate release tablet Take 1 tablet (5 mg total) by mouth every 4 (four) hours as needed. 28 tablet 0    promethazine (PHENERGAN) 25 MG tablet Take 25 mg by mouth every 6 (six) hours as  needed for Nausea.      valACYclovir (VALTREX) 1000 MG tablet Take 1 tablet (1,000 mg total) by mouth 3 (three) times daily. for 7 days 21 tablet 0     No current facility-administered medications for this visit.       Review of Systems   Constitutional: Positive for fatigue. Negative for activity change, appetite change, chills, diaphoresis, fever and unexpected weight change.   HENT: Negative for congestion, ear pain, rhinorrhea, sinus pressure, sore throat, trouble swallowing and voice change.    Eyes: Negative for pain, discharge and visual disturbance.   Respiratory: Negative for cough, chest tightness, shortness of breath and wheezing.    Cardiovascular: Negative for chest pain and palpitations.   Gastrointestinal: Negative for abdominal pain, anorexia, constipation, diarrhea, nausea and vomiting.   Genitourinary: Negative for difficulty urinating, flank pain, frequency and urgency.   Musculoskeletal: Negative for back pain, joint pain and joint swelling.   Skin: Positive for rash. Negative for color change and nail changes.   Neurological: Negative for dizziness, seizures, syncope, weakness, numbness and headaches.   Hematological: Negative for adenopathy.   Psychiatric/Behavioral: Negative for dysphoric mood and sleep disturbance. The patient is not nervous/anxious.          Objective:        Physical Exam:   Physical Exam  Vitals and nursing note reviewed.   Constitutional:       Appearance: Normal appearance. She is normal weight.   HENT:      Head: Normocephalic and atraumatic.   Pulmonary:      Effort: Pulmonary effort is normal.   Skin:     Findings: Rash present.          Neurological:      Mental Status: She is alert.   Psychiatric:         Mood and Affect: Mood normal.         Behavior: Behavior normal.         Thought Content: Thought content normal.         Judgment: Judgment normal.              Assessment:       1. Herpes zoster without complication      Plan:   Herpes zoster without  complication  Start Valtrex. Discussed using Capsaicin cream. Patient to start Gabapentin for pain and may increase to twice daily.  Notify clinic if symptoms fail to improve.  Contact precautions discussed.  Keep area covered and frequent hand washing.   -     valACYclovir (VALTREX) 1000 MG tablet; Take 1 tablet (1,000 mg total) by mouth 3 (three) times daily. for 7 days  Dispense: 21 tablet; Refill: 0  -     gabapentin (NEURONTIN) 300 MG capsule; Take 1 capsule (300 mg total) by mouth Daily. May increase to twice daily if needed.  Dispense: 28 capsule; Refill: 0    This note was created using [employer] voice recognition software that occasionally misinterprets phrases or words.     Follow up if symptoms worsen or fail to improve.    Total time spent with patient: 15 min

## 2022-07-20 ENCOUNTER — OFFICE VISIT (OUTPATIENT)
Dept: PSYCHIATRY | Facility: CLINIC | Age: 27
End: 2022-07-20
Payer: MEDICAID

## 2022-07-20 DIAGNOSIS — F32.1 CURRENT MODERATE EPISODE OF MAJOR DEPRESSIVE DISORDER WITHOUT PRIOR EPISODE: ICD-10-CM

## 2022-07-20 DIAGNOSIS — F41.1 GAD (GENERALIZED ANXIETY DISORDER): ICD-10-CM

## 2022-07-20 PROCEDURE — 90834 PSYTX W PT 45 MINUTES: CPT | Mod: 95,,, | Performed by: SOCIAL WORKER

## 2022-07-20 PROCEDURE — 90834 PR PSYCHOTHERAPY W/PATIENT, 45 MIN: ICD-10-PCS | Mod: 95,,, | Performed by: SOCIAL WORKER

## 2022-07-20 NOTE — PROGRESS NOTES
"Individual Psychotherapy (PhD/LCSW)    7/20/2022    Site:  Telemed   Patient presents at her home in [place], [place] for follow up audiovisual telehealth visit.     Therapeutic Intervention: Met with patient.  Outpatient - Insight oriented psychotherapy 45 min - CPT code 03020 and Outpatient - Supportive psychotherapy 45 min - CPT Code 85852    Chief complaint/reason for encounter: depression and anxiety     Interval history and content of current session: GAD7: 10. Last visit with me: 04/15/21. Irene shared that she has decided to resume therapy sessions with me because "nothing has changed." She shared that she is still  to a man, and though she loves him and cares about him, she is not interested in having intercourse with him. She said, "I am a bad person. He deserves better." I told her that her not being attracted to him or not wanting to be intimate with him does not make her a bad person. While we do have a discussion about asexuality, we discussed that ultimately she is sexually attracted to women. However, she does not feel support from anyone in her life about this, which has led her to suppress who she really is. We discussed the importance of her really discovering who she is and embracing herself for who she is, even beyond her sexual orientation. Discussed the idea of finding a support group where she can meet others who have gone through or are going through a similar experience. We also discussed that she has given birth to her 3rd child in the interim. She expressed love for her children, but Irene does feel that she is unable to obtain employment, which she wants, due to having to watch her children. I will look into some low cost day care options as well as support groups. We also discussed that Irene is interested in medication management; evaluation by female provider placed. Irene denies SI/HI.     Treatment plan:  · Target symptoms: recurrent depression, anxiety   · Why chosen therapy " is appropriate versus another modality: relevant to diagnosis, patient responds to this modality, evidence based practice  · Outcome monitoring methods: self-report, checklist/rating scale  · Therapeutic intervention type: insight oriented psychotherapy, supportive psychotherapy    Risk parameters:  Patient reports no suicidal ideation  Patient reports no homicidal ideation  Patient reports no self-injurious behavior  Patient reports no violent behavior    Verbal deficits: None    Patient's response to intervention:  The patient's response to intervention is accepting.    Progress toward goals and other mental status changes:  The patient's progress toward goals is limited.    Diagnosis:     ICD-10-CM ICD-9-CM   1. BRYON (generalized anxiety disorder)  F41.1 300.02   2. Current moderate episode of major depressive disorder without prior episode  F32.1 296.22       Plan:  individual psychotherapy and consult psychiatrist for medication evaluation Pt to go to ED or call 911 if symptoms worsen or if she has thoughts of harming self and/or others. Pt verbalized understanding.    Return to clinic: as scheduled    Length of Service (minutes): 45      Each patient to whom he or she provides medical services by telemedicine is: (1) informed of the relationship between the physician and patient and the respective role of any other health care provider with respect to management of the patient; and (2) notified that he or she may decline to receive medical services by telemedicine and may withdraw from such care at any time.

## 2022-07-23 ENCOUNTER — HOSPITAL ENCOUNTER (EMERGENCY)
Facility: HOSPITAL | Age: 27
Discharge: HOME | End: 2022-07-24
Attending: EMERGENCY MEDICINE
Payer: MEDICAID

## 2022-07-23 DIAGNOSIS — U07.1 COVID-19: Primary | ICD-10-CM

## 2022-07-23 LAB
B-HCG UR QL: NEGATIVE
CTP QC/QA: YES
SARS-COV-2 RDRP RESP QL NAA+PROBE: POSITIVE

## 2022-07-23 PROCEDURE — U0002 COVID-19 LAB TEST NON-CDC: HCPCS | Performed by: EMERGENCY MEDICINE

## 2022-07-23 PROCEDURE — 85025 COMPLETE CBC W/AUTO DIFF WBC: CPT | Performed by: EMERGENCY MEDICINE

## 2022-07-23 PROCEDURE — 81025 URINE PREGNANCY TEST: CPT | Performed by: EMERGENCY MEDICINE

## 2022-07-23 PROCEDURE — 80053 COMPREHEN METABOLIC PANEL: CPT | Performed by: EMERGENCY MEDICINE

## 2022-07-23 PROCEDURE — 36000 PLACE NEEDLE IN VEIN: CPT

## 2022-07-23 PROCEDURE — 99284 EMERGENCY DEPT VISIT MOD MDM: CPT

## 2022-07-23 PROCEDURE — 25000003 PHARM REV CODE 250: Performed by: EMERGENCY MEDICINE

## 2022-07-23 RX ORDER — ACETAMINOPHEN 500 MG
1000 TABLET ORAL
Status: COMPLETED | OUTPATIENT
Start: 2022-07-23 | End: 2022-07-23

## 2022-07-23 RX ADMIN — ACETAMINOPHEN 1000 MG: 500 TABLET ORAL at 07:07

## 2022-07-23 RX ADMIN — SODIUM CHLORIDE 1000 ML: 0.9 INJECTION, SOLUTION INTRAVENOUS at 11:07

## 2022-07-24 VITALS
TEMPERATURE: 98 F | BODY MASS INDEX: 25.13 KG/M2 | HEART RATE: 110 BPM | WEIGHT: 133 LBS | RESPIRATION RATE: 16 BRPM | DIASTOLIC BLOOD PRESSURE: 72 MMHG | OXYGEN SATURATION: 100 % | SYSTOLIC BLOOD PRESSURE: 117 MMHG

## 2022-07-24 LAB
ALBUMIN SERPL BCP-MCNC: 3.9 G/DL (ref 3.5–5.2)
ALP SERPL-CCNC: 58 U/L (ref 55–135)
ALT SERPL W/O P-5'-P-CCNC: 47 U/L (ref 10–44)
ANION GAP SERPL CALC-SCNC: 10 MMOL/L (ref 8–16)
AST SERPL-CCNC: 24 U/L (ref 10–40)
BASOPHILS # BLD AUTO: 0.02 K/UL (ref 0–0.2)
BASOPHILS NFR BLD: 0.3 % (ref 0–1.9)
BILIRUB SERPL-MCNC: 0.4 MG/DL (ref 0.1–1)
BUN SERPL-MCNC: 7 MG/DL (ref 6–20)
CALCIUM SERPL-MCNC: 8.9 MG/DL (ref 8.7–10.5)
CHLORIDE SERPL-SCNC: 105 MMOL/L (ref 95–110)
CO2 SERPL-SCNC: 22 MMOL/L (ref 23–29)
CREAT SERPL-MCNC: 0.8 MG/DL (ref 0.5–1.4)
DIFFERENTIAL METHOD: ABNORMAL
EOSINOPHIL # BLD AUTO: 0 K/UL (ref 0–0.5)
EOSINOPHIL NFR BLD: 0.3 % (ref 0–8)
ERYTHROCYTE [DISTWIDTH] IN BLOOD BY AUTOMATED COUNT: 17.3 % (ref 11.5–14.5)
EST. GFR  (AFRICAN AMERICAN): >60 ML/MIN/1.73 M^2
EST. GFR  (NON AFRICAN AMERICAN): >60 ML/MIN/1.73 M^2
GLUCOSE SERPL-MCNC: 119 MG/DL (ref 70–110)
HCT VFR BLD AUTO: 32.3 % (ref 37–48.5)
HGB BLD-MCNC: 10 G/DL (ref 12–16)
IMM GRANULOCYTES # BLD AUTO: 0.04 K/UL (ref 0–0.04)
IMM GRANULOCYTES NFR BLD AUTO: 0.6 % (ref 0–0.5)
LYMPHOCYTES # BLD AUTO: 0.3 K/UL (ref 1–4.8)
LYMPHOCYTES NFR BLD: 4.5 % (ref 18–48)
MCH RBC QN AUTO: 23.6 PG (ref 27–31)
MCHC RBC AUTO-ENTMCNC: 31 G/DL (ref 32–36)
MCV RBC AUTO: 76 FL (ref 82–98)
MONOCYTES # BLD AUTO: 0.5 K/UL (ref 0.3–1)
MONOCYTES NFR BLD: 7.6 % (ref 4–15)
NEUTROPHILS # BLD AUTO: 6.2 K/UL (ref 1.8–7.7)
NEUTROPHILS NFR BLD: 86.7 % (ref 38–73)
NRBC BLD-RTO: 0 /100 WBC
PLATELET # BLD AUTO: 239 K/UL (ref 150–450)
PMV BLD AUTO: 10.7 FL (ref 9.2–12.9)
POTASSIUM SERPL-SCNC: 3.3 MMOL/L (ref 3.5–5.1)
PROT SERPL-MCNC: 7.1 G/DL (ref 6–8.4)
RBC # BLD AUTO: 4.23 M/UL (ref 4–5.4)
SODIUM SERPL-SCNC: 137 MMOL/L (ref 136–145)
WBC # BLD AUTO: 7.11 K/UL (ref 3.9–12.7)

## 2022-07-24 PROCEDURE — 25000003 PHARM REV CODE 250: Performed by: EMERGENCY MEDICINE

## 2022-07-24 RX ORDER — POTASSIUM CHLORIDE 20 MEQ/1
40 TABLET, EXTENDED RELEASE ORAL
Status: COMPLETED | OUTPATIENT
Start: 2022-07-24 | End: 2022-07-24

## 2022-07-24 RX ORDER — ONDANSETRON 4 MG/1
4 TABLET, ORALLY DISINTEGRATING ORAL EVERY 8 HOURS PRN
Qty: 10 TABLET | Refills: 0 | Status: SHIPPED | OUTPATIENT
Start: 2022-07-24

## 2022-07-24 RX ADMIN — IBUPROFEN 600 MG: 400 TABLET ORAL at 12:07

## 2022-07-24 RX ADMIN — POTASSIUM CHLORIDE 40 MEQ: 1500 TABLET, EXTENDED RELEASE ORAL at 12:07

## 2022-07-24 NOTE — ED PROVIDER NOTES
Encounter Date: 2022       History     Chief Complaint   Patient presents with    COVID-19 Concerns     Cough and fever since this am.     Patient here with reported cough fever started this morning with nausea but no vomiting myalgias malaise arrival emergency department patient found to be febrile with temp of 102.8° states she had taken Motrin approximately 730 patient reports that when her fever spikes began feeling short of breath as well patient reports that since her fever has improved she feels better this time with no further shortness of breath or severe malaise        Review of patient's allergies indicates:   Allergen Reactions    Oranges [orange]      ulcers     Past Medical History:   Diagnosis Date    Anemia     no current treatment    Epilepsy     Gallstones     HPV (human papilloma virus) infection     LGSIL on Pap smear of cervix     MTHFR (methylene THF reductase) deficiency and homocystinuria     Seizure disorder, complex partial     last seizure ; no medication    Seizures     Swine flu      Past Surgical History:   Procedure Laterality Date     SECTION N/A 3/8/2020    Procedure:  SECTION;  Surgeon: Brian Quijano MD;  Location: [facility] L&D;  Service: OB/GYN;  Laterality: N/A;     SECTION N/A 10/28/2021    Procedure:  SECTION;  Surgeon: Lucinda Rush MD;  Location: [facility] L&D;  Service: OB/GYN;  Laterality: N/A;    CHOLECYSTECTOMY      LAPAROSCOPIC CHOLECYSTECTOMY      toe I&D      TONSILLECTOMY      UPPER GASTROINTESTINAL ENDOSCOPY  2017    Showed H. Pylori    yifan dugan       Family History   Adopted: Yes   Problem Relation Age of Onset    No Known Problems Mother     No Known Problems Father      Social History     Tobacco Use    Smoking status: Former Smoker     Packs/day: 0.25     Years: 4.00     Pack years: 1.00     Types: Cigarettes     Start date:      Quit date: 10/2019     Years since quitting:  2.8    Smokeless tobacco: Never Used    Tobacco comment: quit with pregnancy (2019)   Substance Use Topics    Alcohol use: Not Currently    Drug use: No     Review of Systems   Constitutional: Positive for chills, fatigue and fever.   HENT: Positive for congestion.    Respiratory: Positive for cough and shortness of breath.    Gastrointestinal: Positive for nausea. Negative for vomiting.   Musculoskeletal: Positive for myalgias.   All other systems reviewed and are negative.      Physical Exam     Initial Vitals [07/23/22 1855]   BP Pulse Resp Temp SpO2   (!) 157/97 (!) 168 20 (!) 102.8 °F (39.3 °C) 98 %      MAP       --         Physical Exam    Constitutional: She appears well-developed and well-nourished. No distress.   HENT:   Head: Normocephalic and atraumatic.   Right Ear: External ear normal.   Left Ear: External ear normal.   Mouth/Throat: Oropharynx is clear and moist.   Eyes: Conjunctivae and EOM are normal. Pupils are equal, round, and reactive to light.   Neck: Neck supple.   Normal range of motion.  Cardiovascular: Normal rate, regular rhythm, normal heart sounds and intact distal pulses.   Pulmonary/Chest: Breath sounds normal. No respiratory distress.   Abdominal: Abdomen is soft. Bowel sounds are normal. There is no abdominal tenderness.   Musculoskeletal:         General: No edema. Normal range of motion.      Cervical back: Normal range of motion and neck supple.     Neurological: She is alert and oriented to person, place, and time. GCS score is 15. GCS eye subscore is 4. GCS verbal subscore is 5. GCS motor subscore is 6.   Skin: Skin is warm and dry. Capillary refill takes less than 2 seconds. No rash noted.   Psychiatric: She has a normal mood and affect. Her behavior is normal.         ED Course   Procedures  Labs Reviewed   SARS-COV-2 RNA AMPLIFICATION, QUAL - Abnormal; Notable for the following components:       Result Value    SARS-CoV-2 RNA, Amplification, Qual Positive (*)     All  other components within normal limits    Narrative:     COVID critical result(s) called and verbal readback obtained from   Wilda Feng RN (ER) by SW1 07/23/2022 23:27   CBC W/ AUTO DIFFERENTIAL - Abnormal; Notable for the following components:    Hemoglobin 10.0 (*)     Hematocrit 32.3 (*)     MCV 76 (*)     MCH 23.6 (*)     MCHC 31.0 (*)     RDW 17.3 (*)     Immature Granulocytes 0.6 (*)     Lymph # 0.3 (*)     Gran % 86.7 (*)     Lymph % 4.5 (*)     All other components within normal limits   COMPREHENSIVE METABOLIC PANEL - Abnormal; Notable for the following components:    Potassium 3.3 (*)     CO2 22 (*)     Glucose 119 (*)     ALT 47 (*)     All other components within normal limits   POCT URINE PREGNANCY          Imaging Results    None          Medications   acetaminophen tablet 1,000 mg (1,000 mg Oral Given 7/23/22 1903)   sodium chloride 0.9% bolus 1,000 mL (1,000 mLs Intravenous New Bag 7/23/22 1524)   potassium chloride SA CR tablet 40 mEq (40 mEq Oral Given 7/24/22 0028)   ibuprofen tablet 600 mg (600 mg Oral Given 7/24/22 0029)     Medical Decision Making:   ED Management:  Patient's COVID screen is positive laboratory evaluation reviewed she received IV fluids in the emergency department recommend continue Tylenol Motrin for fever control rest drink plenty fluids Zofran for nausea return to ER for any worsened symptoms or new symptoms                      Clinical Impression:   Final diagnoses:  [U07.1] COVID-19 (Primary)          ED Disposition Condition    Discharge Stable        ED Prescriptions     Medication Sig Dispense Start Date End Date Auth. Provider    ondansetron (ZOFRAN-ODT) 4 MG TbDL Take 1 tablet (4 mg total) by mouth every 8 (eight) hours as needed. 10 tablet 7/24/2022  Matty Soni MD        Follow-up Information     Follow up With Specialties Details Why Contact Info    Jean Waller MD Family Medicine In 1 day for re-examination of your symptoms [address] [facility]  [address]  [facility] [address]  [phone]             Matty Soni MD  07/24/22 0143

## 2022-07-25 NOTE — PROGRESS NOTES
"Individual Psychotherapy (PhD/LCSW)    8/3/2022    Site:  Telemed     Patient presents at home in [place], [place] for follow up audiovisual telehealth visit.     Therapeutic Intervention: Met with patient.  Outpatient - Insight oriented psychotherapy 45 min - CPT code 22820 and Outpatient - Supportive psychotherapy 45 min - CPT Code 52623    Chief complaint/reason for encounter: depression, anxiety and interpersonal     Interval history and content of current session: GAD7: 7. Irene shared that she has been doing "okay." She shared that both she and the children got very sick with Covid, and she had to take care of the children all by herself while sick while her  continued to go to work. I provided empathic support. We discussed how this has made her realize that perhaps he is not the type of partner that she ultimately wants to be with and is thinking that she might want to leave the relationship. Discussed some ideas for low cost ; she said that she heard back from [employer], and all of her children are on the waitlist to start there. We discussed how after this is settled, she would like to find employment, although her  told her that she would have to make what he is making in order to go to work. We discussed that he is ultimately not in control of her actions. I also provided her with psycho education about values versus goals from [employer] and showed her the Ja Johnson video demonstrating the difference. Asked her to examine what are some values that she has and/or wants to develop.     Treatment plan:  · Target symptoms: recurrent depression, anxiety , adjustment  · Why chosen therapy is appropriate versus another modality: relevant to diagnosis, evidence based practice  · Outcome monitoring methods: self-report, checklist/rating scale  · Therapeutic intervention type: insight oriented psychotherapy, supportive psychotherapy    Risk parameters:  Patient reports no suicidal ideation  Patient " reports no homicidal ideation  Patient reports no self-injurious behavior  Patient reports no violent behavior    Verbal deficits: None    Patient's response to intervention:  The patient's response to intervention is accepting.    Progress toward goals and other mental status changes:  The patient's progress toward goals is fair .    Diagnosis:     ICD-10-CM ICD-9-CM   1. BRYON (generalized anxiety disorder)  F41.1 300.02   2. Current moderate episode of major depressive disorder without prior episode  F32.1 296.22       Plan:  individual psychotherapy and consult psychiatrist for medication evaluation Pt to go to ED or call 911 if symptoms worsen or if she has thoughts of harming self and/or others. Pt verbalized understanding.    Return to clinic: as scheduled    Length of Service (minutes): 45      Each patient to whom he or she provides medical services by telemedicine is: (1) informed of the relationship between the physician and patient and the respective role of any other health care provider with respect to management of the patient; and (2) notified that he or she may decline to receive medical services by telemedicine and may withdraw from such care at any time.

## 2022-08-03 ENCOUNTER — OFFICE VISIT (OUTPATIENT)
Dept: PSYCHIATRY | Facility: CLINIC | Age: 27
End: 2022-08-03
Payer: MEDICAID

## 2022-08-03 ENCOUNTER — PATIENT MESSAGE (OUTPATIENT)
Dept: PSYCHIATRY | Facility: CLINIC | Age: 27
End: 2022-08-03
Payer: COMMERCIAL

## 2022-08-03 DIAGNOSIS — F32.1 CURRENT MODERATE EPISODE OF MAJOR DEPRESSIVE DISORDER WITHOUT PRIOR EPISODE: ICD-10-CM

## 2022-08-03 DIAGNOSIS — F41.1 GAD (GENERALIZED ANXIETY DISORDER): ICD-10-CM

## 2022-08-03 PROCEDURE — 90834 PR PSYCHOTHERAPY W/PATIENT, 45 MIN: ICD-10-PCS | Mod: 95,,, | Performed by: SOCIAL WORKER

## 2022-08-03 PROCEDURE — 1159F PR MEDICATION LIST DOCUMENTED IN MEDICAL RECORD: ICD-10-PCS | Mod: CPTII,95,, | Performed by: SOCIAL WORKER

## 2022-08-03 PROCEDURE — 1159F MED LIST DOCD IN RCRD: CPT | Mod: CPTII,95,, | Performed by: SOCIAL WORKER

## 2022-08-03 PROCEDURE — 90834 PSYTX W PT 45 MINUTES: CPT | Mod: 95,,, | Performed by: SOCIAL WORKER

## 2022-08-17 ENCOUNTER — TELEPHONE (OUTPATIENT)
Dept: PSYCHIATRY | Facility: CLINIC | Age: 27
End: 2022-08-17
Payer: COMMERCIAL

## 2022-08-17 NOTE — TELEPHONE ENCOUNTER
Appointment canceled   [facility] Message   Sent:  11:11 AM   To: [facility] Clinical Support Staff    Irene Grayson   MRN: [id number] : 1995   Pt Home: [phone]     Entered: [phone]          Message    Appointment canceled for Irene Grayson ([id number])   Visit Type: ESTABLISHED PATIENT - VIRTUAL   Date        Time      Length    Provider                  Department   2022    3:00 PM  60 mins.   JUAN Lee [facility]      Reason for Cancellation: Appt Time No Longer Works

## 2022-09-06 ENCOUNTER — LAB VISIT (OUTPATIENT)
Dept: LAB | Facility: HOSPITAL | Age: 27
End: 2022-09-06
Attending: FAMILY MEDICINE
Payer: COMMERCIAL

## 2022-09-06 DIAGNOSIS — R07.9 CHEST PAIN, UNSPECIFIED: Primary | ICD-10-CM

## 2022-09-06 DIAGNOSIS — R55 SYNCOPE AND COLLAPSE: ICD-10-CM

## 2022-09-06 DIAGNOSIS — M54.2 CERVICALGIA: ICD-10-CM

## 2022-09-06 DIAGNOSIS — M79.602 LEFT ARM PAIN: ICD-10-CM

## 2022-09-06 PROCEDURE — 93005 ELECTROCARDIOGRAM TRACING: CPT | Mod: PO | Performed by: INTERNAL MEDICINE

## 2022-09-06 PROCEDURE — 93010 ELECTROCARDIOGRAM REPORT: CPT | Mod: ,,, | Performed by: INTERNAL MEDICINE

## 2022-09-06 PROCEDURE — 93010 EKG 12-LEAD: ICD-10-PCS | Mod: ,,, | Performed by: INTERNAL MEDICINE

## 2022-10-10 PROBLEM — O42.919 PRETERM PREMATURE RUPTURE OF MEMBRANES (PPROM) WITH UNKNOWN ONSET OF LABOR: Status: RESOLVED | Noted: 2021-10-19 | Resolved: 2022-10-10

## 2022-11-23 ENCOUNTER — OFFICE VISIT (OUTPATIENT)
Dept: HEMATOLOGY/ONCOLOGY | Facility: CLINIC | Age: 27
End: 2022-11-23
Payer: MEDICAID

## 2022-11-23 VITALS
DIASTOLIC BLOOD PRESSURE: 79 MMHG | SYSTOLIC BLOOD PRESSURE: 120 MMHG | TEMPERATURE: 99 F | HEIGHT: 61 IN | BODY MASS INDEX: 23.79 KG/M2 | RESPIRATION RATE: 18 BRPM | HEART RATE: 87 BPM | WEIGHT: 126 LBS

## 2022-11-23 DIAGNOSIS — E53.8 FOLATE DEFICIENCY: Primary | ICD-10-CM

## 2022-11-23 DIAGNOSIS — R97.8 ELEVATED TUMOR MARKERS: ICD-10-CM

## 2022-11-23 DIAGNOSIS — D50.0 IRON DEFICIENCY ANEMIA DUE TO CHRONIC BLOOD LOSS: ICD-10-CM

## 2022-11-23 PROCEDURE — 99204 OFFICE O/P NEW MOD 45 MIN: CPT | Mod: S$GLB,,, | Performed by: INTERNAL MEDICINE

## 2022-11-23 PROCEDURE — 3078F PR MOST RECENT DIASTOLIC BLOOD PRESSURE < 80 MM HG: ICD-10-PCS | Mod: CPTII,S$GLB,, | Performed by: INTERNAL MEDICINE

## 2022-11-23 PROCEDURE — 3008F BODY MASS INDEX DOCD: CPT | Mod: CPTII,S$GLB,, | Performed by: INTERNAL MEDICINE

## 2022-11-23 PROCEDURE — 3074F PR MOST RECENT SYSTOLIC BLOOD PRESSURE < 130 MM HG: ICD-10-PCS | Mod: CPTII,S$GLB,, | Performed by: INTERNAL MEDICINE

## 2022-11-23 PROCEDURE — 3008F PR BODY MASS INDEX (BMI) DOCUMENTED: ICD-10-PCS | Mod: CPTII,S$GLB,, | Performed by: INTERNAL MEDICINE

## 2022-11-23 PROCEDURE — 3078F DIAST BP <80 MM HG: CPT | Mod: CPTII,S$GLB,, | Performed by: INTERNAL MEDICINE

## 2022-11-23 PROCEDURE — 3074F SYST BP LT 130 MM HG: CPT | Mod: CPTII,S$GLB,, | Performed by: INTERNAL MEDICINE

## 2022-11-23 PROCEDURE — 99204 PR OFFICE/OUTPT VISIT, NEW, LEVL IV, 45-59 MIN: ICD-10-PCS | Mod: S$GLB,,, | Performed by: INTERNAL MEDICINE

## 2022-11-23 PROCEDURE — 1159F MED LIST DOCD IN RCRD: CPT | Mod: CPTII,S$GLB,, | Performed by: INTERNAL MEDICINE

## 2022-11-23 PROCEDURE — 1159F PR MEDICATION LIST DOCUMENTED IN MEDICAL RECORD: ICD-10-PCS | Mod: CPTII,S$GLB,, | Performed by: INTERNAL MEDICINE

## 2022-11-23 RX ORDER — AMOXICILLIN AND CLAVULANATE POTASSIUM 875; 125 MG/1; MG/1
1 TABLET, FILM COATED ORAL 2 TIMES DAILY
COMMUNITY
Start: 2022-11-22

## 2022-11-23 RX ORDER — METRONIDAZOLE 500 MG/1
500 TABLET ORAL 2 TIMES DAILY
COMMUNITY
Start: 2022-11-22

## 2022-11-23 NOTE — ASSESSMENT & PLAN NOTE
Patient has a long standing history of anemia from iron deficiency and folate deficiency.  She is on oral iron currently and her most recent ferritin is 17.  Her iron deficiency is likely due to her heavy menstrual cycles and note is made that she will have colonoscopy done next month.  I feel she may need IV iron but would like to give her another month on the bid iron and see how her folate does to improve her counts.  Will have her back with me in 4-6 weeks with CBC, Folate and ferritin to see how she is doing.  Will also get recent labs from [facility] on chart as well as EGD and colon(when done).

## 2022-11-23 NOTE — ASSESSMENT & PLAN NOTE
Patient has an elevated  done by her OBs office.  She was told this was a mistake but the count was 46.7.  Will check this again to be sure and discussed today.

## 2022-11-23 NOTE — PROGRESS NOTES
INITIAL [facility] HEM/ONC CONSULTATION      Subjective:       Patient ID: Irene Grayson is a 27 y.o. female.    Chief Complaint: No chief complaint on file.  Anemia    Ms. Grayson is a 28yo female who is referred for anemia.  Review of labs show her counts low dating back to 1019 and she has been as low as 6.0g/dl requiring transfusion in 2020.   She is more recently running int he 10 to 11g/dl range.     She states this has been attributed to iron deficiency.  She is currently on FeSo4 325mg bid and folate.  She states she has been on iron in the past but feels it did not help.      She had upper endo done 2022 which showed no bleeding areas and will do colonoscopy on 2022.     She does note a history of long menstrual cycles which are usually up to 7 days and still spots for an additional few days after each cycle.          Past Medical History:   Diagnosis Date    Anemia     no current treatment    Epilepsy     Gallstones     HPV (human papilloma virus) infection     LGSIL on Pap smear of cervix     MTHFR (methylene THF reductase) deficiency and homocystinuria     Seizure disorder, complex partial     last seizure ; no medication    Seizures     Swine flu        Past Surgical History:   Procedure Laterality Date     SECTION N/A 3/8/2020    Procedure:  SECTION;  Surgeon: Brian Quijano MD;  Location: [facility] L&D;  Service: OB/GYN;  Laterality: N/A;     SECTION N/A 10/28/2021    Procedure:  SECTION;  Surgeon: Lucinda Rush MD;  Location: [facility] L&D;  Service: OB/GYN;  Laterality: N/A;    CHOLECYSTECTOMY      LAPAROSCOPIC CHOLECYSTECTOMY  2017    toe I&D      TONSILLECTOMY  2011    UPPER GASTROINTESTINAL ENDOSCOPY  2017    Showed H. Pylori    yifan dugan  2010       Social History     Socioeconomic History    Marital status: Single   Tobacco Use    Smoking status: Former     Packs/day: 0.25     Years: 4.00     Pack years: 1.00     Types: Cigarettes      Start date: 2016     Quit date: 10/2019     Years since quitting: 3.1    Smokeless tobacco: Never    Tobacco comments:     quit with pregnancy (2019)   Substance and Sexual Activity    Alcohol use: Not Currently    Drug use: No    Sexual activity: Not Currently     Partners: Male     Birth control/protection: None       Family History   Adopted: Yes   Problem Relation Age of Onset    No Known Problems Mother     No Known Problems Father        Review of patient's allergies indicates:   Allergen Reactions    Oranges [orange]      ulcers       Current Outpatient Medications:     amoxicillin-clavulanate 875-125mg (AUGMENTIN) 875-125 mg per tablet, Take 1 tablet by mouth 2 (two) times daily., Disp: , Rfl:     folic acid (FOLVITE) 1 MG tablet, Take 1 mg by mouth once daily., Disp: , Rfl:     metoprolol tartrate (LOPRESSOR) 25 MG tablet, Take 12.5 mg by mouth 2 (two) times daily., Disp: , Rfl:     metroNIDAZOLE (FLAGYL) 500 MG tablet, Take 500 mg by mouth 2 (two) times daily., Disp: , Rfl:     gabapentin (NEURONTIN) 300 MG capsule, Take 1 capsule (300 mg total) by mouth Daily. May increase to twice daily if needed. (Patient not taking: Reported on 11/23/2022), Disp: 28 capsule, Rfl: 0    hyoscyamine (ANASPAZ,LEVSIN) 0.125 mg Tab, Take 1 tablet (125 mcg total) by mouth every 6 (six) hours as needed (abdominal pain)., Disp: 15 tablet, Rfl: 0    ibuprofen (ADVIL,MOTRIN) 600 MG tablet, Take 1 tablet (600 mg total) by mouth every 8 (eight) hours as needed for Pain. (Patient not taking: Reported on 11/23/2022), Disp: 20 tablet, Rfl: 0    ondansetron (ZOFRAN-ODT) 4 MG TbDL, Take 1 tablet (4 mg total) by mouth every 8 (eight) hours as needed (nausea). (Patient not taking: Reported on 11/23/2022), Disp: 10 tablet, Rfl: 0    ondansetron (ZOFRAN-ODT) 4 MG TbDL, Take 1 tablet (4 mg total) by mouth every 8 (eight) hours as needed. (Patient not taking: Reported on 11/23/2022), Disp: 10 tablet, Rfl: 0    oxyCODONE (ROXICODONE) 5 MG  "immediate release tablet, Take 1 tablet (5 mg total) by mouth every 4 (four) hours as needed. (Patient not taking: Reported on 11/23/2022), Disp: 28 tablet, Rfl: 0    promethazine (PHENERGAN) 25 MG tablet, Take 25 mg by mouth every 6 (six) hours as needed for Nausea., Disp: , Rfl:     valACYclovir (VALTREX) 1000 MG tablet, Take 1 tablet (1,000 mg total) by mouth 3 (three) times daily. for 7 days, Disp: 21 tablet, Rfl: 0    All medications and past history have been reviewed.    Review of Systems   Constitutional:  Negative for activity change, appetite change, diaphoresis, fatigue, fever and unexpected weight change.   HENT:  Negative for congestion and hearing loss.    Eyes:  Negative for visual disturbance.   Respiratory:  Negative for cough, chest tightness and shortness of breath.    Cardiovascular:  Negative for chest pain and leg swelling.   Gastrointestinal:  Negative for abdominal pain, blood in stool, diarrhea, nausea and vomiting.   Endocrine: Negative for cold intolerance and heat intolerance.   Genitourinary:  Negative for difficulty urinating, dysuria and hematuria.   Neurological:  Negative for dizziness and headaches.   Hematological:  Negative for adenopathy. Does not bruise/bleed easily.   Psychiatric/Behavioral:  Negative for behavioral problems.      Objective:        /79   Pulse 87   Temp 98.6 °F (37 °C)   Resp 18   Ht 5' 1" (1.549 m)   Wt 57.2 kg (126 lb)   BMI 23.81 kg/m²     Physical Exam  Constitutional:       Appearance: She is well-developed.   HENT:      Head: Normocephalic and atraumatic.      Right Ear: External ear normal.      Left Ear: External ear normal.   Eyes:      Conjunctiva/sclera: Conjunctivae normal.      Pupils: Pupils are equal, round, and reactive to light.   Neck:      Thyroid: No thyromegaly.      Trachea: No tracheal deviation.   Cardiovascular:      Rate and Rhythm: Normal rate and regular rhythm.      Heart sounds: Normal heart sounds.   Pulmonary:      " Effort: Pulmonary effort is normal.      Breath sounds: Normal breath sounds.   Abdominal:      General: Bowel sounds are normal. There is no distension.      Palpations: Abdomen is soft. There is no mass.      Tenderness: There is no abdominal tenderness.   Skin:     Findings: No rash.   Neurological:      Comments: Neuro intact througout   Psychiatric:         Behavior: Behavior normal.         Thought Content: Thought content normal.         Judgment: Judgment normal.         Lab  No results found for this or any previous visit (from the past 336 hour(s)).  CMP  Sodium   Date Value Ref Range Status   07/23/2022 137 136 - 145 mmol/L Final     Potassium   Date Value Ref Range Status   07/23/2022 3.3 (L) 3.5 - 5.1 mmol/L Final     Chloride   Date Value Ref Range Status   07/23/2022 105 95 - 110 mmol/L Final     CO2   Date Value Ref Range Status   07/23/2022 22 (L) 23 - 29 mmol/L Final     Glucose   Date Value Ref Range Status   07/23/2022 119 (H) 70 - 110 mg/dL Final     BUN   Date Value Ref Range Status   07/23/2022 7 6 - 20 mg/dL Final     Creatinine   Date Value Ref Range Status   07/23/2022 0.8 0.5 - 1.4 mg/dL Final     Calcium   Date Value Ref Range Status   07/23/2022 8.9 8.7 - 10.5 mg/dL Final     Total Protein   Date Value Ref Range Status   07/23/2022 7.1 6.0 - 8.4 g/dL Final     Albumin   Date Value Ref Range Status   07/23/2022 3.9 3.5 - 5.2 g/dL Final     Total Bilirubin   Date Value Ref Range Status   07/23/2022 0.4 0.1 - 1.0 mg/dL Final     Comment:     For infants and newborns, interpretation of results should be based  on gestational age, weight and in agreement with clinical  observations.    Premature Infant recommended reference ranges:  Up to 24 hours.............<8.0 mg/dL  Up to 48 hours............<12.0 mg/dL  3-5 days..................<15.0 mg/dL  6-29 days.................<15.0 mg/dL       Alkaline Phosphatase   Date Value Ref Range Status   07/23/2022 58 55 - 135 U/L Final     AST   Date  Value Ref Range Status   07/23/2022 24 10 - 40 U/L Final     ALT   Date Value Ref Range Status   07/23/2022 47 (H) 10 - 44 U/L Final     Anion Gap   Date Value Ref Range Status   07/23/2022 10 8 - 16 mmol/L Final     eGFR if    Date Value Ref Range Status   07/23/2022 >60.0 >60 mL/min/1.73 m^2 Final     eGFR if non    Date Value Ref Range Status   07/23/2022 >60.0 >60 mL/min/1.73 m^2 Final     Comment:     Calculation used to obtain the estimated glomerular filtration  rate (eGFR) is the CKD-EPI equation.            Specimen (24h ago, onward)      None                  All lab results and imaging results have been reviewed and discussed with the patient.     Assessment:       1. Folate deficiency    2. Iron deficiency anemia due to chronic blood loss    3. Elevated tumor markers      Problem List Items Addressed This Visit       Iron deficiency anemia due to chronic blood loss     Patient has a long standing history of anemia from iron deficiency and folate deficiency.  She is on oral iron currently and her most recent ferritin is 17.  Her iron deficiency is likely due to her heavy menstrual cycles and note is made that she will have colonoscopy done next month.  I feel she may need IV iron but would like to give her another month on the bid iron and see how her folate does to improve her counts.  Will have her back with me in 4-6 weeks with CBC, Folate and ferritin to see how she is doing.  Will also get recent labs from [facility] on chart as well as EGD and colon(when done).           Relevant Orders    CBC Auto Differential    Ferritin    Folate    Elevated tumor markers     Patient has an elevated  done by her OBs office.  She was told this was a mistake but the count was 46.7.  Will check this again to be sure and discussed today.          Relevant Orders         Other Visit Diagnoses       Folate deficiency    -  Primary    Relevant Orders    CBC Auto Differential     Folate           Cancer Staging   No matching staging information was found for the patient.      Plan:         Follow up in about 6 weeks (around 1/4/2023).       The plan was discussed with the patient and all questions/concerns have been answered to the patient's satisfaction.

## 2022-12-21 ENCOUNTER — TELEPHONE (OUTPATIENT)
Dept: HEMATOLOGY/ONCOLOGY | Facility: CLINIC | Age: 27
End: 2022-12-21

## 2022-12-21 NOTE — TELEPHONE ENCOUNTER
Called patient for upcoming appointment and laboratory work 01/04/2023, patient did not answer, LVM.

## 2022-12-28 ENCOUNTER — LAB VISIT (OUTPATIENT)
Dept: LAB | Facility: HOSPITAL | Age: 27
End: 2022-12-28
Attending: INTERNAL MEDICINE
Payer: MEDICAID

## 2022-12-28 DIAGNOSIS — E53.8 FOLATE DEFICIENCY: ICD-10-CM

## 2022-12-28 DIAGNOSIS — D50.0 IRON DEFICIENCY ANEMIA DUE TO CHRONIC BLOOD LOSS: ICD-10-CM

## 2022-12-28 DIAGNOSIS — R97.8 ELEVATED TUMOR MARKERS: ICD-10-CM

## 2022-12-28 LAB
BASOPHILS # BLD AUTO: 0.04 K/UL (ref 0–0.2)
BASOPHILS NFR BLD: 0.5 % (ref 0–1.9)
DIFFERENTIAL METHOD: ABNORMAL
EOSINOPHIL # BLD AUTO: 0 K/UL (ref 0–0.5)
EOSINOPHIL NFR BLD: 0.4 % (ref 0–8)
ERYTHROCYTE [DISTWIDTH] IN BLOOD BY AUTOMATED COUNT: 15.9 % (ref 11.5–14.5)
FERRITIN SERPL-MCNC: 4 NG/ML (ref 20–300)
FOLATE SERPL-MCNC: 5.8 NG/ML (ref 4–24)
HCT VFR BLD AUTO: 34.5 % (ref 37–48.5)
HGB BLD-MCNC: 10.3 G/DL (ref 12–16)
IMM GRANULOCYTES # BLD AUTO: 0.03 K/UL (ref 0–0.04)
IMM GRANULOCYTES NFR BLD AUTO: 0.4 % (ref 0–0.5)
LYMPHOCYTES # BLD AUTO: 1.6 K/UL (ref 1–4.8)
LYMPHOCYTES NFR BLD: 20.3 % (ref 18–48)
MCH RBC QN AUTO: 22.4 PG (ref 27–31)
MCHC RBC AUTO-ENTMCNC: 29.9 G/DL (ref 32–36)
MCV RBC AUTO: 75 FL (ref 82–98)
MONOCYTES # BLD AUTO: 0.5 K/UL (ref 0.3–1)
MONOCYTES NFR BLD: 6.9 % (ref 4–15)
NEUTROPHILS # BLD AUTO: 5.5 K/UL (ref 1.8–7.7)
NEUTROPHILS NFR BLD: 71.5 % (ref 38–73)
NRBC BLD-RTO: 0 /100 WBC
PLATELET # BLD AUTO: 327 K/UL (ref 150–450)
PMV BLD AUTO: 9.4 FL (ref 9.2–12.9)
RBC # BLD AUTO: 4.59 M/UL (ref 4–5.4)
WBC # BLD AUTO: 7.64 K/UL (ref 3.9–12.7)

## 2022-12-28 PROCEDURE — 36415 COLL VENOUS BLD VENIPUNCTURE: CPT | Performed by: INTERNAL MEDICINE

## 2022-12-28 PROCEDURE — 85025 COMPLETE CBC W/AUTO DIFF WBC: CPT | Performed by: INTERNAL MEDICINE

## 2022-12-28 PROCEDURE — 82728 ASSAY OF FERRITIN: CPT | Performed by: INTERNAL MEDICINE

## 2022-12-28 PROCEDURE — 86304 IMMUNOASSAY TUMOR CA 125: CPT | Performed by: INTERNAL MEDICINE

## 2022-12-28 PROCEDURE — 82746 ASSAY OF FOLIC ACID SERUM: CPT | Performed by: INTERNAL MEDICINE

## 2022-12-30 LAB — CANCER AG125 SERPL-ACNC: 17.5 U/ML (ref 0–38.1)

## 2023-01-04 ENCOUNTER — OFFICE VISIT (OUTPATIENT)
Dept: HEMATOLOGY/ONCOLOGY | Facility: CLINIC | Age: 28
End: 2023-01-04
Payer: MEDICAID

## 2023-01-04 VITALS
HEART RATE: 98 BPM | TEMPERATURE: 98 F | DIASTOLIC BLOOD PRESSURE: 77 MMHG | SYSTOLIC BLOOD PRESSURE: 136 MMHG | WEIGHT: 132 LBS | BODY MASS INDEX: 24.94 KG/M2

## 2023-01-04 DIAGNOSIS — D50.0 IRON DEFICIENCY ANEMIA DUE TO CHRONIC BLOOD LOSS: ICD-10-CM

## 2023-01-04 DIAGNOSIS — R97.8 ELEVATED TUMOR MARKERS: ICD-10-CM

## 2023-01-04 PROCEDURE — 3075F SYST BP GE 130 - 139MM HG: CPT | Mod: CPTII,S$GLB,, | Performed by: INTERNAL MEDICINE

## 2023-01-04 PROCEDURE — 3008F BODY MASS INDEX DOCD: CPT | Mod: CPTII,S$GLB,, | Performed by: INTERNAL MEDICINE

## 2023-01-04 PROCEDURE — 1159F PR MEDICATION LIST DOCUMENTED IN MEDICAL RECORD: ICD-10-PCS | Mod: CPTII,S$GLB,, | Performed by: INTERNAL MEDICINE

## 2023-01-04 PROCEDURE — 3075F PR MOST RECENT SYSTOLIC BLOOD PRESS GE 130-139MM HG: ICD-10-PCS | Mod: CPTII,S$GLB,, | Performed by: INTERNAL MEDICINE

## 2023-01-04 PROCEDURE — 3078F PR MOST RECENT DIASTOLIC BLOOD PRESSURE < 80 MM HG: ICD-10-PCS | Mod: CPTII,S$GLB,, | Performed by: INTERNAL MEDICINE

## 2023-01-04 PROCEDURE — 3008F PR BODY MASS INDEX (BMI) DOCUMENTED: ICD-10-PCS | Mod: CPTII,S$GLB,, | Performed by: INTERNAL MEDICINE

## 2023-01-04 PROCEDURE — 99213 PR OFFICE/OUTPT VISIT, EST, LEVL III, 20-29 MIN: ICD-10-PCS | Mod: S$GLB,,, | Performed by: INTERNAL MEDICINE

## 2023-01-04 PROCEDURE — 1159F MED LIST DOCD IN RCRD: CPT | Mod: CPTII,S$GLB,, | Performed by: INTERNAL MEDICINE

## 2023-01-04 PROCEDURE — 3078F DIAST BP <80 MM HG: CPT | Mod: CPTII,S$GLB,, | Performed by: INTERNAL MEDICINE

## 2023-01-04 PROCEDURE — 1160F PR REVIEW ALL MEDS BY PRESCRIBER/CLIN PHARMACIST DOCUMENTED: ICD-10-PCS | Mod: CPTII,S$GLB,, | Performed by: INTERNAL MEDICINE

## 2023-01-04 PROCEDURE — 1160F RVW MEDS BY RX/DR IN RCRD: CPT | Mod: CPTII,S$GLB,, | Performed by: INTERNAL MEDICINE

## 2023-01-04 PROCEDURE — 99213 OFFICE O/P EST LOW 20 MIN: CPT | Mod: S$GLB,,, | Performed by: INTERNAL MEDICINE

## 2023-01-04 NOTE — PROGRESS NOTES
PROGRESS NOTE    Subjective:       Patient ID: Irene Grayson is a 27 y.o. female.    Chief Complaint:  No chief complaint on file.  Folate and iron def follow up    History of Present Illness:   Irene Grayson is a 27 y.o. female who presents for follow up of above.     Patient has been on BID iron and counts not improved.        EGD and Colon done by Dr. Leonardo.      now normal.       PMH:  Ms. Grayson is a 28yo female who is referred for anemia.  Review of labs show her counts low dating back to 12/1019 and she has been as low as 6.0g/dl requiring transfusion in [place] 2020.   She is more recently running int he 10 to 11g/dl range.     She states this has been attributed to iron deficiency.  She is currently on FeSo4 325mg bid and folate.  She states she has been on iron in the past but feels it did not help.      She had upper endo done 11/7/2022 which showed no bleeding areas and will do colonoscopy on 12/5/2022.     She does note a history of long menstrual cycles which are usually up to 7 days and still spots for an additional few days after each cycle.     Family and Social history reviewed and is unchanged from 11/23/2022      ROS:  Review of Systems   Constitutional:  Negative for fever.   Respiratory:  Negative for shortness of breath.    Cardiovascular:  Negative for chest pain and leg swelling.   Gastrointestinal:  Negative for abdominal pain and blood in stool.   Genitourinary:  Negative for hematuria.   Skin:  Negative for rash.        Current Outpatient Medications:     amoxicillin-clavulanate 875-125mg (AUGMENTIN) 875-125 mg per tablet, Take 1 tablet by mouth 2 (two) times daily., Disp: , Rfl:     folic acid (FOLVITE) 1 MG tablet, Take 1 mg by mouth once daily., Disp: , Rfl:     gabapentin (NEURONTIN) 300 MG capsule, Take 1 capsule (300 mg total) by mouth Daily. May increase to twice daily if needed. (Patient not taking: Reported on  11/23/2022), Disp: 28 capsule, Rfl: 0    hyoscyamine (ANASPAZ,LEVSIN) 0.125 mg Tab, Take 1 tablet (125 mcg total) by mouth every 6 (six) hours as needed (abdominal pain)., Disp: 15 tablet, Rfl: 0    ibuprofen (ADVIL,MOTRIN) 600 MG tablet, Take 1 tablet (600 mg total) by mouth every 8 (eight) hours as needed for Pain. (Patient not taking: Reported on 11/23/2022), Disp: 20 tablet, Rfl: 0    metoprolol tartrate (LOPRESSOR) 25 MG tablet, Take 12.5 mg by mouth 2 (two) times daily., Disp: , Rfl:     metroNIDAZOLE (FLAGYL) 500 MG tablet, Take 500 mg by mouth 2 (two) times daily., Disp: , Rfl:     ondansetron (ZOFRAN-ODT) 4 MG TbDL, Take 1 tablet (4 mg total) by mouth every 8 (eight) hours as needed (nausea). (Patient not taking: Reported on 11/23/2022), Disp: 10 tablet, Rfl: 0    ondansetron (ZOFRAN-ODT) 4 MG TbDL, Take 1 tablet (4 mg total) by mouth every 8 (eight) hours as needed. (Patient not taking: Reported on 11/23/2022), Disp: 10 tablet, Rfl: 0    oxyCODONE (ROXICODONE) 5 MG immediate release tablet, Take 1 tablet (5 mg total) by mouth every 4 (four) hours as needed. (Patient not taking: Reported on 11/23/2022), Disp: 28 tablet, Rfl: 0    promethazine (PHENERGAN) 25 MG tablet, Take 25 mg by mouth every 6 (six) hours as needed for Nausea., Disp: , Rfl:     valACYclovir (VALTREX) 1000 MG tablet, Take 1 tablet (1,000 mg total) by mouth 3 (three) times daily. for 7 days, Disp: 21 tablet, Rfl: 0        Objective:       Physical Examination:     /77   Pulse 98   Temp 98.3 °F (36.8 °C)   Wt 59.9 kg (132 lb)   BMI 24.94 kg/m²     Physical Exam  Constitutional:       Appearance: She is well-developed.   HENT:      Head: Normocephalic and atraumatic.      Right Ear: External ear normal.      Left Ear: External ear normal.   Eyes:      Conjunctiva/sclera: Conjunctivae normal.      Pupils: Pupils are equal, round, and reactive to light.   Neck:      Thyroid: No thyromegaly.      Trachea: No tracheal deviation.    Cardiovascular:      Rate and Rhythm: Normal rate and regular rhythm.      Heart sounds: Normal heart sounds.   Pulmonary:      Effort: Pulmonary effort is normal.      Breath sounds: Normal breath sounds.   Abdominal:      General: Bowel sounds are normal. There is no distension.      Palpations: Abdomen is soft. There is no mass.      Tenderness: There is no abdominal tenderness.   Skin:     Findings: No rash.   Neurological:      Comments: Neuro intact througout   Psychiatric:         Behavior: Behavior normal.         Thought Content: Thought content normal.         Judgment: Judgment normal.       Labs:   Recent Results (from the past 336 hour(s))   CBC Auto Differential    Collection Time: 12/28/22  3:26 PM   Result Value Ref Range    WBC 7.64 3.90 - 12.70 K/uL    Hemoglobin 10.3 (L) 12.0 - 16.0 g/dL    Hematocrit 34.5 (L) 37.0 - 48.5 %    Platelets 327 150 - 450 K/uL     CMP  Sodium   Date Value Ref Range Status   07/23/2022 137 136 - 145 mmol/L Final     Potassium   Date Value Ref Range Status   07/23/2022 3.3 (L) 3.5 - 5.1 mmol/L Final     Chloride   Date Value Ref Range Status   07/23/2022 105 95 - 110 mmol/L Final     CO2   Date Value Ref Range Status   07/23/2022 22 (L) 23 - 29 mmol/L Final     Glucose   Date Value Ref Range Status   07/23/2022 119 (H) 70 - 110 mg/dL Final     BUN   Date Value Ref Range Status   07/23/2022 7 6 - 20 mg/dL Final     Creatinine   Date Value Ref Range Status   07/23/2022 0.8 0.5 - 1.4 mg/dL Final     Calcium   Date Value Ref Range Status   07/23/2022 8.9 8.7 - 10.5 mg/dL Final     Total Protein   Date Value Ref Range Status   07/23/2022 7.1 6.0 - 8.4 g/dL Final     Albumin   Date Value Ref Range Status   07/23/2022 3.9 3.5 - 5.2 g/dL Final     Total Bilirubin   Date Value Ref Range Status   07/23/2022 0.4 0.1 - 1.0 mg/dL Final     Comment:     For infants and newborns, interpretation of results should be based  on gestational age, weight and in agreement with  clinical  observations.    Premature Infant recommended reference ranges:  Up to 24 hours.............<8.0 mg/dL  Up to 48 hours............<12.0 mg/dL  3-5 days..................<15.0 mg/dL  6-29 days.................<15.0 mg/dL       Alkaline Phosphatase   Date Value Ref Range Status   07/23/2022 58 55 - 135 U/L Final     AST   Date Value Ref Range Status   07/23/2022 24 10 - 40 U/L Final     ALT   Date Value Ref Range Status   07/23/2022 47 (H) 10 - 44 U/L Final     Anion Gap   Date Value Ref Range Status   07/23/2022 10 8 - 16 mmol/L Final     eGFR if    Date Value Ref Range Status   07/23/2022 >60.0 >60 mL/min/1.73 m^2 Final     eGFR if non    Date Value Ref Range Status   07/23/2022 >60.0 >60 mL/min/1.73 m^2 Final     Comment:     Calculation used to obtain the estimated glomerular filtration  rate (eGFR) is the CKD-EPI equation.        No results found for: CEA  No results found for: PSA        Assessment/Plan:     Problem List Items Addressed This Visit       Iron deficiency anemia due to chronic blood loss     Patient is not making any improvement on the oral iron.  I feel she is in need of IV as she is profoundly fatigued and ferritin still only 4.  Discussed the risks or allergic reactions today and she is ok with this.  Will arrange this and have her back with me in 2 months with labs.      Get reports from Dr. Leoanrdo for EGD/Colon.           Relevant Orders    CBC Auto Differential    Ferritin    Elevated tumor markers     Recheck is normal.  Observe for now.  Also noted that pelvic u/s is negative.             Discussion:     Follow up in about 2 months (around 3/4/2023).      Electronically signed by Jose Burgess

## 2023-01-04 NOTE — ASSESSMENT & PLAN NOTE
Patient is not making any improvement on the oral iron.  I feel she is in need of IV as she is profoundly fatigued and ferritin still only 4.  Discussed the risks or allergic reactions today and she is ok with this.  Will arrange this and have her back with me in 2 months with labs.      Get reports from Dr. Leonardo for EGD/Colon.

## 2023-01-09 ENCOUNTER — TELEPHONE (OUTPATIENT)
Dept: HEMATOLOGY/ONCOLOGY | Facility: CLINIC | Age: 28
End: 2023-01-09

## 2023-01-09 RX ORDER — SODIUM CHLORIDE 0.9 % (FLUSH) 0.9 %
10 SYRINGE (ML) INJECTION
Status: CANCELLED | OUTPATIENT
Start: 2023-01-13

## 2023-01-09 RX ORDER — METHYLPREDNISOLONE SOD SUCC 125 MG
125 VIAL (EA) INJECTION ONCE AS NEEDED
Status: CANCELLED | OUTPATIENT
Start: 2023-01-13

## 2023-01-09 RX ORDER — EPINEPHRINE 0.3 MG/.3ML
0.3 INJECTION SUBCUTANEOUS ONCE AS NEEDED
Status: CANCELLED | OUTPATIENT
Start: 2023-01-13

## 2023-01-09 RX ORDER — HEPARIN 100 UNIT/ML
500 SYRINGE INTRAVENOUS
Status: CANCELLED | OUTPATIENT
Start: 2023-01-13

## 2023-01-09 RX ORDER — DIPHENHYDRAMINE HYDROCHLORIDE 50 MG/ML
50 INJECTION INTRAMUSCULAR; INTRAVENOUS ONCE AS NEEDED
Status: CANCELLED | OUTPATIENT
Start: 2023-01-13

## 2023-01-09 NOTE — TELEPHONE ENCOUNTER
Left VM informing pt that we had to switch her from Injectafer to Ferrlicit because it's insurance preferred. Advised to call back with any additional questions.

## 2023-01-09 NOTE — TELEPHONE ENCOUNTER
----- Message from PRAKASH Calderon sent at 1/9/2023 10:34 AM CST -----  Please let her know we are changing her to ferrlicit per her Ins recs. This will be 8 infusions instead of 2.    Jennifer  ----- Message -----  From: Jose Weinberg MD  Sent: 1/8/2023   7:35 AM CST  To: PRAKASH Calderon    I guess ferrlicit  ----- Message -----  From: PRAKASH Calderon  Sent: 1/6/2023  11:32 AM CST  To: Jose Weinberg MD    Do you want to start giving Ferrlicit or Venofer?    Jennifer  ----- Message -----  From: Korin Penny  Sent: 1/6/2023   9:44 AM CST  To: PRAKASH Calderon    Patient was ordered Injectafer, per [facility] connections this is a non-preferred drug. She will have to try Venofer or Ferrlecit before she can try injectafer.

## 2023-01-09 NOTE — PROGRESS NOTES
Per the patients insurance she is required to fail both Ferrlicit and Venofer. They will not cover Injectafer. Per Dr. Weinberg orders changed to Ferrlicit.

## 2023-01-25 ENCOUNTER — INFUSION (OUTPATIENT)
Dept: INFUSION THERAPY | Facility: HOSPITAL | Age: 28
End: 2023-01-25
Attending: INTERNAL MEDICINE
Payer: MEDICAID

## 2023-01-25 ENCOUNTER — TELEPHONE (OUTPATIENT)
Dept: INFUSION THERAPY | Facility: HOSPITAL | Age: 28
End: 2023-01-25

## 2023-01-25 VITALS
OXYGEN SATURATION: 98 % | BODY MASS INDEX: 24.38 KG/M2 | WEIGHT: 129.13 LBS | HEART RATE: 85 BPM | TEMPERATURE: 98 F | HEIGHT: 61 IN | RESPIRATION RATE: 16 BRPM | DIASTOLIC BLOOD PRESSURE: 78 MMHG | SYSTOLIC BLOOD PRESSURE: 116 MMHG

## 2023-01-25 DIAGNOSIS — D50.0 IRON DEFICIENCY ANEMIA DUE TO CHRONIC BLOOD LOSS: Primary | ICD-10-CM

## 2023-01-25 PROCEDURE — 63600175 PHARM REV CODE 636 W HCPCS: Performed by: NURSE PRACTITIONER

## 2023-01-25 PROCEDURE — 96365 THER/PROPH/DIAG IV INF INIT: CPT

## 2023-01-25 PROCEDURE — 25000003 PHARM REV CODE 250: Performed by: NURSE PRACTITIONER

## 2023-01-25 RX ORDER — SODIUM CHLORIDE 0.9 % (FLUSH) 0.9 %
10 SYRINGE (ML) INJECTION
Status: CANCELLED | OUTPATIENT
Start: 2023-02-01

## 2023-01-25 RX ORDER — HEPARIN 100 UNIT/ML
500 SYRINGE INTRAVENOUS
Status: CANCELLED | OUTPATIENT
Start: 2023-02-01

## 2023-01-25 RX ORDER — DIPHENHYDRAMINE HYDROCHLORIDE 50 MG/ML
50 INJECTION INTRAMUSCULAR; INTRAVENOUS ONCE AS NEEDED
Status: CANCELLED | OUTPATIENT
Start: 2023-02-01

## 2023-01-25 RX ORDER — EPINEPHRINE 0.3 MG/.3ML
0.3 INJECTION SUBCUTANEOUS ONCE AS NEEDED
Status: CANCELLED | OUTPATIENT
Start: 2023-02-01

## 2023-01-25 RX ORDER — METHYLPREDNISOLONE SOD SUCC 125 MG
125 VIAL (EA) INJECTION ONCE AS NEEDED
Status: CANCELLED | OUTPATIENT
Start: 2023-02-01

## 2023-01-25 RX ADMIN — SODIUM CHLORIDE 125 MG: 9 INJECTION, SOLUTION INTRAVENOUS at 11:01

## 2023-01-25 RX ADMIN — SODIUM CHLORIDE: 0.9 INJECTION, SOLUTION INTRAVENOUS at 11:01

## 2023-01-25 NOTE — PLAN OF CARE
Problem: Anemia  Goal: Anemia Symptom Improvement  Outcome: Met     Problem: Fatigue  Goal: Improved Activity Tolerance  Outcome: Met     Problem: Diarrhea  Goal: Fluid and Electrolyte Balance  Outcome: Met

## 2023-01-25 NOTE — TELEPHONE ENCOUNTER
Spoke with AI Porter NP and instructed to have patient go to the ER for evaluation. Patient notified and voiced understanding. Stated her hands are now still, but no resp difficulty. I informed it is best to get evaluated since it's her first infusion and her symptoms are not typical. She voiced understanding.

## 2023-01-25 NOTE — TELEPHONE ENCOUNTER
Received call from patient that she is home after having her iron infusion and now her bilateral lower legs are swollen from her knees down and are purplish blue and hurts to walk on them. Patient denies and shortness of breath,wheezing, rash, etc and wanted to know if she could take Benadryl. I informed her she could go ahead and take the benadryl, but she needs to get checked out at the hospital. Patient requested to try the benadryl first. I informed her I would notify the MD and see what else they had to say. Patient will also take a claritin as well since she believes she has that on hand. Message sent to doctor's office

## 2023-01-27 NOTE — PROGRESS NOTES
PROGRESS NOTE    Subjective:       Patient ID: Irene Grayson is a 27 y.o. female.    Chief Complaint:  No chief complaint on file.  Folate and iron def follow up    History of Present Illness:   Irene Grayson is a 27 y.o. female who presents for follow up of above.   Patient received first infusion of iron and had 1 hour wait time and the 1 hour later approx developed bilateral lower ext in the knee caps and then bilateral feet with red mottling. Went to the ER had blood work but received no medication and this resolved the following day. She did hand bilateral pain in her hands.    She denies any fevers, CP, SOB.    Patient has been on BID iron and counts not improved.        EGD and Colon done by Dr. Leonardo.      now normal.       PMH:  Ms. Grayson is a 26yo female who is referred for anemia.  Review of labs show her counts low dating back to 12/1019 and she has been as low as 6.0g/dl requiring transfusion in [place] 2020.   She is more recently running int he 10 to 11g/dl range.     She states this has been attributed to iron deficiency.  She is currently on FeSo4 325mg bid and folate.  She states she has been on iron in the past but feels it did not help.      She had upper endo done 11/7/2022 which showed no bleeding areas and will do colonoscopy on 12/5/2022.     She does note a history of long menstrual cycles which are usually up to 7 days and still spots for an additional few days after each cycle.     Family and Social history reviewed and is unchanged from 11/23/2022      ROS:  Review of Systems   Constitutional:  Negative for fever.   Respiratory:  Negative for shortness of breath.    Cardiovascular:  Negative for chest pain and leg swelling.   Gastrointestinal:  Negative for abdominal pain and blood in stool.   Genitourinary:  Negative for hematuria.   Skin:  Negative for rash.        Current Outpatient Medications:      amoxicillin-clavulanate 875-125mg (AUGMENTIN) 875-125 mg per tablet, Take 1 tablet by mouth 2 (two) times daily., Disp: , Rfl:     folic acid (FOLVITE) 1 MG tablet, Take 1 mg by mouth once daily., Disp: , Rfl:     gabapentin (NEURONTIN) 300 MG capsule, Take 1 capsule (300 mg total) by mouth Daily. May increase to twice daily if needed. (Patient not taking: Reported on 11/23/2022), Disp: 28 capsule, Rfl: 0    hyoscyamine (ANASPAZ,LEVSIN) 0.125 mg Tab, Take 1 tablet (125 mcg total) by mouth every 6 (six) hours as needed (abdominal pain)., Disp: 15 tablet, Rfl: 0    ibuprofen (ADVIL,MOTRIN) 600 MG tablet, Take 1 tablet (600 mg total) by mouth every 8 (eight) hours as needed for Pain. (Patient not taking: Reported on 11/23/2022), Disp: 20 tablet, Rfl: 0    metoprolol tartrate (LOPRESSOR) 25 MG tablet, Take 12.5 mg by mouth 2 (two) times daily., Disp: , Rfl:     metroNIDAZOLE (FLAGYL) 500 MG tablet, Take 500 mg by mouth 2 (two) times daily., Disp: , Rfl:     ondansetron (ZOFRAN-ODT) 4 MG TbDL, Take 1 tablet (4 mg total) by mouth every 8 (eight) hours as needed (nausea). (Patient not taking: Reported on 11/23/2022), Disp: 10 tablet, Rfl: 0    ondansetron (ZOFRAN-ODT) 4 MG TbDL, Take 1 tablet (4 mg total) by mouth every 8 (eight) hours as needed. (Patient not taking: Reported on 11/23/2022), Disp: 10 tablet, Rfl: 0    oxyCODONE (ROXICODONE) 5 MG immediate release tablet, Take 1 tablet (5 mg total) by mouth every 4 (four) hours as needed. (Patient not taking: Reported on 11/23/2022), Disp: 28 tablet, Rfl: 0    promethazine (PHENERGAN) 25 MG tablet, Take 25 mg by mouth every 6 (six) hours as needed for Nausea., Disp: , Rfl:     valACYclovir (VALTREX) 1000 MG tablet, Take 1 tablet (1,000 mg total) by mouth 3 (three) times daily. for 7 days, Disp: 21 tablet, Rfl: 0        Objective:       Physical Examination:     /76   Pulse 97   Temp 98.3 °F (36.8 °C)   Wt 59 kg (130 lb 1.6 oz)   BMI 24.58 kg/m²     Physical  Exam  Constitutional:       Appearance: Normal appearance. She is well-developed.   HENT:      Head: Normocephalic and atraumatic.      Right Ear: External ear normal.      Left Ear: External ear normal.   Eyes:      Conjunctiva/sclera: Conjunctivae normal.      Pupils: Pupils are equal, round, and reactive to light.   Neck:      Thyroid: No thyromegaly.      Trachea: No tracheal deviation.   Cardiovascular:      Rate and Rhythm: Normal rate and regular rhythm.      Heart sounds: Normal heart sounds.   Pulmonary:      Effort: Pulmonary effort is normal.      Breath sounds: Normal breath sounds.   Abdominal:      General: Bowel sounds are normal. There is no distension.      Palpations: Abdomen is soft. There is no mass.      Tenderness: There is no abdominal tenderness.   Musculoskeletal:      Right lower leg: No edema.      Left lower leg: No edema.   Skin:     General: Skin is warm and dry.      Findings: No rash.   Neurological:      General: No focal deficit present.      Mental Status: She is alert and oriented to person, place, and time.      Comments: Neuro intact througout   Psychiatric:         Mood and Affect: Mood normal.         Behavior: Behavior normal.         Thought Content: Thought content normal.         Judgment: Judgment normal.       Labs:   Recent Results (from the past 336 hour(s))   CBC auto differential    Collection Time: 01/25/23  5:17 PM   Result Value Ref Range    WBC 10.86 3.90 - 12.70 K/uL    Hemoglobin 11.1 (L) 12.0 - 16.0 g/dL    Hematocrit 37.3 37.0 - 48.5 %    Platelets 437 150 - 450 K/uL     CMP  Sodium   Date Value Ref Range Status   01/25/2023 137 136 - 145 mmol/L Final     Potassium   Date Value Ref Range Status   01/25/2023 3.9 3.5 - 5.1 mmol/L Final     Chloride   Date Value Ref Range Status   01/25/2023 104 95 - 110 mmol/L Final     CO2   Date Value Ref Range Status   01/25/2023 24 23 - 29 mmol/L Final     Glucose   Date Value Ref Range Status   01/25/2023 100 70 - 110  mg/dL Final     BUN   Date Value Ref Range Status   01/25/2023 9 6 - 20 mg/dL Final     Creatinine   Date Value Ref Range Status   01/25/2023 0.8 0.5 - 1.4 mg/dL Final     Calcium   Date Value Ref Range Status   01/25/2023 9.2 8.7 - 10.5 mg/dL Final     Total Protein   Date Value Ref Range Status   01/25/2023 7.7 6.0 - 8.4 g/dL Final     Albumin   Date Value Ref Range Status   01/25/2023 4.0 3.5 - 5.2 g/dL Final     Total Bilirubin   Date Value Ref Range Status   01/25/2023 0.4 0.1 - 1.0 mg/dL Final     Comment:     For infants and newborns, interpretation of results should be based  on gestational age, weight and in agreement with clinical  observations.    Premature Infant recommended reference ranges:  Up to 24 hours.............<8.0 mg/dL  Up to 48 hours............<12.0 mg/dL  3-5 days..................<15.0 mg/dL  6-29 days.................<15.0 mg/dL       Alkaline Phosphatase   Date Value Ref Range Status   01/25/2023 70 55 - 135 U/L Final     AST   Date Value Ref Range Status   01/25/2023 19 10 - 40 U/L Final     ALT   Date Value Ref Range Status   01/25/2023 12 10 - 44 U/L Final     Anion Gap   Date Value Ref Range Status   01/25/2023 9 8 - 16 mmol/L Final     eGFR if    Date Value Ref Range Status   07/23/2022 >60.0 >60 mL/min/1.73 m^2 Final     eGFR if non    Date Value Ref Range Status   07/23/2022 >60.0 >60 mL/min/1.73 m^2 Final     Comment:     Calculation used to obtain the estimated glomerular filtration  rate (eGFR) is the CKD-EPI equation.        No results found for: CEA  No results found for: PSA        Assessment/Plan:     Problem List Items Addressed This Visit          Oncology    Iron deficiency anemia due to chronic blood loss - Primary       Orthopedic    Bilateral swelling of feet       GABY- Continue with dose #2 Ferrlicit; Premed with Benadryl, Pepcid and Solumedrol  Swelling bilateral feet- Continue with dose #2 Ferrlicit; Premed with Benadryl, Pepcid and  Solumedrol      Discussion:     Follow up in about 6 weeks (around 3/13/2023) for with Dr. Weinberg.      Electronically signed by Jennifer Porter, MSN, APRN, AGNP-C, OCN

## 2023-01-30 ENCOUNTER — TELEPHONE (OUTPATIENT)
Dept: INFUSION THERAPY | Facility: HOSPITAL | Age: 28
End: 2023-01-30

## 2023-01-30 ENCOUNTER — OFFICE VISIT (OUTPATIENT)
Dept: HEMATOLOGY/ONCOLOGY | Facility: CLINIC | Age: 28
End: 2023-01-30
Payer: MEDICAID

## 2023-01-30 VITALS
TEMPERATURE: 98 F | DIASTOLIC BLOOD PRESSURE: 76 MMHG | SYSTOLIC BLOOD PRESSURE: 128 MMHG | HEART RATE: 97 BPM | WEIGHT: 130.13 LBS | BODY MASS INDEX: 24.58 KG/M2

## 2023-01-30 DIAGNOSIS — D50.0 IRON DEFICIENCY ANEMIA DUE TO CHRONIC BLOOD LOSS: Primary | ICD-10-CM

## 2023-01-30 DIAGNOSIS — M79.89 BILATERAL SWELLING OF FEET: ICD-10-CM

## 2023-01-30 PROCEDURE — 3008F PR BODY MASS INDEX (BMI) DOCUMENTED: ICD-10-PCS | Mod: CPTII,S$GLB,, | Performed by: NURSE PRACTITIONER

## 2023-01-30 PROCEDURE — 1160F RVW MEDS BY RX/DR IN RCRD: CPT | Mod: CPTII,S$GLB,, | Performed by: NURSE PRACTITIONER

## 2023-01-30 PROCEDURE — 1160F PR REVIEW ALL MEDS BY PRESCRIBER/CLIN PHARMACIST DOCUMENTED: ICD-10-PCS | Mod: CPTII,S$GLB,, | Performed by: NURSE PRACTITIONER

## 2023-01-30 PROCEDURE — 1159F PR MEDICATION LIST DOCUMENTED IN MEDICAL RECORD: ICD-10-PCS | Mod: CPTII,S$GLB,, | Performed by: NURSE PRACTITIONER

## 2023-01-30 PROCEDURE — 3078F PR MOST RECENT DIASTOLIC BLOOD PRESSURE < 80 MM HG: ICD-10-PCS | Mod: CPTII,S$GLB,, | Performed by: NURSE PRACTITIONER

## 2023-01-30 PROCEDURE — 3078F DIAST BP <80 MM HG: CPT | Mod: CPTII,S$GLB,, | Performed by: NURSE PRACTITIONER

## 2023-01-30 PROCEDURE — 3008F BODY MASS INDEX DOCD: CPT | Mod: CPTII,S$GLB,, | Performed by: NURSE PRACTITIONER

## 2023-01-30 PROCEDURE — 3074F SYST BP LT 130 MM HG: CPT | Mod: CPTII,S$GLB,, | Performed by: NURSE PRACTITIONER

## 2023-01-30 PROCEDURE — 3074F PR MOST RECENT SYSTOLIC BLOOD PRESSURE < 130 MM HG: ICD-10-PCS | Mod: CPTII,S$GLB,, | Performed by: NURSE PRACTITIONER

## 2023-01-30 PROCEDURE — 1159F MED LIST DOCD IN RCRD: CPT | Mod: CPTII,S$GLB,, | Performed by: NURSE PRACTITIONER

## 2023-01-30 PROCEDURE — 99213 PR OFFICE/OUTPT VISIT, EST, LEVL III, 20-29 MIN: ICD-10-PCS | Mod: S$GLB,,, | Performed by: NURSE PRACTITIONER

## 2023-01-30 PROCEDURE — 99213 OFFICE O/P EST LOW 20 MIN: CPT | Mod: S$GLB,,, | Performed by: NURSE PRACTITIONER

## 2023-01-30 RX ORDER — DIPHENHYDRAMINE HYDROCHLORIDE 50 MG/ML
25 INJECTION INTRAMUSCULAR; INTRAVENOUS
Status: CANCELLED
Start: 2023-01-30

## 2023-01-30 RX ORDER — FAMOTIDINE 10 MG/ML
20 INJECTION INTRAVENOUS
Status: CANCELLED
Start: 2023-01-30 | End: 2023-01-30

## 2023-01-30 RX ORDER — METHYLPREDNISOLONE SOD SUCC 125 MG
125 VIAL (EA) INJECTION
Status: CANCELLED
Start: 2023-01-30

## 2023-02-08 ENCOUNTER — PATIENT MESSAGE (OUTPATIENT)
Dept: HEMATOLOGY/ONCOLOGY | Facility: CLINIC | Age: 28
End: 2023-02-08

## 2023-02-08 ENCOUNTER — INFUSION (OUTPATIENT)
Dept: INFUSION THERAPY | Facility: HOSPITAL | Age: 28
End: 2023-02-08
Attending: INTERNAL MEDICINE
Payer: MEDICAID

## 2023-02-08 VITALS
WEIGHT: 129 LBS | HEIGHT: 64 IN | BODY MASS INDEX: 22.02 KG/M2 | HEART RATE: 105 BPM | OXYGEN SATURATION: 98 % | SYSTOLIC BLOOD PRESSURE: 125 MMHG | TEMPERATURE: 98 F | DIASTOLIC BLOOD PRESSURE: 79 MMHG | RESPIRATION RATE: 18 BRPM

## 2023-02-08 DIAGNOSIS — M79.89 BILATERAL SWELLING OF FEET: Primary | ICD-10-CM

## 2023-02-08 DIAGNOSIS — D50.0 IRON DEFICIENCY ANEMIA DUE TO CHRONIC BLOOD LOSS: ICD-10-CM

## 2023-02-08 PROCEDURE — 96367 TX/PROPH/DG ADDL SEQ IV INF: CPT

## 2023-02-08 PROCEDURE — 25000003 PHARM REV CODE 250: Performed by: NURSE PRACTITIONER

## 2023-02-08 PROCEDURE — 96365 THER/PROPH/DIAG IV INF INIT: CPT

## 2023-02-08 PROCEDURE — 96375 TX/PRO/DX INJ NEW DRUG ADDON: CPT

## 2023-02-08 PROCEDURE — 63600175 PHARM REV CODE 636 W HCPCS: Performed by: NURSE PRACTITIONER

## 2023-02-08 RX ORDER — HEPARIN 100 UNIT/ML
500 SYRINGE INTRAVENOUS
Status: CANCELLED | OUTPATIENT
Start: 2023-02-09

## 2023-02-08 RX ORDER — METHYLPREDNISOLONE SOD SUCC 125 MG
125 VIAL (EA) INJECTION ONCE AS NEEDED
Status: CANCELLED | OUTPATIENT
Start: 2023-02-09

## 2023-02-08 RX ORDER — SODIUM CHLORIDE 0.9 % (FLUSH) 0.9 %
10 SYRINGE (ML) INJECTION
Status: DISCONTINUED | OUTPATIENT
Start: 2023-02-08 | End: 2023-02-08 | Stop reason: HOSPADM

## 2023-02-08 RX ORDER — DIPHENHYDRAMINE HYDROCHLORIDE 50 MG/ML
50 INJECTION INTRAMUSCULAR; INTRAVENOUS ONCE AS NEEDED
Status: CANCELLED | OUTPATIENT
Start: 2023-02-09

## 2023-02-08 RX ORDER — SODIUM CHLORIDE 0.9 % (FLUSH) 0.9 %
10 SYRINGE (ML) INJECTION
Status: CANCELLED | OUTPATIENT
Start: 2023-02-09

## 2023-02-08 RX ORDER — FAMOTIDINE 10 MG/ML
20 INJECTION INTRAVENOUS
Status: COMPLETED | OUTPATIENT
Start: 2023-02-08 | End: 2023-02-08

## 2023-02-08 RX ORDER — METHYLPREDNISOLONE SOD SUCC 125 MG
125 VIAL (EA) INJECTION
Status: CANCELLED
Start: 2023-02-09

## 2023-02-08 RX ORDER — METHYLPREDNISOLONE SOD SUCC 125 MG
125 VIAL (EA) INJECTION
Status: COMPLETED | OUTPATIENT
Start: 2023-02-08 | End: 2023-02-08

## 2023-02-08 RX ORDER — DIPHENHYDRAMINE HYDROCHLORIDE 50 MG/ML
25 INJECTION INTRAMUSCULAR; INTRAVENOUS
Status: CANCELLED
Start: 2023-02-09

## 2023-02-08 RX ORDER — EPINEPHRINE 0.3 MG/.3ML
0.3 INJECTION SUBCUTANEOUS ONCE AS NEEDED
Status: CANCELLED | OUTPATIENT
Start: 2023-02-09

## 2023-02-08 RX ORDER — FAMOTIDINE 10 MG/ML
20 INJECTION INTRAVENOUS
Status: CANCELLED
Start: 2023-02-09 | End: 2023-02-09

## 2023-02-08 RX ADMIN — FAMOTIDINE 20 MG: 10 INJECTION INTRAVENOUS at 01:02

## 2023-02-08 RX ADMIN — METHYLPREDNISOLONE SODIUM SUCCINATE 125 MG: 125 INJECTION, POWDER, FOR SOLUTION INTRAMUSCULAR; INTRAVENOUS at 01:02

## 2023-02-08 RX ADMIN — SODIUM CHLORIDE 125 MG: 9 INJECTION, SOLUTION INTRAVENOUS at 01:02

## 2023-02-08 RX ADMIN — DIPHENHYDRAMINE HYDROCHLORIDE 25 MG: 50 INJECTION INTRAMUSCULAR; INTRAVENOUS at 01:02

## 2023-02-09 RX ORDER — SODIUM CHLORIDE 0.9 % (FLUSH) 0.9 %
10 SYRINGE (ML) INJECTION
Status: CANCELLED | OUTPATIENT
Start: 2023-02-15

## 2023-02-09 RX ORDER — HEPARIN 100 UNIT/ML
500 SYRINGE INTRAVENOUS
Status: CANCELLED | OUTPATIENT
Start: 2023-02-15

## 2023-02-09 RX ORDER — EPINEPHRINE 0.3 MG/.3ML
0.3 INJECTION SUBCUTANEOUS ONCE AS NEEDED
Status: CANCELLED | OUTPATIENT
Start: 2023-02-15

## 2023-02-09 RX ORDER — METHYLPREDNISOLONE SOD SUCC 125 MG
125 VIAL (EA) INJECTION ONCE AS NEEDED
Status: CANCELLED | OUTPATIENT
Start: 2023-02-15

## 2023-02-09 RX ORDER — DIPHENHYDRAMINE HYDROCHLORIDE 50 MG/ML
25 INJECTION INTRAMUSCULAR; INTRAVENOUS
Status: CANCELLED
Start: 2023-02-15

## 2023-02-09 RX ORDER — METHYLPREDNISOLONE SOD SUCC 125 MG
125 VIAL (EA) INJECTION
Status: CANCELLED
Start: 2023-02-15

## 2023-02-09 RX ORDER — DIPHENHYDRAMINE HYDROCHLORIDE 50 MG/ML
50 INJECTION INTRAMUSCULAR; INTRAVENOUS ONCE AS NEEDED
Status: CANCELLED | OUTPATIENT
Start: 2023-02-15

## 2023-02-09 NOTE — PROGRESS NOTES
Patient unable to tolerate the Ferrlicit due to bilateral lower ext swelling and pain. Will switch from Ferrlicit to Venofer premedicate with Benadryl and Steroid.

## 2023-02-15 ENCOUNTER — INFUSION (OUTPATIENT)
Dept: INFUSION THERAPY | Facility: HOSPITAL | Age: 28
End: 2023-02-15
Attending: INTERNAL MEDICINE
Payer: MEDICAID

## 2023-02-15 VITALS
SYSTOLIC BLOOD PRESSURE: 113 MMHG | RESPIRATION RATE: 18 BRPM | BODY MASS INDEX: 21.85 KG/M2 | HEART RATE: 90 BPM | TEMPERATURE: 97 F | HEIGHT: 64 IN | WEIGHT: 128 LBS | DIASTOLIC BLOOD PRESSURE: 73 MMHG

## 2023-02-15 DIAGNOSIS — M79.89 BILATERAL SWELLING OF FEET: Primary | ICD-10-CM

## 2023-02-15 DIAGNOSIS — D50.0 IRON DEFICIENCY ANEMIA DUE TO CHRONIC BLOOD LOSS: ICD-10-CM

## 2023-02-15 PROCEDURE — 25000003 PHARM REV CODE 250: Performed by: NURSE PRACTITIONER

## 2023-02-15 PROCEDURE — A4216 STERILE WATER/SALINE, 10 ML: HCPCS | Performed by: NURSE PRACTITIONER

## 2023-02-15 PROCEDURE — 96375 TX/PRO/DX INJ NEW DRUG ADDON: CPT

## 2023-02-15 PROCEDURE — 96365 THER/PROPH/DIAG IV INF INIT: CPT

## 2023-02-15 PROCEDURE — 63600175 PHARM REV CODE 636 W HCPCS: Performed by: NURSE PRACTITIONER

## 2023-02-15 RX ORDER — SODIUM CHLORIDE 0.9 % (FLUSH) 0.9 %
10 SYRINGE (ML) INJECTION
Status: DISCONTINUED | OUTPATIENT
Start: 2023-02-15 | End: 2023-02-15 | Stop reason: HOSPADM

## 2023-02-15 RX ORDER — EPINEPHRINE 0.3 MG/.3ML
0.3 INJECTION SUBCUTANEOUS ONCE AS NEEDED
Status: CANCELLED | OUTPATIENT
Start: 2023-02-22

## 2023-02-15 RX ORDER — SODIUM CHLORIDE 0.9 % (FLUSH) 0.9 %
10 SYRINGE (ML) INJECTION
Status: CANCELLED | OUTPATIENT
Start: 2023-02-22

## 2023-02-15 RX ORDER — METHYLPREDNISOLONE SOD SUCC 125 MG
125 VIAL (EA) INJECTION
Status: COMPLETED | OUTPATIENT
Start: 2023-02-15 | End: 2023-02-15

## 2023-02-15 RX ORDER — METHYLPREDNISOLONE SOD SUCC 125 MG
125 VIAL (EA) INJECTION
Status: CANCELLED
Start: 2023-02-22

## 2023-02-15 RX ORDER — METHYLPREDNISOLONE SOD SUCC 125 MG
125 VIAL (EA) INJECTION ONCE AS NEEDED
Status: CANCELLED | OUTPATIENT
Start: 2023-02-22

## 2023-02-15 RX ORDER — HEPARIN 100 UNIT/ML
500 SYRINGE INTRAVENOUS
Status: CANCELLED | OUTPATIENT
Start: 2023-02-22

## 2023-02-15 RX ORDER — DIPHENHYDRAMINE HYDROCHLORIDE 50 MG/ML
25 INJECTION INTRAMUSCULAR; INTRAVENOUS
Status: CANCELLED
Start: 2023-02-22

## 2023-02-15 RX ORDER — DIPHENHYDRAMINE HYDROCHLORIDE 50 MG/ML
50 INJECTION INTRAMUSCULAR; INTRAVENOUS ONCE AS NEEDED
Status: CANCELLED | OUTPATIENT
Start: 2023-02-22

## 2023-02-15 RX ADMIN — METHYLPREDNISOLONE SODIUM SUCCINATE 125 MG: 125 INJECTION, POWDER, FOR SOLUTION INTRAMUSCULAR; INTRAVENOUS at 01:02

## 2023-02-15 RX ADMIN — IRON SUCROSE 200 MG: 20 INJECTION, SOLUTION INTRAVENOUS at 01:02

## 2023-02-15 RX ADMIN — SODIUM CHLORIDE, PRESERVATIVE FREE 10 ML: 5 INJECTION INTRAVENOUS at 02:02

## 2023-02-15 RX ADMIN — SODIUM CHLORIDE: 9 INJECTION, SOLUTION INTRAVENOUS at 01:02

## 2023-02-15 NOTE — PLAN OF CARE
Problem: Anemia  Goal: Anemia Symptom Improvement  2/15/2023 1402 by Halie Cedillo RN  Outcome: Met  2/15/2023 1402 by Halie Cedillo RN  Outcome: Ongoing, Progressing

## 2023-02-28 ENCOUNTER — INFUSION (OUTPATIENT)
Dept: INFUSION THERAPY | Facility: HOSPITAL | Age: 28
End: 2023-02-28
Attending: INTERNAL MEDICINE
Payer: MEDICAID

## 2023-02-28 VITALS
OXYGEN SATURATION: 98 % | TEMPERATURE: 98 F | RESPIRATION RATE: 17 BRPM | BODY MASS INDEX: 22.45 KG/M2 | WEIGHT: 131.5 LBS | HEART RATE: 81 BPM | HEIGHT: 64 IN | DIASTOLIC BLOOD PRESSURE: 81 MMHG | SYSTOLIC BLOOD PRESSURE: 116 MMHG

## 2023-02-28 DIAGNOSIS — M79.89 BILATERAL SWELLING OF FEET: Primary | ICD-10-CM

## 2023-02-28 DIAGNOSIS — D50.0 IRON DEFICIENCY ANEMIA DUE TO CHRONIC BLOOD LOSS: ICD-10-CM

## 2023-02-28 PROCEDURE — 96375 TX/PRO/DX INJ NEW DRUG ADDON: CPT

## 2023-02-28 PROCEDURE — 25000003 PHARM REV CODE 250: Performed by: NURSE PRACTITIONER

## 2023-02-28 PROCEDURE — 63600175 PHARM REV CODE 636 W HCPCS: Performed by: NURSE PRACTITIONER

## 2023-02-28 PROCEDURE — 96365 THER/PROPH/DIAG IV INF INIT: CPT

## 2023-02-28 RX ORDER — METHYLPREDNISOLONE SOD SUCC 125 MG
125 VIAL (EA) INJECTION ONCE AS NEEDED
Status: CANCELLED | OUTPATIENT
Start: 2023-03-07

## 2023-02-28 RX ORDER — DIPHENHYDRAMINE HYDROCHLORIDE 50 MG/ML
25 INJECTION INTRAMUSCULAR; INTRAVENOUS
Status: CANCELLED
Start: 2023-03-07

## 2023-02-28 RX ORDER — HEPARIN 100 UNIT/ML
500 SYRINGE INTRAVENOUS
Status: CANCELLED | OUTPATIENT
Start: 2023-03-07

## 2023-02-28 RX ORDER — SODIUM CHLORIDE 0.9 % (FLUSH) 0.9 %
10 SYRINGE (ML) INJECTION
Status: DISCONTINUED | OUTPATIENT
Start: 2023-02-28 | End: 2023-02-28 | Stop reason: HOSPADM

## 2023-02-28 RX ORDER — SODIUM CHLORIDE 0.9 % (FLUSH) 0.9 %
10 SYRINGE (ML) INJECTION
Status: CANCELLED | OUTPATIENT
Start: 2023-03-07

## 2023-02-28 RX ORDER — EPINEPHRINE 0.3 MG/.3ML
0.3 INJECTION SUBCUTANEOUS ONCE AS NEEDED
Status: CANCELLED | OUTPATIENT
Start: 2023-03-07

## 2023-02-28 RX ORDER — METHYLPREDNISOLONE SOD SUCC 125 MG
125 VIAL (EA) INJECTION
Status: COMPLETED | OUTPATIENT
Start: 2023-02-28 | End: 2023-02-28

## 2023-02-28 RX ORDER — DIPHENHYDRAMINE HYDROCHLORIDE 50 MG/ML
50 INJECTION INTRAMUSCULAR; INTRAVENOUS ONCE AS NEEDED
Status: CANCELLED | OUTPATIENT
Start: 2023-03-07

## 2023-02-28 RX ORDER — METHYLPREDNISOLONE SOD SUCC 125 MG
125 VIAL (EA) INJECTION
Status: CANCELLED
Start: 2023-03-07

## 2023-02-28 RX ADMIN — METHYLPREDNISOLONE SODIUM SUCCINATE 125 MG: 125 INJECTION, POWDER, FOR SOLUTION INTRAMUSCULAR; INTRAVENOUS at 10:02

## 2023-02-28 RX ADMIN — IRON SUCROSE 200 MG: 20 INJECTION, SOLUTION INTRAVENOUS at 10:02

## 2023-02-28 RX ADMIN — SODIUM CHLORIDE: 0.9 INJECTION, SOLUTION INTRAVENOUS at 10:02

## 2023-03-08 ENCOUNTER — TELEPHONE (OUTPATIENT)
Dept: HEMATOLOGY/ONCOLOGY | Facility: CLINIC | Age: 28
End: 2023-03-08

## 2023-03-08 NOTE — TELEPHONE ENCOUNTER
Called patient regarding upcoming appointment and laboratory work for 03/14/2023, patient stated understanding.

## 2023-03-10 ENCOUNTER — TELEPHONE (OUTPATIENT)
Dept: INFUSION THERAPY | Facility: HOSPITAL | Age: 28
End: 2023-03-10

## 2023-03-10 NOTE — TELEPHONE ENCOUNTER
Patient cancelled Venofer infusion via [facility] system citing Appt time no longer works. MD office notified.

## 2023-03-13 ENCOUNTER — LAB VISIT (OUTPATIENT)
Dept: LAB | Facility: HOSPITAL | Age: 28
End: 2023-03-13
Attending: INTERNAL MEDICINE
Payer: MEDICAID

## 2023-03-13 DIAGNOSIS — D50.0 IRON DEFICIENCY ANEMIA DUE TO CHRONIC BLOOD LOSS: ICD-10-CM

## 2023-03-13 LAB
ANISOCYTOSIS BLD QL SMEAR: ABNORMAL
BASOPHILS # BLD AUTO: 0.03 K/UL (ref 0–0.2)
BASOPHILS NFR BLD: 0.4 % (ref 0–1.9)
DIFFERENTIAL METHOD: ABNORMAL
EOSINOPHIL # BLD AUTO: 0.1 K/UL (ref 0–0.5)
EOSINOPHIL NFR BLD: 0.7 % (ref 0–8)
ERYTHROCYTE [DISTWIDTH] IN BLOOD BY AUTOMATED COUNT: 24.3 % (ref 11.5–14.5)
FERRITIN SERPL-MCNC: 69 NG/ML (ref 20–300)
HCT VFR BLD AUTO: 41.8 % (ref 37–48.5)
HGB BLD-MCNC: 13.6 G/DL (ref 12–16)
IMM GRANULOCYTES # BLD AUTO: 0.03 K/UL (ref 0–0.04)
IMM GRANULOCYTES NFR BLD AUTO: 0.4 % (ref 0–0.5)
LYMPHOCYTES # BLD AUTO: 1.9 K/UL (ref 1–4.8)
LYMPHOCYTES NFR BLD: 25.5 % (ref 18–48)
MCH RBC QN AUTO: 26.5 PG (ref 27–31)
MCHC RBC AUTO-ENTMCNC: 32.5 G/DL (ref 32–36)
MCV RBC AUTO: 81 FL (ref 82–98)
MONOCYTES # BLD AUTO: 0.6 K/UL (ref 0.3–1)
MONOCYTES NFR BLD: 7.7 % (ref 4–15)
NEUTROPHILS # BLD AUTO: 4.8 K/UL (ref 1.8–7.7)
NEUTROPHILS NFR BLD: 65.3 % (ref 38–73)
NRBC BLD-RTO: 0 /100 WBC
PLATELET # BLD AUTO: 279 K/UL (ref 150–450)
PLATELET BLD QL SMEAR: ABNORMAL
PMV BLD AUTO: 9.3 FL (ref 9.2–12.9)
RBC # BLD AUTO: 5.14 M/UL (ref 4–5.4)
WBC # BLD AUTO: 7.4 K/UL (ref 3.9–12.7)

## 2023-03-13 PROCEDURE — 36415 COLL VENOUS BLD VENIPUNCTURE: CPT | Performed by: INTERNAL MEDICINE

## 2023-03-13 PROCEDURE — 85025 COMPLETE CBC W/AUTO DIFF WBC: CPT | Performed by: INTERNAL MEDICINE

## 2023-03-13 PROCEDURE — 82728 ASSAY OF FERRITIN: CPT | Performed by: INTERNAL MEDICINE

## 2023-03-15 ENCOUNTER — INFUSION (OUTPATIENT)
Dept: INFUSION THERAPY | Facility: HOSPITAL | Age: 28
End: 2023-03-15
Attending: INTERNAL MEDICINE
Payer: MEDICAID

## 2023-03-15 VITALS
WEIGHT: 132.31 LBS | TEMPERATURE: 98 F | BODY MASS INDEX: 22.59 KG/M2 | HEIGHT: 64 IN | DIASTOLIC BLOOD PRESSURE: 86 MMHG | RESPIRATION RATE: 16 BRPM | SYSTOLIC BLOOD PRESSURE: 127 MMHG | HEART RATE: 81 BPM | OXYGEN SATURATION: 98 %

## 2023-03-15 DIAGNOSIS — D50.0 IRON DEFICIENCY ANEMIA DUE TO CHRONIC BLOOD LOSS: ICD-10-CM

## 2023-03-15 DIAGNOSIS — M79.89 BILATERAL SWELLING OF FEET: Primary | ICD-10-CM

## 2023-03-15 PROCEDURE — 96365 THER/PROPH/DIAG IV INF INIT: CPT

## 2023-03-15 PROCEDURE — 96375 TX/PRO/DX INJ NEW DRUG ADDON: CPT

## 2023-03-15 PROCEDURE — 25000003 PHARM REV CODE 250: Performed by: NURSE PRACTITIONER

## 2023-03-15 PROCEDURE — 63600175 PHARM REV CODE 636 W HCPCS: Performed by: NURSE PRACTITIONER

## 2023-03-15 RX ORDER — METHYLPREDNISOLONE SOD SUCC 125 MG
125 VIAL (EA) INJECTION ONCE AS NEEDED
OUTPATIENT
Start: 2023-03-22

## 2023-03-15 RX ORDER — METHYLPREDNISOLONE SOD SUCC 125 MG
125 VIAL (EA) INJECTION
Start: 2023-03-22

## 2023-03-15 RX ORDER — HEPARIN 100 UNIT/ML
500 SYRINGE INTRAVENOUS
OUTPATIENT
Start: 2023-03-22

## 2023-03-15 RX ORDER — SODIUM CHLORIDE 0.9 % (FLUSH) 0.9 %
10 SYRINGE (ML) INJECTION
Status: DISCONTINUED | OUTPATIENT
Start: 2023-03-15 | End: 2023-03-15 | Stop reason: HOSPADM

## 2023-03-15 RX ORDER — SODIUM CHLORIDE 0.9 % (FLUSH) 0.9 %
10 SYRINGE (ML) INJECTION
OUTPATIENT
Start: 2023-03-22

## 2023-03-15 RX ORDER — DIPHENHYDRAMINE HYDROCHLORIDE 50 MG/ML
50 INJECTION INTRAMUSCULAR; INTRAVENOUS ONCE AS NEEDED
OUTPATIENT
Start: 2023-03-22

## 2023-03-15 RX ORDER — EPINEPHRINE 0.3 MG/.3ML
0.3 INJECTION SUBCUTANEOUS ONCE AS NEEDED
OUTPATIENT
Start: 2023-03-22

## 2023-03-15 RX ORDER — METHYLPREDNISOLONE SOD SUCC 125 MG
125 VIAL (EA) INJECTION
Status: COMPLETED | OUTPATIENT
Start: 2023-03-15 | End: 2023-03-15

## 2023-03-15 RX ORDER — DIPHENHYDRAMINE HYDROCHLORIDE 50 MG/ML
25 INJECTION INTRAMUSCULAR; INTRAVENOUS
Start: 2023-03-22

## 2023-03-15 RX ADMIN — SODIUM CHLORIDE: 0.9 INJECTION, SOLUTION INTRAVENOUS at 01:03

## 2023-03-15 RX ADMIN — IRON SUCROSE 200 MG: 20 INJECTION, SOLUTION INTRAVENOUS at 01:03

## 2023-03-15 RX ADMIN — METHYLPREDNISOLONE SODIUM SUCCINATE 125 MG: 125 INJECTION, POWDER, FOR SOLUTION INTRAMUSCULAR; INTRAVENOUS at 01:03

## 2023-03-15 NOTE — PLAN OF CARE
Problem: Anemia  Goal: Anemia Symptom Improvement  Outcome: Ongoing, Progressing  Intervention: Monitor and Manage Anemia  Flowsheets (Taken 3/15/2023 1301)  Oral Nutrition Promotion: safe use of adaptive equipment encouraged  Safety Promotion/Fall Prevention: instructed to call staff for mobility  Fatigue Management:   activity schedule adjusted   frequent rest breaks encouraged

## 2023-03-22 ENCOUNTER — OFFICE VISIT (OUTPATIENT)
Dept: HEMATOLOGY/ONCOLOGY | Facility: CLINIC | Age: 28
End: 2023-03-22
Payer: MEDICAID

## 2023-03-22 VITALS
WEIGHT: 135 LBS | DIASTOLIC BLOOD PRESSURE: 82 MMHG | RESPIRATION RATE: 20 BRPM | TEMPERATURE: 99 F | BODY MASS INDEX: 23.05 KG/M2 | HEART RATE: 87 BPM | HEIGHT: 64 IN | SYSTOLIC BLOOD PRESSURE: 128 MMHG

## 2023-03-22 DIAGNOSIS — D50.0 IRON DEFICIENCY ANEMIA DUE TO CHRONIC BLOOD LOSS: ICD-10-CM

## 2023-03-22 PROCEDURE — 3079F PR MOST RECENT DIASTOLIC BLOOD PRESSURE 80-89 MM HG: ICD-10-PCS | Mod: CPTII,S$GLB,, | Performed by: INTERNAL MEDICINE

## 2023-03-22 PROCEDURE — 3079F DIAST BP 80-89 MM HG: CPT | Mod: CPTII,S$GLB,, | Performed by: INTERNAL MEDICINE

## 2023-03-22 PROCEDURE — 3008F PR BODY MASS INDEX (BMI) DOCUMENTED: ICD-10-PCS | Mod: CPTII,S$GLB,, | Performed by: INTERNAL MEDICINE

## 2023-03-22 PROCEDURE — 1160F RVW MEDS BY RX/DR IN RCRD: CPT | Mod: CPTII,S$GLB,, | Performed by: INTERNAL MEDICINE

## 2023-03-22 PROCEDURE — 3008F BODY MASS INDEX DOCD: CPT | Mod: CPTII,S$GLB,, | Performed by: INTERNAL MEDICINE

## 2023-03-22 PROCEDURE — 1159F PR MEDICATION LIST DOCUMENTED IN MEDICAL RECORD: ICD-10-PCS | Mod: CPTII,S$GLB,, | Performed by: INTERNAL MEDICINE

## 2023-03-22 PROCEDURE — 99213 PR OFFICE/OUTPT VISIT, EST, LEVL III, 20-29 MIN: ICD-10-PCS | Mod: S$GLB,,, | Performed by: INTERNAL MEDICINE

## 2023-03-22 PROCEDURE — 3074F PR MOST RECENT SYSTOLIC BLOOD PRESSURE < 130 MM HG: ICD-10-PCS | Mod: CPTII,S$GLB,, | Performed by: INTERNAL MEDICINE

## 2023-03-22 PROCEDURE — 3074F SYST BP LT 130 MM HG: CPT | Mod: CPTII,S$GLB,, | Performed by: INTERNAL MEDICINE

## 2023-03-22 PROCEDURE — 1159F MED LIST DOCD IN RCRD: CPT | Mod: CPTII,S$GLB,, | Performed by: INTERNAL MEDICINE

## 2023-03-22 PROCEDURE — 1160F PR REVIEW ALL MEDS BY PRESCRIBER/CLIN PHARMACIST DOCUMENTED: ICD-10-PCS | Mod: CPTII,S$GLB,, | Performed by: INTERNAL MEDICINE

## 2023-03-22 PROCEDURE — 99213 OFFICE O/P EST LOW 20 MIN: CPT | Mod: S$GLB,,, | Performed by: INTERNAL MEDICINE

## 2023-03-22 NOTE — ASSESSMENT & PLAN NOTE
Patient is doing much better at this time.  She feels much better and her energy is great now.  I discussed this today and plan will be to see her again in 3 months with labs and if stable then every six months.  Will continue iron infusion if her ferritin falls.

## 2023-03-22 NOTE — PROGRESS NOTES
PROGRESS NOTE    Subjective:       Patient ID: Irene Grayson is a 27 y.o. female.    Chief Complaint:  No chief complaint on file.  Folate and iron def follow up    History of Present Illness:   Irene Grayson is a 27 y.o. female who presents for follow up of above.     Patient has been on BID iron and counts not improved.        EGD and Colon done by Dr. Leonardo.     Date:  Hb Ferritin  1/25/2023: 11.1 4  3/13/2023: 13.6 69    Venofer:                            Cycle 1: 2/15/2023  Cycle 2: 2/28/2023  Cycle 3: 3/15/2023       PMH:  Ms. Grayson is a 28yo female who is referred for anemia.  Review of labs show her counts low dating back to 12/1019 and she has been as low as 6.0g/dl requiring transfusion in [place] 2020.   She is more recently running int he 10 to 11g/dl range.     She states this has been attributed to iron deficiency.  She is currently on FeSo4 325mg bid and folate.  She states she has been on iron in the past but feels it did not help.      She had upper endo done 11/7/2022 which showed no bleeding areas and will do colonoscopy on 12/5/2022.     She does note a history of long menstrual cycles which are usually up to 7 days and still spots for an additional few days after each cycle.     Family and Social history reviewed and is unchanged from 11/23/2022      ROS:  Review of Systems   Constitutional:  Negative for fever.   Respiratory:  Negative for shortness of breath.    Cardiovascular:  Negative for chest pain and leg swelling.   Gastrointestinal:  Negative for abdominal pain and blood in stool.   Genitourinary:  Negative for hematuria.   Skin:  Negative for rash.        Current Outpatient Medications:     amoxicillin-clavulanate 875-125mg (AUGMENTIN) 875-125 mg per tablet, Take 1 tablet by mouth 2 (two) times daily., Disp: , Rfl:     folic acid (FOLVITE) 1 MG tablet, Take 1 mg by mouth once daily., Disp: , Rfl:     metoprolol tartrate  "(LOPRESSOR) 25 MG tablet, Take 12.5 mg by mouth 2 (two) times daily., Disp: , Rfl:     metroNIDAZOLE (FLAGYL) 500 MG tablet, Take 500 mg by mouth 2 (two) times daily., Disp: , Rfl:     promethazine (PHENERGAN) 25 MG tablet, Take 25 mg by mouth every 6 (six) hours as needed for Nausea., Disp: , Rfl:     gabapentin (NEURONTIN) 300 MG capsule, Take 1 capsule (300 mg total) by mouth Daily. May increase to twice daily if needed. (Patient not taking: Reported on 11/23/2022), Disp: 28 capsule, Rfl: 0    hyoscyamine (ANASPAZ,LEVSIN) 0.125 mg Tab, Take 1 tablet (125 mcg total) by mouth every 6 (six) hours as needed (abdominal pain)., Disp: 15 tablet, Rfl: 0    ibuprofen (ADVIL,MOTRIN) 600 MG tablet, Take 1 tablet (600 mg total) by mouth every 8 (eight) hours as needed for Pain. (Patient not taking: Reported on 11/23/2022), Disp: 20 tablet, Rfl: 0    ondansetron (ZOFRAN-ODT) 4 MG TbDL, Take 1 tablet (4 mg total) by mouth every 8 (eight) hours as needed (nausea). (Patient not taking: Reported on 11/23/2022), Disp: 10 tablet, Rfl: 0    ondansetron (ZOFRAN-ODT) 4 MG TbDL, Take 1 tablet (4 mg total) by mouth every 8 (eight) hours as needed. (Patient not taking: Reported on 11/23/2022), Disp: 10 tablet, Rfl: 0    oxyCODONE (ROXICODONE) 5 MG immediate release tablet, Take 1 tablet (5 mg total) by mouth every 4 (four) hours as needed. (Patient not taking: Reported on 11/23/2022), Disp: 28 tablet, Rfl: 0    valACYclovir (VALTREX) 1000 MG tablet, Take 1 tablet (1,000 mg total) by mouth 3 (three) times daily. for 7 days, Disp: 21 tablet, Rfl: 0        Objective:       Physical Examination:     /82   Pulse 87   Temp 98.5 °F (36.9 °C)   Resp 20   Ht 5' 4" (1.626 m)   Wt 61.2 kg (135 lb)   BMI 23.17 kg/m²     Physical Exam  Constitutional:       Appearance: She is well-developed.   HENT:      Head: Normocephalic and atraumatic.      Right Ear: External ear normal.      Left Ear: External ear normal.   Eyes:      " Conjunctiva/sclera: Conjunctivae normal.      Pupils: Pupils are equal, round, and reactive to light.   Neck:      Thyroid: No thyromegaly.      Trachea: No tracheal deviation.   Cardiovascular:      Rate and Rhythm: Normal rate and regular rhythm.      Heart sounds: Normal heart sounds.   Pulmonary:      Effort: Pulmonary effort is normal.      Breath sounds: Normal breath sounds.   Abdominal:      General: Bowel sounds are normal. There is no distension.      Palpations: Abdomen is soft. There is no mass.      Tenderness: There is no abdominal tenderness.   Skin:     Findings: No rash.   Neurological:      Comments: Neuro intact througout   Psychiatric:         Behavior: Behavior normal.         Thought Content: Thought content normal.         Judgment: Judgment normal.       Labs:   Recent Results (from the past 336 hour(s))   CBC Auto Differential    Collection Time: 03/13/23 11:43 AM   Result Value Ref Range    WBC 7.40 3.90 - 12.70 K/uL    Hemoglobin 13.6 12.0 - 16.0 g/dL    Hematocrit 41.8 37.0 - 48.5 %    Platelets 279 150 - 450 K/uL     CMP  Sodium   Date Value Ref Range Status   01/25/2023 137 136 - 145 mmol/L Final     Potassium   Date Value Ref Range Status   01/25/2023 3.9 3.5 - 5.1 mmol/L Final     Chloride   Date Value Ref Range Status   01/25/2023 104 95 - 110 mmol/L Final     CO2   Date Value Ref Range Status   01/25/2023 24 23 - 29 mmol/L Final     Glucose   Date Value Ref Range Status   01/25/2023 100 70 - 110 mg/dL Final     BUN   Date Value Ref Range Status   01/25/2023 9 6 - 20 mg/dL Final     Creatinine   Date Value Ref Range Status   01/25/2023 0.8 0.5 - 1.4 mg/dL Final     Calcium   Date Value Ref Range Status   01/25/2023 9.2 8.7 - 10.5 mg/dL Final     Total Protein   Date Value Ref Range Status   01/25/2023 7.7 6.0 - 8.4 g/dL Final     Albumin   Date Value Ref Range Status   01/25/2023 4.0 3.5 - 5.2 g/dL Final     Total Bilirubin   Date Value Ref Range Status   01/25/2023 0.4 0.1 - 1.0  mg/dL Final     Comment:     For infants and newborns, interpretation of results should be based  on gestational age, weight and in agreement with clinical  observations.    Premature Infant recommended reference ranges:  Up to 24 hours.............<8.0 mg/dL  Up to 48 hours............<12.0 mg/dL  3-5 days..................<15.0 mg/dL  6-29 days.................<15.0 mg/dL       Alkaline Phosphatase   Date Value Ref Range Status   01/25/2023 70 55 - 135 U/L Final     AST   Date Value Ref Range Status   01/25/2023 19 10 - 40 U/L Final     ALT   Date Value Ref Range Status   01/25/2023 12 10 - 44 U/L Final     Anion Gap   Date Value Ref Range Status   01/25/2023 9 8 - 16 mmol/L Final     eGFR if    Date Value Ref Range Status   07/23/2022 >60.0 >60 mL/min/1.73 m^2 Final     eGFR if non    Date Value Ref Range Status   07/23/2022 >60.0 >60 mL/min/1.73 m^2 Final     Comment:     Calculation used to obtain the estimated glomerular filtration  rate (eGFR) is the CKD-EPI equation.        No results found for: CEA  No results found for: PSA        Assessment/Plan:     Problem List Items Addressed This Visit       Iron deficiency anemia due to chronic blood loss     Patient is doing much better at this time.  She feels much better and her energy is great now.  I discussed this today and plan will be to see her again in 3 months with labs and if stable then every six months.  Will continue iron infusion if her ferritin falls.           Relevant Orders    CBC Auto Differential    Ferritin     Discussion:     Follow up in about 3 months (around 6/22/2023).      Electronically signed by Jose Burgess

## 2023-06-12 ENCOUNTER — TELEPHONE (OUTPATIENT)
Dept: HEMATOLOGY/ONCOLOGY | Facility: CLINIC | Age: 28
End: 2023-06-12

## 2023-06-12 NOTE — TELEPHONE ENCOUNTER
Called patient for upcoming appointment and laboratory work 06/20/2023, patient stated understanding.   F/u with Dr Aicha Mayers office in 3-4 weeks.

## 2023-06-19 ENCOUNTER — LAB VISIT (OUTPATIENT)
Dept: LAB | Facility: HOSPITAL | Age: 28
End: 2023-06-19
Attending: INTERNAL MEDICINE
Payer: MEDICAID

## 2023-06-19 DIAGNOSIS — D50.0 IRON DEFICIENCY ANEMIA DUE TO CHRONIC BLOOD LOSS: ICD-10-CM

## 2023-06-19 LAB
BASOPHILS # BLD AUTO: 0.06 K/UL (ref 0–0.2)
BASOPHILS NFR BLD: 0.7 % (ref 0–1.9)
DIFFERENTIAL METHOD: ABNORMAL
EOSINOPHIL # BLD AUTO: 0.1 K/UL (ref 0–0.5)
EOSINOPHIL NFR BLD: 0.7 % (ref 0–8)
ERYTHROCYTE [DISTWIDTH] IN BLOOD BY AUTOMATED COUNT: 12.9 % (ref 11.5–14.5)
HCT VFR BLD AUTO: 41.8 % (ref 37–48.5)
HGB BLD-MCNC: 13.8 G/DL (ref 12–16)
IMM GRANULOCYTES # BLD AUTO: 0.06 K/UL (ref 0–0.04)
IMM GRANULOCYTES NFR BLD AUTO: 0.7 % (ref 0–0.5)
LYMPHOCYTES # BLD AUTO: 1.9 K/UL (ref 1–4.8)
LYMPHOCYTES NFR BLD: 22.9 % (ref 18–48)
MCH RBC QN AUTO: 30.1 PG (ref 27–31)
MCHC RBC AUTO-ENTMCNC: 33 G/DL (ref 32–36)
MCV RBC AUTO: 91 FL (ref 82–98)
MONOCYTES # BLD AUTO: 0.6 K/UL (ref 0.3–1)
MONOCYTES NFR BLD: 7.7 % (ref 4–15)
NEUTROPHILS # BLD AUTO: 5.6 K/UL (ref 1.8–7.7)
NEUTROPHILS NFR BLD: 67.3 % (ref 38–73)
NRBC BLD-RTO: 0 /100 WBC
PLATELET # BLD AUTO: 318 K/UL (ref 150–450)
PMV BLD AUTO: 9.5 FL (ref 9.2–12.9)
RBC # BLD AUTO: 4.59 M/UL (ref 4–5.4)
WBC # BLD AUTO: 8.33 K/UL (ref 3.9–12.7)

## 2023-06-19 PROCEDURE — 36415 COLL VENOUS BLD VENIPUNCTURE: CPT | Performed by: INTERNAL MEDICINE

## 2023-06-19 PROCEDURE — 82728 ASSAY OF FERRITIN: CPT | Performed by: INTERNAL MEDICINE

## 2023-06-19 PROCEDURE — 85025 COMPLETE CBC W/AUTO DIFF WBC: CPT | Performed by: INTERNAL MEDICINE

## 2023-07-10 ENCOUNTER — HOSPITAL ENCOUNTER (EMERGENCY)
Facility: HOSPITAL | Age: 28
Discharge: HOME | End: 2023-07-10
Attending: EMERGENCY MEDICINE
Payer: MEDICAID

## 2023-07-10 VITALS
HEIGHT: 60 IN | HEART RATE: 84 BPM | BODY MASS INDEX: 26.5 KG/M2 | DIASTOLIC BLOOD PRESSURE: 79 MMHG | WEIGHT: 135 LBS | TEMPERATURE: 98 F | RESPIRATION RATE: 18 BRPM | SYSTOLIC BLOOD PRESSURE: 122 MMHG | OXYGEN SATURATION: 97 %

## 2023-07-10 DIAGNOSIS — R07.9 CHEST PAIN: ICD-10-CM

## 2023-07-10 DIAGNOSIS — M79.602 LEFT ARM PAIN: Primary | ICD-10-CM

## 2023-07-10 LAB
ANION GAP SERPL CALC-SCNC: 10 MMOL/L (ref 8–16)
B-HCG UR QL: NEGATIVE
BASOPHILS # BLD AUTO: 0.03 K/UL (ref 0–0.2)
BASOPHILS NFR BLD: 0.4 % (ref 0–1.9)
BUN SERPL-MCNC: 10 MG/DL (ref 6–20)
CALCIUM SERPL-MCNC: 9.7 MG/DL (ref 8.7–10.5)
CHLORIDE SERPL-SCNC: 107 MMOL/L (ref 95–110)
CO2 SERPL-SCNC: 21 MMOL/L (ref 23–29)
CREAT SERPL-MCNC: 0.7 MG/DL (ref 0.5–1.4)
D DIMER PPP IA.FEU-MCNC: <0.19 MG/L FEU
DIFFERENTIAL METHOD: NORMAL
EOSINOPHIL # BLD AUTO: 0.1 K/UL (ref 0–0.5)
EOSINOPHIL NFR BLD: 1 % (ref 0–8)
ERYTHROCYTE [DISTWIDTH] IN BLOOD BY AUTOMATED COUNT: 12.6 % (ref 11.5–14.5)
EST. GFR  (NO RACE VARIABLE): >60 ML/MIN/1.73 M^2
GLUCOSE SERPL-MCNC: 99 MG/DL (ref 70–110)
HCT VFR BLD AUTO: 39.5 % (ref 37–48.5)
HGB BLD-MCNC: 13.2 G/DL (ref 12–16)
IMM GRANULOCYTES # BLD AUTO: 0.03 K/UL (ref 0–0.04)
IMM GRANULOCYTES NFR BLD AUTO: 0.4 % (ref 0–0.5)
LYMPHOCYTES # BLD AUTO: 2 K/UL (ref 1–4.8)
LYMPHOCYTES NFR BLD: 23.7 % (ref 18–48)
MCH RBC QN AUTO: 30.4 PG (ref 27–31)
MCHC RBC AUTO-ENTMCNC: 33.4 G/DL (ref 32–36)
MCV RBC AUTO: 91 FL (ref 82–98)
MONOCYTES # BLD AUTO: 0.5 K/UL (ref 0.3–1)
MONOCYTES NFR BLD: 6.1 % (ref 4–15)
NEUTROPHILS # BLD AUTO: 5.7 K/UL (ref 1.8–7.7)
NEUTROPHILS NFR BLD: 68.4 % (ref 38–73)
NRBC BLD-RTO: 0 /100 WBC
PLATELET # BLD AUTO: 287 K/UL (ref 150–450)
PMV BLD AUTO: 9.6 FL (ref 9.2–12.9)
POTASSIUM SERPL-SCNC: 4 MMOL/L (ref 3.5–5.1)
RBC # BLD AUTO: 4.34 M/UL (ref 4–5.4)
SODIUM SERPL-SCNC: 138 MMOL/L (ref 136–145)
TROPONIN I SERPL DL<=0.01 NG/ML-MCNC: <0.006 NG/ML (ref 0–0.03)
WBC # BLD AUTO: 8.26 K/UL (ref 3.9–12.7)

## 2023-07-10 PROCEDURE — 93005 ELECTROCARDIOGRAM TRACING: CPT

## 2023-07-10 PROCEDURE — 36415 COLL VENOUS BLD VENIPUNCTURE: CPT | Performed by: EMERGENCY MEDICINE

## 2023-07-10 PROCEDURE — 80048 BASIC METABOLIC PNL TOTAL CA: CPT | Performed by: EMERGENCY MEDICINE

## 2023-07-10 PROCEDURE — 81025 URINE PREGNANCY TEST: CPT | Performed by: NURSE PRACTITIONER

## 2023-07-10 PROCEDURE — 99285 EMERGENCY DEPT VISIT HI MDM: CPT | Mod: 25

## 2023-07-10 PROCEDURE — 85379 FIBRIN DEGRADATION QUANT: CPT | Performed by: EMERGENCY MEDICINE

## 2023-07-10 PROCEDURE — 93010 EKG 12-LEAD: ICD-10-PCS | Mod: ,,, | Performed by: INTERNAL MEDICINE

## 2023-07-10 PROCEDURE — 84484 ASSAY OF TROPONIN QUANT: CPT | Performed by: EMERGENCY MEDICINE

## 2023-07-10 PROCEDURE — 85025 COMPLETE CBC W/AUTO DIFF WBC: CPT | Performed by: EMERGENCY MEDICINE

## 2023-07-10 PROCEDURE — 93010 ELECTROCARDIOGRAM REPORT: CPT | Mod: ,,, | Performed by: INTERNAL MEDICINE

## 2023-07-10 NOTE — FIRST PROVIDER EVALUATION
Emergency Department TeleTriage Encounter Note      CHIEF COMPLAINT    Chief Complaint   Patient presents with    Arm Pain     Left arm x 1 week     Chest Pain     Vs epigastric pain x 1 week        VITAL SIGNS   Initial Vitals [07/10/23 1823]   BP Pulse Resp Temp SpO2   (!) 140/81 97 20 98.1 °F (36.7 °C) 96 %      MAP       --            ALLERGIES    Review of patient's allergies indicates:   Allergen Reactions    Oranges [orange]      ulcers     PROVIDER TRIAGE NOTE  Verbal consent for the teletriage evaluation was given by the patient at the start of the evaluation.  All efforts will be made to maintain patient's privacy during the evaluation.      This is a teletriage evaluation of a 27 y.o. female presenting to the ED with c/o left arm pain and chest pressure for 1 week. Tried Ibuprofen with no relief.  Limited physical exam via telehealth: The patient is awake, alert, answering questions appropriately and is not in respiratory distress.  As the Teletriage provider, I performed an initial assessment and ordered appropriate labs and imaging studies, if any, to facilitate the patient's care once placed in the ED. Once a room is available, care and a full evaluation will be completed by an alternate ED provider.  Any additional orders and the final disposition will be determined by that provider.  All imaging and labs will not be followed-up by the Teletriage Team, including myself.          ORDERS  Labs Reviewed - No data to display    ED Orders (720h ago, onward)      Start Ordered     Status Ordering Provider    07/10/23 1827 07/10/23 1826  EKG 12-lead  Once         Ordered GURDEEP SORIA    Unscheduled 07/10/23 1829  Pregnancy, urine rapid  Once         Ordered KAREN DAWSON    Unscheduled 07/10/23 1829  X-Ray Chest PA And Lateral  1 time imaging         Ordered KAREN DAWSON              Virtual Visit Note: The provider triage portion of this emergency department evaluation and documentation was  performed via [employer], a HIPAA-compliant telemedicine application, in concert with a tele-presenter in the room. A face to face patient evaluation with one of my colleagues will occur once the patient is placed in an emergency department room.      DISCLAIMER: This note was prepared with [employer] voice recognition transcription software. Garbled syntax, mangled pronouns, and other bizarre constructions may be attributed to that software system.

## 2023-07-11 NOTE — ED PROVIDER NOTES
Chief complaint:  Arm Pain (Left arm x 1 week ) and Chest Pain (Vs epigastric pain x 1 week )      HPI:  Irene Grayson is a 27 y.o. female with hx seizure d/o, anxiety d/o, MTHFR deficiency (prior PE), htn, tobacco use, and hyperlipidemia presenting with 2 separate complaints of 1 week of left mid biceps pain without injury worse with certain movements or carrying her child.  This is brief and has not otherwise been hurting.  There is no radiation or migration of this pain.  There is no pain to the elbow or shoulder.  There is no swelling.  There is no associated numbness or weakness.  She is a separate complaint of left lower chest pain that is worse with certain movements.  She denies pleuritic pain, hemoptysis.  She does have chronic exertional dyspnea that is no worse with a history of prior PE not currently on anticoagulation due to prior concern of anemia with hematologist.  She has been off anticoagulation for years.  No clear exertional component of chest pain.  No associated new dyspnea, diaphoresis, positional component, vomiting, radiation, migration.    ROS: As per HPI and below:  No new back pain, leg swelling, leg pain, fever, syncope.    Review of patient's allergies indicates:   Allergen Reactions    Oranges [orange]      ulcers       Patient's Medications   New Prescriptions    No medications on file   Previous Medications    AMOXICILLIN-CLAVULANATE 875-125MG (AUGMENTIN) 875-125 MG PER TABLET    Take 1 tablet by mouth 2 (two) times daily.    FOLIC ACID (FOLVITE) 1 MG TABLET    Take 1 mg by mouth once daily.    GABAPENTIN (NEURONTIN) 300 MG CAPSULE    Take 1 capsule (300 mg total) by mouth Daily. May increase to twice daily if needed.    HYOSCYAMINE (ANASPAZ,LEVSIN) 0.125 MG TAB    Take 1 tablet (125 mcg total) by mouth every 6 (six) hours as needed (abdominal pain).    IBUPROFEN (ADVIL,MOTRIN) 600 MG TABLET    Take 1 tablet (600 mg total) by mouth every 8 (eight) hours as needed for Pain.     METOPROLOL TARTRATE (LOPRESSOR) 25 MG TABLET    Take 12.5 mg by mouth 2 (two) times daily.    METRONIDAZOLE (FLAGYL) 500 MG TABLET    Take 500 mg by mouth 2 (two) times daily.    ONDANSETRON (ZOFRAN-ODT) 4 MG TBDL    Take 1 tablet (4 mg total) by mouth every 8 (eight) hours as needed (nausea).    ONDANSETRON (ZOFRAN-ODT) 4 MG TBDL    Take 1 tablet (4 mg total) by mouth every 8 (eight) hours as needed.    OXYCODONE (ROXICODONE) 5 MG IMMEDIATE RELEASE TABLET    Take 1 tablet (5 mg total) by mouth every 4 (four) hours as needed.    PROMETHAZINE (PHENERGAN) 25 MG TABLET    Take 25 mg by mouth every 6 (six) hours as needed for Nausea.    VALACYCLOVIR (VALTREX) 1000 MG TABLET    Take 1 tablet (1,000 mg total) by mouth 3 (three) times daily. for 7 days   Modified Medications    No medications on file   Discontinued Medications    No medications on file       PMH:  As per HPI and below:  Past Medical History:   Diagnosis Date    Anemia     no current treatment    Epilepsy     Gallstones     HPV (human papilloma virus) infection     LGSIL on Pap smear of cervix     MTHFR (methylene THF reductase) deficiency and homocystinuria     Seizure disorder, complex partial     last seizure ; no medication    Seizures     Swine flu      Past Surgical History:   Procedure Laterality Date     SECTION N/A 3/8/2020    Procedure:  SECTION;  Surgeon: Brian Quijano MD;  Location: [facility] L&D;  Service: OB/GYN;  Laterality: N/A;     SECTION N/A 10/28/2021    Procedure:  SECTION;  Surgeon: Lucinda Rush MD;  Location: [facility] L&D;  Service: OB/GYN;  Laterality: N/A;    CHOLECYSTECTOMY  2017    LAPAROSCOPIC CHOLECYSTECTOMY  2017    toe I&D      TONSILLECTOMY      UPPER GASTROINTESTINAL ENDOSCOPY  2017    Showed H. Pylori    yifan dugan  2010       Social History     Socioeconomic History    Marital status: Single   Tobacco Use    Smoking status: Former     Packs/day: 0.25     Years: 4.00     Pack  years: 1.00     Types: Cigarettes     Start date: 2016     Quit date: 10/2019     Years since quitting: 3.7    Smokeless tobacco: Never    Tobacco comments:     quit with pregnancy (2019)   Substance and Sexual Activity    Alcohol use: Not Currently    Drug use: No    Sexual activity: Not Currently     Partners: Male     Birth control/protection: None       Family History   Adopted: Yes   Problem Relation Age of Onset    No Known Problems Mother     No Known Problems Father        Physical Exam:    Vitals:    07/10/23 2015   BP:    Pulse: 86   Resp: 18   Temp:      GENERAL:  No apparent distress.  Alert.    HEENT:  Moist mucous membranes.  Normocephalic and atraumatic.    NECK:  No swelling.  Midline trachea.   CARDIOVASCULAR:  Regular rate and rhythm.  2+ radial pulses.  No murmurs or rubs.  PULMONARY:  Lungs clear to auscultation bilaterally.  No wheezes, rales, or rhonci.  Unlabored respirations.  ABDOMEN:  Non-tender and non-distended.    EXTREMITIES:  Warm and well perfused.  Brisk capillary refill.  Mild tenderness in the left biceps also provoked by flexion at the elbow against resistance in the mid bicep.  No tenderness at the attachment of the biceps near the shoulder or the elbow.  No deformity palpable.  No upper or lower extremity limb asymmetry.  No lower extremity edema or tenderness.  NEUROLOGICAL:  Normal mental status.  Appropriate and conversant.    SKIN:  No rashes or ecchymoses.    BACK:  Atraumatic.  No CVA tenderness to palpation.      Labs Reviewed   BASIC METABOLIC PANEL - Abnormal; Notable for the following components:       Result Value    CO2 21 (*)     All other components within normal limits   PREGNANCY TEST, URINE RAPID    Narrative:     Specimen Source->Urine   CBC W/ AUTO DIFFERENTIAL   TROPONIN I   D DIMER, QUANTITATIVE       Current Discharge Medication List        CONTINUE these medications which have NOT CHANGED    Details   amoxicillin-clavulanate 875-125mg (AUGMENTIN) 875-125  mg per tablet Take 1 tablet by mouth 2 (two) times daily.      folic acid (FOLVITE) 1 MG tablet Take 1 mg by mouth once daily.      gabapentin (NEURONTIN) 300 MG capsule Take 1 capsule (300 mg total) by mouth Daily. May increase to twice daily if needed.  Qty: 28 capsule, Refills: 0    Associated Diagnoses: Herpes zoster without complication      hyoscyamine (ANASPAZ,LEVSIN) 0.125 mg Tab Take 1 tablet (125 mcg total) by mouth every 6 (six) hours as needed (abdominal pain).  Qty: 15 tablet, Refills: 0      ibuprofen (ADVIL,MOTRIN) 600 MG tablet Take 1 tablet (600 mg total) by mouth every 8 (eight) hours as needed for Pain.  Qty: 20 tablet, Refills: 0      metoprolol tartrate (LOPRESSOR) 25 MG tablet Take 12.5 mg by mouth 2 (two) times daily.      metroNIDAZOLE (FLAGYL) 500 MG tablet Take 500 mg by mouth 2 (two) times daily.      !! ondansetron (ZOFRAN-ODT) 4 MG TbDL Take 1 tablet (4 mg total) by mouth every 8 (eight) hours as needed (nausea).  Qty: 10 tablet, Refills: 0      !! ondansetron (ZOFRAN-ODT) 4 MG TbDL Take 1 tablet (4 mg total) by mouth every 8 (eight) hours as needed.  Qty: 10 tablet, Refills: 0      oxyCODONE (ROXICODONE) 5 MG immediate release tablet Take 1 tablet (5 mg total) by mouth every 4 (four) hours as needed.  Qty: 28 tablet, Refills: 0    Comments: Quantity prescribed more than 7 day supply? No      promethazine (PHENERGAN) 25 MG tablet Take 25 mg by mouth every 6 (six) hours as needed for Nausea.      valACYclovir (VALTREX) 1000 MG tablet Take 1 tablet (1,000 mg total) by mouth 3 (three) times daily. for 7 days  Qty: 21 tablet, Refills: 0    Associated Diagnoses: Herpes zoster without complication       !! - Potential duplicate medications found. Please discuss with provider.          Orders Placed This Encounter   Procedures    X-Ray Chest PA And Lateral    Pregnancy, urine rapid    Complete Blood Count (CBC)    Basic Metabolic Panel (BMP)    Troponin I    D dimer, quantitative    Cardiac  Monitoring - Adult    EKG 12-lead    Insert Saline lock IV       Imaging Results              X-Ray Chest PA And Lateral (Final result)  Result time 07/10/23 18:50:31      Final result by Nishant Sheikh MD (07/10/23 18:50:31)                   Impression:      No evidence of acute cardiopulmonary disease      Electronically signed by: Nishant Sheikh MD  Date:    07/10/2023  Time:    18:50               Narrative:    EXAMINATION:  XR CHEST PA AND LATERAL    CLINICAL HISTORY:  chest pain;    TECHNIQUE:  PA and lateral views of the chest were performed.    COMPARISON:  October 24, 2021    FINDINGS:  The lungs appear clear.  The cardiomediastinal silhouette and bony structures are unremarkable.  Surgical clips are seen in the right upper quadrant.                                  (radiology reading, visualized by me)      ED Course as of 07/10/23 2154   Mon Jul 10, 2023   2049 EKG:  Normal sinus rhythm at a rate of 87.  Normal intervals.  Normal axis.  No significant ST or T wave changes suggesting acute ischemia or infarction.  (Independently interpreted by me)   [MR]      ED Course User Index  [MR] Jose Zelaya MD       MDM:    27 y.o. female with 2 separate complaints of bicep pain as well as left chest pain.  Both may be musculoskeletal.  Bicep pain is highly likely musculoskeletal with low suspicion for bicep tear.  This may be followed up with PCP initially with possible further orthopedic follow-up in the future.  EKG without sign of initial ischemia or sign of pericarditis.  Cardiac biomarker sent for risk stratification for symptoms without significant variation for 1 week.  I do not think serial troponin is indicated if negative.  I have low suspicion for ACS.  I doubt myocarditis or cardiomyopathy.  Low suspicion for aortic dissection.  Patient does have risk factors for PE with prior history as well as clotting disorder.  She is not currently anticoagulated.  D-dimer sent for risk stratification  with overall low suspicion.  No acute process such as pneumothorax evident on chest x-ray.  Workup is negative including negative D-dimer as well as cardiac biomarker.  Acetaminophen as necessary over-the-counter recommended as needed for pain pending outpatient PCP follow-up.  Return precautions reviewed.    Diagnoses:    1. L chest pain  2. L bicep pain       Jose Zelaya MD  07/10/23 6111

## 2023-09-08 ENCOUNTER — OCCUPATIONAL HEALTH (OUTPATIENT)
Dept: URGENT CARE | Facility: CLINIC | Age: 28
End: 2023-09-08
Payer: MEDICAID

## 2023-09-08 DIAGNOSIS — Z13.9 ENCOUNTER FOR SCREENING: Primary | ICD-10-CM

## 2023-09-08 LAB
CTP QC/QA: YES
POC 10 PANEL DRUG SCREEN: NEGATIVE

## 2023-09-08 PROCEDURE — 80305 DRUG TEST PRSMV DIR OPT OBS: CPT | Mod: S$GLB,,, | Performed by: NURSE PRACTITIONER

## 2023-09-08 PROCEDURE — 80305 POCT RAPID DRUG SCREEN 10 PANEL: ICD-10-PCS | Mod: S$GLB,,, | Performed by: NURSE PRACTITIONER

## 2024-10-16 NOTE — SUBJECTIVE & OBJECTIVE
Hospital course: No notes on file    Interval History:  Postop day 2.-status post classical [id number] complaints, ambulating without difficulty    She is doing well this morning. She is tolerating a regular diet without nausea or vomiting. She is voiding spontaneously. She is ambulating. She has passed flatus, and has not a BM. Vaginal bleeding is mild. She denies fever or chills. Abdominal pain is mild and controlled with oral medications. She is not breastfeeding. She desires circumcision for her male baby: not applicable.    Objective:     Vital Signs (Most Recent):  Temp: 99.1 °F (37.3 °C) (03/10/20 0227)  Pulse: 101 (03/10/20 0227)  Resp: 16 (03/10/20 0227)  BP: 114/73 (03/10/20 0227)  SpO2: 96 % (03/10/20 0227) Vital Signs (24h Range):  Temp:  [97.9 °F (36.6 °C)-99.1 °F (37.3 °C)] 99.1 °F (37.3 °C)  Pulse:  [] 101  Resp:  [16-18] 16  SpO2:  [96 %-98 %] 96 %  BP: (101-125)/(62-82) 114/73     Weight: 58.1 kg (128 lb)  Body mass index is 24.19 kg/m².      Intake/Output Summary (Last 24 hours) at 3/10/2020 0651  Last data filed at 3/10/2020 0203  Gross per 24 hour   Intake 2500 ml   Output 2350 ml   Net 150 ml       Significant Labs:  Lab Results   Component Value Date    GROUPTRH A NEG 2020    HEPBSAG Negative 2019     Recent Labs   Lab 20  0331   HGB 6.0*   HCT 20.6*       CBC:   Recent Labs   Lab 20  0331   WBC 17.28*   RBC 2.60*   HGB 6.0*   HCT 20.6*      MCV 79*   MCH 23.1*   MCHC 29.1*     I have personallly reviewed all pertinent lab results from the last 24 hours.    Physical Exam:       Cardiovascular: Normal rate.          Abdominal: Soft. Bowel sounds are normal. She exhibits abdominal incision (clean, dry, intact). There is no tenderness.     Genitourinary: Uterus is enlarged (firm and below umbilicus). There is bleeding (minimal) in the vagina.           Musculoskeletal: She exhibits no tenderness (no calf tenderness).            no

## 2024-11-04 NOTE — PROGRESS NOTES
PROGRESS NOTE    Subjective:       Patient ID: Irene Grayson is a 28 y.o. female.    Chief Complaint:  Folate and iron def follow up    History of Present Illness:   Irene Grayson is a 28 y.o. female who presents for follow up of above.     Patient has not been seen in clinic since March 2023.     Patients hgb 13.6 on 9/4/2024 Ferritin 7. Patient has heavy cycles she has PCOS. She has had 3 kids and does not wish to have anymore. She has noticed increased fatigue, cold sensitivity and eating ice.    Patient had a reaction to the Ferrlicit with severe muscle and joint pain. Bilateral feet swelling. There is a shortage on Venofer. Will get auth for Feraheme.    Patient is having increased headaches and fatigues.  Using Ibuprofen for the Migraines her PCP has tried BP meds with no relief.  EGD and Colon done by Dr. Leonardo.       Labs from 9/4/2024:                Date:  Hb Ferritin  1/25/2023: 11.1 4  3/13/2023: 13.6 69    Venofer:                            Cycle 1: 2/15/2023  Cycle 2: 2/28/2023  Cycle 3: 3/15/2023       PMH:  Ms. Grayson is a 28yo female who is referred for anemia.  Review of labs show her counts low dating back to 12/1019 and she has been as low as 6.0g/dl requiring transfusion in [place] 2020.   She is more recently running int he 10 to 11g/dl range.     She states this has been attributed to iron deficiency.  She is currently on FeSo4 325mg bid and folate.  She states she has been on iron in the past but feels it did not help.      She had upper endo done 11/7/2022 which showed no bleeding areas and will do colonoscopy on 12/5/2022.     She does note a history of long menstrual cycles which are usually up to 7 days and still spots for an additional few days after each cycle.     Family and Social history reviewed and is unchanged from 11/23/2022      ROS:  Review of Systems   Constitutional:  Positive for fatigue. Negative for fever.    Respiratory:  Negative for shortness of breath.    Cardiovascular:  Negative for chest pain and leg swelling.   Gastrointestinal:  Negative for abdominal pain and blood in stool.   Genitourinary:  Negative for hematuria.   Skin:  Negative for rash.          Current Outpatient Medications:     amoxicillin-clavulanate 875-125mg (AUGMENTIN) 875-125 mg per tablet, Take 1 tablet by mouth 2 (two) times daily., Disp: , Rfl:     folic acid (FOLVITE) 1 MG tablet, Take 1 mg by mouth once daily., Disp: , Rfl:     hyoscyamine (ANASPAZ,LEVSIN) 0.125 mg Tab, Take 1 tablet (125 mcg total) by mouth every 6 (six) hours as needed (abdominal pain)., Disp: 15 tablet, Rfl: 0    ibuprofen (ADVIL,MOTRIN) 600 MG tablet, Take 1 tablet (600 mg total) by mouth every 8 (eight) hours as needed for Pain. (Patient not taking: Reported on 11/23/2022), Disp: 20 tablet, Rfl: 0    metoprolol tartrate (LOPRESSOR) 25 MG tablet, Take 12.5 mg by mouth 2 (two) times daily., Disp: , Rfl:     metroNIDAZOLE (FLAGYL) 500 MG tablet, Take 500 mg by mouth 2 (two) times daily., Disp: , Rfl:         Objective:       Physical Examination:     /79   Pulse 96   Temp 98.2 °F (36.8 °C)   Resp 18   Wt 55.5 kg (122 lb 4.8 oz)   LMP 10/15/2024   BMI 23.89 kg/m²     Physical Exam  Constitutional:       Appearance: Normal appearance. She is well-developed.   HENT:      Head: Normocephalic and atraumatic.      Right Ear: External ear normal.      Left Ear: External ear normal.      Nose: Nose normal.      Mouth/Throat:      Mouth: Mucous membranes are moist.   Eyes:      Conjunctiva/sclera: Conjunctivae normal.      Pupils: Pupils are equal, round, and reactive to light.   Neck:      Thyroid: No thyromegaly.      Trachea: No tracheal deviation.   Cardiovascular:      Rate and Rhythm: Normal rate and regular rhythm.      Heart sounds: Normal heart sounds.   Pulmonary:      Effort: Pulmonary effort is normal.      Breath sounds: Normal breath sounds.   Abdominal:       General: Bowel sounds are normal. There is no distension.      Palpations: Abdomen is soft. There is no mass.      Tenderness: There is no abdominal tenderness.   Musculoskeletal:         General: Normal range of motion.   Skin:     General: Skin is warm and dry.      Findings: No rash.   Neurological:      Mental Status: She is alert.      Comments: Neuro intact througout   Psychiatric:         Behavior: Behavior normal.         Thought Content: Thought content normal.         Judgment: Judgment normal.         Labs:   No results found for this or any previous visit (from the past 2 weeks).    CMP  Sodium   Date Value Ref Range Status   07/10/2023 138 136 - 145 mmol/L Final     Potassium   Date Value Ref Range Status   07/10/2023 4.0 3.5 - 5.1 mmol/L Final     Chloride   Date Value Ref Range Status   07/10/2023 107 95 - 110 mmol/L Final     CO2   Date Value Ref Range Status   07/10/2023 21 (L) 23 - 29 mmol/L Final     Glucose   Date Value Ref Range Status   07/10/2023 99 70 - 110 mg/dL Final     BUN   Date Value Ref Range Status   07/10/2023 10 6 - 20 mg/dL Final     Creatinine   Date Value Ref Range Status   07/10/2023 0.7 0.5 - 1.4 mg/dL Final     Calcium   Date Value Ref Range Status   07/10/2023 9.7 8.7 - 10.5 mg/dL Final     Total Protein   Date Value Ref Range Status   01/25/2023 7.7 6.0 - 8.4 g/dL Final     Albumin   Date Value Ref Range Status   01/25/2023 4.0 3.5 - 5.2 g/dL Final     Total Bilirubin   Date Value Ref Range Status   01/25/2023 0.4 0.1 - 1.0 mg/dL Final     Comment:     For infants and newborns, interpretation of results should be based  on gestational age, weight and in agreement with clinical  observations.    Premature Infant recommended reference ranges:  Up to 24 hours.............<8.0 mg/dL  Up to 48 hours............<12.0 mg/dL  3-5 days..................<15.0 mg/dL  6-29 days.................<15.0 mg/dL       Alkaline Phosphatase   Date Value Ref Range Status   01/25/2023 70 55 -  "135 U/L Final     AST   Date Value Ref Range Status   01/25/2023 19 10 - 40 U/L Final     ALT   Date Value Ref Range Status   01/25/2023 12 10 - 44 U/L Final     Anion Gap   Date Value Ref Range Status   07/10/2023 10 8 - 16 mmol/L Final     eGFR if    Date Value Ref Range Status   07/23/2022 >60.0 >60 mL/min/1.73 m^2 Final     eGFR if non    Date Value Ref Range Status   07/23/2022 >60.0 >60 mL/min/1.73 m^2 Final     Comment:     Calculation used to obtain the estimated glomerular filtration  rate (eGFR) is the CKD-EPI equation.        No results found for: "CEA"    Assessment/Plan:     Problem List Items Addressed This Visit          Neuro    Chronic daily headache     Patient currently using Ibuprofen daily with little relief. Tried BP meds. Will send referral to Neurology for further evaluation.         Relevant Orders    Ambulatory referral/consult to Neurology       Renal/    Secondary oligomenorrhea     Contributing to iron def. Patient is only 28 and not able to take birth Control due to MTHFR mutation. Although she states she is not having any more childrenper the patient her GYN will not do hyst due to her age.            Oncology    Iron deficiency anemia due to chronic blood loss - Primary     Ferritin was 7 will get Feraheme x 2 dose. Patient ins does not cover Injectafer and there is a shortage on Venofer; Patient had a reaction to Ferrlicit.         Relevant Orders    Ambulatory referral/consult to Gastroenterology    CBC W/ AUTO DIFFERENTIAL    CMP    FERRITIN    FERRITIN       Discussion:     Follow up in about 6 months (around 5/6/2025) for with repeat labs after iron infusion..    Electronically signed by Jennifer Porter, MSN, APRN, AGNP-C, OCN          "

## 2024-11-06 ENCOUNTER — PATIENT MESSAGE (OUTPATIENT)
Facility: CLINIC | Age: 29
End: 2024-11-06

## 2024-11-06 ENCOUNTER — OFFICE VISIT (OUTPATIENT)
Facility: CLINIC | Age: 29
End: 2024-11-06
Payer: MEDICAID

## 2024-11-06 VITALS
SYSTOLIC BLOOD PRESSURE: 139 MMHG | DIASTOLIC BLOOD PRESSURE: 79 MMHG | RESPIRATION RATE: 18 BRPM | WEIGHT: 122.31 LBS | BODY MASS INDEX: 23.89 KG/M2 | TEMPERATURE: 98 F | HEART RATE: 96 BPM

## 2024-11-06 DIAGNOSIS — R51.9 CHRONIC DAILY HEADACHE: ICD-10-CM

## 2024-11-06 DIAGNOSIS — N91.4 SECONDARY OLIGOMENORRHEA: ICD-10-CM

## 2024-11-06 DIAGNOSIS — D50.0 IRON DEFICIENCY ANEMIA DUE TO CHRONIC BLOOD LOSS: Primary | ICD-10-CM

## 2024-11-06 PROCEDURE — 3008F BODY MASS INDEX DOCD: CPT | Mod: CPTII,,, | Performed by: NURSE PRACTITIONER

## 2024-11-06 PROCEDURE — 3075F SYST BP GE 130 - 139MM HG: CPT | Mod: CPTII,,, | Performed by: NURSE PRACTITIONER

## 2024-11-06 PROCEDURE — G2211 COMPLEX E/M VISIT ADD ON: HCPCS | Mod: S$PBB,,, | Performed by: NURSE PRACTITIONER

## 2024-11-06 PROCEDURE — 99999 PR PBB SHADOW E&M-EST. PATIENT-LVL IV: CPT | Mod: PBBFAC,,, | Performed by: NURSE PRACTITIONER

## 2024-11-06 PROCEDURE — 1160F RVW MEDS BY RX/DR IN RCRD: CPT | Mod: CPTII,,, | Performed by: NURSE PRACTITIONER

## 2024-11-06 PROCEDURE — 3078F DIAST BP <80 MM HG: CPT | Mod: CPTII,,, | Performed by: NURSE PRACTITIONER

## 2024-11-06 PROCEDURE — 99215 OFFICE O/P EST HI 40 MIN: CPT | Mod: S$PBB,,, | Performed by: NURSE PRACTITIONER

## 2024-11-06 PROCEDURE — 1159F MED LIST DOCD IN RCRD: CPT | Mod: CPTII,,, | Performed by: NURSE PRACTITIONER

## 2024-11-06 PROCEDURE — 99214 OFFICE O/P EST MOD 30 MIN: CPT | Mod: PBBFAC,PN | Performed by: NURSE PRACTITIONER

## 2024-11-06 RX ORDER — SODIUM CHLORIDE 0.9 % (FLUSH) 0.9 %
10 SYRINGE (ML) INJECTION
OUTPATIENT
Start: 2024-11-08

## 2024-11-06 RX ORDER — EPINEPHRINE 0.3 MG/.3ML
0.3 INJECTION SUBCUTANEOUS ONCE AS NEEDED
OUTPATIENT
Start: 2024-11-08

## 2024-11-06 RX ORDER — HEPARIN 100 UNIT/ML
500 SYRINGE INTRAVENOUS
OUTPATIENT
Start: 2024-11-08

## 2024-11-06 RX ORDER — DIPHENHYDRAMINE HYDROCHLORIDE 50 MG/ML
50 INJECTION INTRAMUSCULAR; INTRAVENOUS ONCE AS NEEDED
OUTPATIENT
Start: 2024-11-08

## 2024-11-06 NOTE — ASSESSMENT & PLAN NOTE
Patient currently using Ibuprofen daily with little relief. Tried BP meds. Will send referral to Neurology for further evaluation.

## 2024-11-06 NOTE — ASSESSMENT & PLAN NOTE
Contributing to iron def. Patient is only 28 and not able to take birth Control due to MTHFR mutation. Although she states she is not having any more childrenper the patient her GYN will not do hyst due to her age.

## 2024-11-15 ENCOUNTER — LAB VISIT (OUTPATIENT)
Dept: LAB | Facility: HOSPITAL | Age: 29
End: 2024-11-15
Attending: NURSE PRACTITIONER
Payer: MEDICAID

## 2024-11-15 DIAGNOSIS — D50.0 IRON DEFICIENCY ANEMIA SECONDARY TO BLOOD LOSS (CHRONIC): Primary | ICD-10-CM

## 2024-11-15 LAB
ALBUMIN SERPL BCP-MCNC: 4.1 G/DL (ref 3.5–5.2)
ALP SERPL-CCNC: 58 U/L (ref 40–150)
ALT SERPL W/O P-5'-P-CCNC: 16 U/L (ref 10–44)
ANION GAP SERPL CALC-SCNC: 11 MMOL/L (ref 8–16)
AST SERPL-CCNC: 16 U/L (ref 10–40)
BASOPHILS # BLD AUTO: 0.04 K/UL (ref 0–0.2)
BASOPHILS NFR BLD: 0.5 % (ref 0–1.9)
BILIRUB SERPL-MCNC: 0.4 MG/DL (ref 0.1–1)
BUN SERPL-MCNC: 6 MG/DL (ref 6–20)
CALCIUM SERPL-MCNC: 9.5 MG/DL (ref 8.7–10.5)
CHLORIDE SERPL-SCNC: 107 MMOL/L (ref 95–110)
CO2 SERPL-SCNC: 22 MMOL/L (ref 23–29)
CREAT SERPL-MCNC: 0.8 MG/DL (ref 0.5–1.4)
DIFFERENTIAL METHOD BLD: ABNORMAL
EOSINOPHIL # BLD AUTO: 0.1 K/UL (ref 0–0.5)
EOSINOPHIL NFR BLD: 0.8 % (ref 0–8)
ERYTHROCYTE [DISTWIDTH] IN BLOOD BY AUTOMATED COUNT: 13.6 % (ref 11.5–14.5)
EST. GFR  (NO RACE VARIABLE): >60 ML/MIN/1.73 M^2
GLUCOSE SERPL-MCNC: 72 MG/DL (ref 70–110)
HCT VFR BLD AUTO: 39.4 % (ref 37–48.5)
HGB BLD-MCNC: 12.5 G/DL (ref 12–16)
IMM GRANULOCYTES # BLD AUTO: 0.04 K/UL (ref 0–0.04)
IMM GRANULOCYTES NFR BLD AUTO: 0.5 % (ref 0–0.5)
LYMPHOCYTES # BLD AUTO: 1.6 K/UL (ref 1–4.8)
LYMPHOCYTES NFR BLD: 22.1 % (ref 18–48)
MCH RBC QN AUTO: 26.5 PG (ref 27–31)
MCHC RBC AUTO-ENTMCNC: 31.7 G/DL (ref 32–36)
MCV RBC AUTO: 84 FL (ref 82–98)
MONOCYTES # BLD AUTO: 0.6 K/UL (ref 0.3–1)
MONOCYTES NFR BLD: 8.2 % (ref 4–15)
NEUTROPHILS # BLD AUTO: 4.9 K/UL (ref 1.8–7.7)
NEUTROPHILS NFR BLD: 67.9 % (ref 38–73)
NRBC BLD-RTO: 0 /100 WBC
PLATELET # BLD AUTO: 369 K/UL (ref 150–450)
PMV BLD AUTO: 10.8 FL (ref 9.2–12.9)
POTASSIUM SERPL-SCNC: 3.7 MMOL/L (ref 3.5–5.1)
PROT SERPL-MCNC: 7.3 G/DL (ref 6–8.4)
RBC # BLD AUTO: 4.72 M/UL (ref 4–5.4)
SODIUM SERPL-SCNC: 140 MMOL/L (ref 136–145)
WBC # BLD AUTO: 7.29 K/UL (ref 3.9–12.7)

## 2024-11-15 PROCEDURE — 85025 COMPLETE CBC W/AUTO DIFF WBC: CPT | Performed by: NURSE PRACTITIONER

## 2024-11-15 PROCEDURE — 80053 COMPREHEN METABOLIC PANEL: CPT | Performed by: NURSE PRACTITIONER

## 2024-11-15 PROCEDURE — 36415 COLL VENOUS BLD VENIPUNCTURE: CPT | Performed by: NURSE PRACTITIONER

## 2024-11-25 ENCOUNTER — INFUSION (OUTPATIENT)
Dept: INFUSION THERAPY | Facility: HOSPITAL | Age: 29
End: 2024-11-25
Attending: NURSE PRACTITIONER
Payer: MEDICAID

## 2024-11-25 VITALS
TEMPERATURE: 98 F | WEIGHT: 118.63 LBS | SYSTOLIC BLOOD PRESSURE: 115 MMHG | BODY MASS INDEX: 20.25 KG/M2 | HEART RATE: 95 BPM | OXYGEN SATURATION: 98 % | HEIGHT: 64 IN | DIASTOLIC BLOOD PRESSURE: 75 MMHG | RESPIRATION RATE: 16 BRPM

## 2024-11-25 DIAGNOSIS — D50.0 IRON DEFICIENCY ANEMIA DUE TO CHRONIC BLOOD LOSS: Primary | ICD-10-CM

## 2024-11-25 PROCEDURE — 96374 THER/PROPH/DIAG INJ IV PUSH: CPT

## 2024-11-25 PROCEDURE — 63600175 PHARM REV CODE 636 W HCPCS: Mod: JZ,JG | Performed by: NURSE PRACTITIONER

## 2024-11-25 PROCEDURE — 25000003 PHARM REV CODE 250: Performed by: NURSE PRACTITIONER

## 2024-11-25 PROCEDURE — A4216 STERILE WATER/SALINE, 10 ML: HCPCS | Performed by: NURSE PRACTITIONER

## 2024-11-25 RX ORDER — SODIUM CHLORIDE 0.9 % (FLUSH) 0.9 %
10 SYRINGE (ML) INJECTION
Status: DISCONTINUED | OUTPATIENT
Start: 2024-11-25 | End: 2024-11-25 | Stop reason: HOSPADM

## 2024-11-25 RX ORDER — DIPHENHYDRAMINE HYDROCHLORIDE 50 MG/ML
50 INJECTION INTRAMUSCULAR; INTRAVENOUS ONCE AS NEEDED
OUTPATIENT
Start: 2024-11-25

## 2024-11-25 RX ORDER — SODIUM CHLORIDE 0.9 % (FLUSH) 0.9 %
10 SYRINGE (ML) INJECTION
OUTPATIENT
Start: 2024-11-25

## 2024-11-25 RX ORDER — HEPARIN 100 UNIT/ML
500 SYRINGE INTRAVENOUS
OUTPATIENT
Start: 2024-11-25

## 2024-11-25 RX ORDER — EPINEPHRINE 0.3 MG/.3ML
0.3 INJECTION SUBCUTANEOUS ONCE AS NEEDED
OUTPATIENT
Start: 2024-11-25

## 2024-11-25 RX ADMIN — FERUMOXYTOL 510 MG: 510 INJECTION INTRAVENOUS at 09:11

## 2024-11-25 RX ADMIN — Medication 10 ML: at 09:11

## 2024-11-25 NOTE — PLAN OF CARE
Problem: Adult Inpatient Plan of Care  Goal: Optimal Comfort and Wellbeing  Outcome: Progressing  Intervention: Provide Person-Centered Care  Flowsheets (Taken 11/25/2024 6497)  Trust Relationship/Rapport:   choices provided   emotional support provided   care explained   empathic listening provided   questions answered   questions encouraged   reassurance provided   thoughts/feelings acknowledged

## 2024-12-02 ENCOUNTER — INFUSION (OUTPATIENT)
Dept: INFUSION THERAPY | Facility: HOSPITAL | Age: 29
End: 2024-12-02
Attending: NURSE PRACTITIONER
Payer: MEDICAID

## 2024-12-02 VITALS
TEMPERATURE: 98 F | RESPIRATION RATE: 16 BRPM | BODY MASS INDEX: 21 KG/M2 | DIASTOLIC BLOOD PRESSURE: 67 MMHG | HEART RATE: 82 BPM | OXYGEN SATURATION: 98 % | WEIGHT: 123 LBS | SYSTOLIC BLOOD PRESSURE: 115 MMHG | HEIGHT: 64 IN

## 2024-12-02 DIAGNOSIS — D50.0 IRON DEFICIENCY ANEMIA DUE TO CHRONIC BLOOD LOSS: Primary | ICD-10-CM

## 2024-12-02 PROCEDURE — A4216 STERILE WATER/SALINE, 10 ML: HCPCS | Performed by: NURSE PRACTITIONER

## 2024-12-02 PROCEDURE — 25000003 PHARM REV CODE 250: Performed by: NURSE PRACTITIONER

## 2024-12-02 PROCEDURE — 96374 THER/PROPH/DIAG INJ IV PUSH: CPT

## 2024-12-02 PROCEDURE — 63600175 PHARM REV CODE 636 W HCPCS: Mod: JZ,JG | Performed by: NURSE PRACTITIONER

## 2024-12-02 RX ORDER — DIPHENHYDRAMINE HYDROCHLORIDE 50 MG/ML
50 INJECTION INTRAMUSCULAR; INTRAVENOUS ONCE AS NEEDED
Status: CANCELLED | OUTPATIENT
Start: 2024-12-02

## 2024-12-02 RX ORDER — EPINEPHRINE 0.3 MG/.3ML
0.3 INJECTION SUBCUTANEOUS ONCE AS NEEDED
Status: CANCELLED | OUTPATIENT
Start: 2024-12-02

## 2024-12-02 RX ORDER — SODIUM CHLORIDE 0.9 % (FLUSH) 0.9 %
10 SYRINGE (ML) INJECTION
Status: CANCELLED | OUTPATIENT
Start: 2024-12-02

## 2024-12-02 RX ORDER — SODIUM CHLORIDE 0.9 % (FLUSH) 0.9 %
10 SYRINGE (ML) INJECTION
Status: DISCONTINUED | OUTPATIENT
Start: 2024-12-02 | End: 2024-12-02

## 2024-12-02 RX ORDER — HEPARIN 100 UNIT/ML
500 SYRINGE INTRAVENOUS
Status: CANCELLED | OUTPATIENT
Start: 2024-12-02

## 2024-12-02 RX ADMIN — Medication 10 ML: at 09:12

## 2024-12-02 RX ADMIN — FERUMOXYTOL 510 MG: 510 INJECTION INTRAVENOUS at 09:12

## 2024-12-02 NOTE — PLAN OF CARE
Problem: Adult Inpatient Plan of Care  Goal: Optimal Comfort and Wellbeing  Outcome: Progressing  Intervention: Provide Person-Centered Care  Flowsheets (Taken 12/2/2024 8729)  Trust Relationship/Rapport:   choices provided   care explained   emotional support provided   empathic listening provided   questions answered   questions encouraged   reassurance provided   thoughts/feelings acknowledged

## 2025-02-19 ENCOUNTER — HOSPITAL ENCOUNTER (OUTPATIENT)
Facility: HOSPITAL | Age: 30
Discharge: HOME | End: 2025-02-20
Attending: EMERGENCY MEDICINE | Admitting: INTERNAL MEDICINE
Payer: MEDICAID

## 2025-02-19 DIAGNOSIS — R51.9 HEADACHE: ICD-10-CM

## 2025-02-19 DIAGNOSIS — G40.209 COMPLEX PARTIAL SEIZURE: Primary | ICD-10-CM

## 2025-02-19 DIAGNOSIS — R29.818 ACUTE FOCAL NEUROLOGICAL DEFICIT: ICD-10-CM

## 2025-02-19 DIAGNOSIS — R07.9 CHEST PAIN: ICD-10-CM

## 2025-02-19 DIAGNOSIS — G43.809 OTHER MIGRAINE WITHOUT STATUS MIGRAINOSUS, NOT INTRACTABLE: ICD-10-CM

## 2025-02-19 DIAGNOSIS — R51.9 NONINTRACTABLE HEADACHE, UNSPECIFIED CHRONICITY PATTERN, UNSPECIFIED HEADACHE TYPE: ICD-10-CM

## 2025-02-19 LAB
ALBUMIN SERPL BCP-MCNC: 4.8 G/DL (ref 3.5–5.2)
ALP SERPL-CCNC: 50 U/L (ref 55–135)
ALT SERPL W/O P-5'-P-CCNC: 13 U/L (ref 10–44)
AMPHET+METHAMPHET UR QL: NEGATIVE
ANION GAP SERPL CALC-SCNC: 10 MMOL/L (ref 8–16)
AST SERPL-CCNC: 15 U/L (ref 10–40)
B-HCG UR QL: NEGATIVE
BARBITURATES UR QL SCN>200 NG/ML: NEGATIVE
BASOPHILS # BLD AUTO: 0.04 K/UL (ref 0–0.2)
BASOPHILS NFR BLD: 0.5 % (ref 0–1.9)
BENZODIAZ UR QL SCN>200 NG/ML: NEGATIVE
BILIRUB SERPL-MCNC: 0.3 MG/DL (ref 0.1–1)
BUN SERPL-MCNC: 7 MG/DL (ref 6–20)
BZE UR QL SCN: NEGATIVE
CALCIUM SERPL-MCNC: 9.4 MG/DL (ref 8.7–10.5)
CANNABINOIDS UR QL SCN: NEGATIVE
CHLORIDE SERPL-SCNC: 105 MMOL/L (ref 95–110)
CHOLEST SERPL-MCNC: 166 MG/DL (ref 120–199)
CHOLEST/HDLC SERPL: 2.7 {RATIO} (ref 2–5)
CO2 SERPL-SCNC: 22 MMOL/L (ref 23–29)
CREAT SERPL-MCNC: 0.8 MG/DL (ref 0.5–1.4)
CREAT SERPL-MCNC: 0.8 MG/DL (ref 0.5–1.4)
CREAT UR-MCNC: 12 MG/DL (ref 15–325)
CTP QC/QA: YES
DIFFERENTIAL METHOD BLD: ABNORMAL
EOSINOPHIL # BLD AUTO: 0 K/UL (ref 0–0.5)
EOSINOPHIL NFR BLD: 0.3 % (ref 0–8)
ERYTHROCYTE [DISTWIDTH] IN BLOOD BY AUTOMATED COUNT: 14.9 % (ref 11.5–14.5)
EST. GFR  (NO RACE VARIABLE): >60 ML/MIN/1.73 M^2
GLUCOSE SERPL-MCNC: 134 MG/DL (ref 70–110)
HCT VFR BLD AUTO: 45.5 % (ref 37–48.5)
HDLC SERPL-MCNC: 61 MG/DL (ref 40–75)
HDLC SERPL: 36.7 % (ref 20–50)
HGB BLD-MCNC: 14.8 G/DL (ref 12–16)
IMM GRANULOCYTES # BLD AUTO: 0.04 K/UL (ref 0–0.04)
IMM GRANULOCYTES NFR BLD AUTO: 0.5 % (ref 0–0.5)
INR PPP: 1 (ref 0.8–1.2)
LDLC SERPL CALC-MCNC: 80.2 MG/DL (ref 63–159)
LYMPHOCYTES # BLD AUTO: 1.6 K/UL (ref 1–4.8)
LYMPHOCYTES NFR BLD: 20.9 % (ref 18–48)
MCH RBC QN AUTO: 30.4 PG (ref 27–31)
MCHC RBC AUTO-ENTMCNC: 32.5 G/DL (ref 32–36)
MCV RBC AUTO: 93 FL (ref 82–98)
MONOCYTES # BLD AUTO: 0.4 K/UL (ref 0.3–1)
MONOCYTES NFR BLD: 5.4 % (ref 4–15)
NEUTROPHILS # BLD AUTO: 5.7 K/UL (ref 1.8–7.7)
NEUTROPHILS NFR BLD: 72.4 % (ref 38–73)
NONHDLC SERPL-MCNC: 105 MG/DL
NRBC BLD-RTO: 0 /100 WBC
OPIATES UR QL SCN: NEGATIVE
PCP UR QL SCN>25 NG/ML: NEGATIVE
PLATELET # BLD AUTO: 286 K/UL (ref 150–450)
PMV BLD AUTO: 9.8 FL (ref 9.2–12.9)
POCT GLUCOSE: 191 MG/DL (ref 70–110)
POTASSIUM SERPL-SCNC: 3.1 MMOL/L (ref 3.5–5.1)
PROT SERPL-MCNC: 7.1 G/DL (ref 6–8.4)
PROTHROMBIN TIME: 11 SEC (ref 9–12.5)
RBC # BLD AUTO: 4.87 M/UL (ref 4–5.4)
SAMPLE: NORMAL
SODIUM SERPL-SCNC: 137 MMOL/L (ref 136–145)
TOXICOLOGY INFORMATION: ABNORMAL
TRIGL SERPL-MCNC: 124 MG/DL (ref 30–150)
TROPONIN I SERPL HS-MCNC: <2.3 PG/ML (ref 0–14.9)
TSH SERPL DL<=0.005 MIU/L-ACNC: 1.04 UIU/ML (ref 0.34–5.6)
WBC # BLD AUTO: 7.85 K/UL (ref 3.9–12.7)

## 2025-02-19 PROCEDURE — 96375 TX/PRO/DX INJ NEW DRUG ADDON: CPT

## 2025-02-19 PROCEDURE — 82962 GLUCOSE BLOOD TEST: CPT

## 2025-02-19 PROCEDURE — G0378 HOSPITAL OBSERVATION PER HR: HCPCS

## 2025-02-19 PROCEDURE — 25000003 PHARM REV CODE 250

## 2025-02-19 PROCEDURE — 80061 LIPID PANEL: CPT | Performed by: EMERGENCY MEDICINE

## 2025-02-19 PROCEDURE — 84484 ASSAY OF TROPONIN QUANT: CPT

## 2025-02-19 PROCEDURE — 85025 COMPLETE CBC W/AUTO DIFF WBC: CPT | Performed by: EMERGENCY MEDICINE

## 2025-02-19 PROCEDURE — 82565 ASSAY OF CREATININE: CPT

## 2025-02-19 PROCEDURE — 85610 PROTHROMBIN TIME: CPT | Performed by: EMERGENCY MEDICINE

## 2025-02-19 PROCEDURE — 80307 DRUG TEST PRSMV CHEM ANLYZR: CPT | Performed by: EMERGENCY MEDICINE

## 2025-02-19 PROCEDURE — 81025 URINE PREGNANCY TEST: CPT | Performed by: EMERGENCY MEDICINE

## 2025-02-19 PROCEDURE — 36415 COLL VENOUS BLD VENIPUNCTURE: CPT

## 2025-02-19 PROCEDURE — 63600175 PHARM REV CODE 636 W HCPCS: Mod: TB,JZ

## 2025-02-19 PROCEDURE — 99285 EMERGENCY DEPT VISIT HI MDM: CPT | Mod: 25

## 2025-02-19 PROCEDURE — 96365 THER/PROPH/DIAG IV INF INIT: CPT

## 2025-02-19 PROCEDURE — 80053 COMPREHEN METABOLIC PANEL: CPT | Performed by: EMERGENCY MEDICINE

## 2025-02-19 PROCEDURE — 93005 ELECTROCARDIOGRAM TRACING: CPT | Performed by: INTERNAL MEDICINE

## 2025-02-19 PROCEDURE — 63600175 PHARM REV CODE 636 W HCPCS: Performed by: EMERGENCY MEDICINE

## 2025-02-19 PROCEDURE — 99204 OFFICE O/P NEW MOD 45 MIN: CPT | Mod: 95,,, | Performed by: STUDENT IN AN ORGANIZED HEALTH CARE EDUCATION/TRAINING PROGRAM

## 2025-02-19 PROCEDURE — 93010 ELECTROCARDIOGRAM REPORT: CPT | Mod: ,,, | Performed by: INTERNAL MEDICINE

## 2025-02-19 PROCEDURE — 84443 ASSAY THYROID STIM HORMONE: CPT | Performed by: EMERGENCY MEDICINE

## 2025-02-19 RX ORDER — FOLIC ACID 1 MG/1
1 TABLET ORAL DAILY
Status: DISCONTINUED | OUTPATIENT
Start: 2025-02-20 | End: 2025-02-20 | Stop reason: HOSPADM

## 2025-02-19 RX ORDER — NAPROXEN SODIUM 220 MG
220 TABLET ORAL 2 TIMES DAILY
COMMUNITY

## 2025-02-19 RX ORDER — NALOXONE HCL 0.4 MG/ML
0.02 VIAL (ML) INJECTION
Status: DISCONTINUED | OUTPATIENT
Start: 2025-02-19 | End: 2025-02-20 | Stop reason: HOSPADM

## 2025-02-19 RX ORDER — IBUPROFEN 200 MG
24 TABLET ORAL
Status: DISCONTINUED | OUTPATIENT
Start: 2025-02-19 | End: 2025-02-20 | Stop reason: HOSPADM

## 2025-02-19 RX ORDER — MEDROXYPROGESTERONE ACETATE 2.5 MG/1
10 TABLET ORAL DAILY
Status: DISCONTINUED | OUTPATIENT
Start: 2025-02-20 | End: 2025-02-20 | Stop reason: HOSPADM

## 2025-02-19 RX ORDER — GLUCAGON 1 MG
1 KIT INJECTION
Status: DISCONTINUED | OUTPATIENT
Start: 2025-02-19 | End: 2025-02-20 | Stop reason: HOSPADM

## 2025-02-19 RX ORDER — LANOLIN ALCOHOL/MO/W.PET/CERES
800 CREAM (GRAM) TOPICAL
Status: DISCONTINUED | OUTPATIENT
Start: 2025-02-19 | End: 2025-02-20 | Stop reason: HOSPADM

## 2025-02-19 RX ORDER — PANTOPRAZOLE SODIUM 20 MG/1
20 TABLET, DELAYED RELEASE ORAL DAILY PRN
COMMUNITY

## 2025-02-19 RX ORDER — ACETAMINOPHEN 325 MG/1
650 TABLET ORAL EVERY 4 HOURS PRN
Status: DISCONTINUED | OUTPATIENT
Start: 2025-02-19 | End: 2025-02-20 | Stop reason: HOSPADM

## 2025-02-19 RX ORDER — AMOXICILLIN 250 MG
1 CAPSULE ORAL 2 TIMES DAILY PRN
Status: DISCONTINUED | OUTPATIENT
Start: 2025-02-19 | End: 2025-02-20 | Stop reason: HOSPADM

## 2025-02-19 RX ORDER — TALC
6 POWDER (GRAM) TOPICAL NIGHTLY PRN
Status: DISCONTINUED | OUTPATIENT
Start: 2025-02-19 | End: 2025-02-20 | Stop reason: HOSPADM

## 2025-02-19 RX ORDER — KETOROLAC TROMETHAMINE 30 MG/ML
15 INJECTION, SOLUTION INTRAMUSCULAR; INTRAVENOUS ONCE
Status: COMPLETED | OUTPATIENT
Start: 2025-02-19 | End: 2025-02-19

## 2025-02-19 RX ORDER — SODIUM,POTASSIUM PHOSPHATES 280-250MG
2 POWDER IN PACKET (EA) ORAL
Status: DISCONTINUED | OUTPATIENT
Start: 2025-02-19 | End: 2025-02-20 | Stop reason: HOSPADM

## 2025-02-19 RX ORDER — DIPHENHYDRAMINE HYDROCHLORIDE 50 MG/ML
25 INJECTION INTRAMUSCULAR; INTRAVENOUS ONCE
Status: COMPLETED | OUTPATIENT
Start: 2025-02-19 | End: 2025-02-19

## 2025-02-19 RX ORDER — ONDANSETRON HYDROCHLORIDE 2 MG/ML
4 INJECTION, SOLUTION INTRAVENOUS EVERY 6 HOURS PRN
Status: DISCONTINUED | OUTPATIENT
Start: 2025-02-19 | End: 2025-02-20 | Stop reason: HOSPADM

## 2025-02-19 RX ORDER — PANTOPRAZOLE SODIUM 40 MG/1
40 TABLET, DELAYED RELEASE ORAL DAILY
Status: DISCONTINUED | OUTPATIENT
Start: 2025-02-20 | End: 2025-02-20 | Stop reason: HOSPADM

## 2025-02-19 RX ORDER — OMEPRAZOLE 40 MG/1
40 CAPSULE, DELAYED RELEASE ORAL DAILY PRN
Status: ON HOLD | COMMUNITY
Start: 2025-01-17 | End: 2025-02-20 | Stop reason: HOSPADM

## 2025-02-19 RX ORDER — ACETAMINOPHEN 10 MG/ML
1000 INJECTION, SOLUTION INTRAVENOUS ONCE
Status: COMPLETED | OUTPATIENT
Start: 2025-02-19 | End: 2025-02-19

## 2025-02-19 RX ORDER — MEDROXYPROGESTERONE ACETATE 10 MG/1
10 TABLET ORAL DAILY
COMMUNITY
Start: 2025-02-18

## 2025-02-19 RX ORDER — IBUPROFEN 200 MG
16 TABLET ORAL
Status: DISCONTINUED | OUTPATIENT
Start: 2025-02-19 | End: 2025-02-20 | Stop reason: HOSPADM

## 2025-02-19 RX ORDER — SODIUM CHLORIDE, SODIUM LACTATE, POTASSIUM CHLORIDE, CALCIUM CHLORIDE 600; 310; 30; 20 MG/100ML; MG/100ML; MG/100ML; MG/100ML
1000 INJECTION, SOLUTION INTRAVENOUS
Status: COMPLETED | OUTPATIENT
Start: 2025-02-19 | End: 2025-02-19

## 2025-02-19 RX ORDER — ALUMINUM HYDROXIDE, MAGNESIUM HYDROXIDE, AND SIMETHICONE 1200; 120; 1200 MG/30ML; MG/30ML; MG/30ML
30 SUSPENSION ORAL 4 TIMES DAILY PRN
Status: DISCONTINUED | OUTPATIENT
Start: 2025-02-19 | End: 2025-02-20 | Stop reason: HOSPADM

## 2025-02-19 RX ORDER — DEXTROAMPHETAMINE SACCHARATE, AMPHETAMINE ASPARTATE, DEXTROAMPHETAMINE SULFATE AND AMPHETAMINE SULFATE 2.5; 2.5; 2.5; 2.5 MG/1; MG/1; MG/1; MG/1
0.5 TABLET ORAL 2 TIMES DAILY
COMMUNITY
Start: 2025-02-18

## 2025-02-19 RX ORDER — SODIUM CHLORIDE 0.9 % (FLUSH) 0.9 %
10 SYRINGE (ML) INJECTION EVERY 12 HOURS PRN
Status: DISCONTINUED | OUTPATIENT
Start: 2025-02-19 | End: 2025-02-20 | Stop reason: HOSPADM

## 2025-02-19 RX ADMIN — DIPHENHYDRAMINE HYDROCHLORIDE 25 MG: 50 INJECTION INTRAMUSCULAR; INTRAVENOUS at 04:02

## 2025-02-19 RX ADMIN — KETOROLAC TROMETHAMINE 15 MG: 30 INJECTION, SOLUTION INTRAMUSCULAR at 04:02

## 2025-02-19 RX ADMIN — POTASSIUM BICARBONATE 35 MEQ: 391 TABLET, EFFERVESCENT ORAL at 04:02

## 2025-02-19 RX ADMIN — ACETAMINOPHEN 1000 MG: 10 INJECTION INTRAVENOUS at 02:02

## 2025-02-19 RX ADMIN — POTASSIUM BICARBONATE 35 MEQ: 391 TABLET, EFFERVESCENT ORAL at 11:02

## 2025-02-19 RX ADMIN — SODIUM CHLORIDE, POTASSIUM CHLORIDE, SODIUM LACTATE AND CALCIUM CHLORIDE 1000 ML: 600; 310; 30; 20 INJECTION, SOLUTION INTRAVENOUS at 02:02

## 2025-02-19 NOTE — ED PROVIDER NOTES
Encounter Date: 2025       History     Chief Complaint   Patient presents with    Headache     Pt c/o h/a times three days with eye twitching on the left side. Left side arm weakness started around 1130 this morning.     29-year-old female with history of epilepsy, complex partial seizure disorder last seizure in  not on medications, anemia, MTHFR.  Patient presents emergency department with 3 day history of left facial twitching with associated left frontal headache.  She describes it as 5 to 6/10 in nature.  She denies fever, no nausea, vomiting, no recent illness.  Patient decided to come to the emergency department today due to the fact that she also had receive left arm numbness which began this morning.  She states that it felt slightly weak as well.  She denies any difficulty with vision, no difficulty with speech, no weakness to lower extremity.  VAN negative.      Review of patient's allergies indicates:   Allergen Reactions    Oranges [orange]      ulcers     Past Medical History:   Diagnosis Date    Anemia     no current treatment    Epilepsy     Gallstones     HPV (human papilloma virus) infection     LGSIL on Pap smear of cervix     MTHFR (methylene THF reductase) deficiency and homocystinuria     Seizure disorder, complex partial     last seizure ; no medication    Seizures     Swine flu      Past Surgical History:   Procedure Laterality Date     SECTION N/A 3/8/2020    Procedure:  SECTION;  Surgeon: Brian Quijano MD;  Location: [facility] L&D;  Service: OB/GYN;  Laterality: N/A;     SECTION N/A 10/28/2021    Procedure:  SECTION;  Surgeon: Lucinda Rush MD;  Location: [facility] L&D;  Service: OB/GYN;  Laterality: N/A;    CHOLECYSTECTOMY      LAPAROSCOPIC CHOLECYSTECTOMY      toe I&D      TONSILLECTOMY      UPPER GASTROINTESTINAL ENDOSCOPY      Showed H. Pylori    yifan dugan       Family History   Adopted: Yes   Problem Relation Name  Age of Onset    No Known Problems Mother      No Known Problems Father       Social History[1]  Review of Systems   Constitutional:  Negative for fever.   HENT:  Negative for sore throat.    Respiratory:  Negative for shortness of breath.    Cardiovascular:  Negative for chest pain.   Gastrointestinal:  Negative for nausea.   Genitourinary:  Negative for dysuria.   Musculoskeletal:  Negative for back pain.   Skin:  Negative for rash.   Neurological:  Positive for numbness and headaches. Negative for dizziness, seizures, facial asymmetry, speech difficulty and weakness (Left arm).   Hematological:  Does not bruise/bleed easily.       Physical Exam     Initial Vitals [02/19/25 1256]   BP Pulse Resp Temp SpO2   (!) 161/115 (!) 132 17 97.8 °F (36.6 °C) 100 %      MAP       --         Physical Exam    Nursing note and vitals reviewed.  Constitutional: She appears well-developed and well-nourished.   HENT:   Head: Normocephalic and atraumatic.   Nose: Nose normal. Mouth/Throat: Oropharynx is clear and moist.   Eyes: Conjunctivae and EOM are normal. Pupils are equal, round, and reactive to light.   Neck: Neck supple. No thyromegaly present. No tracheal deviation present.   Normal range of motion.  Cardiovascular:  Normal rate, regular rhythm, normal heart sounds and intact distal pulses.     Exam reveals no gallop and no friction rub.       No murmur heard.  Pulmonary/Chest: Breath sounds normal. No stridor. No respiratory distress.   Abdominal: Abdomen is soft. Bowel sounds are normal. She exhibits no mass. There is no rebound and no guarding.   Musculoskeletal:         General: No edema. Normal range of motion.      Cervical back: Normal range of motion and neck supple.     Lymphadenopathy:     She has no cervical adenopathy.   Neurological: She is alert and oriented to person, place, and time. She has normal strength and normal reflexes. GCS score is 15. GCS eye subscore is 4. GCS verbal subscore is 5. GCS motor  subscore is 6.   Patient with facial twitching noted to the left lateral aspect of eye and face.  Otherwise cranial nerves 2-12 intact.  No facial paresthesia noted.  Motor 5/5 to right upper extremity  Motor 4/5 to left upper extremity  Motor 5/5 to bilateral lower extremities    NIH-1 for left arm weakness   Skin: Skin is warm and dry. Capillary refill takes less than 2 seconds.   Psychiatric: She has a normal mood and affect.         ED Course   Procedures  Labs Reviewed   CBC W/ AUTO DIFFERENTIAL - Abnormal       Result Value    WBC 7.85      RBC 4.87      Hemoglobin 14.8      Hematocrit 45.5      MCV 93      MCH 30.4      MCHC 32.5      RDW 14.9 (*)     Platelets 286      MPV 9.8      Immature Granulocytes 0.5      Gran # (ANC) 5.7      Immature Grans (Abs) 0.04      Lymph # 1.6      Mono # 0.4      Eos # 0.0      Baso # 0.04      nRBC 0      Gran % 72.4      Lymph % 20.9      Mono % 5.4      Eosinophil % 0.3      Basophil % 0.5      Differential Method Automated     COMPREHENSIVE METABOLIC PANEL - Abnormal    Sodium 137      Potassium 3.1 (*)     Chloride 105      CO2 22 (*)     Glucose 134 (*)     BUN 7      Creatinine 0.8      Calcium 9.4      Total Protein 7.1      Albumin 4.8      Total Bilirubin 0.3      Alkaline Phosphatase 50 (*)     AST 15      ALT 13      eGFR >60.0      Anion Gap 10     DRUG SCREEN PANEL, URINE EMERGENCY - Abnormal    Benzodiazepines Negative      Cocaine (Metab.) Negative      Opiate Scrn, Ur Negative      Barbiturate Screen, Ur Negative      Amphetamine Screen, Ur Negative      THC Negative      Phencyclidine Negative      Creatinine, Urine 12.0 (*)     Toxicology Information SEE COMMENT      Narrative:     Specimen Source->Urine   POCT GLUCOSE - Abnormal    POCT Glucose 191 (*)    PROTIME-INR    Prothrombin Time 11.0      INR 1.0     TSH    TSH 1.043     LIPID PANEL    Cholesterol 166      Triglycerides 124      HDL 61      LDL Cholesterol 80.2      HDL/Cholesterol Ratio 36.7       Total Cholesterol/HDL Ratio 2.7      Non-HDL Cholesterol 105     HEPATITIS C ANTIBODY   HIV 1 / 2 ANTIBODY   TROPONIN I HIGH SENSITIVITY   POCT URINE PREGNANCY    POC Preg Test, Ur Negative       Acceptable Yes     ISTAT CREATININE    POC Creatinine 0.8      Sample VENOUS            Imaging Results              MRI Brain Without Contrast (Final result)  Result time 02/19/25 16:16:41      Final result by Melchor Cota MD (02/19/25 16:16:41)                   Impression:      No acute intracranial process. Normal appearing MRI of the Brain.      Electronically signed by: Melchor Cota  Date:    02/19/2025  Time:    16:16               Narrative:    CLINICAL HISTORY:  ([id number])30 y/o  (1995) F    Headache, chronic, new features or increased frequency;    TECHNIQUE:  (A#[id number], exam time 2/19/2025 16:11)    MRI BRAIN WITHOUT CONTRAST [id number]    Multiplanar multisequence MRI of the brain was performed.    CONTRAST:    No contrast was administered.    COMPARISON:  CT most recently from 02/19/2025.    FINDINGS:  No hydrocephalus, herniation or midline shift and the basal/suprasellar cisterns are patent.  Diffusion imaging does not indicate acute or recent stroke. No bleed is seen on the gradient reversal images.    The brain has normal ventricles with symmetrical sulci, unremarkable internal structure, and normal signal pattern throughout. There is no cerebral or cerebellar atrophy.    The pituitary gland and infundibulum is normal in size without abnormal signal pattern. The craniocervical junction is unremarkable. The temporal bones, internal and external meati, and semicircular canals appear normal. The orbital contents are normal.  The visualized paranasal sinuses and mastoid air cells are clear.                                       CT HEAD FOR STROKE (Final result)  Result time 02/19/25 13:08:16   Procedure changed from CT Head Without Contrast     Final result by Hermes Aguayo IV,  MD (02/19/25 13:08:16)                   Impression:      No acute intracranial abnormality.    This report was telephoned to Dr. Vasquez in the emergency department at 13:07.      Electronically signed by: Hermes Aguayo  Date:    02/19/2025  Time:    13:08               Narrative:      CMS MANDATED QUALITY DATA - CT RADIATION - 436    All CT scans at this facility utilize dose modulation, iterative reconstruction, and/or weight based dosing when appropriate to reduce radiation dose to as low as reasonably achievable.    EXAMINATION:  CT HEAD FOR STROKE    CLINICAL HISTORY:  Neuro deficit, acute, stroke suspected;.    TECHNIQUE:  Noncontrast imaging is performed.    FINDINGS:  The brain parenchyma appears unremarkable. There are no findings of acute hemorrhage or infarction. There is no evidence of an intra-axial mass, mass effect or shift of the midline. The ventricular system is appropriate in size and position for age. There are no extra-axial collections demonstrated.    Reviewed at bone windows no skull fracture is identified. The visualized portions of paranasal sinuses and mastoid air cells are appropriately aerated.                                       Medications   sodium chloride 0.9% flush 10 mL (has no administration in time range)   melatonin tablet 6 mg (has no administration in time range)   ondansetron injection 4 mg (has no administration in time range)   senna-docusate 8.6-50 mg per tablet 1 tablet (has no administration in time range)   aluminum-magnesium hydroxide-simethicone 200-200-20 mg/5 mL suspension 30 mL (has no administration in time range)   acetaminophen tablet 650 mg (has no administration in time range)   naloxone 0.4 mg/mL injection 0.02 mg (has no administration in time range)   potassium bicarbonate disintegrating tablet 50 mEq (has no administration in time range)   potassium bicarbonate disintegrating tablet 35 mEq (35 mEq Oral Given 2/19/25 1633)   potassium bicarbonate  disintegrating tablet 60 mEq (has no administration in time range)   magnesium oxide tablet 800 mg (has no administration in time range)   magnesium oxide tablet 800 mg (has no administration in time range)   potassium, sodium phosphates 280-160-250 mg packet 2 packet (has no administration in time range)   potassium, sodium phosphates 280-160-250 mg packet 2 packet (has no administration in time range)   potassium, sodium phosphates 280-160-250 mg packet 2 packet (has no administration in time range)   glucose chewable tablet 16 g (has no administration in time range)   glucose chewable tablet 24 g (has no administration in time range)   dextrose 50% injection 12.5 g (has no administration in time range)   dextrose 50% injection 25 g (has no administration in time range)   glucagon (human recombinant) injection 1 mg (has no administration in time range)   folic acid tablet 1 mg (has no administration in time range)   medroxyPROGESTERone tablet 10 mg (has no administration in time range)   pantoprazole EC tablet 40 mg (has no administration in time range)   lactated ringers infusion ( Intravenous Canceled Entry 2/19/25 1422)   acetaminophen 1,000 mg/100 mL (10 mg/mL) injection 1,000 mg (0 mg Intravenous Stopped 2/19/25 1443)   diphenhydrAMINE injection 25 mg (25 mg Intravenous Given 2/19/25 1637)   ketorolac injection 15 mg (15 mg Intravenous Given 2/19/25 1634)     Medical Decision Making  Amount and/or Complexity of Data Reviewed  Labs: ordered.  Radiology: ordered.    Risk  Prescription drug management.               ED Course as of 02/19/25 2051 Wed Feb 19, 2025   7982 Patient seen evaluated emergency department.  Currently at this time patient found left facial twitching over last 3 days with left frontal temporal headache as well.  Was found to be afebrile with normal white count.  Had grossly nonfocal neurologic examination.  Patient did have tele neurology consult placed in emergency department consult  pending.  Patient will be admitted for MRI brain noncontrast study as well as EEG.  Was given IV Tylenol for headache control.  This time concern is could this be complex partial seizures versus atypical complex migraine variant.  Case discussed with Hospital Medicine.  Pending admission. [RM]      ED Course User Index  [RM] Clifford Vasquez MD               Medical Decision Making:   Initial Assessment:   29-year-old female with history of epilepsy, complex partial seizure disorder last seizure in  not on medications, anemia, MTHFR.  Patient presents emergency department with 3 day history of left facial twitching with associated left frontal headache.  She describes it as 5 to 6/10 in nature.  She denies fever, no nausea, vomiting, no recent illness.  Patient decided to come to the emergency department today due to the fact that she also had receive left arm numbness which began this morning.  She states that it felt slightly weak as well.  She denies any difficulty with vision, no difficulty with speech, no weakness to lower extremity.  VAN negative.    Differential Diagnosis:   complex migraine, TIA, CVA, intracranial mass, complex partial seizure  Clinical Tests:   Lab Tests: Ordered and Reviewed  Radiological Study: Ordered and Reviewed  Medical Tests: Ordered and Reviewed             Clinical Impression:  Final diagnoses:  [R29.818] Acute focal neurological deficit  [G40.209] Complex partial seizure (Primary)  [G43.809] Other migraine without status migrainosus, not intractable  [R51.9] Headache          ED Disposition Condition    Observation                   Clifford Vasquez MD  25 1443       [1]   Social History  Tobacco Use    Smoking status: Former     Current packs/day: 0.00     Average packs/day: 0.3 packs/day for 4.0 years (1.0 ttl pk-yrs)     Types: Cigarettes     Start date:      Quit date: 10/2019     Years since quittin.3    Smokeless tobacco: Never    Tobacco comments:      quit with pregnancy (2019)   Substance Use Topics    Alcohol use: Not Currently    Drug use: No        Clifford Vasquez MD  02/19/25 2051

## 2025-02-19 NOTE — PHARMACY MED REC
"Admission Medication History     The home medication history was taken by Manjeet Cabral.    You may go to "Admission" then "Reconcile Home Medications" tabs to review and/or act upon these items.     The home medication list has been updated by the Pharmacy department.   Please read ALL comments highlighted in yellow.   Please address this information as you see fit.    Feel free to contact us if you have any questions or require assistance.      The medications listed below were removed from the home medication list. Please reorder if appropriate:  Patient reports no longer taking the following medication(s):  Augmentin 875-125 mg  Ibuprofen 600 mg  Metronidazole 500 mg    Medications listed below were obtained from: Patient/family and Analytic software- [employer]  No current facility-administered medications on file prior to encounter.     Current Outpatient Medications on File Prior to Encounter   Medication Sig Dispense Refill    dextroamphetamine-amphetamine 10 mg Tab Take 0.5 tablets by mouth 2 (two) times daily.      folic acid (FOLVITE) 1 MG tablet Take 1 mg by mouth once daily.      medroxyPROGESTERone (PROVERA) 10 MG tablet Take 10 mg by mouth once daily.      naproxen sodium (ANAPROX) 220 MG tablet Take 220 mg by mouth 2 (two) times a day.      omeprazole (PRILOSEC) 40 MG capsule Take 40 mg by mouth daily as needed (Heartburn).      pantoprazole (PROTONIX) 20 MG tablet Take 20 mg by mouth daily as needed (Heartburn).      metoprolol tartrate (LOPRESSOR) 25 MG tablet Take 12.5 mg by mouth 2 (two) times daily. (Patient not taking: Reported on 2/19/2025)      [DISCONTINUED] amoxicillin-clavulanate 875-125mg (AUGMENTIN) 875-125 mg per tablet Take 1 tablet by mouth 2 (two) times daily.      [DISCONTINUED] hyoscyamine (ANASPAZ,LEVSIN) 0.125 mg Tab Take 1 tablet (125 mcg total) by mouth every 6 (six) hours as needed (abdominal pain). 15 tablet 0    [DISCONTINUED] ibuprofen (ADVIL,MOTRIN) 600 MG tablet Take 1 " tablet (600 mg total) by mouth every 8 (eight) hours as needed for Pain. (Patient not taking: Reported on 11/23/2022) 20 tablet 0    [DISCONTINUED] metroNIDAZOLE (FLAGYL) 500 MG tablet Take 500 mg by mouth 2 (two) times daily.             Manjeet Cabral  EXT [id number]                .

## 2025-02-19 NOTE — TELEMEDICINE CONSULT
"[facility]   General Neurology  Consult Note      Consult Information  Inpatient Consult to Neurology Services (General Neurology)  Consult performed by: Jeremiah Liagn MD  Consult ordered by: Apurva Young PA-C          Consulting Provider:    Current Providers  No providers found    Patient Location:  [facility] EMERGENCY DEPARTMENT Emergency Department    Spoke hospital nurse at bedside with patient assisting consultant.  Patient information was obtained from patient and relative(s).       Neurology Documentation  Acute Stroke Times   Stroke Team Called Date: 02/19/25  Stroke Team Called Time: 1500  Stroke Team Arrival Date: 02/19/25  Stroke Team Arrival Time: 1520    Blood pressure (!) 147/93, pulse 83, temperature 97.8 °F (36.6 °C), temperature source Oral, resp. rate (!) 24, height 5' 4" (1.626 m), weight 56.7 kg (125 lb), SpO2 99%, not currently breastfeeding.    Medical Decision Making  HPI:  29 y.o. 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 And neurology has been consulted for the same.     History from patient / family / medical records review. Symptoms of left facial twitching over the last 3 days; with associated numbness and non migrainous headaches.     No obvious generalized tonic clonic event / post ictal confusion/drowsiness / any loss of bladder continence / or tongue biting. No prodromal symptoms. No clear triggers. No other substance abuse. No family history of epilepsy. No history of strokes or complex migraines.     Labs no elevated WBC. CT head negative for acute findings.      Images personally reviewed and interpreted:  Study: Head CT  Study Interpretation: no acute findings     Additional studies reviewed:   Study: Cardiac_Neuro: EEG  Study Interpretation:  pending    Laboratory studies reviewed:  BMP:   Lab Results " "  Component Value Date     02/19/2025    K 3.1 (L) 02/19/2025     02/19/2025    CO2 22 (L) 02/19/2025    BUN 7 02/19/2025    CREATININE 0.8 02/19/2025    CALCIUM 9.4 02/19/2025     CBC:   Lab Results   Component Value Date    WBC 7.85 02/19/2025    RBC 4.87 02/19/2025    HGB 14.8 02/19/2025    HCT 45.5 02/19/2025     02/19/2025    MCV 93 02/19/2025    MCH 30.4 02/19/2025    MCHC 32.5 02/19/2025     Lipid Panel:   Lab Results   Component Value Date    CHOL 166 02/19/2025    LDLCALC 80.2 02/19/2025    HDL 61 02/19/2025    TRIG 124 02/19/2025     Coagulation:   Lab Results   Component Value Date    INR 1.0 02/19/2025    APTT 29.1 03/13/2020     Hgb A1C: No results found for: "HGBA1C"  TSH:   Lab Results   Component Value Date    TSH 1.043 02/19/2025       Documentation personally reviewed:  Notes: Notes: H&P     Assessment and plan:  Recs:  History of reported seizure disorder / partial complex not on AED recently - with admission concerns of possible simple focal seizures with functional confounders.     Reviewed the DDx and management - patient / family prefers investigation done prior to consideration of any AED    F/U  MRI brain Epilepsy protocol   EEG > 1 HR/rotuine   -- If EEG abnormal shall consider Lamictal:     Weeks 1: 25 mg once daily; Weeks 2: 50 mg/day in 1 to 2 divided doses based on chosen formulation; Week 3-4 : 100 mg/day in 1 to 2 divided doses based on chosen formulation  / week 5-6: 200 mg/day in 1 to 2 divided doses based on chosen formulation / Reviewed the S.E profile / Importance of complaince      - Prn ativan 1-2 mg IV if GTC or focal complex with secondary generalization > 1 mins / repeat ativan every 2 mins to .1mg/kg for uncontrolled seizures    -- Correct lytes as needed / control infection if any / avoid hypoxia or hypercapnia / avoid hypoglycemia   -- Correct any nutritional deficiencies / correct anemia / provide nutritional support as appropriate / ambulate when " appropriate - PT/OT recs   -- seizure precautions      Seizure counseling provided during the visit - Identification of signs; emergency action and ER attention; risk factor control if any, avoidance of sleep deprivation, environmental modifications / optimization for secondary injury prevention; driving restrictions as per recommendation, compliance to medications     Post charge discharge plan:  Clinic follow up: Epilepsy    Visit Type: in-person only  Timeframe: 4 weeks        Additional Physical Exam, History, & ROS  ROS  Physical Exam    Awake alert following commands   Oriented x 3   No aphasia or cortical signs   No focal motor or sensory deficits or cranial nerve deficits   No resting tremor / Dyskinesia    Past Medical History:   Diagnosis Date    Anemia     no current treatment    Epilepsy     Gallstones     HPV (human papilloma virus) infection     LGSIL on Pap smear of cervix     MTHFR (methylene THF reductase) deficiency and homocystinuria     Seizure disorder, complex partial     last seizure ; no medication    Seizures     Swine flu      Past Surgical History:   Procedure Laterality Date     SECTION N/A 3/8/2020    Procedure:  SECTION;  Surgeon: Brian Quijano MD;  Location: [facility] L&D;  Service: OB/GYN;  Laterality: N/A;     SECTION N/A 10/28/2021    Procedure:  SECTION;  Surgeon: Lucinda Rush MD;  Location: [facility] L&D;  Service: OB/GYN;  Laterality: N/A;    CHOLECYSTECTOMY  2017    LAPAROSCOPIC CHOLECYSTECTOMY      toe I&D      TONSILLECTOMY      UPPER GASTROINTESTINAL ENDOSCOPY      Showed H. Pylori    yifan dugan  2010     Family History   Adopted: Yes   Problem Relation Name Age of Onset    No Known Problems Mother      No Known Problems Father         Diagnoses  Problem Noted   Epilepsy 2021       Jeremiah Liang MD    Neurology consultation requested by spoke provider. Audiovisual encounter with the patient performed using a  secure connection.  Results and impressions from the visit are documented on this note and were communicated to the consulting provider/team via direct communication. The note has been shared for addition to the patients electronic medical record.

## 2025-02-19 NOTE — ASSESSMENT & PLAN NOTE
- Presented to the ED with a chief complaint of headache with onset 3 days ago with intermittent left eye twitching, left arm weakness/numbness/pain, and black shadows/floaters in her left eye.  - Suspect seizure verses headache at this time  - CT head and MRI brain negative for acute intracranial process  - Tele Neurology consulted from ED, recommends EEG  - Appreciate further recs  - Will trial a 1 time dose of Benadryl and Toradol

## 2025-02-19 NOTE — HPI
"Ms. Grayson is a 29 yr old female with a hx of chronic headaches, epilepsy not on AEDs, anemia, BRYON, MDD,  x3, gallstones s/p cholecystectomy, HPV, MTHFR, complex partial seizures who presented to the ED with a chief complaint of 10+/10 constant sharp stabbing persistent left-sided temporal headache that radiates to her left side occipital region and left side neck.  She endorses occasional pains described as electrocution." She states that any coughing, sneezing, or moving worsens her symptoms.  She states that she has been taking naproxen at home but states that she thought that this was ibuprofen and has been taking more than the recommended dose accidentally.  She also endorses trying extra-strength Tylenol with minimal relief.  She also endorses intermittent left eye twitching as well as left arm pain described as achy and throbbing as well as weakness and numbness and intermittent black shadows or floaters in her left eye.  She also endorses brain fog, nausea, chronic diarrhea, lightheadedness, and chronic right-sided chest pain.  She denies slurred speech, facial asymmetry, gait disturbances.  She states she vapes daily, drinks alcohol every other weekend, and denies recreational drug use.  She denies fever, chills, dyspnea, abdominal pain, vomiting, dysuria, hematuria, dizziness, and syncope.    Upon arrival to ED, patient afebrile, HR of 132, RR of 17, BP of 161/115, satting 100% RA.  Workup in the ED revealed potassium of 3.1, CO2 of 22, glucose of 134, POCT glucose of 191.  Remainder of labs fairly unremarkable.  CT head without acute intracranial abnormality.  Patient was given acetaminophen a 1000 mg IV, LR 1 L IV while in the ED. ED provider consulted tele neurology.  case discussed with ED provider and patient will be placed under observation for further management.  "

## 2025-02-19 NOTE — ASSESSMENT & PLAN NOTE
- Hx of epilepsy and complex partial seizures  - Not currently on AEDs outpatient  - Tele Neurology consulted, appreciate recs  - EEG pending

## 2025-02-19 NOTE — H&P
"  [facility] - Emergency Dept  Hospital Medicine  History & Physical    Patient Name: Irene Grayson  MRN: [id number]  Patient Class: OP- Observation  Admission Date: 2025  Attending Physician: Abhishek Gonzalez MD   Primary Care Provider: Clifford Page DO         Patient information was obtained from patient, past medical records, and ER records.     Subjective:     Principal Problem:Headache    Chief Complaint:   Chief Complaint   Patient presents with    Headache     Pt c/o h/a times three days with eye twitching on the left side. Left side arm weakness started around 1130 this morning.        HPI: Ms. Grayson is a 29 yr old female with a hx of chronic headaches, epilepsy not on AEDs, anemia, BRYON, MDD,  x3, gallstones s/p cholecystectomy, HPV, MTHFR, complex partial seizures who presented to the ED with a chief complaint of 10+/10 constant sharp stabbing persistent left-sided temporal headache that radiates to her left side occipital region and left side neck.  She endorses occasional pains described as electrocution." She states that any coughing, sneezing, or moving worsens her symptoms.  She states that she has been taking naproxen at home but states that she thought that this was ibuprofen and has been taking more than the recommended dose accidentally.  She also endorses trying extra-strength Tylenol with minimal relief.  She also endorses intermittent left eye twitching as well as left arm pain described as achy and throbbing as well as weakness and numbness and intermittent black shadows or floaters in her left eye.  She also endorses brain fog, nausea, chronic diarrhea, lightheadedness, and chronic right-sided chest pain.  She denies slurred speech, facial asymmetry, gait disturbances.  She states she vapes daily, drinks alcohol every other weekend, and denies recreational drug use.  She denies fever, chills, dyspnea, abdominal pain, vomiting, dysuria, hematuria, dizziness, and " syncope.    Upon arrival to ED, patient afebrile, HR of 132, RR of 17, BP of 161/115, satting 100% RA.  Workup in the ED revealed potassium of 3.1, CO2 of 22, glucose of 134, POCT glucose of 191.  Remainder of labs fairly unremarkable.  CT head without acute intracranial abnormality.  Patient was given acetaminophen a 1000 mg IV, LR 1 L IV while in the ED. ED provider consulted tele neurology.  case discussed with ED provider and patient will be placed under observation for further management.    Past Medical History:   Diagnosis Date    Anemia     no current treatment    Epilepsy     Gallstones     HPV (human papilloma virus) infection     LGSIL on Pap smear of cervix     MTHFR (methylene THF reductase) deficiency and homocystinuria     Seizure disorder, complex partial     last seizure ; no medication    Seizures     Swine flu        Past Surgical History:   Procedure Laterality Date     SECTION N/A 3/8/2020    Procedure:  SECTION;  Surgeon: Brian Quijano MD;  Location: [facility] L&D;  Service: OB/GYN;  Laterality: N/A;     SECTION N/A 10/28/2021    Procedure:  SECTION;  Surgeon: Lucinda Rush MD;  Location: [facility] L&D;  Service: OB/GYN;  Laterality: N/A;    CHOLECYSTECTOMY  2017    LAPAROSCOPIC CHOLECYSTECTOMY      toe I&D      TONSILLECTOMY      UPPER GASTROINTESTINAL ENDOSCOPY      Showed H. Pylori    yifan dugan         Review of patient's allergies indicates:   Allergen Reactions    Oranges [orange]      ulcers       No current facility-administered medications on file prior to encounter.     Current Outpatient Medications on File Prior to Encounter   Medication Sig    dextroamphetamine-amphetamine 10 mg Tab Take 0.5 tablets by mouth 2 (two) times daily.    folic acid (FOLVITE) 1 MG tablet Take 1 mg by mouth once daily.    medroxyPROGESTERone (PROVERA) 10 MG tablet Take 10 mg by mouth once daily.    naproxen sodium (ANAPROX) 220 MG tablet Take 220 mg  by mouth 2 (two) times a day.    omeprazole (PRILOSEC) 40 MG capsule Take 40 mg by mouth daily as needed (Heartburn).    pantoprazole (PROTONIX) 20 MG tablet Take 20 mg by mouth daily as needed (Heartburn).    [DISCONTINUED] amoxicillin-clavulanate 875-125mg (AUGMENTIN) 875-125 mg per tablet Take 1 tablet by mouth 2 (two) times daily.    [DISCONTINUED] hyoscyamine (ANASPAZ,LEVSIN) 0.125 mg Tab Take 1 tablet (125 mcg total) by mouth every 6 (six) hours as needed (abdominal pain).    [DISCONTINUED] ibuprofen (ADVIL,MOTRIN) 600 MG tablet Take 1 tablet (600 mg total) by mouth every 8 (eight) hours as needed for Pain. (Patient not taking: Reported on 2022)    [DISCONTINUED] metoprolol tartrate (LOPRESSOR) 25 MG tablet Take 12.5 mg by mouth 2 (two) times daily. (Patient not taking: Reported on 2025)    [DISCONTINUED] metroNIDAZOLE (FLAGYL) 500 MG tablet Take 500 mg by mouth 2 (two) times daily.     Family History       Problem Relation (Age of Onset)    No Known Problems Mother, Father          Tobacco Use    Smoking status: Former     Current packs/day: 0.00     Average packs/day: 0.3 packs/day for 4.0 years (1.0 ttl pk-yrs)     Types: Cigarettes     Start date:      Quit date: 10/2019     Years since quittin.3    Smokeless tobacco: Never    Tobacco comments:     quit with pregnancy (2019)   Substance and Sexual Activity    Alcohol use: Not Currently    Drug use: No    Sexual activity: Not Currently     Partners: Male     Birth control/protection: None     Review of Systems   Constitutional:  Negative for chills and fever.   Eyes:  Positive for visual disturbance.   Respiratory:  Negative for shortness of breath.    Cardiovascular:  Positive for chest pain (Chronic right-sided).   Gastrointestinal:  Positive for diarrhea (chronic) and nausea. Negative for abdominal pain and vomiting.   Genitourinary:  Negative for dysuria and hematuria.   Musculoskeletal:  Negative for gait problem.   Neurological:   Positive for weakness (left arm), light-headedness, numbness (left arm) and headaches. Negative for dizziness, syncope, facial asymmetry and speech difficulty.     Objective:     Vital Signs (Most Recent):  Temp: 97.8 °F (36.6 °C) (02/19/25 1256)  Pulse: 83 (02/19/25 1419)  Resp: (!) 24 (02/19/25 1325)  BP: (!) 147/93 (02/19/25 1309)  SpO2: 99 % (02/19/25 1419) Vital Signs (24h Range):  Temp:  [97.8 °F (36.6 °C)] 97.8 °F (36.6 °C)  Pulse:  [] 83  Resp:  [17-26] 24  SpO2:  [99 %-100 %] 99 %  BP: (147-161)/() 147/93     Weight: 56.7 kg (125 lb)  Body mass index is 21.46 kg/m².     Physical Exam  Vitals reviewed.   Constitutional:       General: She is awake. She is not in acute distress.     Appearance: Normal appearance. She is not ill-appearing or diaphoretic.   Cardiovascular:      Rate and Rhythm: Normal rate.      Heart sounds: Normal heart sounds. No murmur heard.     No friction rub. No gallop.   Pulmonary:      Effort: Pulmonary effort is normal. No accessory muscle usage or respiratory distress.      Breath sounds: Normal breath sounds. No wheezing, rhonchi or rales.   Abdominal:      General: Abdomen is flat. Bowel sounds are normal.      Palpations: Abdomen is soft.      Tenderness: There is no abdominal tenderness. There is no guarding or rebound.   Musculoskeletal:      Right lower leg: No edema.      Left lower leg: No edema.   Skin:     General: Skin is warm and dry.   Neurological:      Mental Status: She is alert and oriented to person, place, and time.      Comments: Patient moving all extremities and carrying on conversation.  No slurred speech or facial asymmetry appreciated.  PERRLA and EOMI. 5/5 strength to bilateral upper and lower extremities.  Equal  strength.  Mild decreased sensation to patient's left wrist during exam.  Normal finger-to-nose and normal heel-to-shin.  No other deficits appreciated on exam.   Psychiatric:         Behavior: Behavior is cooperative.                 Significant Labs: All pertinent labs within the past 24 hours have been reviewed.  CBC:   Recent Labs   Lab 02/19/25  1317   WBC 7.85   HGB 14.8   HCT 45.5        CMP:   Recent Labs   Lab 02/19/25  1317      K 3.1*      CO2 22*   *   BUN 7   CREATININE 0.8   CALCIUM 9.4   PROT 7.1   ALBUMIN 4.8   BILITOT 0.3   ALKPHOS 50*   AST 15   ALT 13   ANIONGAP 10     Coagulation:   Recent Labs   Lab 02/19/25  1317   INR 1.0     Lipid Panel:   Recent Labs   Lab 02/19/25  1317   CHOL 166   HDL 61   LDLCALC 80.2   TRIG 124   CHOLHDL 36.7     POCT Glucose:   Recent Labs   Lab 02/19/25  1251   POCTGLUCOSE 191*     TSH:   Recent Labs   Lab 02/19/25  1317   TSH 1.043       Significant Imaging:   Imaging Results              MRI Brain Without Contrast (In process)                      CT HEAD FOR STROKE (Final result)  Result time 02/19/25 13:08:16   Procedure changed from CT Head Without Contrast     Final result by Hermes Aguayo IV, MD (02/19/25 13:08:16)                   Impression:      No acute intracranial abnormality.    This report was telephoned to Dr. Vasquez in the emergency department at 13:07.      Electronically signed by: Hermes Aguayo  Date:    02/19/2025  Time:    13:08               Narrative:      CMS MANDATED QUALITY DATA - CT RADIATION - 436    All CT scans at this facility utilize dose modulation, iterative reconstruction, and/or weight based dosing when appropriate to reduce radiation dose to as low as reasonably achievable.    EXAMINATION:  CT HEAD FOR STROKE    CLINICAL HISTORY:  Neuro deficit, acute, stroke suspected;.    TECHNIQUE:  Noncontrast imaging is performed.    FINDINGS:  The brain parenchyma appears unremarkable. There are no findings of acute hemorrhage or infarction. There is no evidence of an intra-axial mass, mass effect or shift of the midline. The ventricular system is appropriate in size and position for age. There are no extra-axial collections  demonstrated.    Reviewed at bone windows no skull fracture is identified. The visualized portions of paranasal sinuses and mastoid air cells are appropriately aerated.                                     Assessment/Plan:     * Headache  - Presented to the ED with a chief complaint of headache with onset 3 days ago with intermittent left eye twitching, left arm weakness/numbness/pain, and black shadows/floaters in her left eye.  - Suspect seizure verses headache at this time  - CT head and MRI brain negative for acute intracranial process  - Tele Neurology consulted from ED, recommends EEG  - Appreciate further recs  - Will trial a 1 time dose of Benadryl and Toradol      Epilepsy  - Hx of epilepsy and complex partial seizures  - Not currently on AEDs outpatient  - Tele Neurology consulted, appreciate recs  - EEG pending        VTE Risk Mitigation (From admission, onward)           Ordered     IP VTE LOW RISK PATIENT  Once         02/19/25 1523     Place sequential compression device  Until discontinued         02/19/25 1523                         On 02/19/2025, patient should be placed in hospital observation services under my care in collaboration with Abhishek Gonzalez MD.           Apurva Young PA-C  Department of Hospital Medicine  [facility] - Emergency Dept

## 2025-02-19 NOTE — SUBJECTIVE & OBJECTIVE
Past Medical History:   Diagnosis Date    Anemia     no current treatment    Epilepsy     Gallstones     HPV (human papilloma virus) infection     LGSIL on Pap smear of cervix     MTHFR (methylene THF reductase) deficiency and homocystinuria     Seizure disorder, complex partial     last seizure ; no medication    Seizures     Swine flu        Past Surgical History:   Procedure Laterality Date     SECTION N/A 3/8/2020    Procedure:  SECTION;  Surgeon: Brian Quijano MD;  Location: [facility] L&D;  Service: OB/GYN;  Laterality: N/A;     SECTION N/A 10/28/2021    Procedure:  SECTION;  Surgeon: Lucinda Rush MD;  Location: [facility] L&D;  Service: OB/GYN;  Laterality: N/A;    CHOLECYSTECTOMY      LAPAROSCOPIC CHOLECYSTECTOMY      toe I&D      TONSILLECTOMY      UPPER GASTROINTESTINAL ENDOSCOPY      Showed H. Pylori    yifan dugan         Review of patient's allergies indicates:   Allergen Reactions    Oranges [orange]      ulcers       No current facility-administered medications on file prior to encounter.     Current Outpatient Medications on File Prior to Encounter   Medication Sig    dextroamphetamine-amphetamine 10 mg Tab Take 0.5 tablets by mouth 2 (two) times daily.    folic acid (FOLVITE) 1 MG tablet Take 1 mg by mouth once daily.    medroxyPROGESTERone (PROVERA) 10 MG tablet Take 10 mg by mouth once daily.    naproxen sodium (ANAPROX) 220 MG tablet Take 220 mg by mouth 2 (two) times a day.    omeprazole (PRILOSEC) 40 MG capsule Take 40 mg by mouth daily as needed (Heartburn).    pantoprazole (PROTONIX) 20 MG tablet Take 20 mg by mouth daily as needed (Heartburn).    [DISCONTINUED] amoxicillin-clavulanate 875-125mg (AUGMENTIN) 875-125 mg per tablet Take 1 tablet by mouth 2 (two) times daily.    [DISCONTINUED] hyoscyamine (ANASPAZ,LEVSIN) 0.125 mg Tab Take 1 tablet (125 mcg total) by mouth every 6 (six) hours as needed (abdominal pain).    [DISCONTINUED]  ibuprofen (ADVIL,MOTRIN) 600 MG tablet Take 1 tablet (600 mg total) by mouth every 8 (eight) hours as needed for Pain. (Patient not taking: Reported on 2022)    [DISCONTINUED] metoprolol tartrate (LOPRESSOR) 25 MG tablet Take 12.5 mg by mouth 2 (two) times daily. (Patient not taking: Reported on 2025)    [DISCONTINUED] metroNIDAZOLE (FLAGYL) 500 MG tablet Take 500 mg by mouth 2 (two) times daily.     Family History       Problem Relation (Age of Onset)    No Known Problems Mother, Father          Tobacco Use    Smoking status: Former     Current packs/day: 0.00     Average packs/day: 0.3 packs/day for 4.0 years (1.0 ttl pk-yrs)     Types: Cigarettes     Start date:      Quit date: 10/2019     Years since quittin.3    Smokeless tobacco: Never    Tobacco comments:     quit with pregnancy ()   Substance and Sexual Activity    Alcohol use: Not Currently    Drug use: No    Sexual activity: Not Currently     Partners: Male     Birth control/protection: None     Review of Systems   Constitutional:  Negative for chills and fever.   Eyes:  Positive for visual disturbance.   Respiratory:  Negative for shortness of breath.    Cardiovascular:  Positive for chest pain (Chronic right-sided).   Gastrointestinal:  Positive for diarrhea (chronic) and nausea. Negative for abdominal pain and vomiting.   Genitourinary:  Negative for dysuria and hematuria.   Musculoskeletal:  Negative for gait problem.   Neurological:  Positive for weakness (left arm), light-headedness, numbness (left arm) and headaches. Negative for dizziness, syncope, facial asymmetry and speech difficulty.     Objective:     Vital Signs (Most Recent):  Temp: 97.8 °F (36.6 °C) (25 1256)  Pulse: 83 (25 1419)  Resp: (!) 24 (25 1325)  BP: (!) 147/93 (25 1309)  SpO2: 99 % (25 1419) Vital Signs (24h Range):  Temp:  [97.8 °F (36.6 °C)] 97.8 °F (36.6 °C)  Pulse:  [] 83  Resp:  [17-26] 24  SpO2:  [99 %-100 %] 99  %  BP: (147-161)/() 147/93     Weight: 56.7 kg (125 lb)  Body mass index is 21.46 kg/m².     Physical Exam  Vitals reviewed.   Constitutional:       General: She is awake. She is not in acute distress.     Appearance: Normal appearance. She is not ill-appearing or diaphoretic.   Cardiovascular:      Rate and Rhythm: Normal rate.      Heart sounds: Normal heart sounds. No murmur heard.     No friction rub. No gallop.   Pulmonary:      Effort: Pulmonary effort is normal. No accessory muscle usage or respiratory distress.      Breath sounds: Normal breath sounds. No wheezing, rhonchi or rales.   Abdominal:      General: Abdomen is flat. Bowel sounds are normal.      Palpations: Abdomen is soft.      Tenderness: There is no abdominal tenderness. There is no guarding or rebound.   Musculoskeletal:      Right lower leg: No edema.      Left lower leg: No edema.   Skin:     General: Skin is warm and dry.   Neurological:      Mental Status: She is alert and oriented to person, place, and time.      Comments: Patient moving all extremities and carrying on conversation.  No slurred speech or facial asymmetry appreciated.  PERRLA and EOMI. 5/5 strength to bilateral upper and lower extremities.  Equal  strength.  Mild decreased sensation to patient's left wrist during exam.  Normal finger-to-nose and normal heel-to-shin.  No other deficits appreciated on exam.   Psychiatric:         Behavior: Behavior is cooperative.                Significant Labs: All pertinent labs within the past 24 hours have been reviewed.  CBC:   Recent Labs   Lab 02/19/25  1317   WBC 7.85   HGB 14.8   HCT 45.5        CMP:   Recent Labs   Lab 02/19/25  1317      K 3.1*      CO2 22*   *   BUN 7   CREATININE 0.8   CALCIUM 9.4   PROT 7.1   ALBUMIN 4.8   BILITOT 0.3   ALKPHOS 50*   AST 15   ALT 13   ANIONGAP 10     Coagulation:   Recent Labs   Lab 02/19/25  1317   INR 1.0     Lipid Panel:   Recent Labs   Lab 02/19/25  1317    CHOL 166   HDL 61   LDLCALC 80.2   TRIG 124   CHOLHDL 36.7     POCT Glucose:   Recent Labs   Lab 02/19/25  1251   POCTGLUCOSE 191*     TSH:   Recent Labs   Lab 02/19/25  1317   TSH 1.043       Significant Imaging:   Imaging Results              MRI Brain Without Contrast (In process)                      CT HEAD FOR STROKE (Final result)  Result time 02/19/25 13:08:16   Procedure changed from CT Head Without Contrast     Final result by Hermes Aguayo IV, MD (02/19/25 13:08:16)                   Impression:      No acute intracranial abnormality.    This report was telephoned to Dr. Vasquez in the emergency department at 13:07.      Electronically signed by: Hermes Aguayo  Date:    02/19/2025  Time:    13:08               Narrative:      CMS MANDATED QUALITY DATA - CT RADIATION - 436    All CT scans at this facility utilize dose modulation, iterative reconstruction, and/or weight based dosing when appropriate to reduce radiation dose to as low as reasonably achievable.    EXAMINATION:  CT HEAD FOR STROKE    CLINICAL HISTORY:  Neuro deficit, acute, stroke suspected;.    TECHNIQUE:  Noncontrast imaging is performed.    FINDINGS:  The brain parenchyma appears unremarkable. There are no findings of acute hemorrhage or infarction. There is no evidence of an intra-axial mass, mass effect or shift of the midline. The ventricular system is appropriate in size and position for age. There are no extra-axial collections demonstrated.    Reviewed at bone windows no skull fracture is identified. The visualized portions of paranasal sinuses and mastoid air cells are appropriately aerated.

## 2025-02-20 ENCOUNTER — CLINICAL SUPPORT (OUTPATIENT)
Dept: CARDIOLOGY | Facility: HOSPITAL | Age: 30
End: 2025-02-20
Attending: EMERGENCY MEDICINE
Payer: MEDICAID

## 2025-02-20 VITALS
RESPIRATION RATE: 15 BRPM | HEIGHT: 64 IN | SYSTOLIC BLOOD PRESSURE: 111 MMHG | WEIGHT: 125 LBS | HEART RATE: 80 BPM | BODY MASS INDEX: 21.34 KG/M2 | OXYGEN SATURATION: 97 % | DIASTOLIC BLOOD PRESSURE: 74 MMHG | TEMPERATURE: 98 F

## 2025-02-20 PROBLEM — G40.209 COMPLEX PARTIAL SEIZURE: Status: ACTIVE | Noted: 2025-02-20

## 2025-02-20 LAB
ANION GAP SERPL CALC-SCNC: 7 MMOL/L (ref 8–16)
AORTIC ROOT ANNULUS: 2.4 CM
AORTIC VALVE CUSP SEPERATION: 1.6 CM
APICAL FOUR CHAMBER EJECTION FRACTION: 56 %
APICAL TWO CHAMBER EJECTION FRACTION: 57 %
ASCENDING AORTA: 2.1 CM
AV INDEX (PROSTH): 0.93
AV MEAN GRADIENT: 3 MMHG
AV PEAK GRADIENT: 5 MMHG
AV VALVE AREA BY VELOCITY RATIO: 2.9 CM²
AV VALVE AREA: 2.9 CM²
AV VELOCITY RATIO: 0.91
BASOPHILS # BLD AUTO: 0.04 K/UL (ref 0–0.2)
BASOPHILS NFR BLD: 0.7 % (ref 0–1.9)
BSA FOR ECHO PROCEDURE: 1.6 M2
BUN SERPL-MCNC: 9 MG/DL (ref 6–20)
CALCIUM SERPL-MCNC: 8.8 MG/DL (ref 8.7–10.5)
CHLORIDE SERPL-SCNC: 107 MMOL/L (ref 95–110)
CO2 SERPL-SCNC: 25 MMOL/L (ref 23–29)
CREAT SERPL-MCNC: 0.7 MG/DL (ref 0.5–1.4)
CV ECHO LV RWT: 0.38 CM
DIFFERENTIAL METHOD BLD: ABNORMAL
DOP CALC AO PEAK VEL: 1.1 M/S
DOP CALC AO VTI: 20.6 CM
DOP CALC LVOT AREA: 3.1 CM2
DOP CALC LVOT DIAMETER: 2 CM
DOP CALC LVOT PEAK VEL: 1 M/S
DOP CALC LVOT STROKE VOLUME: 60 CM3
DOP CALC MV VTI: 22.4 CM
DOP CALCLVOT PEAK VEL VTI: 19.1 CM
E WAVE DECELERATION TIME: 130 MSEC
E/A RATIO: 1.21
E/E' RATIO: 4 M/S
ECHO LV POSTERIOR WALL: 0.8 CM (ref 0.6–1.1)
EOSINOPHIL # BLD AUTO: 0.1 K/UL (ref 0–0.5)
EOSINOPHIL NFR BLD: 1 % (ref 0–8)
ERYTHROCYTE [DISTWIDTH] IN BLOOD BY AUTOMATED COUNT: 15 % (ref 11.5–14.5)
EST. GFR  (NO RACE VARIABLE): >60 ML/MIN/1.73 M^2
ESTIMATED AVG GLUCOSE: 111 MG/DL (ref 68–131)
FRACTIONAL SHORTENING: 31 % (ref 28–44)
GLUCOSE SERPL-MCNC: 88 MG/DL (ref 70–110)
HBA1C MFR BLD: 5.5 % (ref 4.5–6.2)
HCT VFR BLD AUTO: 39.9 % (ref 37–48.5)
HGB BLD-MCNC: 13.5 G/DL (ref 12–16)
IMM GRANULOCYTES # BLD AUTO: 0.02 K/UL (ref 0–0.04)
IMM GRANULOCYTES NFR BLD AUTO: 0.3 % (ref 0–0.5)
INTERVENTRICULAR SEPTUM: 0.7 CM (ref 0.6–1.1)
IVC DIAMETER: 1.1 CM
LEFT ATRIUM AREA SYSTOLIC (APICAL 4 CHAMBER): 11.3 CM2
LEFT ATRIUM SIZE: 2.8 CM
LEFT INTERNAL DIMENSION IN SYSTOLE: 2.9 CM (ref 2.1–4)
LEFT VENTRICLE DIASTOLIC VOLUME INDEX: 49.11 ML/M2
LEFT VENTRICLE DIASTOLIC VOLUME: 78.58 ML
LEFT VENTRICLE END DIASTOLIC VOLUME APICAL 2 CHAMBER: 60.6 ML
LEFT VENTRICLE END DIASTOLIC VOLUME APICAL 4 CHAMBER: 78.3 ML
LEFT VENTRICLE END SYSTOLIC VOLUME APICAL 4 CHAMBER: 22.8 ML
LEFT VENTRICLE MASS INDEX: 58.1 G/M2
LEFT VENTRICLE SYSTOLIC VOLUME INDEX: 20.1 ML/M2
LEFT VENTRICLE SYSTOLIC VOLUME: 32.21 ML
LEFT VENTRICULAR INTERNAL DIMENSION IN DIASTOLE: 4.2 CM (ref 3.5–6)
LEFT VENTRICULAR MASS: 93 G
LV LATERAL E/E' RATIO: 3.6 M/S
LV SEPTAL E/E' RATIO: 5.4 M/S
LVED V (TEICH): 78.58 ML
LVES V (TEICH): 32.21 ML
LVOT MG: 2 MMHG
LVOT MV: 0.65 CM/S
LYMPHOCYTES # BLD AUTO: 2.4 K/UL (ref 1–4.8)
LYMPHOCYTES NFR BLD: 39 % (ref 18–48)
MAGNESIUM SERPL-MCNC: 1.7 MG/DL (ref 1.6–2.6)
MCH RBC QN AUTO: 30.8 PG (ref 27–31)
MCHC RBC AUTO-ENTMCNC: 33.8 G/DL (ref 32–36)
MCV RBC AUTO: 91 FL (ref 82–98)
MONOCYTES # BLD AUTO: 0.5 K/UL (ref 0.3–1)
MONOCYTES NFR BLD: 8.2 % (ref 4–15)
MV MEAN GRADIENT: 1 MMHG
MV PEAK A VEL: 0.72 M/S
MV PEAK E VEL: 0.87 M/S
MV PEAK GRADIENT: 4 MMHG
MV STENOSIS PRESSURE HALF TIME: 55 MS
MV VALVE AREA BY CONTINUITY EQUATION: 2.68 CM2
MV VALVE AREA P 1/2 METHOD: 4 CM2
NEUTROPHILS # BLD AUTO: 3.1 K/UL (ref 1.8–7.7)
NEUTROPHILS NFR BLD: 50.8 % (ref 38–73)
NRBC BLD-RTO: 0 /100 WBC
OHS CV RV/LV RATIO: 0.43 CM
OHS LV EJECTION FRACTION SIMPSONS BIPLANE MOD: 56 %
OHS QRS DURATION: 84 MS
OHS QTC CALCULATION: 428 MS
PHOSPHATE SERPL-MCNC: 4.1 MG/DL (ref 2.7–4.5)
PLATELET # BLD AUTO: 272 K/UL (ref 150–450)
PMV BLD AUTO: 10.2 FL (ref 9.2–12.9)
POTASSIUM SERPL-SCNC: 4.5 MMOL/L (ref 3.5–5.1)
PV MV: 0.7 M/S
PV PEAK GRADIENT: 4 MMHG
PV PEAK VELOCITY: 1 M/S
RA PRESSURE ESTIMATED: 3 MMHG
RBC # BLD AUTO: 4.39 M/UL (ref 4–5.4)
RIGHT ATRIUM VOLUME AREA LENGTH APICAL 4 CHAMBER: 14.5 ML
RIGHT VENTRICLE DIASTOLIC BASEL DIMENSION: 1.8 CM
RIGHT VENTRICULAR END-DIASTOLIC DIMENSION: 1.8 CM
RV TISSUE DOPPLER FREE WALL SYSTOLIC VELOCITY 1 (APICAL 4 CHAMBER VIEW): 12 CM/S
SODIUM SERPL-SCNC: 139 MMOL/L (ref 136–145)
TDI LATERAL: 0.24 M/S
TDI SEPTAL: 0.16 M/S
TDI: 0.2 M/S
TRICUSPID ANNULAR PLANE SYSTOLIC EXCURSION: 1.86 CM
TROPONIN I SERPL HS-MCNC: <2.3 PG/ML (ref 0–14.9)
WBC # BLD AUTO: 6.07 K/UL (ref 3.9–12.7)
Z-SCORE OF LEFT VENTRICULAR DIMENSION IN END DIASTOLE: -0.81
Z-SCORE OF LEFT VENTRICULAR DIMENSION IN END SYSTOLE: 0.21

## 2025-02-20 PROCEDURE — 84484 ASSAY OF TROPONIN QUANT: CPT

## 2025-02-20 PROCEDURE — 83036 HEMOGLOBIN GLYCOSYLATED A1C: CPT

## 2025-02-20 PROCEDURE — 36415 COLL VENOUS BLD VENIPUNCTURE: CPT

## 2025-02-20 PROCEDURE — 25000003 PHARM REV CODE 250

## 2025-02-20 PROCEDURE — 83735 ASSAY OF MAGNESIUM: CPT

## 2025-02-20 PROCEDURE — 25000003 PHARM REV CODE 250: Performed by: INTERNAL MEDICINE

## 2025-02-20 PROCEDURE — 84100 ASSAY OF PHOSPHORUS: CPT

## 2025-02-20 PROCEDURE — G0378 HOSPITAL OBSERVATION PER HR: HCPCS

## 2025-02-20 PROCEDURE — 80048 BASIC METABOLIC PNL TOTAL CA: CPT

## 2025-02-20 PROCEDURE — 95819 EEG AWAKE AND ASLEEP: CPT | Mod: 26,,, | Performed by: PSYCHIATRY & NEUROLOGY

## 2025-02-20 PROCEDURE — 99214 OFFICE O/P EST MOD 30 MIN: CPT | Mod: ,,, | Performed by: STUDENT IN AN ORGANIZED HEALTH CARE EDUCATION/TRAINING PROGRAM

## 2025-02-20 PROCEDURE — 95819 EEG AWAKE AND ASLEEP: CPT

## 2025-02-20 PROCEDURE — 93306 TTE W/DOPPLER COMPLETE: CPT

## 2025-02-20 PROCEDURE — 85025 COMPLETE CBC W/AUTO DIFF WBC: CPT

## 2025-02-20 RX ORDER — BUTALBITAL, ACETAMINOPHEN AND CAFFEINE 50; 325; 40 MG/1; MG/1; MG/1
1 TABLET ORAL EVERY 4 HOURS PRN
Qty: 30 TABLET | Refills: 0 | Status: SHIPPED | OUTPATIENT
Start: 2025-02-20 | End: 2025-03-22

## 2025-02-20 RX ADMIN — FOLIC ACID 1 MG: 1 TABLET ORAL at 08:02

## 2025-02-20 RX ADMIN — ACETAMINOPHEN 650 MG: 325 TABLET ORAL at 04:02

## 2025-02-20 RX ADMIN — MEDROXYPROGESTERONE ACETATE 10 MG: 2.5 TABLET ORAL at 10:02

## 2025-02-20 RX ADMIN — Medication 800 MG: at 07:02

## 2025-02-20 RX ADMIN — PANTOPRAZOLE SODIUM 40 MG: 40 TABLET, DELAYED RELEASE ORAL at 04:02

## 2025-02-20 NOTE — DISCHARGE SUMMARY
"[facility] Medicine  Discharge Summary      Patient Name: Irene Grayson  MRN: [id number]  ANGY: [id number]  Patient Class: OP- Observation  Admission Date: 2025  Hospital Length of Stay: 0 days  Discharge Date and Time:  2025 12:36 PM  Attending Physician: Brad Ca MD   Discharging Provider: Brad Ca MD  Primary Care Provider: Clifford Page DO    Primary Care Team: Networked reference to record PCT     HPI:   Ms. Grayson is a 29 yr old female with a hx of chronic headaches, epilepsy not on AEDs, anemia, BRYON, MDD,  x3, gallstones s/p cholecystectomy, HPV, MTHFR, complex partial seizures who presented to the ED with a chief complaint of 10+/10 constant sharp stabbing persistent left-sided temporal headache that radiates to her left side occipital region and left side neck.  She endorses occasional pains described as electrocution." She states that any coughing, sneezing, or moving worsens her symptoms.  She states that she has been taking naproxen at home but states that she thought that this was ibuprofen and has been taking more than the recommended dose accidentally.  She also endorses trying extra-strength Tylenol with minimal relief.  She also endorses intermittent left eye twitching as well as left arm pain described as achy and throbbing as well as weakness and numbness and intermittent black shadows or floaters in her left eye.  She also endorses brain fog, nausea, chronic diarrhea, lightheadedness, and chronic right-sided chest pain.  She denies slurred speech, facial asymmetry, gait disturbances.  She states she vapes daily, drinks alcohol every other weekend, and denies recreational drug use.  She denies fever, chills, dyspnea, abdominal pain, vomiting, dysuria, hematuria, dizziness, and syncope.    Upon arrival to ED, patient afebrile, HR of 132, RR of 17, BP of 161/115, satting 100% RA.  Workup in the ED revealed potassium of 3.1, CO2 of 22, " glucose of 134, POCT glucose of 191.  Remainder of labs fairly unremarkable.  CT head without acute intracranial abnormality.  Patient was given acetaminophen a 1000 mg IV, LR 1 L IV while in the ED. ED provider consulted tele neurology.  case discussed with ED provider and patient will be placed under observation for further management.    * No surgery found *      Hospital Course:   29 yr old female with a hx of chronic headaches, epilepsy not on AEDs, anemia, BRYON, MDD,  x3, gallstones s/p cholecystectomy, HPV, MTHFR, complex partial seizures was admitted with  persistent left-sided temporal headache that radiates to her left side occipital region and left side neck. Deny having photophobia at all.  CT head and MRI was done and negative . Headache cocktail given and improved. Neurology seen and request EEG which will done prior to DC .  Overall no headache and no neck stiffness or fever and DC with Fioricet PRN and follow up with her own neuro MD .       Goals of Care Treatment Preferences:  Code Status: Full Code         Consults:   Consults (From admission, onward)          Status Ordering Provider     Inpatient Consult to Neurology Services (General Neurology)  Once        Provider:  Jeremiah Liang MD    Completed JAYLAN KEARNEY            * Headache  - Presented to the ED with a chief complaint of headache with onset 3 days ago with intermittent left eye twitching, left arm weakness/numbness/pain, and black shadows/floaters in her left eye.  - Suspect seizure verses headache at this time  - CT head and MRI brain negative for acute intracranial process  - Tele Neurology consulted from ED, recommends EEG  - Appreciate further recs  - Will trial a 1 time dose of Benadryl and Toradol      Epilepsy  - Hx of epilepsy and complex partial seizures  - Not currently on AEDs outpatient  - Tele Neurology consulted, appreciate recs  - EEG pending        Final Active Diagnoses:    Diagnosis Date Noted  POA    PRINCIPAL PROBLEM:  Headache [R51.9] 02/19/2025 Yes    Epilepsy [G40.909] 09/21/2021 Yes      Problems Resolved During this Admission:       Discharged Condition: fair    Disposition: Home or Self Care    Follow Up:   Follow-up Information       Neurology Follow up.    Why: follow up as previously scheduled on Tuesday                         Patient Instructions:      Diet Adult Regular     Activity as tolerated       Significant Diagnostic Studies: Labs: CMP   Recent Labs   Lab 02/19/25 1317 02/20/25  0320    139   K 3.1* 4.5    107   CO2 22* 25   * 88   BUN 7 9   CREATININE 0.8 0.7   CALCIUM 9.4 8.8   PROT 7.1  --    ALBUMIN 4.8  --    BILITOT 0.3  --    ALKPHOS 50*  --    AST 15  --    ALT 13  --    ANIONGAP 10 7*   , CBC   Recent Labs   Lab 02/19/25 1317 02/20/25  0320   WBC 7.85 6.07   HGB 14.8 13.5   HCT 45.5 39.9    272   , and INR   Lab Results   Component Value Date    INR 1.0 02/19/2025    INR 1.0 03/13/2020       Pending Diagnostic Studies:       Procedure Component Value Units Date/Time    Echo [[id number]]     Order Status: Sent Lab Status: No result     HIV 1/2 Ag/Ab (4th Gen) [[id number]] Collected: 02/19/25 1315    Order Status: Sent Lab Status: No result     Specimen: Blood     Hepatitis C Antibody [[id number]] Collected: 02/19/25 1315    Order Status: Sent Lab Status: No result     Specimen: Blood            Medications:  Reconciled Home Medications:      Medication List        START taking these medications      butalbital-acetaminophen-caffeine -40 mg -40 mg per tablet  Commonly known as: FIORICET, ESGIC  Take 1 tablet by mouth every 4 (four) hours as needed for Pain.            CONTINUE taking these medications      dextroamphetamine-amphetamine 10 mg Tab  Take 0.5 tablets by mouth 2 (two) times daily.     folic acid 1 MG tablet  Commonly known as: FOLVITE  Take 1 mg by mouth once daily.     medroxyPROGESTERone 10 MG tablet  Commonly known as:  PROVERA  Take 10 mg by mouth once daily.     naproxen sodium 220 MG tablet  Commonly known as: ANAPROX  Take 220 mg by mouth 2 (two) times a day.     pantoprazole 20 MG tablet  Commonly known as: PROTONIX  Take 20 mg by mouth daily as needed (Heartburn).            STOP taking these medications      omeprazole 40 MG capsule  Commonly known as: PRILOSEC              Indwelling Lines/Drains at time of discharge:   Lines/Drains/Airways       None                 General: Patient resting comfortably in no acute distress. Appears as stated age. Calm  Eyes: EOM intact. No conjunctivae injection. No scleral icterus.  ENT: Hearing grossly intact. No discharge from ears. No nasal discharge.   CVS: RRR. No LE edema BL.  Lungs: CTA BL, no wheezing or crackles. Good breath sounds. No accessory muscle use. No acute respiratory distress  Neuro: non focal , Follows commands. Responds appropriately   Time spent on the discharge of patient: 22 minutes         Brad Ca MD  Department of Hospital Medicine  Replaced by [facility]

## 2025-02-20 NOTE — NURSING
Patient medication and discharge instruction reviewed with patient. Patient  verbalizes understanding. Patient chose to ambulate off unit  and in NAD. PIV and Tele removed

## 2025-02-20 NOTE — PLAN OF CARE
Date of admission: 3/4/2024  Date of discharge: 03/05/24    Your care was provided by the attending and resident physicians of the [facility]. The primary encounter diagnosis was Chest pain, unspecified type. Diagnoses of Acute alcoholic intoxication with complication (HCC), Hypomagnesemia, and Essential hypertension were also pertinent to this visit.    FOLLOW-UP  - Follow up with your primary care physician Chi Bullock,  in 2-3 days    MEDICATIONS  - You have been started on Thiamine and multivitamin pills and your losartan has been changed to 50 mg  -  your Medicaid from the pharmacy and take as directed.  - Continue taking your other home medications as directed.    ADDITIONAL INSTRUCTIONS  Please return to the [facility] if your symptoms worsen, if you experience chest pain, shortness of breath more than usual, you pass out, developed fevers or confusion, or any concerns whatsoever.        [facility]  Initial Discharge Assessment       Primary Care Provider: Clifford Page DO    Admission Diagnosis: Headache [R51.9]    Admission Date: 2/19/2025  Expected Discharge Date: 2/20/2025    SW met with patient bedside. SW verified demographics, insurance, supports, and PCP. SW assessed patient's needs. Patient is able to complete ADLs independently. Patient verified at home DME: None.  Patient verified No HH, No Dialysis, No Blood Thinners, and No Oxygen.     Patient verified pharmacy of choice: [facility] [address] [facility] [address] [facility]  Patient confirmed she will be her own source of transportation at the time of discharge.     Transition of Care Barriers: None    Payor: MEDICAID / [facility] / Product Type: Managed Medicaid /     Extended Emergency Contact Information  Primary Emergency Contact: Kathy Grayson  Address: [address] [place], [place] [address] [address] [facility]  Home Phone: [phone]  Mobile Phone: [phone]  Relation: Mother  Preferred language: English   needed? No  Secondary Emergency Contact: Lauro Grayson  Address: [address] [place], [place] [address] [place] [address] [facility]  Home Phone: [phone]  Mobile Phone: [phone]  Relation: Father  Preferred language: English   needed? No    Discharge Plan A: Home with family  Discharge Plan B: Home with family      [id number] - [place] - [address]  [address]  [place] [address]  Phone: [phone] Fax: [phone]    [id number] - [place]  [address] AT [facility] &   [address]  [place] [address]  Phone: [phone] Fax: [phone]      Initial Assessment (most recent)       Adult Discharge Assessment - 02/20/25 1113          Discharge Assessment    Assessment Type Discharge Planning Assessment     Confirmed/corrected address, phone number and insurance Yes     Confirmed  Demographics Correct on Facesheet     Source of Information patient     Does patient/caregiver understand observation status Yes     Communicated LAURA with patient/caregiver Yes     People in Home parent(s)     Do you expect to return to your current living situation? Yes     Do you have help at home or someone to help you manage your care at home? Yes     Who are your caregiver(s) and their phone number(s)? PARENTS     Prior to hospitilization cognitive status: Alert/Oriented     Current cognitive status: Alert/Oriented     Walking or Climbing Stairs Difficulty no     Dressing/Bathing Difficulty no     Equipment Currently Used at Home none     Readmission within 30 days? No     Patient currently being followed by outpatient case management? No     Do you currently have service(s) that help you manage your care at home? No     Do you take prescription medications? Yes     Do you have prescription coverage? Yes     Coverage Medicaid     Do you have any problems affording any of your prescribed medications? No     Is the patient taking medications as prescribed? yes     Who is going to help you get home at discharge? Drove self     How do you get to doctors appointments? car, drives self     Are you on dialysis? No     Do you take coumadin? No     Discharge Plan A Home with family     Discharge Plan B Home with family     DME Needed Upon Discharge  none     Discharge Plan discussed with: Patient     Transition of Care Barriers None

## 2025-02-20 NOTE — HOSPITAL COURSE
29 yr old female with a hx of chronic headaches, epilepsy not on AEDs, anemia, BRYON, MDD,  x3, gallstones s/p cholecystectomy, HPV, MTHFR, complex partial seizures was admitted with  persistent left-sided temporal headache that radiates to her left side occipital region and left side neck. Deny having photophobia at all.  CT head and MRI was done and negative . Headache cocktail given and improved. Neurology seen and request EEG which will done prior to DC .  Overall no headache and no neck stiffness or fever and DC with Fioricet PRN and follow up with her own neuro MD .

## 2025-02-20 NOTE — PROCEDURES
Date of service  02/20/2025    Introduction  Electroencephalographic (EEG) recording is performed with electrodes placed according to the International 10-20 placement system. Thirty two (32) channels of digital signal (sampling rate of 512/sec) including T1 and T2 was simultaneously recorded from the scalp and may include EKG, EMG, and/or eye monitors. Recording band pass was 0.1 to 512 Hz. Digital video recording of the patient is simultaneously recorded with the EEG. The patient is instructed to report clinical symptoms which may occur during the recording session. EEG and video recording is stored and archived in digital format. Activation procedures which include photic stimulation, hyperventilation and instructing patients to perform simple tasks are done in selected patients.    The EEG is displayed on a monitor screen and can be reviewed using different montages. Computer assisted analysis is employed to detect spike and electrographic seizure activity. The entire record is submitted for computer analysis. The entire recording is visually reviewed and, the times identified by computer analysis as being spikes or seizures are reviewed again.     Compressed spectral analysis (CSA) is also performed on the activity recorded from each individual channel. This is displayed as a power display of frequencies from 0 to 30 Hz over time. The CSA is reviewed looking for asymmetries in power between homologous areas of the scalp and then compared with the original EEG recording.     Findings  The patient's background consists of a posteriorly dominant 12 Hz alpha rhythm.  Anteriorly, the patient's background consists of predominantly excess beta range frequencies.    Hyperventilation and photic stimulation did not produce any abnormal response.     During the course of the recording, the patient is noted to be awake, subsequently becomes drowsy, and falls asleep with normal, symmetric sleep architecture seen.     There  is no focal slowing.  There are no focal, lateralized, or epileptiform transients.  No electrographic seizures are seen.    EKG demonstrates regular rhythm.    Interpretation  This is a normal EEG during wakefulness and sleep. The excess beta activity can be seen as a result of medication effect. No potentially epileptiform activity was seen. Please be aware that a normal EEG does not exclude the possibility of an underlying seizure disorder.

## 2025-02-20 NOTE — PROGRESS NOTES
"HPI:  29 y.o. 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 And neurology has been consulted for the same.     History from patient / family / medical records review. Symptoms of left facial twitching over the last 3 days; with associated numbness and non migrainous headaches.     No obvious generalized tonic clonic event / post ictal confusion/drowsiness / any loss of bladder continence / or tongue biting. No prodromal symptoms. No clear triggers. No other substance abuse. No family history of epilepsy. No history of strokes or complex migraines.     Labs no elevated WBC. CT head negative for acute findings.      Images personally reviewed and interpreted:  Study: Head CT  Study Interpretation: no acute findings     Additional studies reviewed:   Study: Cardiac_Neuro: EEG  Study Interpretation: pending    Laboratory studies reviewed:  BMP:   Lab Results   Component Value Date     02/19/2025    K 3.1 (L) 02/19/2025     02/19/2025    CO2 22 (L) 02/19/2025    BUN 7 02/19/2025    CREATININE 0.8 02/19/2025    CALCIUM 9.4 02/19/2025     CBC:   Lab Results   Component Value Date    WBC 7.85 02/19/2025    RBC 4.87 02/19/2025    HGB 14.8 02/19/2025    HCT 45.5 02/19/2025     02/19/2025    MCV 93 02/19/2025    MCH 30.4 02/19/2025    MCHC 32.5 02/19/2025     Lipid Panel:   Lab Results   Component Value Date    CHOL 166 02/19/2025    LDLCALC 80.2 02/19/2025    HDL 61 02/19/2025    TRIG 124 02/19/2025     Coagulation:   Lab Results   Component Value Date    INR 1.0 02/19/2025    APTT 29.1 03/13/2020     Hgb A1C: No results found for: "HGBA1C"  TSH:   Lab Results   Component Value Date    TSH 1.043 02/19/2025       Documentation personally reviewed:  Notes: Notes: H&P     Assessment and plan:  Recs:  History of reported seizure disorder / partial complex " not on AED recently - with admission concerns of possible simple focal seizures with functional confounders.     Reviewed the DDx and management - patient / family prefers investigation done prior to consideration of any AED    F/U  MRI brain Epilepsy protocol - negative for any focal contributory pathology     EEG > 1 HR/rotuine unrevealing for focal epileptiform activity or seizures     -- patient / family prefers no AEDs at the moment / shall follow up OP neurology for further evaluation and management     - Prn ativan 1-2 mg IV if GTC or focal complex with secondary generalization > 1 mins / repeat ativan every 2 mins to .1mg/kg for uncontrolled seizures    -- Correct lytes as needed / control infection if any / avoid hypoxia or hypercapnia / avoid hypoglycemia   -- Correct any nutritional deficiencies / correct anemia / provide nutritional support as appropriate / ambulate when appropriate - PT/OT recs   -- seizure precautions      Seizure counseling provided during the visit - Identification of signs; emergency action and ER attention; risk factor control if any, avoidance of sleep deprivation, environmental modifications / optimization for secondary injury prevention; driving restrictions as per recommendation, compliance to medications     Post charge discharge plan:  Clinic follow up: Epilepsy    Visit Type: in-person only  Timeframe: 4 weeks          Jeremiah Liang MD    General Neurology, [facility],   [address] [facility] [address], [address], [place] [address]    Vascular Neurology, Endovascular Neurosurgery,   [facility], [address] [place] [id number]

## 2025-02-20 NOTE — PLAN OF CARE
Discharge orders and chart reviewed. No other discharge needs noted at this time. Pt is clear for discharge from case management, after EEG completed. Pt is discharging to home.    Patient verbalized appointment with Neurology scheduled for next Tuesday - patient to follow up as previously scheduled    Patient to transport self home     02/20/25 1000   Final Note   Assessment Type Final Discharge Note   Anticipated Discharge Disposition Home   What phone number can be called within the next 1-3 days to see how you are doing after discharge? [phone]   Hospital Resources/Appts/Education Provided Appointment suggestion unavailable;Appointments scheduled and added to AVS   Post-Acute Status   Discharge Delays None known at this time

## 2025-02-20 NOTE — NURSING
Patient transferred to unit via wheelchair, patient ambulated to restroom x 1, no complaints of pain voiced at this time, call light within easy reach, SR up x 2, seizure and fall precautions in place, bed alarm on and sounded when prompted.

## 2025-05-14 ENCOUNTER — OFFICE VISIT (OUTPATIENT)
Facility: CLINIC | Age: 30
End: 2025-05-14
Payer: MEDICAID

## 2025-05-14 VITALS
TEMPERATURE: 98 F | WEIGHT: 116.88 LBS | SYSTOLIC BLOOD PRESSURE: 136 MMHG | BODY MASS INDEX: 20.07 KG/M2 | RESPIRATION RATE: 18 BRPM | DIASTOLIC BLOOD PRESSURE: 86 MMHG

## 2025-05-14 DIAGNOSIS — D50.0 IRON DEFICIENCY ANEMIA DUE TO CHRONIC BLOOD LOSS: Primary | ICD-10-CM

## 2025-05-14 DIAGNOSIS — E53.8 FOLATE DEFICIENCY: ICD-10-CM

## 2025-05-14 PROCEDURE — 3079F DIAST BP 80-89 MM HG: CPT | Mod: CPTII,,, | Performed by: NURSE PRACTITIONER

## 2025-05-14 PROCEDURE — 3044F HG A1C LEVEL LT 7.0%: CPT | Mod: CPTII,,, | Performed by: NURSE PRACTITIONER

## 2025-05-14 PROCEDURE — G2211 COMPLEX E/M VISIT ADD ON: HCPCS | Mod: S$PBB,,, | Performed by: NURSE PRACTITIONER

## 2025-05-14 PROCEDURE — 99999 PR PBB SHADOW E&M-EST. PATIENT-LVL III: CPT | Mod: PBBFAC,,, | Performed by: NURSE PRACTITIONER

## 2025-05-14 PROCEDURE — 99213 OFFICE O/P EST LOW 20 MIN: CPT | Mod: PBBFAC,PN | Performed by: NURSE PRACTITIONER

## 2025-05-14 PROCEDURE — 3075F SYST BP GE 130 - 139MM HG: CPT | Mod: CPTII,,, | Performed by: NURSE PRACTITIONER

## 2025-05-14 PROCEDURE — 99214 OFFICE O/P EST MOD 30 MIN: CPT | Mod: S$PBB,,, | Performed by: NURSE PRACTITIONER

## 2025-05-14 PROCEDURE — 1160F RVW MEDS BY RX/DR IN RCRD: CPT | Mod: CPTII,,, | Performed by: NURSE PRACTITIONER

## 2025-05-14 PROCEDURE — 1159F MED LIST DOCD IN RCRD: CPT | Mod: CPTII,,, | Performed by: NURSE PRACTITIONER

## 2025-05-14 PROCEDURE — 3008F BODY MASS INDEX DOCD: CPT | Mod: CPTII,,, | Performed by: NURSE PRACTITIONER

## 2025-05-14 NOTE — PROGRESS NOTES
PROGRESS NOTE    Subjective:       Patient ID: Irene Grayson is a 29 y.o. female.    Chief Complaint:  Folate and iron def follow up    History of Present Illness:   Irene Grayson is a 29 y.o. female who presents for follow up of above.     Patient is here for follow up on iron deficiency. She is feeling better after 2 doses of Feraheme in Dec 2024. Recent ferritin up to 185. Hgb WNL. She has improvement in fatigue and pica.  Patient has discussed surgical options with Dr. Rush    Previously:  Patients hgb 13.6 on 9/4/2024 Ferritin 7. Patient has heavy cycles she has PCOS. She has had 3 kids and does not wish to have anymore. She has noticed increased fatigue, cold sensitivity and eating ice.    Patient had a reaction to the Ferrlicit with severe muscle and joint pain. Bilateral feet swelling. There is a shortage on Venofer. Will get auth for Feraheme.    Patient is having increased headaches and fatigues.  Using Ibuprofen for the Migraines her PCP has tried BP meds with no relief.  EGD and Colon done by Dr. Leonardo.       Labs from 9/4/2024:                Date:  Hb Ferritin  1/25/2023: 11.1 4  3/13/2023: 13.6 69    Venofer:                            Cycle 1: 2/15/2023  Cycle 2: 2/28/2023  Cycle 3: 3/15/2023       PMH:  Ms. Grayson is a 26yo female who is referred for anemia.  Review of labs show her counts low dating back to 12/1019 and she has been as low as 6.0g/dl requiring transfusion in Mar[place] 2020.   She is more recently running int he 10 to 11g/dl range.     She states this has been attributed to iron deficiency.  She is currently on FeSo4 325mg bid and folate.  She states she has been on iron in the past but feels it did not help.      She had upper endo done 11/7/2022 which showed no bleeding areas and will do colonoscopy on 12/5/2022.     She does note a history of long menstrual cycles which are usually up to 7 days and still spots for  an additional few days after each cycle.     Family and Social history reviewed and is unchanged from 11/23/2022      ROS:  Review of Systems   Constitutional:  Positive for fatigue. Negative for fever.   Respiratory:  Negative for shortness of breath.    Cardiovascular:  Negative for chest pain and leg swelling.   Gastrointestinal:  Negative for abdominal pain and blood in stool.   Genitourinary:  Negative for hematuria.   Skin:  Negative for rash.          Current Outpatient Medications:     dextroamphetamine-amphetamine 10 mg Tab, Take 0.5 tablets by mouth 2 (two) times daily., Disp: , Rfl:     folic acid (FOLVITE) 1 MG tablet, Take 1 mg by mouth once daily., Disp: , Rfl:     medroxyPROGESTERone (PROVERA) 10 MG tablet, Take 10 mg by mouth once daily., Disp: , Rfl:     pantoprazole (PROTONIX) 20 MG tablet, Take 20 mg by mouth daily as needed (Heartburn)., Disp: , Rfl:         Objective:       Physical Examination:     /86   Pulse (!) (P) 113   Temp 97.9 °F (36.6 °C)   Resp 18   Wt 53 kg (116 lb 14.4 oz)   LMP 05/05/2025 (Exact Date)   BMI 20.07 kg/m²     Physical Exam  Constitutional:       Appearance: Normal appearance. She is well-developed.   HENT:      Head: Normocephalic and atraumatic.      Right Ear: External ear normal.      Left Ear: External ear normal.      Nose: Nose normal.      Mouth/Throat:      Mouth: Mucous membranes are moist.   Eyes:      Conjunctiva/sclera: Conjunctivae normal.      Pupils: Pupils are equal, round, and reactive to light.   Neck:      Thyroid: No thyromegaly.      Trachea: No tracheal deviation.   Cardiovascular:      Rate and Rhythm: Normal rate and regular rhythm.      Heart sounds: Normal heart sounds.   Pulmonary:      Effort: Pulmonary effort is normal.      Breath sounds: Normal breath sounds.   Abdominal:      General: Bowel sounds are normal. There is no distension.      Palpations: Abdomen is soft. There is no mass.      Tenderness: There is no abdominal  tenderness.   Musculoskeletal:         General: Normal range of motion.   Skin:     General: Skin is warm and dry.      Findings: No rash.   Neurological:      Mental Status: She is alert.      Comments: Neuro intact througout   Psychiatric:         Behavior: Behavior normal.         Thought Content: Thought content normal.         Judgment: Judgment normal.         Labs:   No results found for this or any previous visit (from the past 2 weeks).    CMP  Sodium   Date Value Ref Range Status   02/20/2025 139 136 - 145 mmol/L Final     Potassium   Date Value Ref Range Status   02/20/2025 4.5 3.5 - 5.1 mmol/L Final     Chloride   Date Value Ref Range Status   02/20/2025 107 95 - 110 mmol/L Final     CO2   Date Value Ref Range Status   02/20/2025 25 23 - 29 mmol/L Final     Glucose   Date Value Ref Range Status   02/20/2025 88 70 - 110 mg/dL Final     BUN   Date Value Ref Range Status   02/20/2025 9 6 - 20 mg/dL Final     Creatinine   Date Value Ref Range Status   02/20/2025 0.7 0.5 - 1.4 mg/dL Final     Calcium   Date Value Ref Range Status   02/20/2025 8.8 8.7 - 10.5 mg/dL Final     Total Protein   Date Value Ref Range Status   02/19/2025 7.1 6.0 - 8.4 g/dL Final     Albumin   Date Value Ref Range Status   02/19/2025 4.8 3.5 - 5.2 g/dL Final     Total Bilirubin   Date Value Ref Range Status   02/19/2025 0.3 0.1 - 1.0 mg/dL Final     Comment:     For infants and newborns, interpretation of results should be based  on gestational age, weight and in agreement with clinical  observations.    Premature Infant recommended reference ranges:  Up to 24 hours.............<8.0 mg/dL  Up to 48 hours............<12.0 mg/dL  3-5 days..................<15.0 mg/dL  6-29 days.................<15.0 mg/dL       Alkaline Phosphatase   Date Value Ref Range Status   02/19/2025 50 (L) 55 - 135 U/L Final     AST   Date Value Ref Range Status   02/19/2025 15 10 - 40 U/L Final     ALT   Date Value Ref Range Status   02/19/2025 13 10 - 44 U/L  "Final     Anion Gap   Date Value Ref Range Status   02/20/2025 7 (L) 8 - 16 mmol/L Final     eGFR if    Date Value Ref Range Status   07/23/2022 >60.0 >60 mL/min/1.73 m^2 Final     eGFR if non    Date Value Ref Range Status   07/23/2022 >60.0 >60 mL/min/1.73 m^2 Final     Comment:     Calculation used to obtain the estimated glomerular filtration  rate (eGFR) is the CKD-EPI equation.        No results found for: "CEA"    Assessment/Plan:     Problem List Items Addressed This Visit          Oncology    Iron deficiency anemia due to chronic blood loss - Primary    Ferritin and hgb WNL; Repeat labs and office visit in 6 mths         Relevant Orders    CBC W/ AUTO DIFFERENTIAL    FERRITIN    Iron and TIBC       Endocrine    Folate deficiency    Continue folic acid and repeat labs in 6 mths         Relevant Orders    FOLATE         Discussion:     Follow up in about 6 months (around 11/14/2025).    Electronically signed by Jennifer Porter, MSN, APRN, AGNP-C, OCN            "

## 2025-05-29 ENCOUNTER — HOSPITAL ENCOUNTER (EMERGENCY)
Facility: HOSPITAL | Age: 30
Discharge: HOME | End: 2025-05-29
Attending: EMERGENCY MEDICINE
Payer: MEDICAID

## 2025-05-29 VITALS
RESPIRATION RATE: 20 BRPM | WEIGHT: 116 LBS | HEART RATE: 89 BPM | DIASTOLIC BLOOD PRESSURE: 75 MMHG | HEIGHT: 64 IN | BODY MASS INDEX: 19.81 KG/M2 | OXYGEN SATURATION: 100 % | TEMPERATURE: 99 F | SYSTOLIC BLOOD PRESSURE: 125 MMHG

## 2025-05-29 DIAGNOSIS — R20.0 LEG NUMBNESS: Primary | ICD-10-CM

## 2025-05-29 LAB
B-HCG UR QL: NEGATIVE
CTP QC/QA: YES
HCV AB SERPL QL IA: NORMAL
HIV 1+2 AB+HIV1 P24 AG SERPL QL IA: NORMAL

## 2025-05-29 PROCEDURE — 86803 HEPATITIS C AB TEST: CPT | Performed by: EMERGENCY MEDICINE

## 2025-05-29 PROCEDURE — 81025 URINE PREGNANCY TEST: CPT

## 2025-05-29 PROCEDURE — 87389 HIV-1 AG W/HIV-1&-2 AB AG IA: CPT | Performed by: EMERGENCY MEDICINE

## 2025-05-29 PROCEDURE — 36415 COLL VENOUS BLD VENIPUNCTURE: CPT | Performed by: EMERGENCY MEDICINE

## 2025-05-29 PROCEDURE — 99285 EMERGENCY DEPT VISIT HI MDM: CPT | Mod: 25

## 2025-05-30 ENCOUNTER — HOSPITAL ENCOUNTER (INPATIENT)
Facility: HOSPITAL | Age: 30
LOS: 5 days | Discharge: HOME | DRG: 096 | End: 2025-06-04
Attending: EMERGENCY MEDICINE | Admitting: HOSPITALIST
Payer: MEDICAID

## 2025-05-30 DIAGNOSIS — R20.0 NUMBNESS OF RIGHT LOWER EXTREMITY: ICD-10-CM

## 2025-05-30 DIAGNOSIS — I49.9 ARRHYTHMIA: ICD-10-CM

## 2025-05-30 DIAGNOSIS — R51.9 SUDDEN ONSET OF SEVERE HEADACHE: ICD-10-CM

## 2025-05-30 DIAGNOSIS — G00.9 BACTERIAL MENINGITIS: Primary | ICD-10-CM

## 2025-05-30 DIAGNOSIS — R07.9 CHEST PAIN: ICD-10-CM

## 2025-05-30 DIAGNOSIS — G61.0 AIDP (ACUTE INFLAMMATORY DEMYELINATING POLYNEUROPATHY): ICD-10-CM

## 2025-05-30 PROBLEM — F17.200 NICOTINE DEPENDENCE: Status: ACTIVE | Noted: 2025-05-30

## 2025-05-30 LAB
ABSOLUTE EOSINOPHIL (SMH): 0.05 K/UL
ABSOLUTE MONOCYTE (SMH): 0.5 K/UL (ref 0.3–1)
ABSOLUTE NEUTROPHIL COUNT (SMH): 4.6 K/UL (ref 1.8–7.7)
ALBUMIN SERPL-MCNC: 4.4 G/DL (ref 3.5–5.2)
ALP SERPL-CCNC: 44 UNIT/L (ref 55–135)
ALT SERPL-CCNC: 13 UNIT/L (ref 10–44)
AMPHET UR QL SCN: ABNORMAL
ANION GAP (SMH): 9 MMOL/L (ref 8–16)
AST SERPL-CCNC: 14 UNIT/L (ref 10–40)
BARBITURATE SCN PRESENT UR: NEGATIVE
BASOPHILS # BLD AUTO: 0.04 K/UL
BASOPHILS NFR BLD AUTO: 0.6 %
BENZODIAZ UR QL SCN: NEGATIVE
BILIRUB SERPL-MCNC: 0.3 MG/DL (ref 0.1–1)
BILIRUB UR QL STRIP.AUTO: NEGATIVE
BUN SERPL-MCNC: 13 MG/DL (ref 6–20)
CALCIUM SERPL-MCNC: 9.1 MG/DL (ref 8.7–10.5)
CANNABINOIDS UR QL SCN: NEGATIVE
CHLORIDE SERPL-SCNC: 105 MMOL/L (ref 95–110)
CK SERPL-CCNC: 98 U/L (ref 20–180)
CLARITY UR: CLEAR
CO2 SERPL-SCNC: 23 MMOL/L (ref 23–29)
COCAINE UR QL SCN: NEGATIVE
COLOR UR AUTO: YELLOW
CREAT SERPL-MCNC: 1 MG/DL (ref 0.5–1.4)
CREAT UR-MCNC: 157.5 MG/DL (ref 15–325)
ERYTHROCYTE [DISTWIDTH] IN BLOOD BY AUTOMATED COUNT: 12.5 % (ref 11.5–14.5)
GFR SERPLBLD CREATININE-BSD FMLA CKD-EPI: >60 ML/MIN/1.73/M2
GLUCOSE SERPL-MCNC: 97 MG/DL (ref 70–110)
GLUCOSE UR QL STRIP: NEGATIVE
HCT VFR BLD AUTO: 41.4 % (ref 37–48.5)
HGB BLD-MCNC: 13.7 GM/DL (ref 12–16)
HGB UR QL STRIP: NEGATIVE
IMM GRANULOCYTES # BLD AUTO: 0.02 K/UL (ref 0–0.04)
IMM GRANULOCYTES NFR BLD AUTO: 0.3 % (ref 0–0.5)
KETONES UR QL STRIP: NEGATIVE
LEUKOCYTE ESTERASE UR QL STRIP: NEGATIVE
LYMPHOCYTES # BLD AUTO: 1.67 K/UL (ref 1–4.8)
MCH RBC QN AUTO: 30.8 PG (ref 27–31)
MCHC RBC AUTO-ENTMCNC: 33.1 G/DL (ref 32–36)
MCV RBC AUTO: 93 FL (ref 82–98)
NITRITE UR QL STRIP: NEGATIVE
NUCLEATED RBC (/100WBC) (SMH): 0 /100 WBC
OPIATES UR QL SCN: NEGATIVE
PCP UR QL: NEGATIVE
PH UR STRIP: 7 [PH]
PLATELET # BLD AUTO: 253 K/UL (ref 150–450)
PMV BLD AUTO: 10.5 FL (ref 9.2–12.9)
POTASSIUM SERPL-SCNC: 3.6 MMOL/L (ref 3.5–5.1)
PROT SERPL-MCNC: 7 GM/DL (ref 6–8.4)
PROT UR QL STRIP: ABNORMAL
RBC # BLD AUTO: 4.45 M/UL (ref 4–5.4)
RELATIVE EOSINOPHIL (SMH): 0.7 % (ref 0–8)
RELATIVE LYMPHOCYTE (SMH): 24.4 % (ref 18–48)
RELATIVE MONOCYTE (SMH): 7.3 % (ref 4–15)
RELATIVE NEUTROPHIL (SMH): 66.7 % (ref 38–73)
SODIUM SERPL-SCNC: 137 MMOL/L (ref 136–145)
SP GR UR STRIP: 1.02
UROBILINOGEN UR STRIP-ACNC: ABNORMAL EU/DL
WBC # BLD AUTO: 6.84 K/UL (ref 3.9–12.7)

## 2025-05-30 PROCEDURE — 85025 COMPLETE CBC W/AUTO DIFF WBC: CPT

## 2025-05-30 PROCEDURE — G0378 HOSPITAL OBSERVATION PER HR: HCPCS

## 2025-05-30 PROCEDURE — 11000001 HC ACUTE MED/SURG PRIVATE ROOM

## 2025-05-30 PROCEDURE — A9585 GADOBUTROL INJECTION: HCPCS

## 2025-05-30 PROCEDURE — 25500020 PHARM REV CODE 255

## 2025-05-30 PROCEDURE — 81003 URINALYSIS AUTO W/O SCOPE: CPT | Mod: 59

## 2025-05-30 PROCEDURE — 80307 DRUG TEST PRSMV CHEM ANLYZR: CPT

## 2025-05-30 PROCEDURE — 99285 EMERGENCY DEPT VISIT HI MDM: CPT | Mod: 25

## 2025-05-30 PROCEDURE — 82550 ASSAY OF CK (CPK): CPT

## 2025-05-30 PROCEDURE — 80053 COMPREHEN METABOLIC PANEL: CPT

## 2025-05-30 PROCEDURE — 81025 URINE PREGNANCY TEST: CPT | Performed by: HOSPITALIST

## 2025-05-30 RX ORDER — SODIUM,POTASSIUM PHOSPHATES 280-250MG
2 POWDER IN PACKET (EA) ORAL
Status: DISCONTINUED | OUTPATIENT
Start: 2025-05-30 | End: 2025-06-04 | Stop reason: HOSPADM

## 2025-05-30 RX ORDER — GADOBUTROL 604.72 MG/ML
5 INJECTION INTRAVENOUS
Status: COMPLETED | OUTPATIENT
Start: 2025-05-30 | End: 2025-05-30

## 2025-05-30 RX ORDER — FOLIC ACID 1 MG/1
1 TABLET ORAL DAILY
Status: DISCONTINUED | OUTPATIENT
Start: 2025-05-31 | End: 2025-06-04 | Stop reason: HOSPADM

## 2025-05-30 RX ORDER — TALC
6 POWDER (GRAM) TOPICAL NIGHTLY PRN
Status: DISCONTINUED | OUTPATIENT
Start: 2025-05-30 | End: 2025-06-04 | Stop reason: HOSPADM

## 2025-05-30 RX ORDER — LANOLIN ALCOHOL/MO/W.PET/CERES
800 CREAM (GRAM) TOPICAL
Status: DISCONTINUED | OUTPATIENT
Start: 2025-05-30 | End: 2025-06-04 | Stop reason: HOSPADM

## 2025-05-30 RX ORDER — AMOXICILLIN 250 MG
1 CAPSULE ORAL 2 TIMES DAILY PRN
Status: DISCONTINUED | OUTPATIENT
Start: 2025-05-30 | End: 2025-06-04 | Stop reason: HOSPADM

## 2025-05-30 RX ORDER — ACETAMINOPHEN 325 MG/1
650 TABLET ORAL EVERY 4 HOURS PRN
Status: DISCONTINUED | OUTPATIENT
Start: 2025-05-30 | End: 2025-06-04 | Stop reason: HOSPADM

## 2025-05-30 RX ORDER — IBUPROFEN 200 MG
24 TABLET ORAL
Status: DISCONTINUED | OUTPATIENT
Start: 2025-05-30 | End: 2025-06-04 | Stop reason: HOSPADM

## 2025-05-30 RX ORDER — IBUPROFEN 200 MG
16 TABLET ORAL
Status: DISCONTINUED | OUTPATIENT
Start: 2025-05-30 | End: 2025-06-04 | Stop reason: HOSPADM

## 2025-05-30 RX ORDER — GLUCAGON 1 MG
1 KIT INJECTION
Status: DISCONTINUED | OUTPATIENT
Start: 2025-05-30 | End: 2025-06-04 | Stop reason: HOSPADM

## 2025-05-30 RX ORDER — IPRATROPIUM BROMIDE AND ALBUTEROL SULFATE 2.5; .5 MG/3ML; MG/3ML
3 SOLUTION RESPIRATORY (INHALATION) EVERY 6 HOURS PRN
Status: DISCONTINUED | OUTPATIENT
Start: 2025-05-31 | End: 2025-06-04 | Stop reason: HOSPADM

## 2025-05-30 RX ORDER — ONDANSETRON HYDROCHLORIDE 2 MG/ML
4 INJECTION, SOLUTION INTRAVENOUS EVERY 6 HOURS PRN
Status: DISCONTINUED | OUTPATIENT
Start: 2025-05-30 | End: 2025-06-04 | Stop reason: HOSPADM

## 2025-05-30 RX ORDER — ALUMINUM HYDROXIDE, MAGNESIUM HYDROXIDE, AND SIMETHICONE 1200; 120; 1200 MG/30ML; MG/30ML; MG/30ML
30 SUSPENSION ORAL 4 TIMES DAILY PRN
Status: DISCONTINUED | OUTPATIENT
Start: 2025-05-30 | End: 2025-06-04 | Stop reason: HOSPADM

## 2025-05-30 RX ORDER — NALOXONE HCL 0.4 MG/ML
0.02 VIAL (ML) INJECTION
Status: DISCONTINUED | OUTPATIENT
Start: 2025-05-30 | End: 2025-06-04 | Stop reason: HOSPADM

## 2025-05-30 RX ORDER — SODIUM CHLORIDE 0.9 % (FLUSH) 0.9 %
10 SYRINGE (ML) INJECTION EVERY 12 HOURS PRN
Status: DISCONTINUED | OUTPATIENT
Start: 2025-05-30 | End: 2025-06-04 | Stop reason: HOSPADM

## 2025-05-30 RX ORDER — GUAIFENESIN 100 MG/5ML
200 LIQUID ORAL EVERY 4 HOURS PRN
Status: DISCONTINUED | OUTPATIENT
Start: 2025-05-31 | End: 2025-06-04 | Stop reason: HOSPADM

## 2025-05-30 RX ADMIN — GADOBUTROL 5 ML: 604.72 INJECTION INTRAVENOUS at 07:05

## 2025-05-30 NOTE — ED PROVIDER NOTES
Encounter Date: 2025       History     Chief Complaint   Patient presents with    Leg Problem     RT LEG, PT STATES LOSS OF SENSATION X 3 WEEKS, AND TODAY NOTICED BRUISE/ LUMB NEAR KNEE     29-year-old female with past medical history of complex partial seizures and iron-deficiency anemia presents to the emergency department for numbness of the right lower extremity.  This has been going on for 3 weeks.  Numbness begins at the knee and travels down to the dorsum of the foot.  The lateral aspect of the leg is more numb than the medial aspect.  Patient is comparing it to her  scar where she can feel the pressure of her touching there but sensation is decreased.  Reports that when she walks on it for an extended period of time her leg begins to feel heavy.  She is denying any temperature change, color change, recent travel, previous clots or family history of clots, malignancy.  States that she spoke to her primary care provider regarding this complaint and that she was informed to come to the emergency department.  She states that this problem has been ongoing for 3 weeks in his constant.        Review of patient's allergies indicates:   Allergen Reactions    Oranges [orange]      ulcers     Past Medical History:   Diagnosis Date    Anemia     no current treatment    Epilepsy     Gallstones     HPV (human papilloma virus) infection     LGSIL on Pap smear of cervix     MTHFR (methylene THF reductase) deficiency and homocystinuria     Seizure disorder, complex partial     last seizure ; no medication    Seizures     Swine flu      Past Surgical History:   Procedure Laterality Date     SECTION N/A 3/8/2020    Procedure:  SECTION;  Surgeon: Brian Quijano MD;  Location: [facility] L&D;  Service: OB/GYN;  Laterality: N/A;     SECTION N/A 10/28/2021    Procedure:  SECTION;  Surgeon: Lucinda Rush MD;  Location: [facility] L&D;  Service: OB/GYN;  Laterality: N/A;     CHOLECYSTECTOMY  2017    LAPAROSCOPIC CHOLECYSTECTOMY  2017    toe I&D      TONSILLECTOMY  2011    UPPER GASTROINTESTINAL ENDOSCOPY  2017    Showed H. Pylori    yifan dugan  2010     Family History   Adopted: Yes   Problem Relation Name Age of Onset    No Known Problems Mother      No Known Problems Father       Social History[1]  Review of Systems   Constitutional: Negative.    Respiratory: Negative.     Cardiovascular: Negative.    Gastrointestinal: Negative.    Genitourinary: Negative.    Musculoskeletal:  Positive for gait problem.   Skin: Negative.    Neurological:  Positive for numbness.       Physical Exam     Initial Vitals [05/29/25 1440]   BP Pulse Resp Temp SpO2   123/79 (!) 113 17 98.8 °F (37.1 °C) 97 %      MAP       --         Physical Exam    Vitals reviewed.  Constitutional: She appears well-developed and well-nourished. She is not diaphoretic. No distress.   HENT:   Head: Atraumatic. Mouth/Throat: Oropharynx is clear and moist.   Eyes: Conjunctivae and EOM are normal. Pupils are equal, round, and reactive to light. Right eye exhibits no discharge. Left eye exhibits no discharge.   Neck:   Normal range of motion.  Cardiovascular:  Normal rate, regular rhythm, normal heart sounds and intact distal pulses.           Pulmonary/Chest: Breath sounds normal. No respiratory distress.   Abdominal: Abdomen is soft. She exhibits no distension. There is no abdominal tenderness. There is no rebound.   Musculoskeletal:         General: Normal range of motion.      Cervical back: Normal range of motion.        Legs:       Comments: No swelling, erythema, ecchymosis noted to the lower extremities.  No tenderness localized to the calf muscle.  No isolated midline tenderness.  Patient has full range of motion in the spine without reproducing pain     Neurological: She is alert and oriented to person, place, and time. She has normal strength. No cranial nerve deficit. GCS score is 15. GCS eye subscore is 4. GCS  verbal subscore is 5. GCS motor subscore is 6.   Patient does not have any sensory deficits on physical exam however she is stating that sensation on the lateral aspect of the right lower extremity is decreased than those on the left but with her eyes closed the still able to tell me where I am touching   Skin: Skin is warm. Capillary refill takes less than 2 seconds.         ED Course   Procedures  Labs Reviewed   HEPATITIS C ANTIBODY - Normal       Result Value    Hep C Ab Interp Non-Reactive     HIV 1 / 2 ANTIBODY - Normal    HIV 1/2 Ag/Ab Non-Reactive     HEP C VIRUS HOLD SPECIMEN   POCT URINE PREGNANCY    POC Preg Test, Ur Negative       Acceptable Yes            Imaging Results              MRI Lumbar Spine Without Contrast (Final result)  Result time 05/29/25 19:53:24      Final result by Hoang Bruner MD (05/29/25 19:53:24)                   Impression:      No significant abnormality.      Electronically signed by: Hoang Bruner  Date:    05/29/2025  Time:    19:53               Narrative:    EXAMINATION:  MRI LUMBAR SPINE WITHOUT CONTRAST    CLINICAL HISTORY:  Lumbar radiculopathy, symptoms persist with conservative treatment;Low back pain, progressive neurologic deficit;    TECHNIQUE:  Multiplanar, multisequence MR images were acquired from the thoracolumbar junction to the sacrum without the administration of contrast.    COMPARISON:  None.    FINDINGS:  Alignment: Normal.    Vertebrae: Normal marrow signal. No fracture.    Discs: Normal height and signal.    Cord: Normal.  Conus terminates at L1-2    Degenerative findings:    T12-L1: No significant abnormality.    L1-L2: No significant abnormality.    L2-L3: No significant abnormality.    L3-L4: No significant abnormality.    L4-L5: No significant abnormality.    L5-S1: Minimal disc bulge.  No significant central canal or foraminal narrowing.    Paraspinal muscles & soft tissues: Unremarkable.                                       US  Lower Extremity Veins Right (Final result)  Result time 05/29/25 15:39:47      Final result by Melchor Cota MD (05/29/25 15:39:47)                   Impression:      No evidence of acute right lower extremity deep venous thrombosis in the visualized venous segments.      Electronically signed by: Melchor Cota  Date:    05/29/2025  Time:    15:39               Narrative:    CLINICAL HISTORY:  ([id number] y/o  (1995) F    Edema, unspecified    TECHNIQUE:  (A#[id number], exam time 5/29/2025 15:35)    US LOWER EXTREMITY VEINS RIGHT [id number]    Ultrasound examination of right lower extremity deep venous systems was performed using grayscale, color and spectral Doppler.    COMPARISON:  None available.    FINDINGS:  The common femoral, saphenofemoral junction, femoral and popliteal veins demonstrate a normal response to compression maneuvers. There is also a normal response to respiratory variation. The bifurcation of the common femoral into the superficial and deep femoral veins is also visualized. Flow is identified in these vessels with no evidence of intraluminal thrombus on either color Doppler or grayscale images. The visualized portions of the proximal calf veins are also normal.                                       Medications - No data to display  Medical Decision Making  29-year-old female presents to the emergency department for numbness of the right lower extremity x3 weeks      Considerations include but not limited to DVT, nerve entrapment, cord compression, lumbar stenosis, malignancy    Vitals stable.  Patient afebrile.  Well-appearing on physical exam.  Nontoxic and in no acute distress.  Patient has 5/5 strength in upper and lower extremities.  Normal neurological exam.  She did present to the emergency department earlier this year for left-sided facial twitching.  At the time she had an MRI of the brain which showed no acute abnormality.  States that her lower extremity numbness is  sometimes accompanied with a headache.  Headaches were present at the time of her MRI of the brain.  Patient has a normal gait on physical exam.  Although with her eyes closed she can tell me wearing am touching she states the sensation is decreased.  2+ pedal pulses.  No isolated midline tenderness.  Given the patient's complaints and history a family with MS MRI of the lumbar spine an ultrasound of the right lower extremity performed.  Ultrasound is negative for any acute right lower extremity DVT.  MRI of the lumbar spine shows no significant abnormality.  I did discuss with the patient that she has a mild disc bulge at L5 S1.     On re-evaluation and discussion of her MRI results patient states that she has a family history of MS. Informed her that she will need to follow up with Neurology regarding these symptoms and her visit here today as further testing and blood work will need to be performed.  I did place an urgent ambulatory referral.  Patient was given very strict return precautions regarding worsening symptoms such as her leg giving out on her, color change, temperature change, swelling, low back pain, fevers, night sweats, chills, worsening numbness or weakness in any extremity.  Patient verbalized her understanding and agreed.  Plan also discussed with my attending and all questions were answered at the bedside.    Patient was called to return back to the emergency department tomorrow for MS workup and admission with Neurology consult.  Patient verbalized her understanding and will be returning.    Amount and/or Complexity of Data Reviewed  Labs: ordered.  Radiology: ordered.                                      Clinical Impression:  Final diagnoses:  [R20.0] Leg numbness (Primary)          ED Disposition Condition    Discharge Stable          ED Prescriptions    None       Follow-up Information       Follow up With Specialties Details Why Contact Clifford Ledezma,  Family Medicine Call    [address] [facility] [facility]  [address]  [facility] [address]  [phone]      [place] - Neurology Neurology Call   [address] [place] [address] [address]  [facility] [address]  [phone]                 Patti Mckeon PA-C  [phone]         [1]   Social History  Tobacco Use    Smoking status: Former     Current packs/day: 0.00     Average packs/day: 0.3 packs/day for 4.0 years (1.0 ttl pk-yrs)     Types: Cigarettes     Start date:      Quit date: 10/2019     Years since quittin.6    Smokeless tobacco: Never    Tobacco comments:     quit with pregnancy (2019)   Substance Use Topics    Alcohol use: Not Currently    Drug use: No        Patti Mckeon PA-C  [phone]

## 2025-05-30 NOTE — DISCHARGE INSTRUCTIONS
Please follow up with Neurology regarding your visit here today.  I did place an ambulatory referral and you should receive a phone call.  I have also attached her phone number to your discharge paperwork.  Please return to the emergency department sooner if you develop back pain, sharp shooting pains down into the bilateral extremities, numbness or tingling of the groin, bowel or bladder changes, fever, night sweats, chills, worsening numbness.  Your MRI here is negative for any acute abnormality.  You do have a slight bulge in the your L5 but this is not causing any canal stenosis concerning for any emergent life-threatening conditions.

## 2025-05-31 PROBLEM — G61.0 AIDP (ACUTE INFLAMMATORY DEMYELINATING POLYNEUROPATHY): Status: ACTIVE | Noted: 2025-05-30

## 2025-05-31 LAB
ABSOLUTE EOSINOPHIL (SMH): 0.1 K/UL
ABSOLUTE MONOCYTE (SMH): 0.52 K/UL (ref 0.3–1)
ABSOLUTE NEUTROPHIL COUNT (SMH): 4.1 K/UL (ref 1.8–7.7)
ANION GAP (SMH): 1 MMOL/L (ref 8–16)
B-HCG UR QL: NEGATIVE
BASOPHILS # BLD AUTO: 0.02 K/UL
BASOPHILS NFR BLD AUTO: 0.3 %
BUN SERPL-MCNC: 12 MG/DL (ref 6–20)
CALCIUM SERPL-MCNC: 9.3 MG/DL (ref 8.7–10.5)
CHLORIDE SERPL-SCNC: 111 MMOL/L (ref 95–110)
CO2 SERPL-SCNC: 25 MMOL/L (ref 23–29)
CREAT SERPL-MCNC: 0.7 MG/DL (ref 0.5–1.4)
ERYTHROCYTE [DISTWIDTH] IN BLOOD BY AUTOMATED COUNT: 12.4 % (ref 11.5–14.5)
GFR SERPLBLD CREATININE-BSD FMLA CKD-EPI: >60 ML/MIN/1.73/M2
GLUCOSE SERPL-MCNC: 93 MG/DL (ref 70–110)
HCT VFR BLD AUTO: 41.8 % (ref 37–48.5)
HGB BLD-MCNC: 13.9 GM/DL (ref 12–16)
IMM GRANULOCYTES # BLD AUTO: 0.02 K/UL (ref 0–0.04)
IMM GRANULOCYTES NFR BLD AUTO: 0.3 % (ref 0–0.5)
LYMPHOCYTES # BLD AUTO: 2.12 K/UL (ref 1–4.8)
MAGNESIUM SERPL-MCNC: 1.9 MG/DL (ref 1.6–2.6)
MCH RBC QN AUTO: 31.2 PG (ref 27–31)
MCHC RBC AUTO-ENTMCNC: 33.3 G/DL (ref 32–36)
MCV RBC AUTO: 94 FL (ref 82–98)
NUCLEATED RBC (/100WBC) (SMH): 0 /100 WBC
PHOSPHATE SERPL-MCNC: 3.9 MG/DL (ref 2.7–4.5)
PLATELET # BLD AUTO: 264 K/UL (ref 150–450)
PMV BLD AUTO: 10.6 FL (ref 9.2–12.9)
POTASSIUM SERPL-SCNC: 4.1 MMOL/L (ref 3.5–5.1)
RBC # BLD AUTO: 4.45 M/UL (ref 4–5.4)
RELATIVE EOSINOPHIL (SMH): 1.5 % (ref 0–8)
RELATIVE LYMPHOCYTE (SMH): 31 % (ref 18–48)
RELATIVE MONOCYTE (SMH): 7.6 % (ref 4–15)
RELATIVE NEUTROPHIL (SMH): 59.3 % (ref 38–73)
SODIUM SERPL-SCNC: 137 MMOL/L (ref 136–145)
WBC # BLD AUTO: 6.83 K/UL (ref 3.9–12.7)

## 2025-05-31 PROCEDURE — 99900035 HC TECH TIME PER 15 MIN (STAT)

## 2025-05-31 PROCEDURE — 94799 UNLISTED PULMONARY SVC/PX: CPT

## 2025-05-31 PROCEDURE — 25000003 PHARM REV CODE 250

## 2025-05-31 PROCEDURE — 99900031 HC PATIENT EDUCATION (STAT)

## 2025-05-31 PROCEDURE — 36415 COLL VENOUS BLD VENIPUNCTURE: CPT

## 2025-05-31 PROCEDURE — 63600175 PHARM REV CODE 636 W HCPCS: Mod: JZ,TB | Performed by: HOSPITALIST

## 2025-05-31 PROCEDURE — 20600001 HC STEP DOWN PRIVATE ROOM

## 2025-05-31 PROCEDURE — 84100 ASSAY OF PHOSPHORUS: CPT

## 2025-05-31 PROCEDURE — 83735 ASSAY OF MAGNESIUM: CPT

## 2025-05-31 PROCEDURE — 99406 BEHAV CHNG SMOKING 3-10 MIN: CPT

## 2025-05-31 PROCEDURE — 99447 NTRPROF PH1/NTRNET/EHR 11-20: CPT | Mod: ,,, | Performed by: STUDENT IN AN ORGANIZED HEALTH CARE EDUCATION/TRAINING PROGRAM

## 2025-05-31 PROCEDURE — 00JU3ZZ INSPECTION OF SPINAL CANAL, PERCUTANEOUS APPROACH: ICD-10-PCS | Performed by: RADIOLOGY

## 2025-05-31 PROCEDURE — 94761 N-INVAS EAR/PLS OXIMETRY MLT: CPT

## 2025-05-31 PROCEDURE — 85025 COMPLETE CBC W/AUTO DIFF WBC: CPT

## 2025-05-31 PROCEDURE — 82310 ASSAY OF CALCIUM: CPT

## 2025-05-31 RX ORDER — PROGESTERONE 100 MG/1
100 CAPSULE ORAL DAILY
COMMUNITY
Start: 2025-05-07

## 2025-05-31 RX ORDER — EPINEPHRINE 1 MG/ML
0.3 INJECTION, SOLUTION, CONCENTRATE INTRAVENOUS
Status: DISCONTINUED | OUTPATIENT
Start: 2025-05-31 | End: 2025-06-04 | Stop reason: HOSPADM

## 2025-05-31 RX ORDER — ALBUTEROL SULFATE 90 UG/1
1-2 INHALANT RESPIRATORY (INHALATION) EVERY 4 HOURS PRN
COMMUNITY
Start: 2025-05-30

## 2025-05-31 RX ORDER — BUTALBITAL, ACETAMINOPHEN AND CAFFEINE 50; 325; 40 MG/1; MG/1; MG/1
1 TABLET ORAL EVERY 4 HOURS PRN
COMMUNITY
Start: 2025-05-08

## 2025-05-31 RX ORDER — DEXTROAMPHETAMINE SACCHARATE, AMPHETAMINE ASPARTATE, DEXTROAMPHETAMINE SULFATE AND AMPHETAMINE SULFATE 3.75; 3.75; 3.75; 3.75 MG/1; MG/1; MG/1; MG/1
1 TABLET ORAL 2 TIMES DAILY
COMMUNITY
Start: 2025-05-09

## 2025-05-31 RX ADMIN — HUMAN IMMUNOGLOBULIN G 20 G: 20 LIQUID INTRAVENOUS at 05:05

## 2025-05-31 RX ADMIN — ACETAMINOPHEN 650 MG: 325 TABLET ORAL at 02:05

## 2025-05-31 RX ADMIN — FOLIC ACID 1 MG: 1 TABLET ORAL at 08:05

## 2025-05-31 NOTE — NURSING
Patient transferred to room 2511 via wheelchair by [facility] staff without incident. Telemetry monitor and pulse ox initiated. VS obtained. Patient AAOx4. Declines staff notification to family of transfer.  Oriented patient to room and call bell. Questions answered. Safety maintained. ID band on;upper side rails raised x 2;call light in reach;wheels locked;bed in low position;     Patient reports headaches and sleepiness as precursor to seizure activity.   Bed alarm declined. Bed declination signed and witnessed and placed on chart.

## 2025-05-31 NOTE — SUBJECTIVE & OBJECTIVE
Past Medical History:   Diagnosis Date    Anemia     no current treatment    Epilepsy     Gallstones     HPV (human papilloma virus) infection     LGSIL on Pap smear of cervix     MTHFR (methylene THF reductase) deficiency and homocystinuria     Seizure disorder, complex partial     last seizure ; no medication    Seizures     Swine flu        Past Surgical History:   Procedure Laterality Date     SECTION N/A 3/8/2020    Procedure:  SECTION;  Surgeon: Brian Quijano MD;  Location: [facility] L&D;  Service: OB/GYN;  Laterality: N/A;     SECTION N/A 10/28/2021    Procedure:  SECTION;  Surgeon: Lucinda Rush MD;  Location: [facility] L&D;  Service: OB/GYN;  Laterality: N/A;    CHOLECYSTECTOMY      LAPAROSCOPIC CHOLECYSTECTOMY      toe I&D      TONSILLECTOMY      UPPER GASTROINTESTINAL ENDOSCOPY      Showed H. Pylori    yifan dugan         Review of patient's allergies indicates:   Allergen Reactions    Oranges [orange]      ulcers       No current facility-administered medications on file prior to encounter.     Current Outpatient Medications on File Prior to Encounter   Medication Sig    dextroamphetamine-amphetamine 10 mg Tab Take 0.5 tablets by mouth 2 (two) times daily.    folic acid (FOLVITE) 1 MG tablet Take 1 mg by mouth once daily.    medroxyPROGESTERone (PROVERA) 10 MG tablet Take 10 mg by mouth once daily.    pantoprazole (PROTONIX) 20 MG tablet Take 20 mg by mouth daily as needed (Heartburn).     Family History       Problem Relation (Age of Onset)    No Known Problems Mother, Father          Tobacco Use    Smoking status: Former     Current packs/day: 0.00     Average packs/day: 0.3 packs/day for 4.0 years (1.0 ttl pk-yrs)     Types: Cigarettes     Start date:      Quit date: 10/2019     Years since quittin.6    Smokeless tobacco: Never    Tobacco comments:     quit with pregnancy (2019)   Substance and Sexual Activity    Alcohol use: Not  Currently    Drug use: No    Sexual activity: Not Currently     Partners: Male     Birth control/protection: None     Review of Systems   Constitutional:  Negative for chills and fever.   Eyes:  Negative for visual disturbance.   Respiratory:  Negative for shortness of breath.    Cardiovascular:  Negative for chest pain.   Gastrointestinal:  Positive for diarrhea (Chronic). Negative for abdominal pain, nausea and vomiting.   Genitourinary:  Positive for decreased urine volume. Negative for dysuria and hematuria.   Musculoskeletal:  Positive for gait problem.   Neurological:  Positive for dizziness, weakness (bilateral upper extremity), numbness (right lower extremity) and headaches. Negative for syncope, facial asymmetry, speech difficulty and light-headedness.     Objective:     Vital Signs (Most Recent):  Temp: 97.8 °F (36.6 °C) (05/30/25 2320)  Pulse: 92 (05/30/25 2320)  Resp: 16 (05/30/25 2320)  BP: 138/89 (05/30/25 2320)  SpO2: 99 % (05/30/25 2320) Vital Signs (24h Range):  Temp:  [97.8 °F (36.6 °C)-98.5 °F (36.9 °C)] 97.8 °F (36.6 °C)  Pulse:  [] 92  Resp:  [16] 16  SpO2:  [99 %-100 %] 99 %  BP: (126-138)/(86-89) 138/89     Weight: 51.8 kg (114 lb 1.6 oz)  Body mass index is 21.56 kg/m².     Physical Exam  Vitals reviewed.   Constitutional:       General: She is awake. She is not in acute distress.     Appearance: Normal appearance. She is not ill-appearing or diaphoretic.   Cardiovascular:      Rate and Rhythm: Normal rate.      Heart sounds: Normal heart sounds. No murmur heard.     No friction rub. No gallop.   Pulmonary:      Effort: Pulmonary effort is normal. No accessory muscle usage or respiratory distress.      Breath sounds: Normal breath sounds. No wheezing, rhonchi or rales.   Abdominal:      General: Abdomen is flat. Bowel sounds are normal.      Palpations: Abdomen is soft.      Tenderness: There is no abdominal tenderness. There is no guarding or rebound.   Musculoskeletal:      Right  lower leg: No edema.      Left lower leg: No edema.   Skin:     General: Skin is warm and dry.   Neurological:      Mental Status: She is alert and oriented to person, place, and time.      Comments: Patient is moving all extremities and carrying on conversation.  No slurred speech or facial asymmetry appreciated.  5/5 strength to bilateral upper and lower extremities.  Equal  strength.  Normal sensation to bilateral face as well as bilateral upper extremities.  Decreased sensation reported to right lower extremity from knee to dorsal aspect of right foot.  Normal finger-to-nose and normal heel-to-shin.  No other deficits appreciated on exam.   Psychiatric:         Behavior: Behavior is cooperative.                Significant Labs: All pertinent labs within the past 24 hours have been reviewed.  CBC:   Recent Labs   Lab 05/30/25  1835   WBC 6.84   HGB 13.7   HCT 41.4        CMP:   Recent Labs   Lab 05/30/25  1835      K 3.6      CO2 23   GLU 97   BUN 13   CREATININE 1.0   CALCIUM 9.1   PROT 7.0   ALBUMIN 4.4   BILITOT 0.3   ALKPHOS 44*   AST 14   ALT 13   ANIONGAP 9     Urine Studies:   Recent Labs   Lab 05/30/25  1835   APPEARANCEUA Clear   SPECGRAV 1.025   PROTEINUA Trace*   BILIRUBINUA Negative   UROBILINOGEN 4.0-6.0*   LEUKOCYTESUR Negative       Significant Imaging:   Imaging Results              MRI Brain Demyelinating W W/O Contrast (Final result)  Result time 05/30/25 20:27:22      Final result by Hoang Bruner MD (05/30/25 20:27:22)                   Impression:      No acute intracranial process.      Electronically signed by: Hoang Bruner  Date:    05/30/2025  Time:    20:27               Narrative:    EXAMINATION:  MRI BRAIN DEMYELINATING W/ WO CONTRAST    CLINICAL HISTORY:  MS rule out;.    TECHNIQUE:  Multiplanar multisequence MR imaging of the brain was performed before and after the administration of 5 mL Gadavist  intravenous  contrast.    COMPARISON:  02/19/2025    FINDINGS:  Intracranial compartment:    Ventricles and sulci are normal in size for age without evidence of hydrocephalus. No extra-axial blood or fluid collections.    The brain parenchyma appears normal. No mass lesion, acute hemorrhage, edema or acute infarct. No abnormal enhancement.    Normal vascular flow voids are preserved.    Skull/extracranial contents (limited evaluation): Bone marrow signal intensity is normal.    No evidence of demyelination.    No significant abnormality.  No significant change.

## 2025-05-31 NOTE — PLAN OF CARE
[facility]  Initial Discharge Assessment          completed discharge assessment with pt mother Kathy Grayson via telephone due to pt being sleep and not able to assess at time of assessment in pt room. Pt AAOx4s. Pt lives in the home with her mother and father and two children. Demographics, PCP, and insurance verified. No home health. No dialysis. Pt completes ADLs without assistance. Pt to discharge home via family transport via her mother. Pt has no other needs to be addressed at this time.       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 No  Secondary Emergency Contact: Lauro Grayson  Address: [address] [place] [address] [place] [address] [facility]  Home Phone: [phone]  Mobile Phone: [phone]  Relation: Father  Preferred language: English   needed? No    Discharge Plan A: Home with family  Discharge Plan B: Home      [id number] - [place]  [address]  [address]  [place] [address]  Phone: [phone] Fax: [phone]    [id number] - [place] - [address] AT [facility] [address]  [address]  [place] [address]  Phone: [phone] Fax: [phone]      Initial Assessment (most recent)       Adult Discharge Assessment - 05/31/25 1540          Discharge Assessment     Assessment Type Discharge Planning Assessment     Confirmed/corrected address, phone number and insurance Yes     Source of Information family     If unable to respond/provide information was family/caregiver contacted? Yes     Contact Name/Number Kathy Grayson [phone] (mother)     People in Home child(ruben), dependent;parent(s)     Name(s) of People in Home Kathymulugeta Grayson and Lauro Grayson (parents) and pt minor children     Do you expect to return to your current living situation? Yes     Do you have help at home or someone to help you manage your care at home? Yes     Who are your caregiver(s) and their phone number(s)? Kathy Grayson [phone], Lauro Grayson [phone]     Prior to hospitilization cognitive status: Unable to Assess     Current cognitive status: --   pt was sleeping at time of assessment    Walking or Climbing Stairs Difficulty no     Dressing/Bathing Difficulty no     Home Accessibility wheelchair accessible     Home Layout Bedroom on 2nd floor;Able to live on 1st floor     Equipment Currently Used at Home none     Readmission within 30 days? No     Patient currently being followed by outpatient case management? No     Do you currently have service(s) that help you manage your care at home? No     Do you take prescription medications? Yes     Do you have prescription coverage? Yes     Do you have any problems affording any of your prescribed medications? No     Is the patient taking medications as prescribed? yes     Who is going to help you get home at discharge? Liberty Grayson (mother)     How do you get to doctors appointments? car, drives self     Are you on dialysis? No     Do you take coumadin? No     Discharge Plan A Home with family     Discharge Plan B Home     DME Needed Upon Discharge  none     Discharge Plan discussed with: Parent(s)     Name(s) and Number(s) Kathy Grayson     Transition of Care Barriers None

## 2025-05-31 NOTE — ED PROVIDER NOTES
Encounter Date: 2025       History     Chief Complaint   Patient presents with    Follow-up     Pt called for follow up with PA     29-year-old female past medical history of epilepsy, gallstones, MTHFR presents to the emergency department for re-evaluation.  I saw this patient yesterday for numbness of the right lower extremity.  MRI of the lumbar spine was negative.  Patient also complaining of weakness in the upper extremities and inability to take pans out of the oven.  Also having difficulty opening water bottles or cans.  After reviewing the patient's chart she was sent with an urgent neurology referral for an MS workup.  Neurology was unable to schedule an appointment with her until July.  She was called and informed to return to the emergency department for an MS workup.  Today she is complaining of a mild headache behind the left eye and still having numbness in the right lower extremity.  No new complaints otherwise        Review of patient's allergies indicates:   Allergen Reactions    Oranges [orange]      ulcers     Past Medical History:   Diagnosis Date    Anemia     no current treatment    Epilepsy     Gallstones     HPV (human papilloma virus) infection     LGSIL on Pap smear of cervix     MTHFR (methylene THF reductase) deficiency and homocystinuria     Seizure disorder, complex partial     last seizure ; no medication    Seizures     Swine flu      Past Surgical History:   Procedure Laterality Date     SECTION N/A 3/8/2020    Procedure:  SECTION;  Surgeon: Brian Quijano MD;  Location: [facility] L&D;  Service: OB/GYN;  Laterality: N/A;     SECTION N/A 10/28/2021    Procedure:  SECTION;  Surgeon: Lucinda Rush MD;  Location: [facility] L&D;  Service: OB/GYN;  Laterality: N/A;    CHOLECYSTECTOMY  2017    LAPAROSCOPIC CHOLECYSTECTOMY      toe I&D      TONSILLECTOMY  2011    UPPER GASTROINTESTINAL ENDOSCOPY  2017    Showed H. Pylori    yifan dugan        Family History   Adopted: Yes   Problem Relation Name Age of Onset    No Known Problems Mother      No Known Problems Father       Social History[1]  Review of Systems   Constitutional: Negative.    HENT: Negative.     Eyes: Negative.    Respiratory: Negative.     Cardiovascular: Negative.    Gastrointestinal: Negative.    Genitourinary: Negative.    Musculoskeletal: Negative.    Neurological:  Positive for numbness and headaches.       Physical Exam     Initial Vitals [05/30/25 1809]   BP Pulse Resp Temp SpO2   126/86 108 16 98.5 °F (36.9 °C) 100 %      MAP       --         Physical Exam    Vitals reviewed.  Constitutional: She appears well-developed and well-nourished. She is not diaphoretic. No distress.   HENT:   Head: Atraumatic.   Right Ear: External ear normal.   Left Ear: External ear normal. Mouth/Throat: Oropharynx is clear and moist.   Eyes: Conjunctivae and EOM are normal. Pupils are equal, round, and reactive to light. Right eye exhibits no discharge. Left eye exhibits no discharge.   Neck:   Normal range of motion.  Cardiovascular:  Normal rate, regular rhythm, normal heart sounds and intact distal pulses.           Pulmonary/Chest: Breath sounds normal. No respiratory distress.   Abdominal: Abdomen is soft. She exhibits no distension. There is no abdominal tenderness. There is no rebound.   Musculoskeletal:         General: Normal range of motion.      Cervical back: Normal range of motion.     Neurological: She is alert and oriented to person, place, and time. She has normal strength. No cranial nerve deficit. GCS score is 15. GCS eye subscore is 4. GCS verbal subscore is 5. GCS motor subscore is 6.   Patchy and asymmetric distribution of sensory deficits on the right lower extremity from the knee to the dorsum of the foot with temperature and pinprick sensation.  Normal DTRs   Skin: Skin is warm. Capillary refill takes less than 2 seconds.         ED Course   Procedures  Labs Reviewed    COMPREHENSIVE METABOLIC PANEL - Abnormal       Result Value    Sodium 137      Potassium 3.6      Chloride 105      CO2 23      Glucose 97      BUN 13      Creatinine 1.0      Calcium 9.1      Protein Total 7.0      Albumin 4.4      Bilirubin Total 0.3      ALP 44 (*)     AST 14      ALT 13      Anion Gap 9      eGFR >60     DRUG SCREEN PANEL, URINE EMERGENCY - Abnormal    Benzodiazepine, Urine Negative      Cocaine, Urine Negative      Opiates, Urine Negative      Barbituates, Urine Negative      Amphetamines, Urine Presumptive Positive (*)     THC Negative      Phencyclidine, Urine Negative      Urine Creatinine 157.5      Narrative:     This screen includes the following classes of drugs at the   listed cut-off:    Benzodiazepines                  200 ng/ml  Cocaine metabolite               300 ng/ml  Opiates                          300 ng/ml  Barbiturates                     200 ng/ml  Amphetamines                    1000 ng/ml  Marijuana metabs (THC)            50 ng/ml  Phencyclidine (PCP)               25 ng/ml    High concentrations of Methylenedioxymethamphetamine (MDMA aka  Ectasy) and other structurally similar compounds may cross-   react with the Amphetamine/Methamphetamine screening   immunoassay giving a false positive result.    Note: This exception list includes only more common   interferants in toxicology screen testing.  Because of many cross-reactantspositive results on toxicology drug screens   should be confirmed whenever results do not correlate with   clinical presentation.    This report is intended for use in clinical monitoring and  management of patients. It is not intended for use in   employment related drug testing.    Because of any cross-reactants, positive results on toxicology  drug screens should be confirmed whenever results do not  correlate with clinical presentation.    Presumptive positive results are unconfirmed and may be used   only for medical purposes.   URINALYSIS,  REFLEX TO URINE CULTURE - Abnormal    Color, UA Yellow      Appearance, UA Clear      Spec Grav UA 1.025      pH, UA 7.0      Protein, UA Trace (*)     Glucose, UA Negative      Ketones, UA Negative      Blood, UA Negative      Bilirubin, UA Negative      Urobilinogen, UA 4.0-6.0 (*)     Nitrites, UA Negative      Leukocyte Esterase, UA Negative     CK - Normal    CPK 98     CBC WITH DIFFERENTIAL - Normal    WBC 6.84      RBC 4.45      Hgb 13.7      Hct 41.4      MCV 93      MCH 30.8      MCHC 33.1      RDW 12.5      Platelet Count 253      MPV 10.5      Nucleated RBC 0      Neut % 66.7      Lymph % 24.4      Mono % 7.3      Eos % 0.7      Basophil % 0.6      Imm Grans % 0.3      Neut # 4.6      Lymph # 1.67      Mono # 0.50      Eos # 0.05      Baso # 0.04      Imm Grans # 0.02     CBC W/ AUTO DIFFERENTIAL    Narrative:     The following orders were created for panel order CBC auto differential.  Procedure                               Abnormality         Status                     ---------                               -----------         ------                     CBC with Differential[[id number]]       Normal              Final result                 Please view results for these tests on the individual orders.   EXTRA TUBES    Narrative:     The following orders were created for panel order EXTRA TUBES.  Procedure                               Abnormality         Status                     ---------                               -----------         ------                     Light Blue Top Hold[[id number]]                             In process                 Lavender Top Hold[[id number]]                               In process                   Please view results for these tests on the individual orders.   LIGHT BLUE TOP HOLD   LAVENDER TOP HOLD          Imaging Results              MRI Brain Demyelinating W W/O Contrast (Final result)  Result time 05/30/25 20:27:22      Final result by Hoang Bruner MD  (05/30/25 20:27:22)                   Impression:      No acute intracranial process.      Electronically signed by: Hoang Bautista  Date:    05/30/2025  Time:    20:27               Narrative:    EXAMINATION:  MRI BRAIN DEMYELINATING W/ WO CONTRAST    CLINICAL HISTORY:  MS rule out;.    TECHNIQUE:  Multiplanar multisequence MR imaging of the brain was performed before and after the administration of 5 mL Gadavist  intravenous contrast.    COMPARISON:  02/19/2025    FINDINGS:  Intracranial compartment:    Ventricles and sulci are normal in size for age without evidence of hydrocephalus. No extra-axial blood or fluid collections.    The brain parenchyma appears normal. No mass lesion, acute hemorrhage, edema or acute infarct. No abnormal enhancement.    Normal vascular flow voids are preserved.    Skull/extracranial contents (limited evaluation): Bone marrow signal intensity is normal.    No evidence of demyelination.    No significant abnormality.  No significant change.                                       Medications   sodium chloride 0.9% flush 10 mL (has no administration in time range)   melatonin tablet 6 mg (has no administration in time range)   ondansetron injection 4 mg (has no administration in time range)   senna-docusate 8.6-50 mg per tablet 1 tablet (has no administration in time range)   aluminum-magnesium hydroxide-simethicone 200-200-20 mg/5 mL suspension 30 mL (has no administration in time range)   acetaminophen tablet 650 mg (has no administration in time range)   naloxone 0.4 mg/mL injection 0.02 mg (has no administration in time range)   potassium bicarbonate disintegrating tablet 50 mEq (has no administration in time range)   potassium bicarbonate disintegrating tablet 35 mEq (has no administration in time range)   potassium bicarbonate disintegrating tablet 60 mEq (has no administration in time range)   magnesium oxide tablet 800 mg (has no administration in time range)   magnesium oxide  tablet 800 mg (has no administration in time range)   potassium, sodium phosphates 280-160-250 mg packet 2 packet (has no administration in time range)   potassium, sodium phosphates 280-160-250 mg packet 2 packet (has no administration in time range)   potassium, sodium phosphates 280-160-250 mg packet 2 packet (has no administration in time range)   glucose chewable tablet 16 g (has no administration in time range)   glucose chewable tablet 24 g (has no administration in time range)   dextrose 50% injection 12.5 g (has no administration in time range)   dextrose 50% injection 25 g (has no administration in time range)   glucagon (human recombinant) injection 1 mg (has no administration in time range)   gadobutroL (GADAVIST) injection 5 mL (5 mLs Intravenous Given 5/30/25 1930)     Medical Decision Making  29-year-old female presents to the emergency department for MS evaluation    Vitals stable.  Patient afebrile.  Well-appearing on physical exam.  Nontoxic and in no acute distress.  Patient is continuing to complain of numbness in the right lower extremity.  On physical exam she is able to tell me where I am touching her she just states the sensation is significantly decreased.  Full range of motion all extremities.  2+ distal pulses.  Benign abdominal exam.  Normal neurological exam.  MRI demyelinating with and without contrast performed.  Urine unremarkable.  CMP and CBC unremarkable.  CPK 98.  MRI of the brain demyelinating shows no acute intracranial process.  I did speak with Neurology at [facility] regarding this patient.  There are suggesting an MRI of the cervical spine.  I discussed this with the patient.  She is wanting to stay to have MRI ever cervical spine performed.  Hospital medicine will be assuming care moving forward.    Amount and/or Complexity of Data Reviewed  Labs: ordered.  Radiology: ordered.    Risk  Prescription drug management.                                      Clinical  Impression:  Final diagnoses:  [R20.0] Numbness of right lower extremity          ED Disposition Condition    Observation                       [1]   Social History  Tobacco Use    Smoking status: Former     Current packs/day: 0.00     Average packs/day: 0.3 packs/day for 4.0 years (1.0 ttl pk-yrs)     Types: Cigarettes     Start date:      Quit date: 10/2019     Years since quittin.6    Smokeless tobacco: Never    Tobacco comments:     quit with pregnancy (2019)   Substance Use Topics    Alcohol use: Not Currently    Drug use: No        Patti Mckeon PACRISTAL  [phone]

## 2025-05-31 NOTE — HPI
Ms. Grayson is a 29 yr old female with a hx of swine flu, seizures, MTHFR deficiency and homocystinuria, LGSIL, HPV, cholelithiasis s/p cholecystectomy, anemia, folate deficiency, depression, anxiety who presents to the ED with a chief complaint of right lower extremity numbness as well as bilateral upper extremity weakness.  Patient endorses that she has experienced right lower extremity numbness from her knee to the top of her foot over the last 3-1/2 weeks that has been consistent.  She describes this feeling of numbness to be similar to her  scar.  She also endorses that she has had trouble with her gait and states that she has noticed herself dragging her foot at times.  She tells me that she has also experienced bilateral upper extremity weakness that has been intermittent for a while now but has been progressively worsening recently.  She states it has been hard for her to grab cast iron skillet from out of the oven and also has trouble opening lids.  She initially thought this might be due to stress.  However, it has been unchanged.  She also endorses intermittent headaches behind her left eye for years now as well as dizziness as if the world around her is spinning/moving, bilateral tinnitus also for some time now, and also endorses decreased urine output as well as chronic diarrhea.  She endorses trying ibuprofen at home without relief.  She denies slurred speech, facial asymmetry, vision changes.  She states that she smokes 1 pack of cigarettes every other day, and vapes every other day, she occasionally drinks alcohol, and denies recreational drug use.    Of note, patient was seen in the ED on  and was discharged home with urgent neuro referral for MS workup.  Neurology was unable to see her until  so she was called and advised to return to the ED for further evaluation.    Upon arrival to ED, patient afebrile, HR of 108, RR of 16, BP of 126/86, satting 100% on RA.  Workup in the ED  with presumptive positive urine amphetamines.  Remainder of labs unremarkable.  Of note, patient is on dextroamphetamine-amphetamine 10 mg tabs.  MRI brain demyelinating with and without contrast shows no acute intracranial process.  ED provider discussed case with Neurology at [facility] who recommended patient receive an MRI cervical spine without contrast.  Patient will be placed under observation for further management.

## 2025-05-31 NOTE — H&P
[facility]  History & Physical    Patient Name: Irene Grayson  MRN: [id number]  Patient Class: OP- Observation  Admission Date: 2025  Attending Physician: Phylicia Mills MD   Primary Care Provider: Clifford Page DO         Patient information was obtained from patient, past medical records, and ER records.     Subjective:     Principal Problem:Numbness of right lower extremity    Chief Complaint:   Chief Complaint   Patient presents with    Follow-up     Pt called for follow up with PA        HPI: Ms. Grayson is a 29 yr old female with a hx of swine flu, seizures, MTHFR deficiency and homocystinuria, LGSIL, HPV, cholelithiasis s/p cholecystectomy, anemia, folate deficiency, depression, anxiety who presents to the ED with a chief complaint of right lower extremity numbness as well as bilateral upper extremity weakness.  Patient endorses that she has experienced right lower extremity numbness from her knee to the top of her foot over the last 3-1/2 weeks that has been consistent.  She describes this feeling of numbness to be similar to her  scar.  She also endorses that she has had trouble with her gait and states that she has noticed herself dragging her foot at times.  She tells me that she has also experienced bilateral upper extremity weakness that has been intermittent for a while now but has been progressively worsening recently.  She states it has been hard for her to grab cast iron skillet from out of the oven and also has trouble opening lids.  She initially thought this might be due to stress.  However, it has been unchanged.  She also endorses intermittent headaches behind her left eye for years now as well as dizziness as if the world around her is spinning/moving, bilateral tinnitus also for some time now, and also endorses decreased urine output as well as chronic diarrhea.  She endorses trying ibuprofen at home without relief.  She denies slurred  speech, facial asymmetry, vision changes.  She states that she smokes 1 pack of cigarettes every other day, and vapes every other day, she occasionally drinks alcohol, and denies recreational drug use.    Of note, patient was seen in the ED on  and was discharged home with urgent neuro referral for MS workup.  Neurology was unable to see her until  so she was called and advised to return to the ED for further evaluation.    Upon arrival to ED, patient afebrile, HR of 108, RR of 16, BP of 126/86, satting 100% on RA.  Workup in the ED with presumptive positive urine amphetamines.  Remainder of labs unremarkable.  Of note, patient is on dextroamphetamine-amphetamine 10 mg tabs.  MRI brain demyelinating with and without contrast shows no acute intracranial process.  ED provider discussed case with Neurology at [facility] who recommended patient receive an MRI cervical spine without contrast.  Patient will be placed under observation for further management.    Past Medical History:   Diagnosis Date    Anemia     no current treatment    Epilepsy     Gallstones     HPV (human papilloma virus) infection     LGSIL on Pap smear of cervix     MTHFR (methylene THF reductase) deficiency and homocystinuria     Seizure disorder, complex partial     last seizure ; no medication    Seizures     Swine flu        Past Surgical History:   Procedure Laterality Date     SECTION N/A 3/8/2020    Procedure:  SECTION;  Surgeon: Brain Quijano MD;  Location: [facility] L&D;  Service: OB/GYN;  Laterality: N/A;     SECTION N/A 10/28/2021    Procedure:  SECTION;  Surgeon: Lucinda Rush MD;  Location: [facility] L&D;  Service: OB/GYN;  Laterality: N/A;    CHOLECYSTECTOMY      LAPAROSCOPIC CHOLECYSTECTOMY      toe I&D      TONSILLECTOMY      UPPER GASTROINTESTINAL ENDOSCOPY  2017    Showed H. Pylori    yifan dugan  2010       Review of patient's allergies indicates:   Allergen  Reactions    Oranges [orange]      ulcers       No current facility-administered medications on file prior to encounter.     Current Outpatient Medications on File Prior to Encounter   Medication Sig    dextroamphetamine-amphetamine 10 mg Tab Take 0.5 tablets by mouth 2 (two) times daily.    folic acid (FOLVITE) 1 MG tablet Take 1 mg by mouth once daily.    medroxyPROGESTERone (PROVERA) 10 MG tablet Take 10 mg by mouth once daily.    pantoprazole (PROTONIX) 20 MG tablet Take 20 mg by mouth daily as needed (Heartburn).     Family History       Problem Relation (Age of Onset)    No Known Problems Mother, Father          Tobacco Use    Smoking status: Former     Current packs/day: 0.00     Average packs/day: 0.3 packs/day for 4.0 years (1.0 ttl pk-yrs)     Types: Cigarettes     Start date:      Quit date: 10/2019     Years since quittin.6    Smokeless tobacco: Never    Tobacco comments:     quit with pregnancy (2019)   Substance and Sexual Activity    Alcohol use: Not Currently    Drug use: No    Sexual activity: Not Currently     Partners: Male     Birth control/protection: None     Review of Systems   Constitutional:  Negative for chills and fever.   Eyes:  Negative for visual disturbance.   Respiratory:  Negative for shortness of breath.    Cardiovascular:  Negative for chest pain.   Gastrointestinal:  Positive for diarrhea (Chronic). Negative for abdominal pain, nausea and vomiting.   Genitourinary:  Positive for decreased urine volume. Negative for dysuria and hematuria.   Musculoskeletal:  Positive for gait problem.   Neurological:  Positive for dizziness, weakness (bilateral upper extremity), numbness (right lower extremity) and headaches. Negative for syncope, facial asymmetry, speech difficulty and light-headedness.     Objective:     Vital Signs (Most Recent):  Temp: 97.8 °F (36.6 °C) (25)  Pulse: 92 (25)  Resp: 16 (25)  BP: 138/89 (25)  SpO2: 99 % (25  2320) Vital Signs (24h Range):  Temp:  [97.8 °F (36.6 °C)-98.5 °F (36.9 °C)] 97.8 °F (36.6 °C)  Pulse:  [] 92  Resp:  [16] 16  SpO2:  [99 %-100 %] 99 %  BP: (126-138)/(86-89) 138/89     Weight: 51.8 kg (114 lb 1.6 oz)  Body mass index is 21.56 kg/m².     Physical Exam  Vitals reviewed.   Constitutional:       General: She is awake. She is not in acute distress.     Appearance: Normal appearance. She is not ill-appearing or diaphoretic.   Cardiovascular:      Rate and Rhythm: Normal rate.      Heart sounds: Normal heart sounds. No murmur heard.     No friction rub. No gallop.   Pulmonary:      Effort: Pulmonary effort is normal. No accessory muscle usage or respiratory distress.      Breath sounds: Normal breath sounds. No wheezing, rhonchi or rales.   Abdominal:      General: Abdomen is flat. Bowel sounds are normal.      Palpations: Abdomen is soft.      Tenderness: There is no abdominal tenderness. There is no guarding or rebound.   Musculoskeletal:      Right lower leg: No edema.      Left lower leg: No edema.   Skin:     General: Skin is warm and dry.   Neurological:      Mental Status: She is alert and oriented to person, place, and time.      Comments: Patient is moving all extremities and carrying on conversation.  No slurred speech or facial asymmetry appreciated.  5/5 strength to bilateral upper and lower extremities.  Equal  strength.  Normal sensation to bilateral face as well as bilateral upper extremities.  Decreased sensation reported to right lower extremity from knee to dorsal aspect of right foot.  Normal finger-to-nose and normal heel-to-shin.  No other deficits appreciated on exam.   Psychiatric:         Behavior: Behavior is cooperative.                Significant Labs: All pertinent labs within the past 24 hours have been reviewed.  CBC:   Recent Labs   Lab 05/30/25  1835   WBC 6.84   HGB 13.7   HCT 41.4        CMP:   Recent Labs   Lab 05/30/25  1835      K 3.6       CO2 23   GLU 97   BUN 13   CREATININE 1.0   CALCIUM 9.1   PROT 7.0   ALBUMIN 4.4   BILITOT 0.3   ALKPHOS 44*   AST 14   ALT 13   ANIONGAP 9     Urine Studies:   Recent Labs   Lab 05/30/25 1835   APPEARANCEUA Clear   SPECGRAV 1.025   PROTEINUA Trace*   BILIRUBINUA Negative   UROBILINOGEN 4.0-6.0*   LEUKOCYTESUR Negative       Significant Imaging:   Imaging Results              MRI Brain Demyelinating W W/O Contrast (Final result)  Result time 05/30/25 20:27:22      Final result by Hoang Bruner MD (05/30/25 20:27:22)                   Impression:      No acute intracranial process.      Electronically signed by: Hoang Bruner  Date:    05/30/2025  Time:    20:27               Narrative:    EXAMINATION:  MRI BRAIN DEMYELINATING W/ WO CONTRAST    CLINICAL HISTORY:  MS rule out;.    TECHNIQUE:  Multiplanar multisequence MR imaging of the brain was performed before and after the administration of 5 mL Gadavist  intravenous contrast.    COMPARISON:  02/19/2025    FINDINGS:  Intracranial compartment:    Ventricles and sulci are normal in size for age without evidence of hydrocephalus. No extra-axial blood or fluid collections.    The brain parenchyma appears normal. No mass lesion, acute hemorrhage, edema or acute infarct. No abnormal enhancement.    Normal vascular flow voids are preserved.    Skull/extracranial contents (limited evaluation): Bone marrow signal intensity is normal.    No evidence of demyelination.    No significant abnormality.  No significant change.                                     Assessment/Plan:     Assessment & Plan  Numbness of right lower extremity  - MRI brain demyelinating with and without contrast without acute intracranial process  - Neurology at main [place] recommended MRI cervical spine without contrast and if everything is normal can likely follow-up with Neurology outpatient on July 8th appointment.  - Symptomatic management    Epilepsy  - Hx noted  - Seizure precautions  - No  AEDs currently on home medication list    Nicotine dependence  Dangers of cigarette smoking were reviewed with patient in detail. Patient was Counseled for 3-10 minutes. Nicotine replacement options were discussed. Nicotine replacement was discussed- not prescribed per patient's request  VTE Risk Mitigation (From admission, onward)           Ordered     IP VTE LOW RISK PATIENT  Once         05/30/25 2228     Place sequential compression device  Until discontinued         05/30/25 2228                         On 05/30/2025, patient should be placed in hospital observation services under my care in collaboration with Phylicia Mills MD.           Apurva Young PA-C  Department of Hospital Medicine  [facility]

## 2025-05-31 NOTE — TELEMEDICINE CONSULT
[facility]  Neurology  Tele-Neurology Interprofessional Consult Note           Consult Information  Inpatient Consult to Neurology Services (General Neurology)  Consult performed by: Skip Mccurdy MD  Consult ordered by: Apurva Young PA-C        Consulting Provider:    Patient Location:  [address]     Summary of patient's symptoms:  29 yr old female with a hx of swine flu, seizures, MTHFR deficiency and homocystinuria, LGSIL, HPV, cholelithiasis s/p cholecystectomy, anemia, folate deficiency, depression, anxiety who presents to the ED with a chief complaint of right lower extremity numbness as well as bilateral upper extremity weakness.  History is obtained from chart review.  She describes her numbness feeling similar to her  scar.  Also there is mentioning of gait difficulty.  Symptoms have been progressing to the point that she has difficulty lifting objects.  There is decreased urine output and chronic diarrhea.  Other neurological workup has been negative so far and patient was not able to get the neurology appointment prompting her to come to the ER.     Images personally reviewed and interpreted:  Study: MRI Brain  Study Interpretation:   Imaging:   Imaging: Images were reviewed remotely as they were acquired.    CTH:  No acute intracranial abnormality    MRI brain demyelinating protocol with and without contrast:  No evidence of demyelination.  No other acute intracranial abnormalities.    MRI lumbar spine without contrast:  No significant abnormality        Assessment and plan:  Neurological workup has been negative thus far.  Given bilateral upper extremity Misha and presence of chronic diarrhea, I am suspicious and would like to rule out AIDP/CIDP.  I communicated my assessment with the primary care provider in recommended lumbar puncture which is not available until Monday.     -MRI C-spine with contrast  -MRI lumbar spine with contrast to rule out nerve root  enhancement  -start IVIG today   -lumbar puncture with CSF cell counts and protein on Monday     I spent approximately 14 minutes on this encounter. More than half of that time was spent communicating with the consulting provider and coordinating patient care.       Skip Mccurdy MD        This encounter was conducted as an interprofessional communication between providers at the [facility] and vascular neurologist. The interaction was completed over the phone or via secure messaging (electronic medical record - [employer] Secure Chat).     Once this note was completed, a written copy was sent back to the provider via fax or electronic medical record.

## 2025-05-31 NOTE — NURSING
Report called to Kimi Lopez.  Patient aware of transfer.  IVIG to be initiated once patient is transferred.

## 2025-05-31 NOTE — ASSESSMENT & PLAN NOTE
- MRI brain demyelinating with and without contrast without acute intracranial process  - Neurology at [facility] [place] recommended MRI cervical spine without contrast and if everything is normal can likely follow-up with Neurology outpatient on July 8th appointment.  - Symptomatic management

## 2025-05-31 NOTE — CARE UPDATE
05/31/25 0052   Patient Assessment/Suction   Level of Consciousness (AVPU) alert   Respiratory Effort Normal   Expansion/Accessory Muscles/Retractions no use of accessory muscles   All Lung Fields Breath Sounds clear   Rhythm/Pattern, Respiratory unlabored   Cough Frequency no cough   PRE-TX-O2   Device (Oxygen Therapy) room air   SpO2 100 %   Pulse Oximetry Type Intermittent   $ Pulse Oximetry - Multiple Charge Pulse Oximetry - Multiple   Pulse 90   Resp 19   Aerosol Therapy   $ Aerosol Therapy Charges PRN treatment not required   Education   $ Education Bronchodilator;15 min   Tobacco Cessation Intervention   Do you use any type of tobacco product? No   $ Smoking Cessation Charges Smoking Cessation - Intermediate (Non-CTTS)   Respiratory Evaluation   $ Care Plan Tech Time 15 min   $ Respiratory Evaluation Complete   Evaluation For New Orders   Admitting Diagnosis LOWER EXTERMITY NUMBNESS   Cardiac Diagnosis NONE   Pulmonary Diagnosis NONE   Current Surgeries NONE   Home Oxygen   Has Home Oxygen? No   Home Aerosol, MDI, DPI, and Other Treatments/Therapies   Home Respiratory Therapy Per Patient/Review of Chart No   Oxygen Care Plan   Oxygen Care Plan Per Protocol   Rationale No rational found   Bronchodilator Care Plan   Bronchodilator Care Plan N/A   Rationale No Rationale found   Atelectasis Care Plan   Atelectasis Care Plan Other   Rationale No Rational Found   Airway Clearance Care Plan   Airway Clearance Care Plan Other   Rationale No rationale found        Pt c/o left sided rib pain, s/p fall last week. Pt denies LOC, no blood thinners.

## 2025-05-31 NOTE — NURSING TRANSFER
Nursing Transfer Note      5/31/2025   1:29 PM    Nurse giving handoff:Batool  Nurse receiving handoff:John    Reason patient is being transferred: IVIG    Transfer To: 2511    Transfer via wheelchair    Transfer with n/a    Transported by staff x 1    Transfer Vital Signs:  Blood Pressure:114/75  Heart Rate:100  O2:95 on RA  Temperature:98.4 oral   Respirations:18    Telemetry: n/a  Order for Tele Monitor? No    Additional Lines: n/a    Medicines sent: n/a    Any special needs or follow-up needed: n/a    Patient belongings transferred with patient: Yes, cellphone and clothing     Chart send with patient: Yes    Notified: patient aware    Patient reassessed at: 5/31/25 @ 1330 (date, time)  1  Upon arrival to floor: cardiac monitor applied, patient oriented to room, call bell in reach, and bed in lowest position

## 2025-06-01 LAB
ABSOLUTE EOSINOPHIL (SMH): 0.09 K/UL
ABSOLUTE MONOCYTE (SMH): 0.55 K/UL (ref 0.3–1)
ABSOLUTE NEUTROPHIL COUNT (SMH): 4.8 K/UL (ref 1.8–7.7)
ANION GAP (SMH): 11 MMOL/L (ref 8–16)
BASOPHILS # BLD AUTO: 0.03 K/UL
BASOPHILS NFR BLD AUTO: 0.4 %
BUN SERPL-MCNC: 11 MG/DL (ref 6–20)
CALCIUM SERPL-MCNC: 9.2 MG/DL (ref 8.7–10.5)
CHLORIDE SERPL-SCNC: 108 MMOL/L (ref 95–110)
CO2 SERPL-SCNC: 17 MMOL/L (ref 23–29)
CREAT SERPL-MCNC: 0.6 MG/DL (ref 0.5–1.4)
ERYTHROCYTE [DISTWIDTH] IN BLOOD BY AUTOMATED COUNT: 12.1 % (ref 11.5–14.5)
GFR SERPLBLD CREATININE-BSD FMLA CKD-EPI: >60 ML/MIN/1.73/M2
GLUCOSE SERPL-MCNC: 98 MG/DL (ref 70–110)
HCT VFR BLD AUTO: 42.2 % (ref 37–48.5)
HGB BLD-MCNC: 13.9 GM/DL (ref 12–16)
IMM GRANULOCYTES # BLD AUTO: 0.02 K/UL (ref 0–0.04)
IMM GRANULOCYTES NFR BLD AUTO: 0.3 % (ref 0–0.5)
LYMPHOCYTES # BLD AUTO: 1.4 K/UL (ref 1–4.8)
MAGNESIUM SERPL-MCNC: 1.9 MG/DL (ref 1.6–2.6)
MCH RBC QN AUTO: 30.9 PG (ref 27–31)
MCHC RBC AUTO-ENTMCNC: 32.9 G/DL (ref 32–36)
MCV RBC AUTO: 94 FL (ref 82–98)
NUCLEATED RBC (/100WBC) (SMH): 0 /100 WBC
PHOSPHATE SERPL-MCNC: 3.8 MG/DL (ref 2.7–4.5)
PLATELET # BLD AUTO: 243 K/UL (ref 150–450)
PMV BLD AUTO: 10.1 FL (ref 9.2–12.9)
POTASSIUM SERPL-SCNC: 3.8 MMOL/L (ref 3.5–5.1)
RBC # BLD AUTO: 4.5 M/UL (ref 4–5.4)
RELATIVE EOSINOPHIL (SMH): 1.3 % (ref 0–8)
RELATIVE LYMPHOCYTE (SMH): 20.2 % (ref 18–48)
RELATIVE MONOCYTE (SMH): 7.9 % (ref 4–15)
RELATIVE NEUTROPHIL (SMH): 69.9 % (ref 38–73)
SODIUM SERPL-SCNC: 136 MMOL/L (ref 136–145)
WBC # BLD AUTO: 6.92 K/UL (ref 3.9–12.7)

## 2025-06-01 PROCEDURE — 20600001 HC STEP DOWN PRIVATE ROOM

## 2025-06-01 PROCEDURE — 25500020 PHARM REV CODE 255: Performed by: FAMILY MEDICINE

## 2025-06-01 PROCEDURE — 84100 ASSAY OF PHOSPHORUS: CPT

## 2025-06-01 PROCEDURE — 99900031 HC PATIENT EDUCATION (STAT)

## 2025-06-01 PROCEDURE — 99900035 HC TECH TIME PER 15 MIN (STAT)

## 2025-06-01 PROCEDURE — 80048 BASIC METABOLIC PNL TOTAL CA: CPT

## 2025-06-01 PROCEDURE — 85025 COMPLETE CBC W/AUTO DIFF WBC: CPT

## 2025-06-01 PROCEDURE — 25000003 PHARM REV CODE 250

## 2025-06-01 PROCEDURE — 63600175 PHARM REV CODE 636 W HCPCS: Mod: JZ,TB | Performed by: HOSPITALIST

## 2025-06-01 PROCEDURE — 25000003 PHARM REV CODE 250: Performed by: FAMILY MEDICINE

## 2025-06-01 PROCEDURE — A9585 GADOBUTROL INJECTION: HCPCS | Performed by: FAMILY MEDICINE

## 2025-06-01 PROCEDURE — 83735 ASSAY OF MAGNESIUM: CPT

## 2025-06-01 PROCEDURE — 94761 N-INVAS EAR/PLS OXIMETRY MLT: CPT

## 2025-06-01 PROCEDURE — 36415 COLL VENOUS BLD VENIPUNCTURE: CPT

## 2025-06-01 RX ORDER — BUTALBITAL, ACETAMINOPHEN AND CAFFEINE 50; 325; 40 MG/1; MG/1; MG/1
1 TABLET ORAL EVERY 8 HOURS PRN
Status: DISCONTINUED | OUTPATIENT
Start: 2025-06-01 | End: 2025-06-04 | Stop reason: HOSPADM

## 2025-06-01 RX ORDER — HYDROMORPHONE HYDROCHLORIDE 1 MG/ML
1 INJECTION, SOLUTION INTRAMUSCULAR; INTRAVENOUS; SUBCUTANEOUS ONCE
Status: DISCONTINUED | OUTPATIENT
Start: 2025-06-02 | End: 2025-06-02

## 2025-06-01 RX ORDER — GADOBUTROL 604.72 MG/ML
5 INJECTION INTRAVENOUS
Status: COMPLETED | OUTPATIENT
Start: 2025-06-01 | End: 2025-06-01

## 2025-06-01 RX ADMIN — GADOBUTROL 5 ML: 604.72 INJECTION INTRAVENOUS at 04:06

## 2025-06-01 RX ADMIN — POTASSIUM BICARBONATE 50 MEQ: 977.5 TABLET, EFFERVESCENT ORAL at 11:06

## 2025-06-01 RX ADMIN — HUMAN IMMUNOGLOBULIN G 20 G: 20 LIQUID INTRAVENOUS at 06:06

## 2025-06-01 RX ADMIN — FOLIC ACID 1 MG: 1 TABLET ORAL at 09:06

## 2025-06-01 RX ADMIN — BUTALBITAL, ACETAMINOPHEN, AND CAFFEINE 1 TABLET: 325; 50; 40 TABLET ORAL at 07:06

## 2025-06-01 NOTE — SUBJECTIVE & OBJECTIVE
Interval History:  no new issues.  No new symptoms.  Neurology consulted with us today and recommended LP to rule out AIDP.  Also recommended IVIG.    Review of Systems   Constitutional:  Negative for fever.   Respiratory:  Negative for shortness of breath.      Objective:     Vital Signs (Most Recent):  Temp: 98.3 °F (36.8 °C) (05/31/25 2130)  Pulse: 80 (05/31/25 2130)  Resp: 17 (05/31/25 2130)  BP: 116/72 (05/31/25 2130)  SpO2: 100 % (05/31/25 2130) Vital Signs (24h Range):  Temp:  [97.8 °F (36.6 °C)-98.5 °F (36.9 °C)] 98.3 °F (36.8 °C)  Pulse:  [] 80  Resp:  [15-28] 17  SpO2:  [95 %-100 %] 100 %  BP: (109-138)/(57-89) 116/72     Weight: 51.8 kg (114 lb 1.6 oz)  Body mass index is 21.56 kg/m².    Intake/Output Summary (Last 24 hours) at 5/31/2025 2159  Last data filed at 5/31/2025 2021  Gross per 24 hour   Intake 600 ml   Output 400 ml   Net 200 ml         Physical Exam  Constitutional:       General: She is not in acute distress.     Appearance: She is not ill-appearing.   Eyes:      General:         Right eye: No discharge.         Left eye: No discharge.   Neck:      Vascular: No JVD.   Cardiovascular:      Rate and Rhythm: Normal rate and regular rhythm.   Pulmonary:      Effort: Pulmonary effort is normal.      Breath sounds: Normal breath sounds.   Abdominal:      General: Abdomen is flat. Bowel sounds are normal. There is no distension.      Palpations: Abdomen is soft.      Tenderness: There is no abdominal tenderness.   Musculoskeletal:      Right lower leg: No edema.      Left lower leg: No edema.   Skin:     General: Skin is warm and moist.      Findings: No rash.   Neurological:      Mental Status: She is alert.   Psychiatric:         Attention and Perception: Attention normal.         Mood and Affect: Mood and affect normal.         Speech: Speech normal.               Significant Labs: All pertinent labs within the past 24 hours have been reviewed.    Significant Imaging: I have reviewed all  pertinent imaging results/findings within the past 24 hours.

## 2025-06-01 NOTE — ASSESSMENT & PLAN NOTE
Possible diagnosis.  Discussed with neurology.  Get MRI of spine with contrast.  Get LP and check protein level and cell count.  Begin IVIG empirically while waiting for workup to be complete.  Discussed with patient and parents.

## 2025-06-01 NOTE — PROGRESS NOTES
[facility] Medicine  Progress Note    Patient Name: Irene Grayson  MRN: [id number]  Patient Class: IP- Inpatient   Admission Date: 2025  Length of Stay: 1 days  Attending Physician: Keila Colin DO  Primary Care Provider: Clifford Page DO        Subjective     Principal Problem:AIDP (acute inflammatory demyelinating polyneuropathy)        HPI:  Ms. Grayson is a 29 yr old female with a hx of swine flu, seizures, MTHFR deficiency and homocystinuria, LGSIL, HPV, cholelithiasis s/p cholecystectomy, anemia, folate deficiency, depression, anxiety who presents to the ED with a chief complaint of right lower extremity numbness as well as bilateral upper extremity weakness.  Patient endorses that she has experienced right lower extremity numbness from her knee to the top of her foot over the last 3-1/2 weeks that has been consistent.  She describes this feeling of numbness to be similar to her  scar.  She also endorses that she has had trouble with her gait and states that she has noticed herself dragging her foot at times.  She tells me that she has also experienced bilateral upper extremity weakness that has been intermittent for a while now but has been progressively worsening recently.  She states it has been hard for her to grab cast iron skillet from out of the oven and also has trouble opening lids.  She initially thought this might be due to stress.  However, it has been unchanged.  She also endorses intermittent headaches behind her left eye for years now as well as dizziness as if the world around her is spinning/moving, bilateral tinnitus also for some time now, and also endorses decreased urine output as well as chronic diarrhea.  She endorses trying ibuprofen at home without relief.  She denies slurred speech, facial asymmetry, vision changes.  She states that she smokes 1 pack of cigarettes every other day, and vapes every other day, she occasionally drinks  alcohol, and denies recreational drug use.    Of note, patient was seen in the ED on 05/29 and was discharged home with urgent neuro referral for MS workup.  Neurology was unable to see her until July 8th so she was called and advised to return to the ED for further evaluation.    Upon arrival to ED, patient afebrile, HR of 108, RR of 16, BP of 126/86, satting 100% on RA.  Workup in the ED with presumptive positive urine amphetamines.  Remainder of labs unremarkable.  Of note, patient is on dextroamphetamine-amphetamine 10 mg tabs.  MRI brain demyelinating with and without contrast shows no acute intracranial process.  ED provider discussed case with Neurology at [facility] who recommended patient receive an MRI cervical spine without contrast.  Patient will be placed under observation for further management.    Overview/Hospital Course:  We admitted the patient with the neurologic symptoms and diagnosed her with possible AIDP vs CIDP.  Neurology consulted with us and recommended MRI of spine and LP to confirm suspicions.  He recommended beginning IVIG infusions daily empirically.  Patient was moved to Stepdown for frequent vitals during infusions.  Plan is for MRI of spine and LP on 06/02.    Interval History:  no new issues.  No new symptoms.  Neurology consulted with us today and recommended LP to rule out AIDP.  Also recommended IVIG.    Review of Systems   Constitutional:  Negative for fever.   Respiratory:  Negative for shortness of breath.      Objective:     Vital Signs (Most Recent):  Temp: 98.3 °F (36.8 °C) (06/01/25 0730)  Pulse: 101 (06/01/25 1430)  Resp: (!) 23 (06/01/25 1430)  BP: 125/78 (06/01/25 1430)  SpO2: 98 % (06/01/25 1430) Vital Signs (24h Range):  Temp:  [98.2 °F (36.8 °C)-98.4 °F (36.9 °C)] 98.3 °F (36.8 °C)  Pulse:  [] 101  Resp:  [12-43] 23  SpO2:  [95 %-100 %] 98 %  BP: ()/(51-78) 125/78     Weight: 51.8 kg (114 lb 1.6 oz)  Body mass index is 21.56 kg/m².    Intake/Output  Summary (Last 24 hours) at 6/1/2025 1620  Last data filed at 6/1/2025 1242  Gross per 24 hour   Intake 710 ml   Output 400 ml   Net 310 ml         Physical Exam  Constitutional:       General: She is not in acute distress.     Appearance: She is not ill-appearing.   Eyes:      General:         Right eye: No discharge.         Left eye: No discharge.   Neck:      Vascular: No JVD.   Cardiovascular:      Rate and Rhythm: Normal rate and regular rhythm.   Pulmonary:      Effort: Pulmonary effort is normal.      Breath sounds: Normal breath sounds.   Abdominal:      General: Abdomen is flat. Bowel sounds are normal. There is no distension.      Palpations: Abdomen is soft.      Tenderness: There is no abdominal tenderness.   Musculoskeletal:      Right lower leg: No edema.      Left lower leg: No edema.   Skin:     General: Skin is warm and moist.      Findings: No rash.   Neurological:      Mental Status: She is alert.   Psychiatric:         Attention and Perception: Attention normal.         Mood and Affect: Mood and affect normal.         Speech: Speech normal.               Significant Labs: All pertinent labs within the past 24 hours have been reviewed.    Significant Imaging: I have reviewed all pertinent imaging results/findings within the past 24 hours.      Assessment & Plan  AIDP (acute inflammatory demyelinating polyneuropathy)  Possible diagnosis.  Discussed with neurology.  Get MRI of spine with contrast.  Get LP and check protein level and cell count.  Begin IVIG empirically while waiting for workup to be complete.  Discussed with patient and parents.  Plan for MRIs and LP 6/2  Epilepsy  - Hx noted  - Seizure precautions  - No AEDs currently on home medication list    Nicotine dependence  Dangers of cigarette smoking were reviewed with patient in detail. Patient was Counseled for 3-10 minutes. Nicotine replacement options were discussed. Nicotine replacement was discussed- not prescribed per patient's  request  VTE Risk Mitigation (From admission, onward)           Ordered     IP VTE LOW RISK PATIENT  Once         05/30/25 2228     Place sequential compression device  Until discontinued         05/30/25 2228                    Discharge Planning   LAURA: 6/2/2025     Code Status: Full Code   Medical Readiness for Discharge Date:   Discharge Plan A: Home with family                        Keila Colin DO  Department of Hospital Medicine   Replaced by [facility]

## 2025-06-01 NOTE — PLAN OF CARE
06/01/25 0805   Patient Assessment/Suction   Level of Consciousness (AVPU) alert   Respiratory Effort Normal;Unlabored   Expansion/Accessory Muscles/Retractions no use of accessory muscles   All Lung Fields Breath Sounds Anterior:;Lateral:;diminished;clear   Rhythm/Pattern, Respiratory pattern regular   Cough Frequency no cough   PRE-TX-O2   Device (Oxygen Therapy) room air   SpO2 96 %   Pulse Oximetry Type Continuous   $ Pulse Oximetry - Multiple Charge Pulse Oximetry - Multiple   Pulse 83   Resp 17   Aerosol Therapy   $ Aerosol Therapy Charges PRN treatment not required   Respiratory Treatment Status (SVN) PRN treatment not required

## 2025-06-01 NOTE — PROGRESS NOTES
[facility] Medicine  Progress Note    Patient Name: Irene Grayson  MRN: [id number]  Patient Class: IP- Inpatient   Admission Date: 2025  Length of Stay: 0 days  Attending Physician: Jeremiah Ag MD  Primary Care Provider: Clifford Page DO        Subjective     Principal Problem:AIDP (acute inflammatory demyelinating polyneuropathy)        HPI:  Ms. Grayson is a 29 yr old female with a hx of swine flu, seizures, MTHFR deficiency and homocystinuria, LGSIL, HPV, cholelithiasis s/p cholecystectomy, anemia, folate deficiency, depression, anxiety who presents to the ED with a chief complaint of right lower extremity numbness as well as bilateral upper extremity weakness.  Patient endorses that she has experienced right lower extremity numbness from her knee to the top of her foot over the last 3-1/2 weeks that has been consistent.  She describes this feeling of numbness to be similar to her  scar.  She also endorses that she has had trouble with her gait and states that she has noticed herself dragging her foot at times.  She tells me that she has also experienced bilateral upper extremity weakness that has been intermittent for a while now but has been progressively worsening recently.  She states it has been hard for her to grab cast iron skillet from out of the oven and also has trouble opening lids.  She initially thought this might be due to stress.  However, it has been unchanged.  She also endorses intermittent headaches behind her left eye for years now as well as dizziness as if the world around her is spinning/moving, bilateral tinnitus also for some time now, and also endorses decreased urine output as well as chronic diarrhea.  She endorses trying ibuprofen at home without relief.  She denies slurred speech, facial asymmetry, vision changes.  She states that she smokes 1 pack of cigarettes every other day, and vapes every other day, she occasionally drinks  alcohol, and denies recreational drug use.    Of note, patient was seen in the ED on 05/29 and was discharged home with urgent neuro referral for MS workup.  Neurology was unable to see her until July 8th so she was called and advised to return to the ED for further evaluation.    Upon arrival to ED, patient afebrile, HR of 108, RR of 16, BP of 126/86, satting 100% on RA.  Workup in the ED with presumptive positive urine amphetamines.  Remainder of labs unremarkable.  Of note, patient is on dextroamphetamine-amphetamine 10 mg tabs.  MRI brain demyelinating with and without contrast shows no acute intracranial process.  ED provider discussed case with Neurology at [facility] who recommended patient receive an MRI cervical spine without contrast.  Patient will be placed under observation for further management.    Overview/Hospital Course:  No notes on file    Interval History:  no new issues.  No new symptoms.  Neurology consulted with us today and recommended LP to rule out AIDP.  Also recommended IVIG.    Review of Systems   Constitutional:  Negative for fever.   Respiratory:  Negative for shortness of breath.      Objective:     Vital Signs (Most Recent):  Temp: 98.3 °F (36.8 °C) (05/31/25 2130)  Pulse: 80 (05/31/25 2130)  Resp: 17 (05/31/25 2130)  BP: 116/72 (05/31/25 2130)  SpO2: 100 % (05/31/25 2130) Vital Signs (24h Range):  Temp:  [97.8 °F (36.6 °C)-98.5 °F (36.9 °C)] 98.3 °F (36.8 °C)  Pulse:  [] 80  Resp:  [15-28] 17  SpO2:  [95 %-100 %] 100 %  BP: (109-138)/(57-89) 116/72     Weight: 51.8 kg (114 lb 1.6 oz)  Body mass index is 21.56 kg/m².    Intake/Output Summary (Last 24 hours) at 5/31/2025 2159  Last data filed at 5/31/2025 2021  Gross per 24 hour   Intake 600 ml   Output 400 ml   Net 200 ml         Physical Exam  Constitutional:       General: She is not in acute distress.     Appearance: She is not ill-appearing.   Eyes:      General:         Right eye: No discharge.         Left eye: No  discharge.   Neck:      Vascular: No JVD.   Cardiovascular:      Rate and Rhythm: Normal rate and regular rhythm.   Pulmonary:      Effort: Pulmonary effort is normal.      Breath sounds: Normal breath sounds.   Abdominal:      General: Abdomen is flat. Bowel sounds are normal. There is no distension.      Palpations: Abdomen is soft.      Tenderness: There is no abdominal tenderness.   Musculoskeletal:      Right lower leg: No edema.      Left lower leg: No edema.   Skin:     General: Skin is warm and moist.      Findings: No rash.   Neurological:      Mental Status: She is alert.   Psychiatric:         Attention and Perception: Attention normal.         Mood and Affect: Mood and affect normal.         Speech: Speech normal.               Significant Labs: All pertinent labs within the past 24 hours have been reviewed.    Significant Imaging: I have reviewed all pertinent imaging results/findings within the past 24 hours.      Assessment & Plan  AIDP (acute inflammatory demyelinating polyneuropathy)  Possible diagnosis.  Discussed with neurology.  Get MRI of spine with contrast.  Get LP and check protein level and cell count.  Begin IVIG empirically while waiting for workup to be complete.  Discussed with patient and parents.  Epilepsy  - Hx noted  - Seizure precautions  - No AEDs currently on home medication list    Nicotine dependence  Dangers of cigarette smoking were reviewed with patient in detail. Patient was Counseled for 3-10 minutes. Nicotine replacement options were discussed. Nicotine replacement was discussed- not prescribed per patient's request  VTE Risk Mitigation (From admission, onward)           Ordered     IP VTE LOW RISK PATIENT  Once         05/30/25 2228     Place sequential compression device  Until discontinued         05/30/25 2228                    Discharge Planning   LAURA: 6/1/2025     Code Status: Full Code   Medical Readiness for Discharge Date:   Discharge Plan A: Home with family                         Jeremiah Ag MD  Department of Hospital Medicine   [facility]

## 2025-06-01 NOTE — SUBJECTIVE & OBJECTIVE
Interval History:  no new issues.  No new symptoms.  Neurology consulted with us today and recommended LP to rule out AIDP.  Also recommended IVIG.    Review of Systems   Constitutional:  Negative for fever.   Respiratory:  Negative for shortness of breath.      Objective:     Vital Signs (Most Recent):  Temp: 98.3 °F (36.8 °C) (06/01/25 0730)  Pulse: 101 (06/01/25 1430)  Resp: (!) 23 (06/01/25 1430)  BP: 125/78 (06/01/25 1430)  SpO2: 98 % (06/01/25 1430) Vital Signs (24h Range):  Temp:  [98.2 °F (36.8 °C)-98.4 °F (36.9 °C)] 98.3 °F (36.8 °C)  Pulse:  [] 101  Resp:  [12-43] 23  SpO2:  [95 %-100 %] 98 %  BP: ()/(51-78) 125/78     Weight: 51.8 kg (114 lb 1.6 oz)  Body mass index is 21.56 kg/m².    Intake/Output Summary (Last 24 hours) at 6/1/2025 1620  Last data filed at 6/1/2025 1242  Gross per 24 hour   Intake 710 ml   Output 400 ml   Net 310 ml         Physical Exam  Constitutional:       General: She is not in acute distress.     Appearance: She is not ill-appearing.   Eyes:      General:         Right eye: No discharge.         Left eye: No discharge.   Neck:      Vascular: No JVD.   Cardiovascular:      Rate and Rhythm: Normal rate and regular rhythm.   Pulmonary:      Effort: Pulmonary effort is normal.      Breath sounds: Normal breath sounds.   Abdominal:      General: Abdomen is flat. Bowel sounds are normal. There is no distension.      Palpations: Abdomen is soft.      Tenderness: There is no abdominal tenderness.   Musculoskeletal:      Right lower leg: No edema.      Left lower leg: No edema.   Skin:     General: Skin is warm and moist.      Findings: No rash.   Neurological:      Mental Status: She is alert.   Psychiatric:         Attention and Perception: Attention normal.         Mood and Affect: Mood and affect normal.         Speech: Speech normal.               Significant Labs: All pertinent labs within the past 24 hours have been reviewed.    Significant Imaging: I have reviewed all  pertinent imaging results/findings within the past 24 hours.

## 2025-06-01 NOTE — ASSESSMENT & PLAN NOTE
Possible diagnosis.  Discussed with neurology.  Get MRI of spine with contrast.  Get LP and check protein level and cell count.  Begin IVIG empirically while waiting for workup to be complete.  Discussed with patient and parents.  Plan for MRIs and LP 6/2   [Disease:__________________________] : Disease: [unfilled] [de-identified] : Patient states she has been doing well in rehab facility, denies acute events.  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

## 2025-06-01 NOTE — HOSPITAL COURSE
We admitted the patient with the neurologic symptoms and diagnosed her with possible AIDP vs CIDP.  Neurology consulted with us and recommended MRI of spine and LP to confirm suspicions.  He recommended beginning IVIG infusions daily empirically.  Patient was moved to Stepdown for frequent vitals during infusions. MRI of spine and LP on 06/02 initial CSF count shows WBC greater than 2000, elevated protein and decreased glucose concerning for bacterial meningitis versus underlying CNS inflammatory process but with sudden onset headache this morning we will need to start empiric antibiotics and steroids and ordered blood cultures.  Infectious Disease has been consult as well as Neurology.  MRI of the C-spine showed mod- severe spinal canal stenosis and Neurosurgery was consult but no role for surgical intervention at this time.  Hold IVIG at this time due to possible medication reaction causing meningitis like symptoms.  Discuss with Neurology team and they felt that unlikely AIDP given presence of deep tendon reflexes but definitely needs close follow up with Neurology.  Discuss with Infectious Disease and blood cultures and CSF cultures negative for any growth x2 days, it is felt that this is likely aseptic meningitis from IVIG and patient should discontinue.  Does not need antibiotics and can Follow up with ID in 2 weeks.  We will add Privigen IVIG to allergy list for now.

## 2025-06-02 PROBLEM — M48.02 CERVICAL STENOSIS OF SPINAL CANAL: Status: ACTIVE | Noted: 2025-06-02

## 2025-06-02 PROBLEM — R51.9 SUDDEN ONSET OF SEVERE HEADACHE: Status: ACTIVE | Noted: 2025-06-02

## 2025-06-02 LAB
ABSOLUTE EOSINOPHIL (SMH): 0.07 K/UL
ABSOLUTE MONOCYTE (SMH): 0.74 K/UL (ref 0.3–1)
ABSOLUTE NEUTROPHIL COUNT (SMH): 8.3 K/UL (ref 1.8–7.7)
ANION GAP (SMH): 7 MMOL/L (ref 8–16)
BASOPHILS # BLD AUTO: 0.04 K/UL
BASOPHILS NFR BLD AUTO: 0.4 %
BUN SERPL-MCNC: 9 MG/DL (ref 6–20)
C-REACTIVE PROTEIN (SMH): 0.1 MG/DL
CALCIUM SERPL-MCNC: 9.6 MG/DL (ref 8.7–10.5)
CHLORIDE SERPL-SCNC: 103 MMOL/L (ref 95–110)
CLARITY CSF: ABNORMAL
CO2 SERPL-SCNC: 27 MMOL/L (ref 23–29)
COLOR CSF: COLORLESS
CREAT SERPL-MCNC: 0.7 MG/DL (ref 0.5–1.4)
CSF TUBE NUMBER (SMH): 1
CSF TUBE NUMBER (SMH): 1
ERYTHROCYTE [DISTWIDTH] IN BLOOD BY AUTOMATED COUNT: 12.2 % (ref 11.5–14.5)
ERYTHROCYTE [SEDIMENTATION RATE] IN BLOOD: 20 MM/HR (ref 0–20)
GFR SERPLBLD CREATININE-BSD FMLA CKD-EPI: >60 ML/MIN/1.73/M2
GLUCOSE CSF-MCNC: 37 MG/DL (ref 40–70)
GLUCOSE SERPL-MCNC: 105 MG/DL (ref 70–110)
HCT VFR BLD AUTO: 43.7 % (ref 37–48.5)
HGB BLD-MCNC: 14.2 GM/DL (ref 12–16)
IMM GRANULOCYTES # BLD AUTO: 0.05 K/UL (ref 0–0.04)
IMM GRANULOCYTES NFR BLD AUTO: 0.5 % (ref 0–0.5)
LYMPHOCYTES # BLD AUTO: 1.02 K/UL (ref 1–4.8)
LYMPHOCYTES NFR CSF MANUAL: 6 %
MAGNESIUM SERPL-MCNC: 2 MG/DL (ref 1.6–2.6)
MCH RBC QN AUTO: 30.6 PG (ref 27–31)
MCHC RBC AUTO-ENTMCNC: 32.5 G/DL (ref 32–36)
MCV RBC AUTO: 94 FL (ref 82–98)
NEUTROPHILS NFR CSF MANUAL: 94 %
NUCLEATED RBC (/100WBC) (SMH): 0 /100 WBC
PHOSPHATE SERPL-MCNC: 3.4 MG/DL (ref 2.7–4.5)
PLATELET # BLD AUTO: 243 K/UL (ref 150–450)
PMV BLD AUTO: 10 FL (ref 9.2–12.9)
POTASSIUM SERPL-SCNC: 5 MMOL/L (ref 3.5–5.1)
PROCALCITONIN SERPL-MCNC: <0.05 NG/ML
PROT CSF-MCNC: 137 MG/DL (ref 15–40)
RBC # BLD AUTO: 4.64 M/UL (ref 4–5.4)
RBC # CSF: 7 /CU MM
RELATIVE EOSINOPHIL (SMH): 0.7 % (ref 0–8)
RELATIVE LYMPHOCYTE (SMH): 10 % (ref 18–48)
RELATIVE MONOCYTE (SMH): 7.3 % (ref 4–15)
RELATIVE NEUTROPHIL (SMH): 81.1 % (ref 38–73)
SODIUM SERPL-SCNC: 137 MMOL/L (ref 136–145)
SPECIMEN VOL CSF: 2 ML
WBC # BLD AUTO: 10.18 K/UL (ref 3.9–12.7)
WBC # CSF: 2713 /CU MM (ref 0–5)

## 2025-06-02 PROCEDURE — 25000003 PHARM REV CODE 250

## 2025-06-02 PROCEDURE — 80048 BASIC METABOLIC PNL TOTAL CA: CPT

## 2025-06-02 PROCEDURE — 99900031 HC PATIENT EDUCATION (STAT)

## 2025-06-02 PROCEDURE — 63600175 PHARM REV CODE 636 W HCPCS

## 2025-06-02 PROCEDURE — 36415 COLL VENOUS BLD VENIPUNCTURE: CPT | Performed by: NURSE PRACTITIONER

## 2025-06-02 PROCEDURE — 86592 SYPHILIS TEST NON-TREP QUAL: CPT | Performed by: NURSE PRACTITIONER

## 2025-06-02 PROCEDURE — 36415 COLL VENOUS BLD VENIPUNCTURE: CPT | Performed by: FAMILY MEDICINE

## 2025-06-02 PROCEDURE — 83735 ASSAY OF MAGNESIUM: CPT

## 2025-06-02 PROCEDURE — 84100 ASSAY OF PHOSPHORUS: CPT

## 2025-06-02 PROCEDURE — 99900035 HC TECH TIME PER 15 MIN (STAT)

## 2025-06-02 PROCEDURE — 99223 1ST HOSP IP/OBS HIGH 75: CPT | Mod: ,,, | Performed by: NURSE PRACTITIONER

## 2025-06-02 PROCEDURE — 36415 COLL VENOUS BLD VENIPUNCTURE: CPT

## 2025-06-02 PROCEDURE — 25000003 PHARM REV CODE 250: Performed by: FAMILY MEDICINE

## 2025-06-02 PROCEDURE — 93005 ELECTROCARDIOGRAM TRACING: CPT | Performed by: INTERNAL MEDICINE

## 2025-06-02 PROCEDURE — 85025 COMPLETE CBC W/AUTO DIFF WBC: CPT

## 2025-06-02 PROCEDURE — 87040 BLOOD CULTURE FOR BACTERIA: CPT | Performed by: FAMILY MEDICINE

## 2025-06-02 PROCEDURE — 84157 ASSAY OF PROTEIN OTHER: CPT | Performed by: HOSPITALIST

## 2025-06-02 PROCEDURE — 86140 C-REACTIVE PROTEIN: CPT | Performed by: NURSE PRACTITIONER

## 2025-06-02 PROCEDURE — 89051 BODY FLUID CELL COUNT: CPT | Performed by: HOSPITALIST

## 2025-06-02 PROCEDURE — 84145 PROCALCITONIN (PCT): CPT | Performed by: NURSE PRACTITIONER

## 2025-06-02 PROCEDURE — 85651 RBC SED RATE NONAUTOMATED: CPT | Performed by: NURSE PRACTITIONER

## 2025-06-02 PROCEDURE — 20600001 HC STEP DOWN PRIVATE ROOM

## 2025-06-02 PROCEDURE — 63600175 PHARM REV CODE 636 W HCPCS: Performed by: FAMILY MEDICINE

## 2025-06-02 PROCEDURE — 87205 SMEAR GRAM STAIN: CPT | Performed by: NURSE PRACTITIONER

## 2025-06-02 PROCEDURE — 27000207 HC ISOLATION

## 2025-06-02 PROCEDURE — 82945 GLUCOSE OTHER FLUID: CPT | Performed by: HOSPITALIST

## 2025-06-02 PROCEDURE — 93010 ELECTROCARDIOGRAM REPORT: CPT | Mod: ,,, | Performed by: INTERNAL MEDICINE

## 2025-06-02 PROCEDURE — 87483 CNS DNA AMP PROBE TYPE 12-25: CPT | Performed by: FAMILY MEDICINE

## 2025-06-02 PROCEDURE — 94761 N-INVAS EAR/PLS OXIMETRY MLT: CPT

## 2025-06-02 PROCEDURE — 25000003 PHARM REV CODE 250: Performed by: INTERNAL MEDICINE

## 2025-06-02 RX ORDER — LIDOCAINE HYDROCHLORIDE 10 MG/ML
5 INJECTION, SOLUTION INFILTRATION; PERINEURAL ONCE
Status: COMPLETED | OUTPATIENT
Start: 2025-06-02 | End: 2025-06-02

## 2025-06-02 RX ORDER — CEFTRIAXONE 2 G/1
2 INJECTION, POWDER, FOR SOLUTION INTRAMUSCULAR; INTRAVENOUS
Status: DISCONTINUED | OUTPATIENT
Start: 2025-06-02 | End: 2025-06-04

## 2025-06-02 RX ORDER — SUMATRIPTAN 6 MG/.5ML
6 INJECTION, SOLUTION SUBCUTANEOUS ONCE
Status: COMPLETED | OUTPATIENT
Start: 2025-06-02 | End: 2025-06-02

## 2025-06-02 RX ORDER — HYDROMORPHONE HYDROCHLORIDE 1 MG/ML
1 INJECTION, SOLUTION INTRAMUSCULAR; INTRAVENOUS; SUBCUTANEOUS ONCE
Status: COMPLETED | OUTPATIENT
Start: 2025-06-02 | End: 2025-06-02

## 2025-06-02 RX ORDER — METHYLPREDNISOLONE SOD SUCC 125 MG
60 VIAL (EA) INJECTION
Status: DISCONTINUED | OUTPATIENT
Start: 2025-06-02 | End: 2025-06-04 | Stop reason: HOSPADM

## 2025-06-02 RX ORDER — IBUPROFEN 200 MG
600 TABLET ORAL ONCE
Status: COMPLETED | OUTPATIENT
Start: 2025-06-02 | End: 2025-06-02

## 2025-06-02 RX ADMIN — IBUPROFEN 600 MG: 200 TABLET, FILM COATED ORAL at 02:06

## 2025-06-02 RX ADMIN — METHYLPREDNISOLONE SODIUM SUCCINATE 60 MG: 125 INJECTION, POWDER, FOR SOLUTION INTRAMUSCULAR; INTRAVENOUS at 05:06

## 2025-06-02 RX ADMIN — BUTALBITAL, ACETAMINOPHEN, AND CAFFEINE 1 TABLET: 325; 50; 40 TABLET ORAL at 11:06

## 2025-06-02 RX ADMIN — METHYLPREDNISOLONE SODIUM SUCCINATE 500 MG: 500 INJECTION INTRAMUSCULAR; INTRAVENOUS at 11:06

## 2025-06-02 RX ADMIN — SODIUM CHLORIDE 500 ML: 9 INJECTION, SOLUTION INTRAVENOUS at 10:06

## 2025-06-02 RX ADMIN — SUMATRIPTAN 6 MG: 6 INJECTION, SOLUTION SUBCUTANEOUS at 05:06

## 2025-06-02 RX ADMIN — ACETAMINOPHEN 650 MG: 325 TABLET ORAL at 07:06

## 2025-06-02 RX ADMIN — CEFTRIAXONE 2 G: 2 INJECTION, POWDER, FOR SOLUTION INTRAMUSCULAR; INTRAVENOUS at 04:06

## 2025-06-02 RX ADMIN — LIDOCAINE HYDROCHLORIDE 5 ML: 10 SOLUTION INTRAVENOUS at 01:06

## 2025-06-02 RX ADMIN — VANCOMYCIN HYDROCHLORIDE 1000 MG: 1 INJECTION, POWDER, LYOPHILIZED, FOR SOLUTION INTRAVENOUS at 04:06

## 2025-06-02 RX ADMIN — ONDANSETRON 4 MG: 2 INJECTION INTRAMUSCULAR; INTRAVENOUS at 10:06

## 2025-06-02 RX ADMIN — HYDROMORPHONE HYDROCHLORIDE 1 MG: 1 INJECTION, SOLUTION INTRAMUSCULAR; INTRAVENOUS; SUBCUTANEOUS at 12:06

## 2025-06-02 RX ADMIN — BUTALBITAL, ACETAMINOPHEN, AND CAFFEINE 1 TABLET: 325; 50; 40 TABLET ORAL at 07:06

## 2025-06-02 NOTE — SUBJECTIVE & OBJECTIVE
Interval History:  Seen and examined, new onset severe headache with photophobia and neck pain    Review of Systems   Constitutional:  Negative for fever.   Respiratory:  Negative for chest tightness and shortness of breath.    Cardiovascular:  Negative for chest pain.   Gastrointestinal:  Negative for abdominal distention.   Genitourinary:  Negative for difficulty urinating.   Neurological:  Positive for headaches.     Objective:     Vital Signs (Most Recent):  Temp: 98.3 °F (36.8 °C) (06/02/25 1511)  Pulse: 90 (06/02/25 1600)  Resp: 18 (06/02/25 1500)  BP: 108/68 (06/02/25 1500)  SpO2: 99 % (06/02/25 1600) Vital Signs (24h Range):  Temp:  [98 °F (36.7 °C)-98.5 °F (36.9 °C)] 98.3 °F (36.8 °C)  Pulse:  [] 90  Resp:  [13-51] 18  SpO2:  [95 %-100 %] 99 %  BP: (107-135)/(58-95) 108/68     Weight: 51.8 kg (114 lb 1.6 oz)  Body mass index is 21.56 kg/m².    Intake/Output Summary (Last 24 hours) at 6/2/2025 1621  Last data filed at 6/2/2025 1200  Gross per 24 hour   Intake 1280.51 ml   Output 4 ml   Net 1276.51 ml         Physical Exam  Constitutional:       General: She is not in acute distress.     Appearance: She is ill-appearing.   Eyes:      General:         Right eye: No discharge.         Left eye: No discharge.   Neck:      Vascular: No JVD.   Cardiovascular:      Rate and Rhythm: Normal rate and regular rhythm.   Pulmonary:      Effort: Pulmonary effort is normal. No respiratory distress.      Breath sounds: Normal breath sounds.   Abdominal:      General: Abdomen is flat. Bowel sounds are normal. There is no distension.      Palpations: Abdomen is soft.      Tenderness: There is no abdominal tenderness.   Musculoskeletal:      Right lower leg: No edema.      Left lower leg: No edema.   Skin:     General: Skin is warm and moist.      Findings: No rash.   Neurological:      Mental Status: She is alert and oriented to person, place, and time.   Psychiatric:         Attention and Perception: Attention normal.          Mood and Affect: Mood and affect normal.         Speech: Speech normal.               Significant Labs: All pertinent labs within the past 24 hours have been reviewed.    Significant Imaging: I have reviewed all pertinent imaging results/findings within the past 24 hours.

## 2025-06-02 NOTE — PROGRESS NOTES
[facility] Medicine  Progress Note    Patient Name: Irene Grayson  MRN: [id number]  Patient Class: IP- Inpatient   Admission Date: 2025  Length of Stay: 2 days  Attending Physician: Keila Colin DO  Primary Care Provider: Clifford Page DO        Subjective     Principal Problem:AIDP (acute inflammatory demyelinating polyneuropathy)        HPI:  Ms. Grayson is a 29 yr old female with a hx of swine flu, seizures, MTHFR deficiency and homocystinuria, LGSIL, HPV, cholelithiasis s/p cholecystectomy, anemia, folate deficiency, depression, anxiety who presents to the ED with a chief complaint of right lower extremity numbness as well as bilateral upper extremity weakness.  Patient endorses that she has experienced right lower extremity numbness from her knee to the top of her foot over the last 3-1/2 weeks that has been consistent.  She describes this feeling of numbness to be similar to her  scar.  She also endorses that she has had trouble with her gait and states that she has noticed herself dragging her foot at times.  She tells me that she has also experienced bilateral upper extremity weakness that has been intermittent for a while now but has been progressively worsening recently.  She states it has been hard for her to grab cast iron skillet from out of the oven and also has trouble opening lids.  She initially thought this might be due to stress.  However, it has been unchanged.  She also endorses intermittent headaches behind her left eye for years now as well as dizziness as if the world around her is spinning/moving, bilateral tinnitus also for some time now, and also endorses decreased urine output as well as chronic diarrhea.  She endorses trying ibuprofen at home without relief.  She denies slurred speech, facial asymmetry, vision changes.  She states that she smokes 1 pack of cigarettes every other day, and vapes every other day, she occasionally drinks  alcohol, and denies recreational drug use.    Of note, patient was seen in the ED on 05/29 and was discharged home with urgent neuro referral for MS workup.  Neurology was unable to see her until July 8th so she was called and advised to return to the ED for further evaluation.    Upon arrival to ED, patient afebrile, HR of 108, RR of 16, BP of 126/86, satting 100% on RA.  Workup in the ED with presumptive positive urine amphetamines.  Remainder of labs unremarkable.  Of note, patient is on dextroamphetamine-amphetamine 10 mg tabs.  MRI brain demyelinating with and without contrast shows no acute intracranial process.  ED provider discussed case with Neurology at [facility] who recommended patient receive an MRI cervical spine without contrast.  Patient will be placed under observation for further management.    Overview/Hospital Course:  We admitted the patient with the neurologic symptoms and diagnosed her with possible AIDP vs CIDP.  Neurology consulted with us and recommended MRI of spine and LP to confirm suspicions.  He recommended beginning IVIG infusions daily empirically.  Patient was moved to Stepdown for frequent vitals during infusions. MRI of spine and LP on 06/02 initial CSF count shows WBC greater than 2000, elevated protein and decreased glucose concerning for bacterial meningitis versus underlying CNS inflammatory process but with sudden onset headache this morning we will need to start empiric antibiotics and steroids and ordered blood cultures.  Infectious Disease has been consult as well as Neurology.  MRI of the C-spine showed severe spinal canal stenosis and Neurosurgery was consult but no role for surgical intervention at this time.    Interval History:  Seen and examined, new onset severe headache with photophobia and neck pain    Review of Systems   Constitutional:  Negative for fever.   Respiratory:  Negative for chest tightness and shortness of breath.    Cardiovascular:  Negative for  chest pain.   Gastrointestinal:  Negative for abdominal distention.   Genitourinary:  Negative for difficulty urinating.   Neurological:  Positive for headaches.     Objective:     Vital Signs (Most Recent):  Temp: 98.3 °F (36.8 °C) (06/02/25 1511)  Pulse: 90 (06/02/25 1600)  Resp: 18 (06/02/25 1500)  BP: 108/68 (06/02/25 1500)  SpO2: 99 % (06/02/25 1600) Vital Signs (24h Range):  Temp:  [98 °F (36.7 °C)-98.5 °F (36.9 °C)] 98.3 °F (36.8 °C)  Pulse:  [] 90  Resp:  [13-51] 18  SpO2:  [95 %-100 %] 99 %  BP: (107-135)/(58-95) 108/68     Weight: 51.8 kg (114 lb 1.6 oz)  Body mass index is 21.56 kg/m².    Intake/Output Summary (Last 24 hours) at 6/2/2025 1621  Last data filed at 6/2/2025 1200  Gross per 24 hour   Intake 1280.51 ml   Output 4 ml   Net 1276.51 ml         Physical Exam  Constitutional:       General: She is not in acute distress.     Appearance: She is ill-appearing.   Eyes:      General:         Right eye: No discharge.         Left eye: No discharge.   Neck:      Vascular: No JVD.   Cardiovascular:      Rate and Rhythm: Normal rate and regular rhythm.   Pulmonary:      Effort: Pulmonary effort is normal. No respiratory distress.      Breath sounds: Normal breath sounds.   Abdominal:      General: Abdomen is flat. Bowel sounds are normal. There is no distension.      Palpations: Abdomen is soft.      Tenderness: There is no abdominal tenderness.   Musculoskeletal:      Right lower leg: No edema.      Left lower leg: No edema.   Skin:     General: Skin is warm and moist.      Findings: No rash.   Neurological:      Mental Status: She is alert and oriented to person, place, and time.   Psychiatric:         Attention and Perception: Attention normal.         Mood and Affect: Mood and affect normal.         Speech: Speech normal.               Significant Labs: All pertinent labs within the past 24 hours have been reviewed.    Significant Imaging: I have reviewed all pertinent imaging results/findings  within the past 24 hours.      Assessment & Plan  AIDP (acute inflammatory demyelinating polyneuropathy)  Possible diagnosis.  Discussed with neurology.  Get MRI of spine with contrast.  Get LP and check protein level and cell count.  Begin IVIG empirically while waiting for workup to be complete.  Discussed with patient and parents.  Plan for MRIs and LP 6/2  Epilepsy  - Hx noted  - Seizure precautions  - No AEDs currently on home medication list    Nicotine dependence  Dangers of cigarette smoking were reviewed with patient in detail. Patient was Counseled for 3-10 minutes. Nicotine replacement options were discussed. Nicotine replacement was discussed- not prescribed per patient's request  Cervical stenosis of spinal canal  Seen and evaluated by Neurosurgery, no role for surgical intervention    Sudden onset of severe headache  New onset headache, neck pain, photophobia concerning for meningitis  -LP showed CSF WBC greater than 2700, high protein, low glucose  -start empiric antibiotics vancomycin and ceftriaxone and steroids  -infectious disease and neurology consulted  -obtain blood cultures    VTE Risk Mitigation (From admission, onward)           Ordered     IP VTE LOW RISK PATIENT  Once         05/30/25 2228     Place sequential compression device  Until discontinued         05/30/25 2228                    Discharge Planning   LAURA: 6/5/2025     Code Status: Full Code   Medical Readiness for Discharge Date:   Discharge Plan A: Home with family                        Keila Colin DO  Department of Hospital Medicine   Critical [facility] hospital

## 2025-06-02 NOTE — CONSULTS
[facility]  Neurosurgery  Consult Note    Consults  Subjective:     Chief Complaint/Reason for Admission:  Right lower leg paresthesias    History of Present Illness: Irene Grayson is a 29 year old female who presented to the emergency department on 2025 reporting right lower leg paresthesias for 3 weeks.  MRI lumbar and lower extremity ultrasound was negative for any acute findings.  During encounter, patient reported family history of multiple sclerosis.  She was instructed to return to emergency department on the next day for MS workup and admission with neurology consult.   MRI brain WWO and thoracic MRI was negative.  During admission, patient also reported bilateral upper extremity weakness.  Neurology was consulted and recommended AIPD workup and began IVIG.Cervical MRI revealed C5-6 disc protrusion resulting in moderate canal stenosis.  Neurosurgery consulted.    On initiation of encounter, patient was found in bed, awake and alert.  She reports waxing and waning cervical pain which radiates to left anterior biceps, forearm and 2nd/3rd finger.  Cervical pain worsens with activity.  She usually manages these symptoms with over-the-counter ibuprofen.  She also reports right lower leg paresthesias.  She denies falls, imbalance, hand clumsiness, urinary urgency or incontinence.   She denies bowel incontinence.    Prescriptions Prior to Admission[1]    Review of patient's allergies indicates:   Allergen Reactions    Oranges [orange]      ulcers       Past Medical History:   Diagnosis Date    Anemia     no current treatment    Epilepsy     Gallstones     HPV (human papilloma virus) infection     LGSIL on Pap smear of cervix     MTHFR (methylene THF reductase) deficiency and homocystinuria     Seizure disorder, complex partial     last seizure ; no medication    Seizures     Swine flu      Past Surgical History:   Procedure Laterality Date     SECTION N/A 3/8/2020    Procedure:   SECTION;  Surgeon: Brian Quijano MD;  Location: [facility] L&D;  Service: OB/GYN;  Laterality: N/A;     SECTION N/A 10/28/2021    Procedure:  SECTION;  Surgeon: Lucinda Rush MD;  Location: [facility] L&D;  Service: OB/GYN;  Laterality: N/A;    CHOLECYSTECTOMY  2017    LAPAROSCOPIC CHOLECYSTECTOMY      toe I&D      TONSILLECTOMY      UPPER GASTROINTESTINAL ENDOSCOPY      Showed H. Pylori    yifan dugan       Family History       Problem Relation (Age of Onset)    No Known Problems Mother, Father          Tobacco Use    Smoking status: Former     Current packs/day: 0.00     Average packs/day: 0.3 packs/day for 4.0 years (1.0 ttl pk-yrs)     Types: Cigarettes     Start date:      Quit date: 10/2019     Years since quittin.6    Smokeless tobacco: Never    Tobacco comments:     quit with pregnancy ()   Substance and Sexual Activity    Alcohol use: Not Currently    Drug use: No    Sexual activity: Not Currently     Partners: Male     Birth control/protection: None       Objective:     Weight: 51.8 kg (114 lb 1.6 oz)  Body mass index is 21.56 kg/m².  Vital Signs (Most Recent):  Temp: 98.4 °F (36.9 °C) (25 0711)  Pulse: 87 (25 1320)  Resp: 20 (25 1320)  BP: 119/62 (25 1320)  SpO2: 99 % (25 1320) Vital Signs (24h Range):  Temp:  [98 °F (36.7 °C)-98.4 °F (36.9 °C)] 98.4 °F (36.9 °C)  Pulse:  [] 87  Resp:  [13-51] 20  SpO2:  [95 %-100 %] 99 %  BP: (107-135)/(58-95) 119/62     Date 25 0700 - 25 0659   Shift 1662-7845 8573-8223 9323-9870 24 Hour Total   INTAKE   P.O. 120   120   IV Piggyback 598.6   598.6   Shift Total(mL/kg) 718.6(13.9)   718.6(13.9)   OUTPUT   Shift Total(mL/kg)       Weight (kg) 51.8 51.8 51.8 51.8             Neurosurgery Physical Exam  General:  No acute distress.  Neurologic: Alert and oriented. Thought content appropriate.  GCS: E4 V5 M6; Total: 15  Pulmonary: normal respirations, no signs of  "respiratory distress  Skin: Skin is warm, dry and intact.    Sensory: intact to light touch throughout  Motor Strength: Moves all extremities spontaneously with good tone.    Bilateral upper extremity strength deltoid/biceps/triceps/hand  5/5   Bilateral lower extremity strength iliopsoas/TA/EHL/gastrocnemius 5/5   No abnormal movements seen.     Ponce's:  Negative bilaterally.  Reflexes:   Biceps:  1+   Brachioradialis: 1+   Triceps: 1+   Patellar: 1+             Significant Labs:  Recent Labs   Lab 06/01/25 0437 06/02/25  0453   GLU 98 105    137   K 3.8 5.0    103   CO2 17* 27   BUN 11 9   CREATININE 0.6 0.7   CALCIUM 9.2 9.6   MG 1.9 2.0     Recent Labs   Lab 06/01/25 0437 06/02/25  0453   WBC 6.92 10.18   HGB 13.9 14.2   HCT 42.2 43.7    243     No results for input(s): "LABPT", "INR", "APTT" in the last 48 hours.  Microbiology Results (last 7 days)       ** No results found for the last 168 hours. **            Assessment/Plan:     Cervical stenosis of spinal canal  Irene Grayson is a 29 year old female who presented to the emergency department on 05/29/2025 reporting right lower leg paresthesias for 3 weeks.  MRI lumbar and lower extremity ultrasound was negative for any acute findings.  During encounter, patient reported family history of multiple sclerosis.  She was instructed to return to emergency department on the next day for MS workup and admission with neurology consult.   MRI brain WWO and thoracic MRI were negative.  During admission, patient also reported bilateral upper extremity weakness.  Neurology was consulted and recommended cervical MRI, AIPD workup along with IVIG.Cervical MRI revealed C5-6 disc protrusion resulting in moderate canal stenosis.  Neurosurgery consulted.    Patient is neurologically intact.    Cervical MRI revealed moderate C5-6 disc protrusion with moderate canal stenosis however CSF fluid signal noted dorsally.  MRI also revealed moderate to " severe C5-6 bilateral foraminal stenosis.    No significant stenosis was seen on thoracic or lumbar MRI.    No acute neurosurgical intervention warranted.    Recommend outpatient pain clinic referral for bilateral transforaminal C5-6 MARSHA consideration.      Patient can follow-up as needed.      Discussed with Dr. Mills.        Thank you for your consult. I will sign off. Please contact us if you have any additional questions.    NUBIA SESAY PA-C  Neurosurgery  [facility]       [1]   Medications Prior to Admission   Medication Sig Dispense Refill Last Dose/Taking    albuterol (PROVENTIL/VENTOLIN HFA) 90 mcg/actuation inhaler Inhale 1-2 puffs into the lungs every 4 (four) hours as needed for Wheezing or Shortness of Breath. NEW Rx - NOT YET STARTED   Taking As Needed    butalbital-acetaminophen-caffeine -40 mg (FIORICET, ESGIC) -40 mg per tablet Take 1 tablet by mouth every 4 (four) hours as needed.   Past Week    dextroamphetamine-amphetamine (ADDERALL) 15 mg tablet Take 1 tablet by mouth 2 (two) times a day.   5/30/2025    progesterone (PROMETRIUM) 100 MG capsule Take 100 mg by mouth once daily.   5/30/2025    folic acid (FOLVITE) 1 MG tablet Take 1 mg by mouth once daily.   Unknown

## 2025-06-02 NOTE — PROGRESS NOTES
Pharmacokinetic Initial Assessment: IV Vancomycin    Assessment/Plan:    Initiate intravenous vancomycin with loading dose of 1000 mg once followed by a maintenance dose of vancomycin 750 mg IV every 12  hours  Desired empiric serum trough concentration is 15 to 20 mcg/mL  Draw vancomycin trough level 30 min prior to fourth dose on 6/4 at approximately 0400  Pharmacy will continue to follow and monitor vancomycin.      Please contact pharmacy at extension [phone] with any questions regarding this assessment.     Thank you for the consult,   Lin Tan       Patient brief summary:  Irene Grayson is a 29 y.o. female initiated on antimicrobial therapy with IV Vancomycin for treatment of suspected meningitis    Drug Allergies:   Review of patient's allergies indicates:   Allergen Reactions    Oranges [orange]      ulcers       Actual Body Weight:   51.8 kg    Renal Function:   Estimated Creatinine Clearance: 90.3 mL/min (based on SCr of 0.7 mg/dL).,     Dialysis Method (if applicable):  N/A    CBC (last 72 hours):  Recent Labs   Lab Result Units 05/30/25 1835 05/31/25 0436 06/01/25 0437 06/02/25  0453   WBC K/uL 6.84 6.83 6.92 10.18   Hgb gm/dL 13.7 13.9 13.9 14.2   Hct % 41.4 41.8 42.2 43.7   Platelet Count K/uL 253 264 243 243   Mono % % 7.3 7.6 7.9 7.3   Eos % % 0.7 1.5 1.3 0.7   Basophil % % 0.6 0.3 0.4 0.4       Metabolic Panel (last 72 hours):  Recent Labs   Lab Result Units 05/30/25 1835 05/31/25 0436 06/01/25 0437 06/02/25  0453 06/02/25  1317   Sodium mmol/L 137 137 136 137  --    Potassium mmol/L 3.6 4.1 3.8 5.0  --    Chloride mmol/L 105 111* 108 103  --    CO2 mmol/L 23 25 17* 27  --    Glucose mg/dL 97 93 98 105  --    Glucose CSF mg/dL  --   --   --   --  37*   Glucose, UA  Negative  --   --   --   --    BUN mg/dL 13 12 11 9  --    Creatinine mg/dL 1.0 0.7 0.6 0.7  --    Urine Creatinine mg/dL 157.5  --   --   --   --    Albumin g/dL 4.4  --   --   --   --    Bilirubin Total mg/dL 0.3  --   " --   --   --    ALP unit/L 44*  --   --   --   --    AST unit/L 14  --   --   --   --    ALT unit/L 13  --   --   --   --    Magnesium mg/dL  --  1.9 1.9 2.0  --    Phosphorus Level mg/dL  --  3.9 3.8 3.4  --        Drug levels (last 3 results):  No results for input(s): "VANCOMYCINRA", "VANCORANDOM", "VANCOMYCINPE", "VANCOPEAK", "VANCOMYCINTR", "VANCOTROUGH" in the last 72 hours.    Microbiologic Results:  Microbiology Results (last 7 days)       Procedure Component Value Units Date/Time    Blood culture [[id number]] Collected: 06/02/25 1535    Order Status: Sent Specimen: Blood Updated: 06/02/25 1613    Blood culture [[id number]] Collected: 06/02/25 1540    Order Status: Sent Specimen: Blood Updated: 06/02/25 1613    CSF culture [[id number]] Collected: 06/02/25 1317    Order Status: Resulted Specimen: CSF (Spinal Fluid) from CSF Tap, Tube 3 Updated: 06/02/25 1601            "

## 2025-06-02 NOTE — PLAN OF CARE
06/02/25 0827   Patient Assessment/Suction   Level of Consciousness (AVPU) alert   Respiratory Effort Normal;Unlabored   Expansion/Accessory Muscles/Retractions no use of accessory muscles   All Lung Fields Breath Sounds Anterior:;Lateral:;clear   Rhythm/Pattern, Respiratory pattern regular   Cough Frequency no cough   PRE-TX-O2   Device (Oxygen Therapy) room air   SpO2 97 %   Pulse Oximetry Type Continuous   $ Pulse Oximetry - Multiple Charge Pulse Oximetry - Multiple   Pulse 94   Resp 15   Aerosol Therapy   $ Aerosol Therapy Charges PRN treatment not required   Respiratory Treatment Status (SVN) PRN treatment not required   Education   $ Education Bronchodilator;15 min

## 2025-06-02 NOTE — SUBJECTIVE & OBJECTIVE
Prescriptions Prior to Admission[1]    Review of patient's allergies indicates:   Allergen Reactions    Oranges [orange]      ulcers       Past Medical History:   Diagnosis Date    Anemia     no current treatment    Epilepsy     Gallstones     HPV (human papilloma virus) infection     LGSIL on Pap smear of cervix     MTHFR (methylene THF reductase) deficiency and homocystinuria     Seizure disorder, complex partial     last seizure ; no medication    Seizures     Swine flu      Past Surgical History:   Procedure Laterality Date     SECTION N/A 3/8/2020    Procedure:  SECTION;  Surgeon: Brian Quijano MD;  Location: [facility] L&D;  Service: OB/GYN;  Laterality: N/A;     SECTION N/A 10/28/2021    Procedure:  SECTION;  Surgeon: Lucinda Rush MD;  Location: [facility] L&D;  Service: OB/GYN;  Laterality: N/A;    CHOLECYSTECTOMY      LAPAROSCOPIC CHOLECYSTECTOMY      toe I&D      TONSILLECTOMY      UPPER GASTROINTESTINAL ENDOSCOPY      Showed H. Pylori    yifan dugan       Family History       Problem Relation (Age of Onset)    No Known Problems Mother, Father          Tobacco Use    Smoking status: Former     Current packs/day: 0.00     Average packs/day: 0.3 packs/day for 4.0 years (1.0 ttl pk-yrs)     Types: Cigarettes     Start date:      Quit date: 10/2019     Years since quittin.6    Smokeless tobacco: Never    Tobacco comments:     quit with pregnancy ()   Substance and Sexual Activity    Alcohol use: Not Currently    Drug use: No    Sexual activity: Not Currently     Partners: Male     Birth control/protection: None       Objective:     Weight: 51.8 kg (114 lb 1.6 oz)  Body mass index is 21.56 kg/m².  Vital Signs (Most Recent):  Temp: 98.4 °F (36.9 °C) (25 0711)  Pulse: 87 (25 1320)  Resp: 20 (25 1320)  BP: 119/62 (25 1320)  SpO2: 99 % (25 1320) Vital Signs (24h Range):  Temp:  [98 °F (36.7 °C)-98.4 °F (36.9 °C)]  "98.4 °F (36.9 °C)  Pulse:  [] 87  Resp:  [13-51] 20  SpO2:  [95 %-100 %] 99 %  BP: (107-135)/(58-95) 119/62     Date 06/02/25 0700 - 06/03/25 0659   Shift 7656-3174 3473-0081 2837-4512 24 Hour Total   INTAKE   P.O. 120   120   IV Piggyback 598.6   598.6   Shift Total(mL/kg) 718.6(13.9)   718.6(13.9)   OUTPUT   Shift Total(mL/kg)       Weight (kg) 51.8 51.8 51.8 51.8             Neurosurgery Physical Exam  General:  No acute distress.  Neurologic: Alert and oriented. Thought content appropriate.  GCS: E4 V5 M6; Total: 15  Pulmonary: normal respirations, no signs of respiratory distress  Skin: Skin is warm, dry and intact.    Sensory: intact to light touch throughout  Motor Strength: Moves all extremities spontaneously with good tone.    Bilateral upper extremity strength deltoid/biceps/triceps/hand  5/5   Bilateral lower extremity strength iliopsoas/TA/EHL/gastrocnemius 5/5   No abnormal movements seen.     Ponce's:  Negative bilaterally.  Reflexes:   Biceps:  1+   Brachioradialis: 1+   Triceps: 1+   Patellar: 1+             Significant Labs:  Recent Labs   Lab 06/01/25  0437 06/02/25  0453   GLU 98 105    137   K 3.8 5.0    103   CO2 17* 27   BUN 11 9   CREATININE 0.6 0.7   CALCIUM 9.2 9.6   MG 1.9 2.0     Recent Labs   Lab 06/01/25  0437 06/02/25  0453   WBC 6.92 10.18   HGB 13.9 14.2   HCT 42.2 43.7    243     No results for input(s): "LABPT", "INR", "APTT" in the last 48 hours.  Microbiology Results (last 7 days)       ** No results found for the last 168 hours. **                 [1]   Medications Prior to Admission   Medication Sig Dispense Refill Last Dose/Taking    albuterol (PROVENTIL/VENTOLIN HFA) 90 mcg/actuation inhaler Inhale 1-2 puffs into the lungs every 4 (four) hours as needed for Wheezing or Shortness of Breath. NEW Rx - NOT YET STARTED   Taking As Needed    butalbital-acetaminophen-caffeine -40 mg (FIORICET, ESGIC) -40 mg per tablet Take 1 tablet by mouth " every 4 (four) hours as needed.   Past Week    dextroamphetamine-amphetamine (ADDERALL) 15 mg tablet Take 1 tablet by mouth 2 (two) times a day.   5/30/2025    progesterone (PROMETRIUM) 100 MG capsule Take 100 mg by mouth once daily.   5/30/2025    folic acid (FOLVITE) 1 MG tablet Take 1 mg by mouth once daily.   Unknown

## 2025-06-02 NOTE — ASSESSMENT & PLAN NOTE
Irene Grayson is a 29 year old female who presented to the emergency department on 05/29/2025 reporting right lower leg paresthesias for 3 weeks.  MRI lumbar and lower extremity ultrasound was negative for any acute findings.  During encounter, patient reported family history of multiple sclerosis.  She was instructed to return to emergency department on the next day for MS workup and admission with neurology consult.   MRI brain WWO and thoracic MRI were negative.  During admission, patient also reported bilateral upper extremity weakness.  Neurology was consulted and recommended cervical MRI, AIPD workup along with IVIG.Cervical MRI revealed C5-6 disc protrusion resulting in moderate canal stenosis.  Neurosurgery consulted.    Patient is neurologically intact.    Cervical MRI revealed moderate C5-6 disc protrusion with moderate canal stenosis however CSF fluid signal noted dorsally.  MRI also revealed moderate to severe C5-6 bilateral foraminal stenosis.    No significant stenosis was seen on thoracic or lumbar MRI.    No acute neurosurgical intervention warranted.    Recommend outpatient pain clinic referral for bilateral transforaminal C5-6 MARSHA consideration.      Patient can follow-up as needed.      Discussed with Dr. Mills.

## 2025-06-02 NOTE — CONSULTS
[facility]   Department of Infectious Disease  Consult Note        PATIENT NAME: Irene Grayson  MRN: [id number]  TODAY'S DATE: 06/02/2025  ADMIT DATE: 5/30/2025  LENGTH OF STAY: 2 DAYS      CHIEF COMPLAINT: Follow-up (Pt called for follow up with PA)    PRINCIPLE PROBLEM: AIDP (acute inflammatory demyelinating polyneuropathy)    REASON FOR CONSULT: worsening headache, photophobia, CSF shows high protein and low glucose - concern for bact meningitis     ASSESSMENT and PLAN     1. Bacterial meningitis   - MRI brain without contrast and MRI brain demyelinating with and without contrast both normal  - CSF with 2718 WBCs, 94% neutrophils, 6% lymphocytes, glucose 37 and protein 137  - blood cultures x2 pending  - placed on droplet precautions  -CSF culture, CSF VDRL and meningitis/encephalitis panel pending    2.  Seizures not on AED    3. Asthma    4.  Acute inflammatory demyelinating polyneuropathy - started on IVIG empirically, followed by Neurology    5.  Hx of: MTHFR deficiency and homocystinuria, LGSIL, HPV, cholelithiasis s/p cholecystectomy, anemia, folate deficiency, depression and anxiety    6. Cervical Spine Stenosis  -MRI of C-spine with broad-based disc perfusion at C5-C6 with moderate to severe spinal canal stenosis and straightening of normal cervical spinal curvature  -  evaluated by Neurosurgery, needs outpatient follow-up       RECOMMENDATIONS:  Place on droplet precautions   Start ceftriaxone 2 g IV every 12 hours   Continue vancomycin with pharmacy to dose   Obtain CSF culture  Obtain encephalitis meningitis panel   Add on CRP, ESR and procalcitonin  Add on GC/chlamydia urine and treponemal IgG/IgM  Seizure precaution    D/W Dr Mathew    Thank you for this consult. Please send Epic secure chat with any questions.    Antibiotics (From admission, onward)      Start     Stop Route Frequency Ordered    06/02/25 1700  vancomycin (VANCOCIN) 1,000 mg in 0.9% NaCl 250 mL IVPB (admixture device)    "      -- IV Once 06/02/25 1537    06/02/25 1630  vancomycin - pharmacy to dose  (vancomycin IVPB (PEDS and ADULTS))        Placed in "And" Linked Group    -- IV pharmacy to manage frequency 06/02/25 1530    06/02/25 1600  cefTRIAXone injection 2 g         -- IV Every 12 hours (non-standard times) 06/02/25 1532          Antifungals (From admission, onward)      None           Antivirals (From admission, onward)      None            HPI      Irene Grayson is a 29 y.o. female  with chronic medical problems including seizures, asthma, MTHFR deficiency and homocystinuria, LGSIL, HPV, cholelithiasis s/p cholecystectomy, anemia, folate deficiency, depression and anxiety  who presented to the emergency department on 5/29 complaining of  numbness to right lower extremity from knee to ankle for 3 weeks. Associated symptoms are headache, neck stiffness, nausea. She does not have any change in her gait or movement.  She was concerned because she has a family history of MS.   She had a brain MRI and was referred to Neurology.  Liset's called back the next day because she was unable to get into Neurology until July and she was having a headache and still having the numbness in the right lower extremity.   She has had an intermittent headache  over the years but after the 2nd dose of IVIG  after admission she has had a severe headache that starts at the base of her  head and upper neck and goes around to her forehead.  It is associated with severe nausea, no vision, hearing or speech changes.   She states she has a chronic sore throat in his seeing ENT for GERD.  About 3 weeks ago she took a course of doxycycline and prior to that a course of Augmentin with no improvement in throat.  She has had her tonsils out twice and she said they keep growing back.  She has not been able to eat because she is nauseated.  She has a chronic cough, chronic bilateral wrist weakness that causes her to drop things that has been going on for " months.  She had seizures as a child and came to the emergency room  in the last year with concern for a seizure but refused antiepileptic drugs in his not followed up with neurology.  She also has ringing in her ears.  Her 3-year-old son has had a fever for the past 2 weeks and went to the emergency room and had Mark bar IgG positive and IgM negative.  She said he was having a fever and a cough up to 104 but has not really been acting sick.     She denies any wounds but states she had a few red spots on her right lower extremity that look like flea bites but they are gone now. She lives at home with her mother and father in her 3 kids ages 6 5 and 3.  She has a dog and a cat, she is a stay-at-home mom.  She smokes lightly, no alcohol or drug use.       MRI brain demyelinating negative for anything acute, MRI t/L spine negative for anything acute or chronic.  Lumbar puncture completed at 0130 and CSF culture, VDRL and meningitis encephalitis panel pending.   No leukocytosis, neutrophils 81% 0.1% today but not in the last 3 days prior, creatinine 0.7 and estimated creatinine clearance 90.3, pregnancy test negative, drug screen positive for amphetamines, hepatitis and HIV negative, urinalysis with protein, CSF glucose 37, protein 137,  WBC 2713 with 94% neutrophils, 7 RBCs and 6 lymphocytes.    Antibiotic history:     Ceftriaxone: 6/2/25  Vancomycin: 6/2/25    Microbiology:    06/02/2025 blood cultures x2 sets in process   06/02/2025 CSF culture pending  06/02/2025 encephalitis/meningitis panel pending  06/02/2025 CSF VDRL pending      Outdoor activities: Lives at home with mother and father, 3 children ages 6,5 and 3,  Travel: none  Implants: None  Antibiotic history:  See HPI    Social History  Marital Status: Single  Alcohol History:  reports that she does not currently use alcohol.  Tobacco History:  reports that she quit smoking about 5 years ago. Her smoking use included cigarettes. She started smoking about  9 years ago. She has a 1 pack-year smoking history. She has never used smokeless tobacco.  Drug History:  reports no history of drug use.    Review of patient's allergies indicates:   Allergen Reactions    Oranges [orange]      ulcers       Past Medical History:   Diagnosis Date    Anemia     no current treatment    Epilepsy     Gallstones     HPV (human papilloma virus) infection     LGSIL on Pap smear of cervix     MTHFR (methylene THF reductase) deficiency and homocystinuria     Seizure disorder, complex partial     last seizure ; no medication    Seizures     Swine flu      Past Surgical History:   Procedure Laterality Date     SECTION N/A 3/8/2020    Procedure:  SECTION;  Surgeon: Brian Quijano MD;  Location: [facility] L&D;  Service: OB/GYN;  Laterality: N/A;     SECTION N/A 10/28/2021    Procedure:  SECTION;  Surgeon: Lucinda Rush MD;  Location: [facility] L&D;  Service: OB/GYN;  Laterality: N/A;    CHOLECYSTECTOMY      LAPAROSCOPIC CHOLECYSTECTOMY      toe I&D      TONSILLECTOMY      UPPER GASTROINTESTINAL ENDOSCOPY      Showed H. Pylori    yifan dugan  2010     Family History   Adopted: Yes   Problem Relation Name Age of Onset    No Known Problems Mother      No Known Problems Father         SUBJECTIVE     Review of Systems  Constitutional:  Denies fevers, chills, or night sweats, + loss of appetite.  HEENT: Denies visual changes, ear pain, mouth pain or trouble swallowing, dental pain. + sore throat, maybe some chronic sore throat intermittently  Neck: + neck stiffness, feels like sore neck lumps  Respiratory: + shortness of breath with exertion, + chronic cough with brown sputum,occ wheezing, No hemoptysis.  Cardiovascular:  Denies chest pain, palpitations or edema.  Gastrointestinal: Denies abdominal pain, vomiting, constipation or diarrhea. + nausea  Genitourinary:  Denies dysuria, frequency, urgency or hematuria   Musculoskeletal:  Denies joint  pain or swelling, difficulty walking.    Skin:  Denies rash or itching.   Neurologic: + numbness rt lower extremity, + bilat weak wrists, + headache, + family history MS  Psychiatric:  Denies changes in mood or behavior.    OBJECTIVE     Temp:  [98 °F (36.7 °C)-98.5 °F (36.9 °C)] 98.3 °F (36.8 °C)  Pulse:  [] 92  Resp:  [13-51] 18  SpO2:  [95 %-100 %] 97 %  BP: (107-135)/(58-95) 108/68  Temp:  [98 °F (36.7 °C)-98.5 °F (36.9 °C)]   Temp: 98.3 °F (36.8 °C) (06/02/25 1511)  Pulse: 92 (06/02/25 1500)  Resp: 18 (06/02/25 1500)  BP: 108/68 (06/02/25 1500)  SpO2: 97 % (06/02/25 1500)    Intake/Output Summary (Last 24 hours) at 6/2/2025 1602  Last data filed at 6/2/2025 1200  Gross per 24 hour   Intake 1280.51 ml   Output 4 ml   Net 1276.51 ml      Examined@1613  Physical Exam  General: Thin, young adult female, sitting up in bed awake and alert, she has her mother and 3 children in the room with her she is a good historian, is awake alert sitting up in bed in no distress.  Eyes: Eyes with no icterus or injection. Vision grossly normal, PERRL.   Ears: Hearing grossly normal.  Nose: Nares patent  Mouth: Moist mucous membranes, dentition is good. No ulcerations, erythema or exudates.  No visualized tonsils.  Neck: Patient is unable to touch her chin to her chest, pain with passive flexion, extension and rotation.  No lymphadenopathy.  Cardiovascular: Regular rate and rhythm, no murmurs, no peripheral edema.    Respiratory:  Clear to auscultation bilaterally anterior and posterior, no tachypnea or increased work of breathing. On RA  Gastrointestinal:  Soft with active bowel sounds, no tenderness to palpation, no distention.  Genitourinary:  No suprapubic tenderness. Void independently  Musculoskeletal:  Moves all extremities with equal strength.  Independently ambulatory.  In bed but has trouble when she tries to recline has to keep her head up or her neck hurts. With hip flexion has low back pain but no neck  "pain.  Skin: Pale, warm and dry, no obvious rashes.    No wounds.  Neuro: Oriented, conversant, follows commands.  Psych: Good mood, normal affect.  VAD: Multiple PIV  Isolation: No active isolations     Wounds  None    Significant Labs: All pertinent labs within the past 24 hours have been reviewed.    CBC LAST 7 DAYS  Recent Labs   Lab 05/30/25 1835 05/31/25 0436 06/01/25 0437 06/02/25  0453   WBC 6.84 6.83 6.92 10.18   RBC 4.45 4.45 4.50 4.64   HGB 13.7 13.9 13.9 14.2   HCT 41.4 41.8 42.2 43.7   MCV 93 94 94 94   MCH 30.8 31.2* 30.9 30.6   MCHC 33.1 33.3 32.9 32.5   RDW 12.5 12.4 12.1 12.2    264 243 243   MPV 10.5 10.6 10.1 10.0   NRBC 0 0 0 0       CHEMISTRY LAST 7 DAYS  Recent Labs   Lab 05/30/25 1835 05/31/25 0436 06/01/25 0437 06/02/25  0453    137 136 137   K 3.6 4.1 3.8 5.0    111* 108 103   CO2 23 25 17* 27   ANIONGAP 9 1* 11 7*   BUN 13 12 11 9   CREATININE 1.0 0.7 0.6 0.7   GLU 97 93 98 105   CALCIUM 9.1 9.3 9.2 9.6   MG  --  1.9 1.9 2.0   ALBUMIN 4.4  --   --   --    PROT 7.0  --   --   --    ALKPHOS 44*  --   --   --    ALT 13  --   --   --    AST 14  --   --   --    BILITOT 0.3  --   --   --        Estimated Creatinine Clearance: 90.3 mL/min (based on SCr of 0.7 mg/dL).    INFLAMMATORY/PROCAL  LAST 7 DAYS  No results for input(s): "PROCAL", "ESR", "CRP" in the last 168 hours.  No results found for: "ESR"  No results found for: "CRP"    PRIOR MICROBIOLOGY:  No results found for the last 90 days.      LAST 7 DAYS MICROBIOLOGY   Microbiology Results (last 7 days)       Procedure Component Value Units Date/Time    CSF culture [[id number]] Collected: 06/02/25 1317    Order Status: Sent Specimen: CSF (Spinal Fluid) Updated: 06/02/25 1601    Blood culture [[id number]] Collected: 06/02/25 1540    Order Status: Sent Specimen: Blood     Blood culture [[id number]] Collected: 06/02/25 1535    Order Status: Sent Specimen: Blood               CURRENT/PREVIOUS VISIT EKG  Results for orders " placed or performed during the hospital encounter of 05/30/25   EKG 12-lead    Collection Time: 06/02/25 10:28 AM   Result Value Ref Range    QRS Duration 78 ms    [facility] QTC Calculation 433 ms    Narrative    Test Reason : I49.9,    Vent. Rate : 101 BPM     Atrial Rate : 101 BPM     P-R Int : 112 ms          QRS Dur :  78 ms      QT Int : 334 ms       P-R-T Axes :  51  82  54 degrees    QTcB Int : 433 ms    Sinus tachycardia  Otherwise normal ECG  When compared with ECG of 19-Feb-2025 16:51,  No significant change was found    Referred By: AAAREFERRAL SELF           Confirmed By:          Significant Imaging: I have reviewed all relevant and available imaging results/findings within the past 24 hours.      I spent a total of 75 minutes on the day of the visit.This includes face to face time and non-face to face time preparing to see the patient (eg, review of tests), obtaining and/or reviewing separately obtained history, documenting clinical information in the electronic or other health record, independently interpreting results and communicating results to the patient/family/caregiver, or care coordinator.      Minerva Campuzano NP  Date of Service: 06/02/2025      This note was created using Trig[employer]  voice recognition software that occasionally misinterpreted phrases or words.

## 2025-06-02 NOTE — HPI
Irene Grayson is a 29 year old female who presented to the emergency department on 05/29/2025 reporting right lower leg paresthesias for 3 weeks.  MRI lumbar and lower extremity ultrasound was negative for any acute findings.  During encounter, patient reported family history of multiple sclerosis.  She was instructed to return to emergency department on the next day for MS workup and admission with neurology consult.   MRI brain WWO and thoracic MRI was negative.  During admission, patient also reported bilateral upper extremity weakness.  Neurology was consulted and recommended AIPD workup and began IVIG.Cervical MRI revealed C5-6 disc protrusion resulting in moderate canal stenosis.  Neurosurgery consulted.    On initiation of encounter, patient was found in bed, awake and alert.  She reports waxing and waning cervical pain which radiates to left anterior biceps, forearm and 2nd/3rd finger.  Cervical pain worsens with activity.  She usually manages these symptoms with over-the-counter ibuprofen.  She also reports right lower leg paresthesias.  She denies falls, imbalance, hand clumsiness, urinary urgency or incontinence.   She denies bowel incontinence.

## 2025-06-02 NOTE — ASSESSMENT & PLAN NOTE
Possible diagnosis.  Discussed with neurology.  Get MRI of spine with contrast.  Get LP and check protein level and cell count.  Begin IVIG empirically while waiting for workup to be complete.  Discussed with patient and parents.  Plan for MRIs and LP 6/2

## 2025-06-02 NOTE — ASSESSMENT & PLAN NOTE
New onset headache, neck pain, photophobia concerning for meningitis  -LP showed CSF WBC greater than 2700, high protein, low glucose  -start empiric antibiotics vancomycin and ceftriaxone and steroids  -infectious disease and neurology consulted  -obtain blood cultures

## 2025-06-03 LAB
ABSOLUTE EOSINOPHIL (SMH): 0 K/UL
ABSOLUTE MONOCYTE (SMH): 0.9 K/UL (ref 0.3–1)
ABSOLUTE NEUTROPHIL COUNT (SMH): 9.8 K/UL (ref 1.8–7.7)
ANION GAP (SMH): 8 MMOL/L (ref 8–16)
BASOPHILS # BLD AUTO: 0.01 K/UL
BASOPHILS NFR BLD AUTO: 0.1 %
BUN SERPL-MCNC: 12 MG/DL (ref 6–20)
C GATTII+NEOFOR DNA CSF QL NAA+NON-PROBE: NOT DETECTED
CALCIUM SERPL-MCNC: 9.2 MG/DL (ref 8.7–10.5)
CHLORIDE SERPL-SCNC: 108 MMOL/L (ref 95–110)
CMV DNA CSF QL NAA+NON-PROBE: NOT DETECTED
CO2 SERPL-SCNC: 21 MMOL/L (ref 23–29)
CREAT SERPL-MCNC: 0.6 MG/DL (ref 0.5–1.4)
E COLI K1 DNA CSF QL NAA+NON-PROBE: NOT DETECTED
ERYTHROCYTE [DISTWIDTH] IN BLOOD BY AUTOMATED COUNT: 12.1 % (ref 11.5–14.5)
EV RNA CSF QL NAA+NON-PROBE: NOT DETECTED
GFR SERPLBLD CREATININE-BSD FMLA CKD-EPI: >60 ML/MIN/1.73/M2
GLUCOSE SERPL-MCNC: 135 MG/DL (ref 70–110)
GP B STREP DNA CSF QL NAA+NON-PROBE: NOT DETECTED
HAEM INFLU DNA CSF QL NAA+NON-PROBE: NOT DETECTED
HCT VFR BLD AUTO: 37 % (ref 37–48.5)
HGB BLD-MCNC: 12.6 GM/DL (ref 12–16)
HHV6 DNA CSF QL NAA+NON-PROBE: NOT DETECTED
HSV1 DNA CSF QL NAA+NON-PROBE: NOT DETECTED
HSV2 DNA CSF QL NAA+NON-PROBE: NOT DETECTED
IMM GRANULOCYTES # BLD AUTO: 0.07 K/UL (ref 0–0.04)
IMM GRANULOCYTES NFR BLD AUTO: 0.6 % (ref 0–0.5)
L MONOCYTOG DNA CSF QL NAA+NON-PROBE: NOT DETECTED
LYMPHOCYTES # BLD AUTO: 0.85 K/UL (ref 1–4.8)
MAGNESIUM SERPL-MCNC: 1.8 MG/DL (ref 1.6–2.6)
MCH RBC QN AUTO: 31.5 PG (ref 27–31)
MCHC RBC AUTO-ENTMCNC: 34.1 G/DL (ref 32–36)
MCV RBC AUTO: 93 FL (ref 82–98)
N MEN DNA CSF QL NAA+NON-PROBE: NOT DETECTED
NUCLEATED RBC (/100WBC) (SMH): 0 /100 WBC
PARECHOVIRUS A RNA CSF QL NAA+NON-PROBE: NOT DETECTED
PHOSPHATE SERPL-MCNC: 3.5 MG/DL (ref 2.7–4.5)
PLATELET # BLD AUTO: 285 K/UL (ref 150–450)
PMV BLD AUTO: 10.7 FL (ref 9.2–12.9)
POTASSIUM SERPL-SCNC: 3.6 MMOL/L (ref 3.5–5.1)
RBC # BLD AUTO: 4 M/UL (ref 4–5.4)
RELATIVE EOSINOPHIL (SMH): 0 % (ref 0–8)
RELATIVE LYMPHOCYTE (SMH): 7.3 % (ref 18–48)
RELATIVE MONOCYTE (SMH): 7.8 % (ref 4–15)
RELATIVE NEUTROPHIL (SMH): 84.2 % (ref 38–73)
S PNEUM DNA CSF QL NAA+NON-PROBE: NOT DETECTED
SODIUM SERPL-SCNC: 137 MMOL/L (ref 136–145)
T PALLIDUM IGG+IGM SER QL: NORMAL
VZV DNA CSF QL NAA+NON-PROBE: NOT DETECTED
WBC # BLD AUTO: 11.58 K/UL (ref 3.9–12.7)

## 2025-06-03 PROCEDURE — 36415 COLL VENOUS BLD VENIPUNCTURE: CPT | Performed by: NURSE PRACTITIONER

## 2025-06-03 PROCEDURE — 20600001 HC STEP DOWN PRIVATE ROOM

## 2025-06-03 PROCEDURE — 86593 SYPHILIS TEST NON-TREP QUANT: CPT | Performed by: NURSE PRACTITIONER

## 2025-06-03 PROCEDURE — 85025 COMPLETE CBC W/AUTO DIFF WBC: CPT

## 2025-06-03 PROCEDURE — 99222 1ST HOSP IP/OBS MODERATE 55: CPT | Mod: 95,,, | Performed by: PSYCHIATRY & NEUROLOGY

## 2025-06-03 PROCEDURE — 94761 N-INVAS EAR/PLS OXIMETRY MLT: CPT

## 2025-06-03 PROCEDURE — 87491 CHLMYD TRACH DNA AMP PROBE: CPT | Performed by: NURSE PRACTITIONER

## 2025-06-03 PROCEDURE — 99900035 HC TECH TIME PER 15 MIN (STAT)

## 2025-06-03 PROCEDURE — 84100 ASSAY OF PHOSPHORUS: CPT

## 2025-06-03 PROCEDURE — 25000003 PHARM REV CODE 250: Performed by: FAMILY MEDICINE

## 2025-06-03 PROCEDURE — 99900031 HC PATIENT EDUCATION (STAT)

## 2025-06-03 PROCEDURE — 83735 ASSAY OF MAGNESIUM: CPT

## 2025-06-03 PROCEDURE — 99233 SBSQ HOSP IP/OBS HIGH 50: CPT | Mod: ,,, | Performed by: NURSE PRACTITIONER

## 2025-06-03 PROCEDURE — 25000003 PHARM REV CODE 250

## 2025-06-03 PROCEDURE — 63600175 PHARM REV CODE 636 W HCPCS: Performed by: FAMILY MEDICINE

## 2025-06-03 PROCEDURE — 82435 ASSAY OF BLOOD CHLORIDE: CPT

## 2025-06-03 RX ADMIN — Medication 800 MG: at 05:06

## 2025-06-03 RX ADMIN — ACETAMINOPHEN 650 MG: 325 TABLET ORAL at 04:06

## 2025-06-03 RX ADMIN — CEFTRIAXONE 2 G: 2 INJECTION, POWDER, FOR SOLUTION INTRAMUSCULAR; INTRAVENOUS at 04:06

## 2025-06-03 RX ADMIN — Medication 800 MG: at 10:06

## 2025-06-03 RX ADMIN — FOLIC ACID 1 MG: 1 TABLET ORAL at 10:06

## 2025-06-03 RX ADMIN — METHYLPREDNISOLONE SODIUM SUCCINATE 60 MG: 125 INJECTION, POWDER, FOR SOLUTION INTRAMUSCULAR; INTRAVENOUS at 06:06

## 2025-06-03 RX ADMIN — CEFTRIAXONE 2 G: 2 INJECTION, POWDER, FOR SOLUTION INTRAMUSCULAR; INTRAVENOUS at 05:06

## 2025-06-03 RX ADMIN — VANCOMYCIN HYDROCHLORIDE 750 MG: 750 INJECTION, POWDER, LYOPHILIZED, FOR SOLUTION INTRAVENOUS at 04:06

## 2025-06-03 RX ADMIN — BUTALBITAL, ACETAMINOPHEN, AND CAFFEINE 1 TABLET: 325; 50; 40 TABLET ORAL at 04:06

## 2025-06-03 RX ADMIN — POTASSIUM BICARBONATE 50 MEQ: 977.5 TABLET, EFFERVESCENT ORAL at 05:06

## 2025-06-03 RX ADMIN — METHYLPREDNISOLONE SODIUM SUCCINATE 60 MG: 125 INJECTION, POWDER, FOR SOLUTION INTRAMUSCULAR; INTRAVENOUS at 04:06

## 2025-06-03 NOTE — PROGRESS NOTES
Pharmacy Consult Discontinuation: Vancomycin    Irene Grayson [id number] is a 29 y.o. female was consulted for vancomycin pharmacotherapy management by pharmacy.    Pharmacy consult for vancomycin dosing is no longer required.  Vancomycin was discontinued 06/03/2025 @ 0958 by Minerva Campuzano.    Thank you for allowing us to participate in this patient's care. Should you have any questions or concerns please feel free to contact the pharmacy department at [phone].    Bebeto Nixon RPH     Pt is calling in regards to message below    Pt states she needs an order sent to [facility] for the lab draw.    Pt is unsure of Fax #    Any questions please call pt

## 2025-06-03 NOTE — PLAN OF CARE
PCP and neuro on AVS and scheduled.      06/03/25 1441   Post-Acute Status   Hospital Resources/Appts/Education Provided Appointments scheduled and added to AVS

## 2025-06-03 NOTE — PROGRESS NOTES
[facility]   Department of Infectious Disease  Progress Note        PATIENT NAME: Irene Grayson  MRN: [id number]  TODAY'S DATE: 06/03/2025  ADMIT DATE: 5/30/2025  LENGTH OF STAY: 3 DAYS    CHIEF COMPLAINT: Follow-up (Pt called for follow up with PA)    PRINCIPLE PROBLEM: AIDP (acute inflammatory demyelinating polyneuropathy)    REASON FOR CONSULT: worsening headache, photophobia, CSF shows high protein and low glucose - concern for bact meningitis     ASSESSMENT and PLAN     1. Bacterial meningitis versus aseptic meningitis from IVIG   - MRI brain without contrast and MRI brain demyelinating with and without contrast both normal  - CSF with 2718 WBCs, 94% neutrophils, 6% lymphocytes, glucose 37 and protein 137  - blood cultures x2 pending  - placed on droplet precautions  -CSF culture, CSF VDRL pending, meningitis/encephalitis panel negative    2.  Seizures not on AED    3. Asthma    4.  Acute inflammatory demyelinating polyneuropathy - started on IVIG empirically, followed by Neurology  -Patient refused IVIG on 06/02,    5.  Hx of: MTHFR deficiency and homocystinuria, LGSIL, HPV, cholelithiasis s/p cholecystectomy, anemia, folate deficiency, depression and anxiety    6. Cervical Spine Stenosis  -MRI of C-spine with broad-based disc perfusion at C5-C6 with moderate to severe spinal canal stenosis and straightening of normal cervical spinal curvature  -  evaluated by Neurosurgery, needs outpatient follow-up       RECOMMENDATIONS:  Stop vancomycin   Remove Droplet precautions  She is feeling much better after declining IVIG infusion last night, most likely aseptic meningitis from IVIG  Continue ceftriaxone 2 g IV every 12 hours - if CSF culture negative, will discontinue in a.m.  Follow-up on VDRL CSF,  GC/chlamydia urine and treponemal IgG/IgM  Seizure precautions  Hold IVIG pending Neurology evaluation    D/W Dr Mathew    Please send Epic secure chat with any questions.    Antibiotics (From admission,  onward)      Start     Stop Route Frequency Ordered    06/02/25 1600  cefTRIAXone injection 2 g         -- IV Every 12 hours (non-standard times) 06/02/25 1532          Antifungals (From admission, onward)      None           Antivirals (From admission, onward)      None            INTERVAL HISTORY     6/3/2025@0944 (Justen):  patient is awake and alert sitting up in bed.  She looks and feels great today.  She refused dose of IVIG last night because she feels like that is what caused all of her problems.  Patient states she felt well prior to IVIG infusion.  She said  several hours after receiving infusion is when the severe headache started and she started feeling so poorly. She denies neck pain or stiffness. She said the numbness in the right shin is no different than it has been for the past 3 weeks.  Wrist weakness is no different.  She has had no fevers, chills, sweats,  headache, photophobia resolved.  She has been ambulatory in her room.  She has not had a bowel movement for 7 days.  She said her appetite is much better though she has nausea today that she thinks is from the potassium supplementation that   She was given to drink.  We sent a message to hospitalist regarding holding IVIG until Neurology sees patient.  I let her know that we were stopping vancomycin and would wait for CSF culture to finalize before stopping the other antibiotic.  T-max 99.4° in the last 24 hours.  No leukocytosis, normal platelets, normal H&H, neutrophils 84.2%, creatinine 0.6, CRP 0.10, procalcitonin negative, ESR 20.  All normal.  Blood cultures no growth to date,  CSF culture no growth to date.  Gram stain few WBCs no organisms. ME panel negative.        HPI      Irene Grayson is a 29 y.o. female  with chronic medical problems including seizures, asthma, MTHFR deficiency and homocystinuria, LGSIL, HPV, cholelithiasis s/p cholecystectomy, anemia, folate deficiency, depression and anxiety  who presented to the emergency  department on 5/29 complaining of  numbness to right lower extremity from knee to ankle for 3 weeks. Associated symptoms are headache, neck stiffness, nausea. She does not have any change in her gait or movement.  She was concerned because she has a family history of MS.   She had a brain MRI and was referred to Neurology.  Liset's called back the next day because she was unable to get into Neurology until July and she was having a headache and still having the numbness in the right lower extremity.   She has had an intermittent headache  over the years but after the 2nd dose of IVIG  after admission she has had a severe headache that starts at the base of her  head and upper neck and goes around to her forehead.  It is associated with severe nausea, no vision, hearing or speech changes.   She states she has a chronic sore throat in his seeing ENT for GERD.  About 3 weeks ago she took a course of doxycycline and prior to that a course of Augmentin with no improvement in throat.  She has had her tonsils out twice and she said they keep growing back.  She has not been able to eat because she is nauseated.  She has a chronic cough, chronic bilateral wrist weakness that causes her to drop things that has been going on for months.  She had seizures as a child and came to the emergency room  in the last year with concern for a seizure but refused antiepileptic drugs in his not followed up with neurology.  She also has ringing in her ears.  Her 3-year-old son has had a fever for the past 2 weeks and went to the emergency room and had Mark bar IgG positive and IgM negative.  She said he was having a fever and a cough up to 104 but has not really been acting sick.     She denies any wounds but states she had a few red spots on her right lower extremity that look like flea bites but they are gone now. She lives at home with her mother and father in her 3 kids ages 6 5 and 3.  She has a dog and a cat, she is a stay-at-home  mom.  She smokes lightly, no alcohol or drug use.       MRI brain demyelinating negative for anything acute, MRI t/L spine negative for anything acute or chronic.  Lumbar puncture completed at 0130 and CSF culture, VDRL and meningitis encephalitis panel pending.   No leukocytosis, neutrophils 81% 0.1% today but not in the last 3 days prior, creatinine 0.7 and estimated creatinine clearance 90.3, pregnancy test negative, drug screen positive for amphetamines, hepatitis and HIV negative, urinalysis with protein, CSF glucose 37, protein 137,  WBC 2713 with 94% neutrophils, 7 RBCs and 6 lymphocytes.    Antibiotic history:    Ceftriaxone: 6/2/25  Vancomycin: 6/2/25-6/3/25    Microbiology:    06/02/2025 blood cultures x2 sets no growth to date  06/02/2025 CSF culture pending  06/02/2025 encephalitis/meningitis panel negative  06/02/2025 CSF VDRL pending    Outdoor activities: Lives at home with mother and father, 3 children ages 6,5 and 3,  Travel: none  Implants: None  Antibiotic history:  See HPI    Social History  Marital Status: Single  Alcohol History:  reports that she does not currently use alcohol.  Tobacco History:  reports that she quit smoking about 5 years ago. Her smoking use included cigarettes. She started smoking about 9 years ago. She has a 1 pack-year smoking history. She has never used smokeless tobacco.  Drug History:  reports no history of drug use.    Review of patient's allergies indicates:   Allergen Reactions    Oranges [orange]      ulcers       SUBJECTIVE     Review of Systems  Review of systems obtained and negative except as stated above in Interval History     OBJECTIVE     Temp:  [98 °F (36.7 °C)-99.4 °F (37.4 °C)] 98.6 °F (37 °C)  Pulse:  [] 82  Resp:  [18-37] 18  SpO2:  [96 %-100 %] 97 %  BP: ()/(53-78) 107/58  Temp:  [98 °F (36.7 °C)-99.4 °F (37.4 °C)]   Temp: 98.6 °F (37 °C) (06/03/25 0701)  Pulse: 82 (06/03/25 0900)  Resp: 18 (06/03/25 0733)  BP: (!) 107/58 (06/03/25  0900)  SpO2: 97 % (06/03/25 0900)    Intake/Output Summary (Last 24 hours) at 6/3/2025 1030  Last data filed at 6/3/2025 1001  Gross per 24 hour   Intake 1377.53 ml   Output --   Net 1377.53 ml     Physical Exam  General:   Awake and alert, pleasant and conversant, states she feels great today.  Eyes: Eyes with no icterus or injection. Vision grossly normal, PERRL.   Ears: Hearing grossly normal.  Nose: Nares patent  Mouth: Moist mucous membranes, dentition is good. No ulcerations, erythema or exudates.  No visualized tonsils.  Neck: Patient is able to flex chin to her chest, no pain with passive flexion, extension and rotation.  No lymphadenopathy.  Cardiovascular: Regular rate and rhythm, no murmurs, no peripheral edema.    Respiratory:  Clear to auscultation bilaterally anterior and posterior, no tachypnea or increased work of breathing. On RA  Gastrointestinal:  Soft with active bowel sounds, no tenderness to palpation, no distention.  Musculoskeletal:  Moves all extremities with equal strength.  Independently ambulatory.   Skin: Pale, warm and dry, no obvious rashes. No wounds.  Neuro: Oriented, conversant, follows commands.  Psych: Good mood, normal affect.  VAD: Multiple PIV  Isolation: Droplet     Wounds  None    Significant Labs: All pertinent labs within the past 24 hours have been reviewed.    CBC LAST 7 DAYS  Recent Labs   Lab 05/30/25 1835 05/31/25 0436 06/01/25 0437 06/02/25  0453 06/03/25  0449   WBC 6.84 6.83 6.92 10.18 11.58   RBC 4.45 4.45 4.50 4.64 4.00   HGB 13.7 13.9 13.9 14.2 12.6   HCT 41.4 41.8 42.2 43.7 37.0   MCV 93 94 94 94 93   MCH 30.8 31.2* 30.9 30.6 31.5*   MCHC 33.1 33.3 32.9 32.5 34.1   RDW 12.5 12.4 12.1 12.2 12.1    264 243 243 285   MPV 10.5 10.6 10.1 10.0 10.7   NRBC 0 0 0 0 0       CHEMISTRY LAST 7 DAYS  Recent Labs   Lab 05/30/25 1835 05/31/25  0436 06/01/25  0437 06/02/25  0453 06/03/25  0449    137 136 137 137   K 3.6 4.1 3.8 5.0 3.6    111* 108 103 108  "  CO2 23 25 17* 27 21*   ANIONGAP 9 1* 11 7* 8   BUN 13 12 11 9 12   CREATININE 1.0 0.7 0.6 0.7 0.6   GLU 97 93 98 105 135*   CALCIUM 9.1 9.3 9.2 9.6 9.2   MG  --  1.9 1.9 2.0 1.8   ALBUMIN 4.4  --   --   --   --    PROT 7.0  --   --   --   --    ALKPHOS 44*  --   --   --   --    ALT 13  --   --   --   --    AST 14  --   --   --   --    BILITOT 0.3  --   --   --   --        Estimated Creatinine Clearance: 105.3 mL/min (based on SCr of 0.6 mg/dL).    INFLAMMATORY/PROCAL  LAST 7 DAYS  Recent Labs   Lab 06/02/25  0453   CRP 0.10     No results found for: "ESR"  CRP   Date Value Ref Range Status   06/02/2025 0.10 <1.00 mg/dL Final     Comment:     CRP-Normal Application expected values:          <1.0        mg/dL   Normal Range          1.0 - 5.0  mg/dL   Indicates mild inflammation          5.0 - 10.0 mg/dL   Indicates severe inflammation        >10.0        mg/dL   Represents serious processes and frequently                                 indicates the presence of a bacterial infection.        PRIOR MICROBIOLOGY:  No results found for the last 90 days.      LAST 7 DAYS MICROBIOLOGY   Microbiology Results (last 7 days)       Procedure Component Value Units Date/Time    CSF culture [[id number]] Collected: 06/02/25 1317    Order Status: Completed Specimen: CSF (Spinal Fluid) from CSF Tap, Tube 3 Updated: 06/03/25 0829     CULTURE, CSF No Growth     GRAM STAIN Few WBC seen      No organisms seen    Blood culture [[id number]]  (Normal) Collected: 06/02/25 1540    Order Status: Completed Specimen: Blood Updated: 06/02/25 2301     CULTURE, BLOOD ([facility]) No Growth After 6 Hours    Blood culture [[id number]]  (Normal) Collected: 06/02/25 1535    Order Status: Completed Specimen: Blood Updated: 06/02/25 2301     CULTURE, BLOOD ([facility]) No Growth After 6 Hours              CURRENT/PREVIOUS VISIT EKG  Results for orders placed or performed during the hospital encounter of 05/30/25   EKG 12-lead    Collection Time: 06/02/25 10:28 AM "   Result Value Ref Range    QRS Duration 78 ms    [facility] QTC Calculation 433 ms    Narrative    Test Reason : I49.9,    Vent. Rate : 101 BPM     Atrial Rate : 101 BPM     P-R Int : 112 ms          QRS Dur :  78 ms      QT Int : 334 ms       P-R-T Axes :  51  82  54 degrees    QTcB Int : 433 ms    Sinus tachycardia  Otherwise normal ECG  When compared with ECG of 19-Feb-2025 16:51,  No significant change was found    Referred By: AAAREFERRAL SELF           Confirmed By:          Significant Imaging: I have reviewed all relevant and available imaging results/findings within the past 24 hours.      I spent a total of 56 minutes on the day of the visit.This includes face to face time and non-face to face time preparing to see the patient (eg, review of tests), obtaining and/or reviewing separately obtained history, documenting clinical information in the electronic or other health record, independently interpreting results and communicating results to the patient/family/caregiver, or care coordinator.      Minerva Campuzano NP  Date of Service: 06/03/2025      This note was created using [employer]  voice recognition software that occasionally misinterpreted phrases or words.

## 2025-06-03 NOTE — TELEMEDICINE CONSULT
"[facility]   General Neurology  Consult Note      Consult Information  Inpatient consult to Neurology Services (Vascular Neurology)  Consult performed by: Davion Arrington MD  Consult ordered by: Keila Colin DO  Reason for consult: Weakness/numbness        Consulting Provider:    Current Providers  No providers found    Patient Location:  [address] IP Unit    Spoke hospital nurse at bedside with patient assisting consultant.  Patient information was obtained from patient.       Neurology Documentation       Blood pressure 110/71, pulse 87, temperature 98.7 °F (37.1 °C), temperature source Oral, resp. rate 16, height 5' 7" (1.702 m), weight 52 kg (114 lb 10.2 oz), last menstrual period 05/10/2025, SpO2 100%, not currently breastfeeding.    Medical Decision Making  HPI:  29 y.o. female 29 yr old female with a hx of swine flu, seizures, MTHFR deficiency, homocystinuria,  HPV, cholelithiasis s/p cholecystectomy, anemia, folate deficiency, depression, anxiety who presents to the ED due right lower extremity numbness as well as bilateral upper extremity weakness. At some point she had difficulty with her gait. Symptoms had been present for several weeks. Due to suspicion of AIDP pt was started on IVIg. She developed headache and photophobia concerning for a meningitis. Today pt reports feeling markedly improved. She is able to walk with no difficulties and reports only numbness son her right leg.         Images personally reviewed and interpreted:  Study: MRI Brain  Study Interpretation: FINDINGS:  Intracranial compartment:     Ventricles and sulci are normal in size for age without evidence of hydrocephalus. No extra-axial blood or fluid collections.     The brain parenchyma appears normal. No mass lesion, acute hemorrhage, edema or acute infarct. No abnormal enhancement.     Normal vascular flow voids are preserved.     Skull/extracranial contents (limited evaluation): Bone marrow signal intensity is " normal.     No evidence of demyelination.     No significant abnormality.  No significant change.     Impression:     No acute intracranial process.        Electronically signed by:Hoang Bautista  Date:                                            05/30/2025  Time:                                           20:27    Additional studies reviewed:   Study: Cardiac_Neuro: 2D echo and LP results      Vitals    Height Weight BMI (Calculated) BSA (Calculated - sq m) BP Pulse       118/82 70     Study Details A complete echo was performed using complete 2D, color flow Doppler and spectral Doppler. During the study, the apical, parasternal and subcostal views were captured. This was a portable study performed at the patient's bedside.     Echocardiography Findings    Left Ventricle The left ventricle is normal in size. Normal wall thickness. Normal wall motion. There is normal systolic function with a visually estimated ejection fraction of 55 - 70%. There is normal diastolic function.   Right Ventricle Normal right ventricular cavity size. Wall thickness is normal. Systolic function is normal.   Left Atrium Normal left atrial size.   Right Atrium Normal right atrial size.   Aortic Valve The aortic valve is structurally normal. There is normal leaflet mobility. Aortic valve peak velocity is 1.1 m/s. Mean gradient is 3 mmHg. There is no significant regurgitation.   Mitral Valve The mitral valve is structurally normal. There is normal leaflet mobility. The mean pressure gradient across the mitral valve is 1 mmHg at a heart rate of  bpm. There is no significant regurgitation.   Tricuspid Valve The tricuspid valve is structurally normal. There is normal leaflet mobility. There is no significant regurgitation.   Pulmonic Valve The pulmonic valve is structurally normal. There is normal leaflet mobility. There is no significant regurgitation.   IVC/SVC Normal venous pressure at 3 mmHg.   Ascending Aorta Aortic root is normal in size.  Ascending aorta is normal measuring 2.10 cm.   Pericardium and Other Findings There is no pericardial effusion.       Study Interpretation: ME Panel  Order: [id number]   Status: Final result       Next appt: 07/02/2025 at 08:00 AM in Neurology (Marshall J Kellerman, PA-C)    Test Result Released: Yes (seen)    0 Result Notes      Component  Ref Range & Units (hover) 1 d ago   Cryptococcus neoformans/gattii Not Detected   Cytomegalovirus (CMV) Not Detected   Enterovirus (EV) Not Detected   Escherichia coli K1 Not Detected   Haemophilus influenzae Not Detected   HSV-1 Not Detected   HSV-2 Not Detected   Human Herpesvirus 6 (HHV-6) Not Detected   Human Parechovirus (HPEV) Not Detected   Listeria monocytogenes Not Detected   Neisseria meningitidis Not Detected   Streptococcus agalactiae (Group B) Not Detected   Streptococcus pneumoniae Not Detected   Varicella zoster Virus (VZV) Not Detected          Laboratory studies reviewed:  BMP:   Lab Results   Component Value Date     06/03/2025    K 3.6 06/03/2025     06/03/2025    CO2 21 (L) 06/03/2025    BUN 12 06/03/2025    CREATININE 0.6 06/03/2025    CALCIUM 9.2 06/03/2025     CBC:   Lab Results   Component Value Date    WBC 11.58 06/03/2025    RBC 4.00 06/03/2025    HGB 12.6 06/03/2025    HCT 37.0 06/03/2025     06/03/2025    MCV 93 06/03/2025    MCH 31.5 (H) 06/03/2025    MCHC 34.1 06/03/2025     Lipid Panel:   Lab Results   Component Value Date    CHOL 166 02/19/2025    LDLCALC 80.2 02/19/2025    HDL 61 02/19/2025    TRIG 124 02/19/2025     Coagulation:   Lab Results   Component Value Date    INR 1.0 02/19/2025    APTT 29.1 03/13/2020     Hgb A1C:   Lab Results   Component Value Date    HGBA1C 5.5 02/20/2025     TSH:   Lab Results   Component Value Date    TSH 1.043 02/19/2025       Documentation personally reviewed:  Notes: Notes: ED notes and H&P     Assessment and plan:    28 y/o female with complaints of motor and sensory symptoms progressing for  three weeks. Due to suspicion of AIDP pt was started on IVIg but she was not able to tolerate after two doses. Today pt reports barely any symptoms but tingling on her right leg. Exam by NP showed presence of DTR's which does not correlate with demyelinating polyneuropathies.    MRI brain and spine are unrevealing    CSF remarkable to 2K WBC's and elevated protein with low glucose. This was done after IVIg which could had caused an aseptic meningitis.    -Stop IVIg for now.  -EMG/NCS as outpatient.  -If her motor and sensory symptoms return and a demyelinating polyneuropathy is suspected PLEX is an option if unable to administer IVIg.  -B12, Vitamin E, copper levels.  -Pt agrees with plan as she tells me that she fells markedly improved and would like to know the actual Dx for her symptoms before continuing or resuming any treatment. She has an appointment at [facility]- Neurology on 7/2/25.    Post charge discharge plan:  Clinic follow up: General Neurology    Visit Type: in-person only  Timeframe: 2 weeks        Additional Physical Exam, History, & ROS  Review of Systems   Constitutional:  Negative for fever.   HENT:  Negative for hearing loss.    Eyes:  Negative for double vision.   Respiratory:  Negative for cough.    Cardiovascular:  Negative for chest pain.   Gastrointestinal:  Negative for abdominal pain.   Genitourinary:  Negative for dysuria.   Musculoskeletal:  Negative for myalgias.   Skin:  Negative for rash.   Neurological:  Negative for dizziness.     Physical Exam  Constitutional:       General: She is not in acute distress.  Pulmonary:      Effort: No respiratory distress.   Neurological:      Mental Status: She is alert and oriented to person, place, and time.      Cranial Nerves: No dysarthria or facial asymmetry.      Motor: No tremor or pronator drift.      Coordination: Coordination normal. Finger-Nose-Finger Test normal.      Gait: Gait normal.       Diagnosis Date    Anemia     no current  treatment    Epilepsy     Gallstones     HPV (human papilloma virus) infection     LGSIL on Pap smear of cervix     MTHFR (methylene THF reductase) deficiency and homocystinuria     Seizure disorder, complex partial     last seizure ; no medication    Seizures     Swine flu      Past Surgical History:   Procedure Laterality Date     SECTION N/A 3/8/2020    Procedure:  SECTION;  Surgeon: Brian Quijano MD;  [facility]: [facility];  Service: OB/GYN;  Laterality: N/A;     SECTION N/A 10/28/2021    Procedure:  SECTION;  Surgeon: Lucinda Rush MD;  Location: [facility] L&D;  Service: OB/GYN;  Laterality: N/A;    CHOLECYSTECTOMY  2017    LAPAROSCOPIC CHOLECYSTECTOMY      toe I&D      TONSILLECTOMY      UPPER GASTROINTESTINAL ENDOSCOPY      Showed H. Pylori    yifan dugan       Family History   Adopted: Yes   Problem Relation Name Age of Onset    No Known Problems Mother      No Known Problems Father         Diagnoses  No problems updated.    Davion Arrington MD    Neurology consultation requested by spoke provider. Audiovisual encounter with the patient performed using a secure connection.  Results and impressions from the visit are documented on this note and were communicated to the consulting provider/team via direct communication. The note has been shared for addition to the patients electronic medical record.

## 2025-06-03 NOTE — PROGRESS NOTES
[facility] Medicine  Progress Note    Patient Name: Irene Grayson  MRN: [id number]  Patient Class: IP- Inpatient   Admission Date: 2025  Length of Stay: 3 days  Attending Physician: Keila Colin DO  Primary Care Provider: Clifford Page DO        Subjective     Principal Problem:AIDP (acute inflammatory demyelinating polyneuropathy)        HPI:  Ms. Grayson is a 29 yr old female with a hx of swine flu, seizures, MTHFR deficiency and homocystinuria, LGSIL, HPV, cholelithiasis s/p cholecystectomy, anemia, folate deficiency, depression, anxiety who presents to the ED with a chief complaint of right lower extremity numbness as well as bilateral upper extremity weakness.  Patient endorses that she has experienced right lower extremity numbness from her knee to the top of her foot over the last 3-1/2 weeks that has been consistent.  She describes this feeling of numbness to be similar to her  scar.  She also endorses that she has had trouble with her gait and states that she has noticed herself dragging her foot at times.  She tells me that she has also experienced bilateral upper extremity weakness that has been intermittent for a while now but has been progressively worsening recently.  She states it has been hard for her to grab cast iron skillet from out of the oven and also has trouble opening lids.  She initially thought this might be due to stress.  However, it has been unchanged.  She also endorses intermittent headaches behind her left eye for years now as well as dizziness as if the world around her is spinning/moving, bilateral tinnitus also for some time now, and also endorses decreased urine output as well as chronic diarrhea.  She endorses trying ibuprofen at home without relief.  She denies slurred speech, facial asymmetry, vision changes.  She states that she smokes 1 pack of cigarettes every other day, and vapes every other day, she occasionally drinks  alcohol, and denies recreational drug use.    Of note, patient was seen in the ED on 05/29 and was discharged home with urgent neuro referral for MS workup.  Neurology was unable to see her until July 8th so she was called and advised to return to the ED for further evaluation.    Upon arrival to ED, patient afebrile, HR of 108, RR of 16, BP of 126/86, satting 100% on RA.  Workup in the ED with presumptive positive urine amphetamines.  Remainder of labs unremarkable.  Of note, patient is on dextroamphetamine-amphetamine 10 mg tabs.  MRI brain demyelinating with and without contrast shows no acute intracranial process.  ED provider discussed case with Neurology at [facility] who recommended patient receive an MRI cervical spine without contrast.  Patient will be placed under observation for further management.    Overview/Hospital Course:  We admitted the patient with the neurologic symptoms and diagnosed her with possible AIDP vs CIDP.  Neurology consulted with us and recommended MRI of spine and LP to confirm suspicions.  He recommended beginning IVIG infusions daily empirically.  Patient was moved to Stepdown for frequent vitals during infusions. MRI of spine and LP on 06/02 initial CSF count shows WBC greater than 2000, elevated protein and decreased glucose concerning for bacterial meningitis versus underlying CNS inflammatory process but with sudden onset headache this morning we will need to start empiric antibiotics and steroids and ordered blood cultures.  Infectious Disease has been consult as well as Neurology.  MRI of the C-spine showed severe spinal canal stenosis and Neurosurgery was consult but no role for surgical intervention at this time.  Hold IVIG at this time due to possible medication reaction causing meningitis like symptoms.  Awaiting further recommendations from specialists.    Interval History:  Seen and examined, new onset severe headache with photophobia and neck pain    Review of Systems    Constitutional:  Negative for diaphoresis and fever.   Respiratory:  Negative for chest tightness and shortness of breath.    Cardiovascular:  Negative for chest pain.   Gastrointestinal:  Negative for abdominal distention.   Genitourinary:  Negative for difficulty urinating.   Neurological:  Positive for headaches.     Objective:     Vital Signs (Most Recent):  Temp: 97.8 °F (36.6 °C) (06/03/25 1100)  Pulse: 85 (06/03/25 1200)  Resp: 18 (06/03/25 0733)  BP: 118/74 (06/03/25 1200)  SpO2: 99 % (06/03/25 1200) Vital Signs (24h Range):  Temp:  [97.8 °F (36.6 °C)-99.4 °F (37.4 °C)] 97.8 °F (36.6 °C)  Pulse:  [] 85  Resp:  [18-37] 18  SpO2:  [96 %-100 %] 99 %  BP: ()/(53-78) 118/74     Weight: 51.8 kg (114 lb 1.6 oz)  Body mass index is 21.56 kg/m².    Intake/Output Summary (Last 24 hours) at 6/3/2025 1549  Last data filed at 6/3/2025 1001  Gross per 24 hour   Intake 778.96 ml   Output --   Net 778.96 ml         Physical Exam  Constitutional:       General: She is not in acute distress.     Appearance: She is ill-appearing.   Eyes:      General:         Right eye: No discharge.         Left eye: No discharge.   Neck:      Vascular: No JVD.   Cardiovascular:      Rate and Rhythm: Normal rate and regular rhythm.   Pulmonary:      Effort: Pulmonary effort is normal. No respiratory distress.      Breath sounds: Normal breath sounds.   Abdominal:      General: Abdomen is flat. Bowel sounds are normal. There is no distension.      Palpations: Abdomen is soft.      Tenderness: There is no abdominal tenderness.   Musculoskeletal:      Right lower leg: No edema.      Left lower leg: No edema.   Skin:     General: Skin is warm and moist.      Findings: No rash.   Neurological:      Mental Status: She is alert and oriented to person, place, and time.   Psychiatric:         Attention and Perception: Attention normal.         Mood and Affect: Mood and affect normal.         Speech: Speech normal.                Significant Labs: All pertinent labs within the past 24 hours have been reviewed.    Significant Imaging: I have reviewed all pertinent imaging results/findings within the past 24 hours.      Assessment & Plan  AIDP (acute inflammatory demyelinating polyneuropathy)  Possible diagnosis.  Discussed with neurology.  Get MRI of spine with contrast.  Get LP and check protein level and cell count.  Begin IVIG empirically while waiting for workup to be complete.  Discussed with patient and parents.  Plan for MRIs and LP 6/2  Epilepsy  - Hx noted  - Seizure precautions  - No AEDs currently on home medication list    Nicotine dependence  Dangers of cigarette smoking were reviewed with patient in detail. Patient was Counseled for 3-10 minutes. Nicotine replacement options were discussed. Nicotine replacement was discussed- not prescribed per patient's request  Cervical stenosis of spinal canal  Seen and evaluated by Neurosurgery, no role for surgical intervention    Sudden onset of severe headache  New onset headache, neck pain, photophobia concerning for meningitis  -LP showed CSF WBC greater than 2700, high protein, low glucose  -start empiric antibiotics vancomycin and ceftriaxone and steroids  -infectious disease and neurology consulted  -obtain blood cultures    VTE Risk Mitigation (From admission, onward)           Ordered     IP VTE LOW RISK PATIENT  Once         05/30/25 2228     Place sequential compression device  Until discontinued         05/30/25 2228                    Discharge Planning   LAURA: 6/4/2025     Code Status: Full Code   Medical Readiness for Discharge Date:   Discharge Plan A: Home with family                        Keila Colin DO  Department of Hospital Medicine   [facility]

## 2025-06-03 NOTE — PLAN OF CARE
Pt continues to require inpatient medical care at this time. Pt declined IVIG last night and during SIBR pt looked much improved clinically. At this time it was discussed with pt the probability of meningitis symptoms being from the IVIG. Final blood cultures and CSF cultures pending. Case management to continue to follow for needs. Current plan is home with family, mother to provide transport.      06/03/25 9398   Discharge Reassessment   Assessment Type Discharge Planning Reassessment   Did the patient's condition or plan change since previous assessment? Yes   Discharge Plan discussed with: Patient   Communicated LAURA with patient/caregiver Yes   Discharge Plan A Home with family   Why the patient remains in the hospital Requires continued medical care

## 2025-06-03 NOTE — SUBJECTIVE & OBJECTIVE
Interval History:  Seen and examined, new onset severe headache with photophobia and neck pain    Review of Systems   Constitutional:  Negative for diaphoresis and fever.   Respiratory:  Negative for chest tightness and shortness of breath.    Cardiovascular:  Negative for chest pain.   Gastrointestinal:  Negative for abdominal distention.   Genitourinary:  Negative for difficulty urinating.   Neurological:  Positive for headaches.     Objective:     Vital Signs (Most Recent):  Temp: 97.8 °F (36.6 °C) (06/03/25 1100)  Pulse: 85 (06/03/25 1200)  Resp: 18 (06/03/25 0733)  BP: 118/74 (06/03/25 1200)  SpO2: 99 % (06/03/25 1200) Vital Signs (24h Range):  Temp:  [97.8 °F (36.6 °C)-99.4 °F (37.4 °C)] 97.8 °F (36.6 °C)  Pulse:  [] 85  Resp:  [18-37] 18  SpO2:  [96 %-100 %] 99 %  BP: ()/(53-78) 118/74     Weight: 51.8 kg (114 lb 1.6 oz)  Body mass index is 21.56 kg/m².    Intake/Output Summary (Last 24 hours) at 6/3/2025 1549  Last data filed at 6/3/2025 1001  Gross per 24 hour   Intake 778.96 ml   Output --   Net 778.96 ml         Physical Exam  Constitutional:       General: She is not in acute distress.     Appearance: She is ill-appearing.   Eyes:      General:         Right eye: No discharge.         Left eye: No discharge.   Neck:      Vascular: No JVD.   Cardiovascular:      Rate and Rhythm: Normal rate and regular rhythm.   Pulmonary:      Effort: Pulmonary effort is normal. No respiratory distress.      Breath sounds: Normal breath sounds.   Abdominal:      General: Abdomen is flat. Bowel sounds are normal. There is no distension.      Palpations: Abdomen is soft.      Tenderness: There is no abdominal tenderness.   Musculoskeletal:      Right lower leg: No edema.      Left lower leg: No edema.   Skin:     General: Skin is warm and moist.      Findings: No rash.   Neurological:      Mental Status: She is alert and oriented to person, place, and time.   Psychiatric:         Attention and Perception:  Attention normal.         Mood and Affect: Mood and affect normal.         Speech: Speech normal.               Significant Labs: All pertinent labs within the past 24 hours have been reviewed.    Significant Imaging: I have reviewed all pertinent imaging results/findings within the past 24 hours.

## 2025-06-03 NOTE — CARE UPDATE
06/03/25 0733   Patient Assessment/Suction   Level of Consciousness (AVPU) alert   All Lung Fields Breath Sounds clear   PRE-TX-O2   Device (Oxygen Therapy) room air   SpO2 98 %   Pulse Oximetry Type Continuous   $ Pulse Oximetry - Multiple Charge Pulse Oximetry - Multiple   Pulse 76   Resp 18   Aerosol Therapy   $ Aerosol Therapy Charges PRN treatment not required

## 2025-06-03 NOTE — SUBJECTIVE & OBJECTIVE
HPI:  29 y.o. female 29 yr old female with a hx of swine flu, seizures, MTHFR deficiency, homocystinuria,  HPV, cholelithiasis s/p cholecystectomy, anemia, folate deficiency, depression, anxiety who presents to the ED due right lower extremity numbness as well as bilateral upper extremity weakness. At some point she had difficulty with her gait. Symptoms had been present for several weeks. Due to suspicion of AIDP pt was started on IVIg. She developed headache and photophobia concerning for a meningitis. Today pt reports feeling markedly improved. She is able to walk with no difficulties and reports only numbness son her right leg.         Images personally reviewed and interpreted:  Study: MRI Brain  Study Interpretation: FINDINGS:  Intracranial compartment:     Ventricles and sulci are normal in size for age without evidence of hydrocephalus. No extra-axial blood or fluid collections.     The brain parenchyma appears normal. No mass lesion, acute hemorrhage, edema or acute infarct. No abnormal enhancement.     Normal vascular flow voids are preserved.     Skull/extracranial contents (limited evaluation): Bone marrow signal intensity is normal.     No evidence of demyelination.     No significant abnormality.  No significant change.     Impression:     No acute intracranial process.        Electronically signed by:Hoang Bautista  Date:                                            05/30/2025  Time:                                           20:27    Additional studies reviewed:   Study: Cardiac_Neuro: 2D echo and LP results      Vitals    Height Weight BMI (Calculated) BSA (Calculated - sq m) BP Pulse       118/82 70     Study Details A complete echo was performed using complete 2D, color flow Doppler and spectral Doppler. During the study, the apical, parasternal and subcostal views were captured. This was a portable study performed at the patient's bedside.     Echocardiography Findings    Left Ventricle The left  ventricle is normal in size. Normal wall thickness. Normal wall motion. There is normal systolic function with a visually estimated ejection fraction of 55 - 70%. There is normal diastolic function.   Right Ventricle Normal right ventricular cavity size. Wall thickness is normal. Systolic function is normal.   Left Atrium Normal left atrial size.   Right Atrium Normal right atrial size.   Aortic Valve The aortic valve is structurally normal. There is normal leaflet mobility. Aortic valve peak velocity is 1.1 m/s. Mean gradient is 3 mmHg. There is no significant regurgitation.   Mitral Valve The mitral valve is structurally normal. There is normal leaflet mobility. The mean pressure gradient across the mitral valve is 1 mmHg at a heart rate of  bpm. There is no significant regurgitation.   Tricuspid Valve The tricuspid valve is structurally normal. There is normal leaflet mobility. There is no significant regurgitation.   Pulmonic Valve The pulmonic valve is structurally normal. There is normal leaflet mobility. There is no significant regurgitation.   IVC/SVC Normal venous pressure at 3 mmHg.   Ascending Aorta Aortic root is normal in size. Ascending aorta is normal measuring 2.10 cm.   Pericardium and Other Findings There is no pericardial effusion.       Study Interpretation: ME Panel  Order: [id number]   Status: Final result       Next appt: 07/02/2025 at 08:00 AM in Neurology (Marshall J Kellerman, PA-C)    Test Result Released: Yes (seen)    0 Result Notes      Component  Ref Range & Units (hover) 1 d ago   Cryptococcus neoformans/gattii Not Detected   Cytomegalovirus (CMV) Not Detected   Enterovirus (EV) Not Detected   Escherichia coli K1 Not Detected   Haemophilus influenzae Not Detected   HSV-1 Not Detected   HSV-2 Not Detected   Human Herpesvirus 6 (HHV-6) Not Detected   Human Parechovirus (HPEV) Not Detected   Listeria monocytogenes Not Detected   Neisseria meningitidis Not Detected   Streptococcus  agalactiae (Group B) Not Detected   Streptococcus pneumoniae Not Detected   Varicella zoster Virus (VZV) Not Detected          Laboratory studies reviewed:  BMP:   Lab Results   Component Value Date     06/03/2025    K 3.6 06/03/2025     06/03/2025    CO2 21 (L) 06/03/2025    BUN 12 06/03/2025    CREATININE 0.6 06/03/2025    CALCIUM 9.2 06/03/2025     CBC:   Lab Results   Component Value Date    WBC 11.58 06/03/2025    RBC 4.00 06/03/2025    HGB 12.6 06/03/2025    HCT 37.0 06/03/2025     06/03/2025    MCV 93 06/03/2025    MCH 31.5 (H) 06/03/2025    MCHC 34.1 06/03/2025     Lipid Panel:   Lab Results   Component Value Date    CHOL 166 02/19/2025    LDLCALC 80.2 02/19/2025    HDL 61 02/19/2025    TRIG 124 02/19/2025     Coagulation:   Lab Results   Component Value Date    INR 1.0 02/19/2025    APTT 29.1 03/13/2020     Hgb A1C:   Lab Results   Component Value Date    HGBA1C 5.5 02/20/2025     TSH:   Lab Results   Component Value Date    TSH 1.043 02/19/2025       Documentation personally reviewed:  Notes: Notes: ED notes and H&P     Assessment and plan:    28 y/o female with complaints of motor and sensory symptoms progressing for three weeks. Due to suspicion of AIDP pt was started on IVIg but she was not able to tolerate after two doses. Today pt reports barely any symptoms but tingling on her right leg. Exam by NP showed presence of DTR's which does not correlate with demyelinating polyneuropathies.    MRI brain and spine are unrevealing    CSF remarkable to 2K WBC's and elevated protein with low glucose. This was done after IVIg which could had caused an aseptic meningitis.    -Stop IVIg for now.  -EMG/NCS as outpatient.  -If her motor and sensory symptoms return and a demyelinating polyneuropathy is suspected PLEX is an option if unable to administer IVIg.  -B12, Vitamin E, copper levels.  -Pt agrees with plan as she tells me that she fells markedly improved and would like to know the actual Dx  for her symptoms before continuing or resuming any treatment. She has an appointment at [facility]- Neurology on 7/2/25.    Post charge discharge plan:  Clinic follow up: General Neurology    Visit Type: in-person only  Timeframe: 2 weeks

## 2025-06-03 NOTE — PLAN OF CARE
Problem: Adult Inpatient Plan of Care  Goal: Plan of Care Review  Outcome: Ongoing  Goal: Patient-Specific Goal (Individualized)  Outcome: Ongoing  Goal: Absence of Hospital-Acquired Illness or Injury  Outcome: Ongoing  Goal: Optimal Comfort and Wellbeing  Outcome: Ongoing  Goal: Readiness for Transition of Care  Outcome: Ongoing     Problem: Infection  Goal: Absence of Infection Signs and Symptoms  Outcome: Ongoing

## 2025-06-04 VITALS
DIASTOLIC BLOOD PRESSURE: 71 MMHG | BODY MASS INDEX: 17.99 KG/M2 | OXYGEN SATURATION: 99 % | RESPIRATION RATE: 20 BRPM | HEART RATE: 73 BPM | SYSTOLIC BLOOD PRESSURE: 118 MMHG | HEIGHT: 67 IN | WEIGHT: 114.63 LBS | TEMPERATURE: 98 F

## 2025-06-04 PROBLEM — G61.0 AIDP (ACUTE INFLAMMATORY DEMYELINATING POLYNEUROPATHY): Status: RESOLVED | Noted: 2025-05-30 | Resolved: 2025-06-04

## 2025-06-04 PROBLEM — R51.9 SUDDEN ONSET OF SEVERE HEADACHE: Status: RESOLVED | Noted: 2025-06-02 | Resolved: 2025-06-04

## 2025-06-04 LAB
ABSOLUTE EOSINOPHIL (SMH): 0 K/UL
ABSOLUTE MONOCYTE (SMH): 0.27 K/UL (ref 0.3–1)
ABSOLUTE NEUTROPHIL COUNT (SMH): 7.3 K/UL (ref 1.8–7.7)
ANION GAP (SMH): 8 MMOL/L (ref 8–16)
BASOPHILS # BLD AUTO: 0.01 K/UL
BASOPHILS NFR BLD AUTO: 0.1 %
BUN SERPL-MCNC: 11 MG/DL (ref 6–20)
C TRACH RRNA SPEC QL NAA+PROBE: NEGATIVE
CALCIUM SERPL-MCNC: 9.6 MG/DL (ref 8.7–10.5)
CHLORIDE SERPL-SCNC: 108 MMOL/L (ref 95–110)
CO2 SERPL-SCNC: 24 MMOL/L (ref 23–29)
CREAT SERPL-MCNC: 0.6 MG/DL (ref 0.5–1.4)
ERYTHROCYTE [DISTWIDTH] IN BLOOD BY AUTOMATED COUNT: 12.4 % (ref 11.5–14.5)
GFR SERPLBLD CREATININE-BSD FMLA CKD-EPI: >60 ML/MIN/1.73/M2
GLUCOSE SERPL-MCNC: 137 MG/DL (ref 70–110)
HCT VFR BLD AUTO: 38 % (ref 37–48.5)
HGB BLD-MCNC: 12.7 GM/DL (ref 12–16)
IMM GRANULOCYTES # BLD AUTO: 0.11 K/UL (ref 0–0.04)
IMM GRANULOCYTES NFR BLD AUTO: 1.3 % (ref 0–0.5)
LYMPHOCYTES # BLD AUTO: 0.69 K/UL (ref 1–4.8)
MAGNESIUM SERPL-MCNC: 1.9 MG/DL (ref 1.6–2.6)
MCH RBC QN AUTO: 31.1 PG (ref 27–31)
MCHC RBC AUTO-ENTMCNC: 33.4 G/DL (ref 32–36)
MCV RBC AUTO: 93 FL (ref 82–98)
N GONORRHOEA RRNA SPEC QL NAA+PROBE: NEGATIVE
NUCLEATED RBC (/100WBC) (SMH): 0 /100 WBC
PHOSPHATE SERPL-MCNC: 2.4 MG/DL (ref 2.7–4.5)
PLATELET # BLD AUTO: 278 K/UL (ref 150–450)
PMV BLD AUTO: 11 FL (ref 9.2–12.9)
POTASSIUM SERPL-SCNC: 3.8 MMOL/L (ref 3.5–5.1)
RBC # BLD AUTO: 4.09 M/UL (ref 4–5.4)
RELATIVE EOSINOPHIL (SMH): 0 % (ref 0–8)
RELATIVE LYMPHOCYTE (SMH): 8.2 % (ref 18–48)
RELATIVE MONOCYTE (SMH): 3.2 % (ref 4–15)
RELATIVE NEUTROPHIL (SMH): 87.2 % (ref 38–73)
SODIUM SERPL-SCNC: 140 MMOL/L (ref 136–145)
WBC # BLD AUTO: 8.42 K/UL (ref 3.9–12.7)

## 2025-06-04 PROCEDURE — 94761 N-INVAS EAR/PLS OXIMETRY MLT: CPT

## 2025-06-04 PROCEDURE — 36415 COLL VENOUS BLD VENIPUNCTURE: CPT

## 2025-06-04 PROCEDURE — 85025 COMPLETE CBC W/AUTO DIFF WBC: CPT

## 2025-06-04 PROCEDURE — 99233 SBSQ HOSP IP/OBS HIGH 50: CPT | Mod: ,,, | Performed by: NURSE PRACTITIONER

## 2025-06-04 PROCEDURE — 99900035 HC TECH TIME PER 15 MIN (STAT)

## 2025-06-04 PROCEDURE — 25000003 PHARM REV CODE 250

## 2025-06-04 PROCEDURE — 63600175 PHARM REV CODE 636 W HCPCS: Mod: JZ,TB | Performed by: FAMILY MEDICINE

## 2025-06-04 PROCEDURE — 83735 ASSAY OF MAGNESIUM: CPT

## 2025-06-04 PROCEDURE — 80048 BASIC METABOLIC PNL TOTAL CA: CPT

## 2025-06-04 PROCEDURE — 84100 ASSAY OF PHOSPHORUS: CPT

## 2025-06-04 RX ADMIN — CEFTRIAXONE 2 G: 2 INJECTION, POWDER, FOR SOLUTION INTRAMUSCULAR; INTRAVENOUS at 05:06

## 2025-06-04 RX ADMIN — METHYLPREDNISOLONE SODIUM SUCCINATE 60 MG: 125 INJECTION, POWDER, FOR SOLUTION INTRAMUSCULAR; INTRAVENOUS at 05:06

## 2025-06-04 RX ADMIN — FOLIC ACID 1 MG: 1 TABLET ORAL at 08:06

## 2025-06-04 NOTE — PLAN OF CARE
Follow up with PCP and neuro scheduled and added to AVS. Follow up with ID requested via inbasket. All follow ups added to AVS for reference. Neuro referral in and pt added to waitlist for potential sooner appointment. No further needs known. Mother to provide transport at discharge.      06/04/25 1351   Final Note   Assessment Type Final Discharge Note   Anticipated Discharge Disposition Home   What phone number can be called within the next 1-3 days to see how you are doing after discharge? [phone]   Hospital Resources/Appts/Education Provided Provided patient/caregiver with written discharge plan information;Appointments scheduled and added to AVS   Post-Acute Status   Discharge Delays None known at this time

## 2025-06-04 NOTE — PROGRESS NOTES
[facility]   Department of Infectious Disease  Progress Note        PATIENT NAME: Irene Grayson  MRN: [id number]  TODAY'S DATE: 06/04/2025  ADMIT DATE: 5/30/2025  LENGTH OF STAY: 4 DAYS    CHIEF COMPLAINT: Follow-up (Pt called for follow up with PA)    PRINCIPLE PROBLEM: AIDP (acute inflammatory demyelinating polyneuropathy)    REASON FOR CONSULT: worsening headache, photophobia, CSF shows high protein and low glucose - concern for bact meningitis     ASSESSMENT and PLAN     1. Bacterial meningitis versus aseptic meningitis from IVIG   - MRI brain without contrast and MRI brain demyelinating with and without contrast both normal  - CSF with 2718 WBCs, 94% neutrophils, 6% lymphocytes, glucose 37 and protein 137  - blood cultures x2 ngtd, pending final  -CSF culture ngtd at 36 hours, CSF VDRL pending, meningitis/encephalitis panel negative    2.  Seizures not on AED    3. Asthma, controlled    4.  Acute inflammatory demyelinating polyneuropathy - started on IVIG empirically, followed by Neurology  -Patient refused IVIG on 06/02, S/P 2 doses  - MRI C, T and L-spine negative except for cervical stenosis on MRI C-spine    5.  Hx of: MTHFR deficiency and homocystinuria, LGSIL, HPV, cholelithiasis s/p cholecystectomy, anemia, folate deficiency, depression and anxiety    6. Cervical Spine Stenosis  -MRI of C-spine with broad-based disc perfusion at C5-C6 with moderate to severe spinal canal stenosis and straightening of normal cervical spinal curvature  -  evaluated by Neurosurgery, needs outpatient follow-up       RECOMMENDATIONS:  Stop ceftriaxone  She is feeling much better after stopping IVIG - received 2 doses, last dose on 6/2/2025  OK to discharge on no antibiotics    D/W Dr Mathew    Infectious Disease will sign off. Please send Epic secure chat with any questions.    Antibiotics (From admission, onward)      None          Antifungals (From admission, onward)      None           Antivirals (From  admission, onward)      None            INTERVAL HISTORY     6/4/2025@0928 (Justen): She is sitting up in bed awake and alert and feels back to baseline.  To be discharged today.  CSF cultures so far negative, blood cultures no growth to date, T-max 98.7° in the last 24 hours.  She was seen by Neurology yesterday who agreed with stopping IVIG and recommended follow-up as scheduled with Neurology at [facility] on July 2, 2025.  No lab work today.    6/3/2025@0944 (Justen):  patient is awake and alert sitting up in bed.  She looks and feels great today.  She refused dose of IVIG last night because she feels like that is what caused all of her problems.  Patient states she felt well prior to IVIG infusion.  She said  several hours after receiving infusion is when the severe headache started and she started feeling so poorly. She denies neck pain or stiffness. She said the numbness in the right shin is no different than it has been for the past 3 weeks.  Wrist weakness is no different.  She has had no fevers, chills, sweats,  headache, photophobia resolved.  She has been ambulatory in her room.  She has not had a bowel movement for 7 days.  She said her appetite is much better though she has nausea today that she thinks is from the potassium supplementation that   She was given to drink.  We sent a message to hospitalist regarding holding IVIG until Neurology sees patient.  I let her know that we were stopping vancomycin and would wait for CSF culture to finalize before stopping the other antibiotic.  T-max 99.4° in the last 24 hours.  No leukocytosis, normal platelets, normal H&H, neutrophils 84.2%, creatinine 0.6, CRP 0.10, procalcitonin negative, ESR 20.  All normal.  Blood cultures no growth to date,  CSF culture no growth to date.  Gram stain few WBCs no organisms. ME panel negative.        KI Anayahiginio is a 29 y.o. female  with chronic medical problems including seizures, asthma, MTHFR deficiency  and homocystinuria, LGSIL, HPV, cholelithiasis s/p cholecystectomy, anemia, folate deficiency, depression and anxiety  who presented to the emergency department on 5/29 complaining of  numbness to right lower extremity from knee to ankle for 3 weeks. Associated symptoms are headache, neck stiffness, nausea. She does not have any change in her gait or movement.  She was concerned because she has a family history of MS.   She had a brain MRI and was referred to Neurology.  Liset's called back the next day because she was unable to get into Neurology until July and she was having a headache and still having the numbness in the right lower extremity.   She has had an intermittent headache  over the years but after the 2nd dose of IVIG  after admission she has had a severe headache that starts at the base of her  head and upper neck and goes around to her forehead.  It is associated with severe nausea, no vision, hearing or speech changes.   She states she has a chronic sore throat in his seeing ENT for GERD.  About 3 weeks ago she took a course of doxycycline and prior to that a course of Augmentin with no improvement in throat.  She has had her tonsils out twice and she said they keep growing back.  She has not been able to eat because she is nauseated.  She has a chronic cough, chronic bilateral wrist weakness that causes her to drop things that has been going on for months.  She had seizures as a child and came to the emergency room  in the last year with concern for a seizure but refused antiepileptic drugs in his not followed up with neurology.  She also has ringing in her ears.  Her 3-year-old son has had a fever for the past 2 weeks and went to the emergency room and had Mark bar IgG positive and IgM negative.  She said he was having a fever and a cough up to 104 but has not really been acting sick.     She denies any wounds but states she had a few red spots on her right lower extremity that look like flea  bites but they are gone now. She lives at home with her mother and father in her 3 kids ages 6 5 and 3.  She has a dog and a cat, she is a stay-at-home mom.  She smokes lightly, no alcohol or drug use.       MRI brain demyelinating negative for anything acute, MRI t/L spine negative for anything acute or chronic.  Lumbar puncture completed at 0130 and CSF culture, VDRL and meningitis encephalitis panel pending.   No leukocytosis, neutrophils 81% 0.1% today but not in the last 3 days prior, creatinine 0.7 and estimated creatinine clearance 90.3, pregnancy test negative, drug screen positive for amphetamines, hepatitis and HIV negative, urinalysis with protein, CSF glucose 37, protein 137,  WBC 2713 with 94% neutrophils, 7 RBCs and 6 lymphocytes.    Antibiotic history:    Ceftriaxone: 6/2/25-3/4/2025  Vancomycin: 6/2/25-6/3/25    Microbiology:    06/02/2025 blood cultures x2 sets no growth to date  06/02/2025 CSF culture no growth to date  06/02/2025 encephalitis/meningitis panel negative  06/02/2025 CSF VDRL and GC/Chlamydia pending    Outdoor activities: Lives at home with mother and father, 3 children ages 6,5 and 3,  Travel: none  Implants: None  Antibiotic history:  See HPI    Social History  Marital Status: Single  Alcohol History:  reports that she does not currently use alcohol.  Tobacco History:  reports that she quit smoking about 5 years ago. Her smoking use included cigarettes. She started smoking about 9 years ago. She has a 1 pack-year smoking history. She has never used smokeless tobacco.  Drug History:  reports no history of drug use.    Review of patient's allergies indicates:   Allergen Reactions    Oranges [orange]      ulcers       SUBJECTIVE     Review of Systems  Review of systems obtained and negative except as stated above in Interval History     OBJECTIVE     Temp:  [97.8 °F (36.6 °C)-98.7 °F (37.1 °C)] 98.2 °F (36.8 °C)  Pulse:  [71-94] 71  Resp:  [16-20] 16  SpO2:  [96 %-100 %] 96 %  BP:  (105-119)/(55-77) 107/62  Temp:  [97.8 °F (36.6 °C)-98.7 °F (37.1 °C)]   Temp: 98.2 °F (36.8 °C) (06/04/25 0700)  Pulse: 71 (06/04/25 0900)  Resp: 16 (06/04/25 0900)  BP: 107/62 (06/04/25 0900)  SpO2: 96 % (06/04/25 0900)    Intake/Output Summary (Last 24 hours) at 6/4/2025 0944  Last data filed at 6/4/2025 0935  Gross per 24 hour   Intake 487.85 ml   Output 1 ml   Net 486.85 ml     Physical Exam  General: Patient feels back to baseline.  Eyes: Eyes with no icterus or injection. Vision grossly normal, PERRL.   Ears: Hearing grossly normal.  Nose: Nares patent  Mouth: Moist mucous membranes, dentition is good. No ulcerations, erythema or exudates.    Neck:   No neck pain. No lymphadenopathy.  Cardiovascular: Regular rate and rhythm, no murmurs, no peripheral edema.    Respiratory:  Clear to auscultation bilaterally anterior and posterior, no tachypnea or increased work of breathing. On RA,   No coughing or wheezing noted.  Gastrointestinal:  Soft with active bowel sounds, no tenderness to palpation, no distention.  Musculoskeletal: Moves all extremities with equal strength.  Independently ambulatory.   Skin: Pale, warm and dry, no obvious rashes. No wounds.  Neuro: Oriented, conversant, follows commands. Unchanged decreased sensation to right lower extremity shin to ankle.  Psych: Good mood, normal affect.  VAD: PIV  Isolation: No active isolations     Wounds  None    Significant Labs: All pertinent labs within the past 24 hours have been reviewed.    CBC LAST 7 DAYS  Recent Labs   Lab 05/30/25  1835 05/31/25  0436 06/01/25  0437 06/02/25  0453 06/03/25  0449   WBC 6.84 6.83 6.92 10.18 11.58   RBC 4.45 4.45 4.50 4.64 4.00   HGB 13.7 13.9 13.9 14.2 12.6   HCT 41.4 41.8 42.2 43.7 37.0   MCV 93 94 94 94 93   MCH 30.8 31.2* 30.9 30.6 31.5*   MCHC 33.1 33.3 32.9 32.5 34.1   RDW 12.5 12.4 12.1 12.2 12.1    264 243 243 285   MPV 10.5 10.6 10.1 10.0 10.7   NRBC 0 0 0 0 0       CHEMISTRY LAST 7 DAYS  Recent Labs   Lab  "05/30/25  1835 05/31/25  0436 06/01/25  0437 06/02/25  0453 06/03/25  0449    137 136 137 137   K 3.6 4.1 3.8 5.0 3.6    111* 108 103 108   CO2 23 25 17* 27 21*   ANIONGAP 9 1* 11 7* 8   BUN 13 12 11 9 12   CREATININE 1.0 0.7 0.6 0.7 0.6   GLU 97 93 98 105 135*   CALCIUM 9.1 9.3 9.2 9.6 9.2   MG  --  1.9 1.9 2.0 1.8   ALBUMIN 4.4  --   --   --   --    PROT 7.0  --   --   --   --    ALKPHOS 44*  --   --   --   --    ALT 13  --   --   --   --    AST 14  --   --   --   --    BILITOT 0.3  --   --   --   --        Estimated Creatinine Clearance: 114.6 mL/min (based on SCr of 0.6 mg/dL).    INFLAMMATORY/PROCAL  LAST 7 DAYS  Recent Labs   Lab 06/02/25 0453   CRP 0.10     No results found for: "ESR"  CRP   Date Value Ref Range Status   06/02/2025 0.10 <1.00 mg/dL Final     Comment:     CRP-Normal Application expected values:          <1.0        mg/dL   Normal Range          1.0 - 5.0  mg/dL   Indicates mild inflammation          5.0 - 10.0 mg/dL   Indicates severe inflammation        >10.0        mg/dL   Represents serious processes and frequently                                 indicates the presence of a bacterial infection.        PRIOR MICROBIOLOGY:  No results found for the last 90 days.      LAST 7 DAYS MICROBIOLOGY   Microbiology Results (last 7 days)       Procedure Component Value Units Date/Time    CSF culture [[id number]] Collected: 06/02/25 1317    Order Status: Completed Specimen: CSF (Spinal Fluid) from CSF Tap, Tube 3 Updated: 06/04/25 0706     CULTURE, CSF No Growth To Date     GRAM STAIN Few WBC seen      No organisms seen    Blood culture [[id number]]  (Normal) Collected: 06/02/25 1540    Order Status: Completed Specimen: Blood Updated: 06/04/25 0429     CULTURE, BLOOD ([facility]) No Growth After 36 Hours    Blood culture [[id number]]  (Normal) Collected: 06/02/25 1535    Order Status: Completed Specimen: Blood Updated: 06/04/25 0429     CULTURE, BLOOD ([facility]) No Growth After 36 Hours      "         CURRENT/PREVIOUS VISIT EKG  Results for orders placed or performed during the hospital encounter of 05/30/25   EKG 12-lead    Collection Time: 06/02/25 10:28 AM   Result Value Ref Range    QRS Duration 78 ms    [facility] QTC Calculation 433 ms    Narrative    Test Reason : I49.9,    Vent. Rate : 101 BPM     Atrial Rate : 101 BPM     P-R Int : 112 ms          QRS Dur :  78 ms      QT Int : 334 ms       P-R-T Axes :  51  82  54 degrees    QTcB Int : 433 ms    Sinus tachycardia  Otherwise normal ECG  When compared with ECG of 19-Feb-2025 16:51,  No significant change was found    Referred By: AAAREFERRAL SELF           Confirmed By:          Significant Imaging: I have reviewed all relevant and available imaging results/findings within the past 24 hours.      I spent a total of 50 minutes on the day of the visit.This includes face to face time and non-face to face time preparing to see the patient (eg, review of tests), obtaining and/or reviewing separately obtained history, documenting clinical information in the electronic or other health record, independently interpreting results and communicating results to the patient/family/caregiver, or care coordinator.      Minerva Campuzano NP  Date of Service: 06/04/2025      This note was created using [employer]  voice recognition software that occasionally misinterpreted phrases or words.

## 2025-06-04 NOTE — DISCHARGE SUMMARY
[facility] Medicine  Discharge Summary      Patient Name: Irene Grayson  MRN: [id number]  ANGY: [id number]  Patient Class: IP- Inpatient  Admission Date: 2025  Hospital Length of Stay: 4 days  Discharge Date and Time: 2025 2:00 PM  Attending Physician: Keila Colin DO   Discharging Provider: Keila Colin DO  Primary Care Provider: Clifford Page DO    Primary Care Team: Networked reference to record PCT     HPI:   Ms. Grayson is a 29 yr old female with a hx of swine flu, seizures, MTHFR deficiency and homocystinuria, LGSIL, HPV, cholelithiasis s/p cholecystectomy, anemia, folate deficiency, depression, anxiety who presents to the ED with a chief complaint of right lower extremity numbness as well as bilateral upper extremity weakness.  Patient endorses that she has experienced right lower extremity numbness from her knee to the top of her foot over the last 3-1/2 weeks that has been consistent.  She describes this feeling of numbness to be similar to her  scar.  She also endorses that she has had trouble with her gait and states that she has noticed herself dragging her foot at times.  She tells me that she has also experienced bilateral upper extremity weakness that has been intermittent for a while now but has been progressively worsening recently.  She states it has been hard for her to grab cast iron skillet from out of the oven and also has trouble opening lids.  She initially thought this might be due to stress.  However, it has been unchanged.  She also endorses intermittent headaches behind her left eye for years now as well as dizziness as if the world around her is spinning/moving, bilateral tinnitus also for some time now, and also endorses decreased urine output as well as chronic diarrhea.  She endorses trying ibuprofen at home without relief.  She denies slurred speech, facial asymmetry, vision changes.  She states that she smokes 1 pack of  cigarettes every other day, and vapes every other day, she occasionally drinks alcohol, and denies recreational drug use.    Of note, patient was seen in the ED on 05/29 and was discharged home with urgent neuro referral for MS workup.  Neurology was unable to see her until July 8th so she was called and advised to return to the ED for further evaluation.    Upon arrival to ED, patient afebrile, HR of 108, RR of 16, BP of 126/86, satting 100% on RA.  Workup in the ED with presumptive positive urine amphetamines.  Remainder of labs unremarkable.  Of note, patient is on dextroamphetamine-amphetamine 10 mg tabs.  MRI brain demyelinating with and without contrast shows no acute intracranial process.  ED provider discussed case with Neurology at [facility] who recommended patient receive an MRI cervical spine without contrast.  Patient will be placed under observation for further management.    * No surgery found *      Hospital Course:   We admitted the patient with the neurologic symptoms and diagnosed her with possible AIDP vs CIDP.  Neurology consulted with us and recommended MRI of spine and LP to confirm suspicions.  He recommended beginning IVIG infusions daily empirically.  Patient was moved to Stepdown for frequent vitals during infusions. MRI of spine and LP on 06/02 initial CSF count shows WBC greater than 2000, elevated protein and decreased glucose concerning for bacterial meningitis versus underlying CNS inflammatory process but with sudden onset headache this morning we will need to start empiric antibiotics and steroids and ordered blood cultures.  Infectious Disease has been consult as well as Neurology.  MRI of the C-spine showed mod- severe spinal canal stenosis and Neurosurgery was consult but no role for surgical intervention at this time.  Hold IVIG at this time due to possible medication reaction causing meningitis like symptoms.  Discuss with Neurology team and they felt that unlikely AIDP given  presence of deep tendon reflexes but definitely needs close follow up with Neurology.  Discuss with Infectious Disease and blood cultures and CSF cultures negative for any growth x2 days, it is felt that this is likely aseptic meningitis from IVIG and patient should discontinue.  Does not need antibiotics and can Follow up with ID in 2 weeks.  We will add Privigen IVIG to allergy list for now.     Goals of Care Treatment Preferences:  Code Status: Full Code      SDOH Screening:  The patient declined to be screened for utility difficulties, food insecurity, transport difficulties, housing insecurity, and interpersonal safety, so no concerns could be identified this admission.     Consults:   Consults (From admission, onward)          Status Ordering Provider     Inpatient consult to Social Work/Case Management  Once        Provider:  (Not yet assigned)    Acknowledged CARLOS A LEAL     Inpatient consult to Neurology Services (Vascular Neurology)  Once        Provider:  Pam Jimenez PA-C    Completed ADRIANA HYLTON     Inpatient consult to Infectious Diseases  Once        Provider:  Curt Mathew MD    Completed ADRIANA HYLTON     Inpatient consult to Neurosurgery  Once        Provider:  Akin Mills DO    Acknowledged ADRIANA HYLTON     Inpatient Consult to Neurology Services (General Neurology)  Once        Provider:  (Not yet assigned)    Completed JAYLAN KEARNEY            Assessment & Plan  Epilepsy  - Hx noted  - Seizure precautions  - No AEDs currently on home medication list    Nicotine dependence  Dangers of cigarette smoking were reviewed with patient in detail. Patient was Counseled for 3-10 minutes. Nicotine replacement options were discussed. Nicotine replacement was discussed- not prescribed per patient's request  Cervical stenosis of spinal canal  Seen and evaluated by Neurosurgery, no role for surgical intervention    Final Active Diagnoses:    Diagnosis Date Noted POA     Cervical stenosis of spinal canal [M48.02] 06/02/2025 Yes    Nicotine dependence [F17.200] 05/30/2025 Yes    Epilepsy [G40.909] 09/21/2021 Yes      Problems Resolved During this Admission:    Diagnosis Date Noted Date Resolved POA    PRINCIPAL PROBLEM:  AIDP (acute inflammatory demyelinating polyneuropathy) [G61.0] 05/30/2025 06/04/2025 Yes    Sudden onset of severe headache [R51.9] 06/02/2025 06/04/2025 No       Discharged Condition: good    Disposition: Home or Self Care    Follow Up:   Follow-up Information       Clifford Page DO. Go on 6/12/2025.    Specialty: Family Medicine  Why: please go to hospital follow up appointment at 2:45PM  Contact information:  [address]  [address]  [place] [facility] [address]  [phone]               Kellerman, Marshall J, PA-C. Go on 7/2/2025.    Specialty: Neurology  Why: please go to appointment for 8AM  Contact information:  [address] [facility] [address]  [phone]               Curt Mathew MD. Schedule an appointment as soon as possible for a visit in 2 week(s).    Specialty: Infectious Diseases  Why: In basket message sent to clinic. Clinic to contact pt with appointment date and time. If the clinic has not contacted you within 3 days of discharge to schedule, please contact them to schedule.  Contact information:  [address] [facility] [address]  [phone]               Maxine George FNP. Go on 6/24/2025.    Specialty: Neurology  Why: please go to hospital follow up appointment for 10:30AM  Contact information:  [address] [facility] [id number] [phone]                           Patient Instructions:      Ambulatory referral/consult to Neurology   Standing Status: Future   Referral Priority: Routine Referral Type: Consultation   Referral Reason: Specialty Services Required   Requested Specialty: Neurology   Number of Visits Requested: 1     Notify your health care provider if you experience any of the following:  temperature  ">100.4     Notify your health care provider if you experience any of the following:  persistent nausea and vomiting or diarrhea     Notify your health care provider if you experience any of the following:  severe uncontrolled pain     Notify your health care provider if you experience any of the following:  difficulty breathing or increased cough     Notify your health care provider if you experience any of the following:  persistent dizziness, light-headedness, or visual disturbances     Notify your health care provider if you experience any of the following:  increased confusion or weakness     Activity as tolerated       Significant Diagnostic Studies: Labs: BMP:   Recent Labs   Lab 06/03/25  0449 06/04/25  0926   * 137*    140   K 3.6 3.8    108   CO2 21* 24   BUN 12 11   CREATININE 0.6 0.6   CALCIUM 9.2 9.6   MG 1.8 1.9   , CMP   Recent Labs   Lab 06/03/25  0449 06/04/25  0926    140   K 3.6 3.8    108   CO2 21* 24   * 137*   BUN 12 11   CREATININE 0.6 0.6   CALCIUM 9.2 9.6   ANIONGAP 8 8   , CBC   Recent Labs   Lab 06/03/25 0449 06/04/25  0926   WBC 11.58 8.42   HGB 12.6 12.7   HCT 37.0 38.0    278   , INR   Lab Results   Component Value Date    INR 1.0 02/19/2025    INR 1.0 03/13/2020   , Lipid Panel   Lab Results   Component Value Date    CHOL 166 02/19/2025    HDL 61 02/19/2025    LDLCALC 80.2 02/19/2025    TRIG 124 02/19/2025    CHOLHDL 36.7 02/19/2025   , Troponin No results for input(s): "TROPONINI" in the last 168 hours., A1C:   Recent Labs   Lab 02/20/25  0320   HGBA1C 5.5   , and All labs within the past 24 hours have been reviewed  Microbiology: Blood Culture No results found for: "LABBLOO" and CSF Culture:   Lab Results   Component Value Date    CSFCULTURE No Growth To Date 06/02/2025     Radiology:   Imaging Results              MRI Brain Demyelinating W W/O Contrast (Final result)  Result time 05/30/25 20:27:22      Final result by Hoang Bruner MD " (05/30/25 20:27:22)                   Impression:      No acute intracranial process.      Electronically signed by: Hoang Bautista  Date:    05/30/2025  Time:    20:27               Narrative:    EXAMINATION:  MRI BRAIN DEMYELINATING W/ WO CONTRAST    CLINICAL HISTORY:  MS rule out;.    TECHNIQUE:  Multiplanar multisequence MR imaging of the brain was performed before and after the administration of 5 mL Gadavist  intravenous contrast.    COMPARISON:  02/19/2025    FINDINGS:  Intracranial compartment:    Ventricles and sulci are normal in size for age without evidence of hydrocephalus. No extra-axial blood or fluid collections.    The brain parenchyma appears normal. No mass lesion, acute hemorrhage, edema or acute infarct. No abnormal enhancement.    Normal vascular flow voids are preserved.    Skull/extracranial contents (limited evaluation): Bone marrow signal intensity is normal.    No evidence of demyelination.    No significant abnormality.  No significant change.                                      Cardiac Graphics: Echocardiogram: 2D echo with color flow doppler: No results found for this or any previous visit. and Transthoracic echo (TTE) complete (Cupid Only):   Results for orders placed or performed during the hospital encounter of 02/19/25   Echo   Result Value Ref Range    IVC diameter 1.10 cm    TDI SEPTAL 0.16 m/s    MV valve area p 1/2 method 4.00 cm2    MV stenosis pressure 1/2 time 55.00 ms    Ascending aorta 2.10 cm    Ao root annulus 2.40 cm    Pulmonary Valve Mean Velocity 0.70 m/s    PV peak gradient 4 mmHg    PV PEAK VELOCITY 1.00 m/s    Left Ventricular Outflow Tract Mean Gradient 2.00 mmHg    Left Ventricular Outflow Tract Mean Velocity 0.65 cm/s    LVOT peak nati 1.0 m/s    MV VTI 22.4 cm    MV valve area by continuity eq 2.68 cm2    MV peak gradient 4 mmHg    MV mean gradient 1 mmHg    MOHSEN by Velocity Ratio 2.9 cm²    AV index (prosthetic) 0.93     AV Velocity Ratio 0.91     AV valve area  2.9 cm²    LVOT peak VTI 19.1 cm    Ao VTI 20.6 cm    Ao peak angelo 1.1 m/s    AV peak gradient 5 mmHg    AV mean gradient 3 mmHg    RA area length vol 14.50 mL    LA size 2.8 cm    RV/LV Ratio 0.43 cm    TAPSE 1.86 cm    MV Peak A Angelo 0.72 m/s    RV S' 12.00 cm/s    E/E' ratio 4 m/s    RV- rodriguez basal diam 1.8 cm    LV mass 93.0 g    LV LATERAL E/E' RATIO 3.6 m/s    LV SEPTAL E/E' RATIO 5.4 m/s    E wave deceleration time 130 msec    E/A ratio 1.21     TDI LATERAL 0.24 m/s    MV Peak E Angelo 0.87 m/s    LV Mass Index 58.1 g/m2    PW 0.8 0.6 - 1.1 cm    Left Ventricle Relative Wall Thickness 0.38 cm    FS 31.0 28 - 44 %    LVOT area 3.1 cm2    LVOT diameter 2.0 cm    IVS 0.7 0.6 - 1.1 cm    Left Ventricular End Diastolic Volume by Teichholz Method 78.58 mL    Left Ventricular End Systolic Volume by Teichholz Method 32.21 mL    LV EDV A4C 78.30 mL    LV EDV A2C 60.877656871359161 mL    LV Diastolic Volume Index 49.11 mL/m2    LV ESV A4C 22.80 mL    LV Diastolic Volume 78.58 mL    LVIDs 2.9 2.1 - 4.0 cm    LV Systolic Volume Index 20.1 mL/m2    LV Systolic Volume 32.21 mL    LVIDd 4.2 3.5 - 6.0 cm    LVOT stroke volume 60.0 cm3    A4C EF 56 %    A2C EF 57 %    Mendoza's Biplane MOD Ejection Fraction 56 %    Mean e' 0.20 m/s    ZLVIDS 0.21     ZLVIDD -0.81     LA area A4C 11.30 cm2    RVDD 1.80 cm    AORTIC VALVE CUSP SEPERATION 1.60 cm    BSA 1.6 m2    Est. RA pres 3 mmHg    Narrative      Left Ventricle: The left ventricle is normal in size. Normal wall   thickness. Normal wall motion. There is normal systolic function with a   visually estimated ejection fraction of 55 - 70%. There is normal   diastolic function.    Right Ventricle: Normal right ventricular cavity size. Wall thickness   is normal. Systolic function is normal.    IVC/SVC: Normal venous pressure at 3 mmHg.         Pending Diagnostic Studies:       Procedure Component Value Units Date/Time    C. trachomatis/N. gonorrhoeae by AMP DNA [place]; Urine  [[id number]] Collected: 06/03/25 0619    Order Status: Sent Lab Status: In process Updated: 06/03/25 0635    Specimen: Genital from Urine     EKG 12-lead [[id number]] Collected: 06/02/25 1028    Order Status: Sent Lab Status: In process Updated: 06/02/25 1413     QRS Duration 78 ms      [facility] QTC Calculation 433 ms     Narrative:      Test Reason : I49.9,    Vent. Rate : 101 BPM     Atrial Rate : 101 BPM     P-R Int : 112 ms          QRS Dur :  78 ms      QT Int : 334 ms       P-R-T Axes :  51  82  54 degrees    QTcB Int : 433 ms    Sinus tachycardia  Otherwise normal ECG  When compared with ECG of 19-Feb-2025 16:51,  No significant change was found    Referred By: AAAREFERRAL SELF           Confirmed By:     EXTRA TUBES [[id number]] Collected: 05/30/25 1847    Order Status: Sent Lab Status: In process Updated: 05/30/25 1847    Specimen: Blood, Venous     Narrative:      The following orders were created for panel order EXTRA TUBES.  Procedure                               Abnormality         Status                     ---------                               -----------         ------                     Light Blue Top Hold[[id number]]                             In process                 Lavender Top Hold[[id number]]                               In process                   Please view results for these tests on the individual orders.    Meningitis - Encephalitis Panel, CSF [[id number]] Collected: 06/02/25 1555    Order Status: Sent Lab Status: In process Updated: 06/02/25 1557    Specimen: CSF (Spinal Fluid) from CSF Tap, Tube 2     VDRL, CSF [[id number]] Collected: 06/02/25 1317    Order Status: Sent Lab Status: In process Updated: 06/02/25 1601    Specimen: CSF (Spinal Fluid) from Cerebrospinal Fluid            Medications:  Reconciled Home Medications:      Medication List        CONTINUE taking these medications      albuterol 90 mcg/actuation inhaler  Commonly known as: PROVENTIL/VENTOLIN HFA  Inhale 1-2  puffs into the lungs every 4 (four) hours as needed for Wheezing or Shortness of Breath. NEW Rx - NOT YET STARTED     butalbital-acetaminophen-caffeine -40 mg -40 mg per tablet  Commonly known as: FIORICET, ESGIC  Take 1 tablet by mouth every 4 (four) hours as needed.     dextroamphetamine-amphetamine 15 mg tablet  Commonly known as: ADDERALL  Take 1 tablet by mouth 2 (two) times a day.     folic acid 1 MG tablet  Commonly known as: FOLVITE  Take 1 mg by mouth once daily.     progesterone 100 MG capsule  Commonly known as: PROMETRIUM  Take 100 mg by mouth once daily.              Indwelling Lines/Drains at time of discharge:   Lines/Drains/Airways       None                   Time spent on the discharge of patient: 34 minutes         Keila Colin DO  Department of Hospital Medicine  [facility]

## 2025-06-04 NOTE — PROGRESS NOTES
IV d/c'd - pt tolerated well. Discharge instructions provided - pt verbalized understanding. Pt departed with family with all personal belongings and discharge paperwork @4748.

## 2025-06-05 LAB
OHS QRS DURATION: 78 MS
OHS QTC CALCULATION: 433 MS

## 2025-06-06 LAB
BACTERIA CSF CULT: NO GROWTH
GRAM STN SPEC: NORMAL
GRAM STN SPEC: NORMAL

## 2025-06-07 LAB
BACTERIA BLD CULT: NORMAL
BACTERIA BLD CULT: NORMAL

## 2025-06-09 ENCOUNTER — HOSPITAL ENCOUNTER (EMERGENCY)
Facility: HOSPITAL | Age: 30
Discharge: HOME | End: 2025-06-09
Attending: EMERGENCY MEDICINE
Payer: MEDICAID

## 2025-06-09 VITALS
BODY MASS INDEX: 17.89 KG/M2 | OXYGEN SATURATION: 100 % | RESPIRATION RATE: 20 BRPM | SYSTOLIC BLOOD PRESSURE: 159 MMHG | DIASTOLIC BLOOD PRESSURE: 97 MMHG | HEIGHT: 67 IN | HEART RATE: 98 BPM | TEMPERATURE: 98 F | WEIGHT: 114 LBS

## 2025-06-09 DIAGNOSIS — R07.9 CHEST PAIN: ICD-10-CM

## 2025-06-09 DIAGNOSIS — R10.13 EPIGASTRIC PAIN: ICD-10-CM

## 2025-06-09 LAB
ABSOLUTE EOSINOPHIL (SMH): 0.03 K/UL
ABSOLUTE MONOCYTE (SMH): 0.54 K/UL (ref 0.3–1)
ABSOLUTE NEUTROPHIL COUNT (SMH): 5 K/UL (ref 1.8–7.7)
ALBUMIN SERPL-MCNC: 4 G/DL (ref 3.5–5.2)
ALP SERPL-CCNC: 52 UNIT/L (ref 40–150)
ALT SERPL-CCNC: 46 UNIT/L (ref 10–44)
ANION GAP (SMH): 9 MMOL/L (ref 8–16)
AST SERPL-CCNC: 27 UNIT/L (ref 11–45)
B-HCG UR QL: NEGATIVE
BASOPHILS # BLD AUTO: 0.04 K/UL
BASOPHILS NFR BLD AUTO: 0.5 %
BILIRUB SERPL-MCNC: 0.3 MG/DL (ref 0.1–1)
BUN SERPL-MCNC: 6 MG/DL (ref 6–20)
CALCIUM SERPL-MCNC: 8.9 MG/DL (ref 8.7–10.5)
CHLORIDE SERPL-SCNC: 109 MMOL/L (ref 95–110)
CO2 SERPL-SCNC: 22 MMOL/L (ref 23–29)
CREAT SERPL-MCNC: 0.7 MG/DL (ref 0.5–1.4)
CTP QC/QA: YES
D DIMER PPP IA.FEU-MCNC: <0.19 MG/L FEU
ERYTHROCYTE [DISTWIDTH] IN BLOOD BY AUTOMATED COUNT: 12.3 % (ref 11.5–14.5)
GFR SERPLBLD CREATININE-BSD FMLA CKD-EPI: >60 ML/MIN/1.73/M2
GLUCOSE SERPL-MCNC: 106 MG/DL (ref 70–110)
HCT VFR BLD AUTO: 41.3 % (ref 37–48.5)
HGB BLD-MCNC: 13.9 GM/DL (ref 12–16)
IMM GRANULOCYTES # BLD AUTO: 0.06 K/UL (ref 0–0.04)
IMM GRANULOCYTES NFR BLD AUTO: 0.8 % (ref 0–0.5)
LIPASE SERPL-CCNC: 15 U/L (ref 4–60)
LYMPHOCYTES # BLD AUTO: 1.92 K/UL (ref 1–4.8)
MAGNESIUM SERPL-MCNC: 2 MG/DL (ref 1.6–2.6)
MCH RBC QN AUTO: 30.9 PG (ref 27–31)
MCHC RBC AUTO-ENTMCNC: 33.7 G/DL (ref 32–36)
MCV RBC AUTO: 92 FL (ref 82–98)
NT-PROBNP SERPL-MCNC: <15 PG/ML
NUCLEATED RBC (/100WBC) (SMH): 0 /100 WBC
PLATELET # BLD AUTO: 374 K/UL (ref 150–450)
PMV BLD AUTO: 10.4 FL (ref 9.2–12.9)
POTASSIUM SERPL-SCNC: 3.4 MMOL/L (ref 3.5–5.1)
PROT SERPL-MCNC: 7.5 GM/DL (ref 6–8.4)
RBC # BLD AUTO: 4.5 M/UL (ref 4–5.4)
RELATIVE EOSINOPHIL (SMH): 0.4 % (ref 0–8)
RELATIVE LYMPHOCYTE (SMH): 25.2 % (ref 18–48)
RELATIVE MONOCYTE (SMH): 7.1 % (ref 4–15)
RELATIVE NEUTROPHIL (SMH): 66 % (ref 38–73)
SODIUM SERPL-SCNC: 140 MMOL/L (ref 136–145)
TROPONIN I SERPL HS-MCNC: <3 NG/L
WBC # BLD AUTO: 7.63 K/UL (ref 3.9–12.7)

## 2025-06-09 PROCEDURE — 83880 ASSAY OF NATRIURETIC PEPTIDE: CPT | Performed by: NURSE PRACTITIONER

## 2025-06-09 PROCEDURE — 84484 ASSAY OF TROPONIN QUANT: CPT | Performed by: NURSE PRACTITIONER

## 2025-06-09 PROCEDURE — 93010 ELECTROCARDIOGRAM REPORT: CPT | Mod: ,,, | Performed by: INTERNAL MEDICINE

## 2025-06-09 PROCEDURE — 81025 URINE PREGNANCY TEST: CPT | Performed by: EMERGENCY MEDICINE

## 2025-06-09 PROCEDURE — 80053 COMPREHEN METABOLIC PANEL: CPT | Performed by: NURSE PRACTITIONER

## 2025-06-09 PROCEDURE — 96374 THER/PROPH/DIAG INJ IV PUSH: CPT

## 2025-06-09 PROCEDURE — 85379 FIBRIN DEGRADATION QUANT: CPT | Performed by: NURSE PRACTITIONER

## 2025-06-09 PROCEDURE — 63600175 PHARM REV CODE 636 W HCPCS: Performed by: NURSE PRACTITIONER

## 2025-06-09 PROCEDURE — 25000003 PHARM REV CODE 250: Performed by: NURSE PRACTITIONER

## 2025-06-09 PROCEDURE — 83735 ASSAY OF MAGNESIUM: CPT | Performed by: NURSE PRACTITIONER

## 2025-06-09 PROCEDURE — 93005 ELECTROCARDIOGRAM TRACING: CPT

## 2025-06-09 PROCEDURE — 85025 COMPLETE CBC W/AUTO DIFF WBC: CPT | Performed by: NURSE PRACTITIONER

## 2025-06-09 PROCEDURE — 99285 EMERGENCY DEPT VISIT HI MDM: CPT | Mod: 25

## 2025-06-09 PROCEDURE — 36415 COLL VENOUS BLD VENIPUNCTURE: CPT | Performed by: NURSE PRACTITIONER

## 2025-06-09 PROCEDURE — 83690 ASSAY OF LIPASE: CPT | Performed by: NURSE PRACTITIONER

## 2025-06-09 PROCEDURE — 25500020 PHARM REV CODE 255

## 2025-06-09 PROCEDURE — 25000003 PHARM REV CODE 250: Performed by: EMERGENCY MEDICINE

## 2025-06-09 RX ORDER — ALUMINUM HYDROXIDE, MAGNESIUM HYDROXIDE, AND SIMETHICONE 1200; 120; 1200 MG/30ML; MG/30ML; MG/30ML
30 SUSPENSION ORAL ONCE
Status: COMPLETED | OUTPATIENT
Start: 2025-06-09 | End: 2025-06-09

## 2025-06-09 RX ORDER — LIDOCAINE HYDROCHLORIDE 20 MG/ML
15 SOLUTION OROPHARYNGEAL ONCE
Status: COMPLETED | OUTPATIENT
Start: 2025-06-09 | End: 2025-06-09

## 2025-06-09 RX ORDER — PANTOPRAZOLE SODIUM 40 MG/1
40 TABLET, DELAYED RELEASE ORAL DAILY
Qty: 30 TABLET | Refills: 0 | Status: SHIPPED | OUTPATIENT
Start: 2025-06-09 | End: 2026-06-09

## 2025-06-09 RX ORDER — POTASSIUM CHLORIDE 20 MEQ/1
40 TABLET, EXTENDED RELEASE ORAL
Status: COMPLETED | OUTPATIENT
Start: 2025-06-09 | End: 2025-06-09

## 2025-06-09 RX ORDER — PANTOPRAZOLE SODIUM 40 MG/10ML
40 INJECTION, POWDER, LYOPHILIZED, FOR SOLUTION INTRAVENOUS
Status: COMPLETED | OUTPATIENT
Start: 2025-06-09 | End: 2025-06-09

## 2025-06-09 RX ADMIN — LIDOCAINE HYDROCHLORIDE 15 ML: 20 SOLUTION ORAL at 09:06

## 2025-06-09 RX ADMIN — IOHEXOL 75 ML: 350 INJECTION, SOLUTION INTRAVENOUS at 08:06

## 2025-06-09 RX ADMIN — ALUMINUM HYDROXIDE, MAGNESIUM HYDROXIDE, AND DIMETHICONE 30 ML: 200; 20; 200 SUSPENSION ORAL at 09:06

## 2025-06-09 RX ADMIN — PANTOPRAZOLE SODIUM 40 MG: 40 INJECTION, POWDER, FOR SOLUTION INTRAVENOUS at 07:06

## 2025-06-09 RX ADMIN — POTASSIUM CHLORIDE 40 MEQ: 1500 TABLET, EXTENDED RELEASE ORAL at 09:06

## 2025-06-09 NOTE — ED NOTES
Pt states yesterday she started having neck pain that radiated to under her right shoulder. Reports chest pain is 'almost like indigestion', nearing the RUQ. Pain goes from a 3 to a 7 randomly.

## 2025-06-10 LAB
OHS QRS DURATION: 74 MS
OHS QTC CALCULATION: 457 MS

## 2025-06-10 NOTE — ED PROVIDER NOTES
Encounter Date: 2025       History     Chief Complaint   Patient presents with    Chest Pain     Pt recent dc from hospital after having meningitis. Pt c/o chest pain the began last night      Patient is a 29 y.o. female who presents to the ED 2025 with a chief complaint of Chest pain that started yesterday.  Patient has lot maybe it was from taking her Adderall and drinking too much caffeine so she skipped her Adderall and only drank a small amount of coffee today.  She denies any shortness of breath.  She states when she was walking around the [employer] store with her kids today she felt like she might fall down.  She felt suddenly weak and everything got a little blurry.  She did not fall to the ground or lose consciousness.  She states the pain is in her mid chest in the goes to the right in his also in her upper abdomen.  She has a history of cholecystectomy.  States she was recently hospitalized approximately a week ago for aseptic meningitis.  She is not currently on any medicines for this.  She is to follow up with Infectious Disease.  Other history includes seizures, MTHFR, Anemia, migraines.                Review of patient's allergies indicates:   Allergen Reactions    Oranges [orange]      ulcers    Privigen [immun glob g(igg)-pro-iga 0-50] Photosensitivity and Nausea And Vomiting     Had aseptic meningitis reaction     Past Medical History:   Diagnosis Date    Anemia     no current treatment    Epilepsy     Gallstones     HPV (human papilloma virus) infection     LGSIL on Pap smear of cervix     MTHFR (methylene THF reductase) deficiency and homocystinuria     Seizure disorder, complex partial     last seizure ; no medication    Seizures     Swine flu      Past Surgical History:   Procedure Laterality Date     SECTION N/A 3/8/2020    Procedure:  SECTION;  Surgeon: Brian Quijano MD;  Location: [facility] L&D;  Service: OB/GYN;  Laterality: N/A;     SECTION N/A 10/28/2021     Procedure:  SECTION;  Surgeon: Lucinda Rush MD;  [facility];  Service: OB/GYN;  Laterality: N/A;    CHOLECYSTECTOMY  2017    LAPAROSCOPIC CHOLECYSTECTOMY  2017    toe I&D      TONSILLECTOMY  2011    UPPER GASTROINTESTINAL ENDOSCOPY  2017    Showed H. Pylori    yifan dugan       Family History   Adopted: Yes   Problem Relation Name Age of Onset    No Known Problems Mother      No Known Problems Father       Social History[1]  Review of Systems   Constitutional:  Negative for chills and fever.   HENT:  Negative for sore throat.    Respiratory:  Negative for chest tightness and shortness of breath.    Cardiovascular:  Positive for chest pain.   Gastrointestinal:  Positive for abdominal pain. Negative for constipation, diarrhea, nausea and vomiting.   Genitourinary:  Negative for dysuria.   Musculoskeletal:  Negative for arthralgias and myalgias.   Skin:  Negative for rash and wound.   Neurological:  Negative for syncope.   Hematological:  Does not bruise/bleed easily.       Physical Exam     Initial Vitals   BP Pulse Resp Temp SpO2   25 1841 25 1809 25 1809 25 1809 25 1809   (!) 142/96 (!) 114 20 98.4 °F (36.9 °C) 99 %      MAP       --                Physical Exam    Nursing note and vitals reviewed.  Constitutional: Vital signs are normal. She appears well-developed and well-nourished.   HENT:   Head: Normocephalic and atraumatic.   Eyes: Pupils are equal, round, and reactive to light.   Neck: Neck supple.   Cardiovascular:  Normal rate, regular rhythm, normal heart sounds and intact distal pulses.     Exam reveals no gallop and no friction rub.       No murmur heard.  Pulmonary/Chest: Breath sounds normal. She has no wheezes. She has no rhonchi. She has no rales.   Abdominal: Abdomen is soft. Bowel sounds are normal. She exhibits no distension and no mass. There is abdominal tenderness in the epigastric area. There is no rebound and no guarding.    Musculoskeletal:      Cervical back: Neck supple.     Neurological: She is alert and oriented to person, place, and time. She has normal strength.   Skin: Skin is warm, dry and intact.   Psychiatric: She has a normal mood and affect. Her speech is normal and behavior is normal.         ED Course   Procedures  Labs Reviewed   COMPREHENSIVE METABOLIC PANEL - Abnormal       Result Value    Sodium 140      Potassium 3.4 (*)     Chloride 109      CO2 22 (*)     Glucose 106      BUN 6      Creatinine 0.7      Calcium 8.9      Protein Total 7.5      Albumin 4.0      Bilirubin Total 0.3      ALP 52      AST 27      ALT 46 (*)     Anion Gap 9      eGFR >60     CBC WITH DIFFERENTIAL - Abnormal    WBC 7.63      RBC 4.50      Hgb 13.9      Hct 41.3      MCV 92      MCH 30.9      MCHC 33.7      RDW 12.3      Platelet Count 374      MPV 10.4      Nucleated RBC 0      Neut % 66.0      Lymph % 25.2      Mono % 7.1      Eos % 0.4      Basophil % 0.5      Imm Grans % 0.8 (*)     Neut # 5.0      Lymph # 1.92      Mono # 0.54      Eos # 0.03      Baso # 0.04      Imm Grans # 0.06 (*)    NT-PRO NATRIURETIC PEPTIDE - Normal    NT-proBNP <15     D DIMER, QUANTITATIVE - Normal    D-Dimer <0.19     TROPONIN I HIGH SENSITIVITY - Normal    Troponin High Sensitive <3     MAGNESIUM - Normal    Magnesium 2.0     LIPASE - Normal    Lipase Level 15     CBC W/ AUTO DIFFERENTIAL    Narrative:     The following orders were created for panel order Complete Blood Count (CBC).  Procedure                               Abnormality         Status                     ---------                               -----------         ------                     CBC with Differential[[id number]]       Abnormal            Final result                 Please view results for these tests on the individual orders.   POCT URINE PREGNANCY    POC Preg Test, Ur Negative       Acceptable Yes       EKG Readings: (Independently Interpreted)   Initial Reading:  No STEMI. Rhythm: Normal Sinus Rhythm. Heart Rate: 109. Ectopy: No Ectopy. Conduction: Normal. ST Segments: Normal ST Segments. T Waves: Normal. Clinical Impression: Normal Sinus Rhythm Other Impression: No ST elevation or depression.       Imaging Results              CTA Chest Abdomen Pelvis (XPD) (Final result)  Result time 06/09/25 22:39:49      Final result by Fay Ernst MD (06/09/25 22:39:49)                   Impression:      No evidence of aortic aneurysm, dissection or vascular occlusion.    No acute abnormalities in the thorax, abdomen or pelvis as imaged.    Cholecystectomy.      Electronically signed by: Fay Ernst  Date:    06/09/2025  Time:    22:39               Narrative:    EXAMINATION:  CTA CHEST ABDOMEN PELVIS (XPD)    CLINICAL HISTORY:  Aortic aneurysm, known or suspected;    TECHNIQUE:  Low dose axial images, sagittal and coronal reformations were obtained from the thoracic inlet to the pubic symphysis following the IV administration of 75 mL of Omnipaque 350.    COMPARISON:  CT abdomen pelvis 05/20/2022.    FINDINGS:  Base of Neck: No significant abnormality.    Thoracic soft tissues: Unremarkable.    Aorta: Left-sided aortic arch.  There is no aortic aneurysm.  There is no significant atherosclerosis in the thoracic and abdominal aorta.  There is no evidence of dissection as imaged.    Heart: Normal in size.  No pericardial effusion.  No evidence of right ventricular strain.    Pulmonary vasculature: Pulmonary arteries distribute normally.  There is no evidence of pulmonary thromboemboli to the level of the segmental branches as imaged.  More peripheral thromboemboli are not reliably excluded.  There are four pulmonary veins.    Nadiya/Mediastinum: There is no pathologic jose eduardo enlargement.    Airways: Patent.    Lungs/Pleura: Clear lungs. No pleural effusion or thickening.    Esophagus: Unremarkable.    Liver: Normal size and attenuation. No focal lesions.    Gallbladder:  Cholecystectomy.    Bile Ducts: No dilatation.    Pancreas: No mass. No peripancreatic fat stranding.    Spleen: Normal.    Adrenals: Normal.    Kidneys/Ureters: Normal symmetric cortical enhancement.  No calculi, cortical mass or  hydroureteronephrosis.    Bladder: No wall thickening.    Reproductive organs: Uterus is unremarkable.  Follicles are noted within the ovaries.  No significant adnexal masses and there is no free fluid or adenopathy..    GI Tract/Mesentery: No evidence of bowel obstruction or inflammation.  The appendix is normal.    Peritoneal Space: No ascites or free air.    Retroperitoneum: No significant adenopathy.    Abdominal wall: Normal.    Vasculature: No aneurysm.    Bones: No acute fracture. No suspicious lytic or sclerotic lesions.                                       X-Ray Chest PA And Lateral (Final result)  Result time 06/09/25 22:08:18      Final result by Fay Ernst MD (06/09/25 22:08:18)                   Impression:      No acute abnormality.      Electronically signed by: Fay Ernst  Date:    06/09/2025  Time:    22:08               Narrative:    EXAMINATION:  XR CHEST PA AND LATERAL    CLINICAL HISTORY:  Epigastric pain    TECHNIQUE:  PA and lateral views of the chest were performed.    COMPARISON:  07/10/2023, 10/24/2021 chest x-ray    FINDINGS:  Cardiac silhouette is stable and nonenlarged.  The lungs appear clear.  There is no pleural effusion or pneumothorax.  Osseous structures are intact.                                       Medications   pantoprazole injection 40 mg (40 mg Intravenous Given 6/9/25 1915)   iohexoL (OMNIPAQUE 350) 350 mg iodine/mL injection (75 mLs Intravenous Given 6/9/25 2007)   aluminum-magnesium hydroxide-simethicone 200-200-20 mg/5 mL suspension 30 mL (30 mLs Oral Given 6/9/25 2157)     And   LIDOcaine viscous HCl 2% oral solution 15 mL (15 mLs Oral Given 6/9/25 2157)   potassium chloride SA CR tablet 40 mEq (40 mEq Oral Given 6/9/25 2157)      Medical Decision Making  CT scan shows no acute abnormalities laboratory evaluation revealed mild hypokalemia she is given potassium in the emergency department remainder within normal limits will release with Protonix for her epigastric pain recommend she return to ER for any worsened symptoms or new symptoms outpatient follow-up as scheduled    Amount and/or Complexity of Data Reviewed  Labs: ordered.  Radiology: ordered.    Risk  OTC drugs.  Prescription drug management.         APC / Resident Notes:   Patient is a 29 y.o. female who presents to the ED 06/09/2025 who underwent emergent evaluation for her chest and epigastric pain.  EKG without evidence of ischemia.  Mildly tachycardic.  D-dimer normal.  I doubt PE.  Troponin normal.  No evidence of ACS.  She is in no acute respiratory distress and vital signs normal.  She has mild epigastric tenderness on abdominal exam.  No lower abdominal tenderness.  She has no gallbladder and I do not think acute cholecystitis or choledocholithiasis.  Labs are remarkable.  Mild hypokalemia.  No other severe electrolyte derangements or dehydration noted.  Chest x-ray unremarkable.  No evidence of pneumonia or pneumothorax.  Lipase normal.  No evidence of pancreatitis.2100 CTA pending to rule out dissection or other emergent intra-abdominal process such as obstruction or pancreatitis.             ED Course as of 06/09/25 2350   Mon Jun 09, 2025 2126 Sign out to Dr. Soni [SARAK]      ED Course User Index  [JK] Lucinda Whitehead NP               Medical Decision Making:   Differential Diagnosis:   Dissection  Gastritis  PE  ACS             Clinical Impression:  Final diagnoses:  [R07.9] Chest pain  [R10.13] Epigastric pain          ED Disposition Condition    Discharge Stable          ED Prescriptions       Medication Sig Dispense Start Date End Date Auth. Provider    pantoprazole (PROTONIX) 40 MG tablet Take 1 tablet (40 mg total) by mouth once daily. 30 tablet 6/9/2025  2026 Matty Soni MD          Follow-up Information       Follow up With Specialties Details Why Contact Info    Clifford Page DO Family Medicine Call in 1 day for further evaluation and treatment [address] [facility]  [address]  [facility] [address]  [phone]                     [1]   Social History  Tobacco Use    Smoking status: Former     Current packs/day: 0.00     Average packs/day: 0.3 packs/day for 4.0 years (1.0 ttl pk-yrs)     Types: Cigarettes     Start date:      Quit date: 10/2019     Years since quittin.6    Smokeless tobacco: Never    Tobacco comments:     quit with pregnancy (2019)   Substance Use Topics    Alcohol use: Not Currently    Drug use: No        Matty Soni MD  [phone]       Matty Soni MD  [phone]

## 2025-07-01 ENCOUNTER — TELEPHONE (OUTPATIENT)
Dept: NEUROLOGY | Facility: CLINIC | Age: 30
End: 2025-07-01
Payer: MEDICAID

## 2025-07-01 NOTE — TELEPHONE ENCOUNTER
I left a message for the patient to let her know her appointment for tomorrow was rescheduled to 9:00 with Dr. Murrell. The patient was asked to call back with any questions she may have.

## 2025-08-20 ENCOUNTER — TELEPHONE (OUTPATIENT)
Dept: SPINE | Facility: CLINIC | Age: 30
End: 2025-08-20
Payer: MEDICAID

## 2025-08-20 DIAGNOSIS — M48.02 CERVICAL STENOSIS OF SPINE: Primary | ICD-10-CM

## 2025-08-21 ENCOUNTER — TELEPHONE (OUTPATIENT)
Dept: PAIN MEDICINE | Facility: CLINIC | Age: 30
End: 2025-08-21
Payer: MEDICAID

## 2025-08-21 DIAGNOSIS — M54.12 CERVICAL RADICULOPATHY: Primary | ICD-10-CM

## 2025-09-03 DIAGNOSIS — R29.818 TRANSIENT NEUROLOGICAL SYMPTOMS: Primary | ICD-10-CM

## (undated) DEVICE — NDL SPINAL SPINOCAN 22GX3.5

## (undated) DEVICE — DRESSING POST OP MEPILEX  AG 4X10

## (undated) DEVICE — COVER SURG LIGHT HANDLE

## (undated) DEVICE — TROCAR ENDOPATH XCEL 12X100MM

## (undated) DEVICE — APPLICATOR CHLORAPREP ORN 26ML

## (undated) DEVICE — DISSECTOR 5MM ENDOPATH

## (undated) DEVICE — SLEEVE SCD EXPRESS CALF MEDIUM

## (undated) DEVICE — SUT 0 VICRYL / UR6 (J603)

## (undated) DEVICE — BAG TISS RETRV MONARCH 10MM

## (undated) DEVICE — TROCAR KII BLLN 12MM 10CM

## (undated) DEVICE — BARRIER INTERCEED 3X4 4350

## (undated) DEVICE — ALCOHOL 70% ISOP RUBBING 4OZ

## (undated) DEVICE — CLIP ENDO LIGATION LARGE CLIPS

## (undated) DEVICE — ELECTRODE REM PLYHSV RETURN 9

## (undated) DEVICE — SEE MEDLINE ITEM 152622

## (undated) DEVICE — Device

## (undated) DEVICE — GLOVE SURG ULTRA TOUCH 6

## (undated) DEVICE — UNDERGLOVE BIOGEL PI SZ 6.5 LF

## (undated) DEVICE — SEE MEDLINE ITEM 152680

## (undated) DEVICE — SUT MONOCRYL 4-0 PS-2

## (undated) DEVICE — GAUZE SPONGE BULKEE 6X6.75IN

## (undated) DEVICE — CANNULA ENDOPATH XCEL 5X100MM

## (undated) DEVICE — BANDAGE ADHESIVE

## (undated) DEVICE — SCISSOR CURVED ENDOPATH 5MM

## (undated) DEVICE — CLOSURE SKIN STERI STRIP 1/2X4

## (undated) DEVICE — KIT ANTIFOG

## (undated) DEVICE — TROCAR ENDOPATH XCEL 5X100MM

## (undated) DEVICE — PACK CUSTOM ENDO CHOLO SLI